# Patient Record
Sex: MALE | Race: WHITE | NOT HISPANIC OR LATINO | Employment: OTHER | URBAN - METROPOLITAN AREA
[De-identification: names, ages, dates, MRNs, and addresses within clinical notes are randomized per-mention and may not be internally consistent; named-entity substitution may affect disease eponyms.]

---

## 2017-01-17 ENCOUNTER — APPOINTMENT (OUTPATIENT)
Dept: LAB | Age: 67
End: 2017-01-17
Payer: MEDICARE

## 2017-01-17 ENCOUNTER — TRANSCRIBE ORDERS (OUTPATIENT)
Dept: ADMINISTRATIVE | Age: 67
End: 2017-01-17

## 2017-01-17 DIAGNOSIS — I35.9 AORTIC VALVE DISORDERS: ICD-10-CM

## 2017-01-17 DIAGNOSIS — I35.9 AORTIC VALVE DISORDERS: Primary | ICD-10-CM

## 2017-01-17 LAB
INR PPP: 3.36 (ref 0.86–1.16)
PROTHROMBIN TIME: 33.3 SECONDS (ref 12–14.3)

## 2017-01-17 PROCEDURE — 36415 COLL VENOUS BLD VENIPUNCTURE: CPT

## 2017-01-17 PROCEDURE — 85610 PROTHROMBIN TIME: CPT

## 2017-02-07 ENCOUNTER — TRANSCRIBE ORDERS (OUTPATIENT)
Dept: ADMINISTRATIVE | Age: 67
End: 2017-02-07

## 2017-02-07 ENCOUNTER — APPOINTMENT (OUTPATIENT)
Dept: LAB | Age: 67
End: 2017-02-07
Payer: MEDICARE

## 2017-02-07 DIAGNOSIS — I35.9 AORTIC VALVE DISORDERS: Primary | ICD-10-CM

## 2017-02-07 DIAGNOSIS — I35.9 AORTIC VALVE DISORDERS: ICD-10-CM

## 2017-02-07 LAB
INR PPP: 3.04 (ref 0.86–1.16)
PROTHROMBIN TIME: 30.9 SECONDS (ref 12–14.3)

## 2017-02-07 PROCEDURE — 85610 PROTHROMBIN TIME: CPT

## 2017-02-07 PROCEDURE — 36415 COLL VENOUS BLD VENIPUNCTURE: CPT

## 2017-03-13 ENCOUNTER — TRANSCRIBE ORDERS (OUTPATIENT)
Dept: ADMINISTRATIVE | Age: 67
End: 2017-03-13

## 2017-03-13 ENCOUNTER — APPOINTMENT (OUTPATIENT)
Dept: LAB | Age: 67
End: 2017-03-13
Payer: MEDICARE

## 2017-03-13 DIAGNOSIS — I35.9 AORTIC VALVE DISORDERS: ICD-10-CM

## 2017-03-13 DIAGNOSIS — I35.9 AORTIC VALVE DISORDERS: Primary | ICD-10-CM

## 2017-03-13 LAB
INR PPP: 3.24 (ref 0.86–1.16)
PROTHROMBIN TIME: 32.4 SECONDS (ref 12–14.3)

## 2017-03-13 PROCEDURE — 85610 PROTHROMBIN TIME: CPT

## 2017-03-13 PROCEDURE — 36415 COLL VENOUS BLD VENIPUNCTURE: CPT

## 2017-04-03 ENCOUNTER — TRANSCRIBE ORDERS (OUTPATIENT)
Dept: ADMINISTRATIVE | Age: 67
End: 2017-04-03

## 2017-04-03 ENCOUNTER — APPOINTMENT (OUTPATIENT)
Dept: LAB | Age: 67
End: 2017-04-03
Payer: MEDICARE

## 2017-04-03 DIAGNOSIS — Z12.5 SPECIAL SCREENING FOR MALIGNANT NEOPLASM OF PROSTATE: ICD-10-CM

## 2017-04-03 DIAGNOSIS — I35.9 AORTIC VALVE DISORDERS: ICD-10-CM

## 2017-04-03 DIAGNOSIS — E78.5 OTHER AND UNSPECIFIED HYPERLIPIDEMIA: ICD-10-CM

## 2017-04-03 DIAGNOSIS — E13.9 DIABETES MELLITUS OF OTHER TYPE WITHOUT COMPLICATION: ICD-10-CM

## 2017-04-03 DIAGNOSIS — E13.9 DIABETES MELLITUS OF OTHER TYPE WITHOUT COMPLICATION: Primary | ICD-10-CM

## 2017-04-03 LAB
ALBUMIN SERPL BCP-MCNC: 3.7 G/DL (ref 3.5–5)
ALP SERPL-CCNC: 65 U/L (ref 46–116)
ALT SERPL W P-5'-P-CCNC: 65 U/L (ref 12–78)
ANION GAP SERPL CALCULATED.3IONS-SCNC: 8 MMOL/L (ref 4–13)
AST SERPL W P-5'-P-CCNC: 45 U/L (ref 5–45)
BILIRUB SERPL-MCNC: 0.88 MG/DL (ref 0.2–1)
BUN SERPL-MCNC: 10 MG/DL (ref 5–25)
CALCIUM SERPL-MCNC: 8.6 MG/DL (ref 8.3–10.1)
CHLORIDE SERPL-SCNC: 101 MMOL/L (ref 100–108)
CHOLEST SERPL-MCNC: 133 MG/DL (ref 50–200)
CO2 SERPL-SCNC: 25 MMOL/L (ref 21–32)
CREAT SERPL-MCNC: 0.96 MG/DL (ref 0.6–1.3)
EST. AVERAGE GLUCOSE BLD GHB EST-MCNC: 183 MG/DL
GFR SERPL CREATININE-BSD FRML MDRD: >60 ML/MIN/1.73SQ M
GLUCOSE P FAST SERPL-MCNC: 173 MG/DL (ref 65–99)
HBA1C MFR BLD: 8 % (ref 4.2–6.3)
HDLC SERPL-MCNC: 27 MG/DL (ref 40–60)
INR PPP: 4.01 (ref 0.86–1.16)
LDLC SERPL CALC-MCNC: 48 MG/DL (ref 0–100)
POTASSIUM SERPL-SCNC: 4.2 MMOL/L (ref 3.5–5.3)
PROT SERPL-MCNC: 7.3 G/DL (ref 6.4–8.2)
PROTHROMBIN TIME: 38.1 SECONDS (ref 12–14.3)
PSA SERPL-MCNC: 1 NG/ML (ref 0–4)
SODIUM SERPL-SCNC: 134 MMOL/L (ref 136–145)
TRIGL SERPL-MCNC: 291 MG/DL

## 2017-04-03 PROCEDURE — 36415 COLL VENOUS BLD VENIPUNCTURE: CPT

## 2017-04-03 PROCEDURE — 80053 COMPREHEN METABOLIC PANEL: CPT

## 2017-04-03 PROCEDURE — 83036 HEMOGLOBIN GLYCOSYLATED A1C: CPT

## 2017-04-03 PROCEDURE — G0103 PSA SCREENING: HCPCS

## 2017-04-03 PROCEDURE — 80061 LIPID PANEL: CPT

## 2017-04-03 PROCEDURE — 85610 PROTHROMBIN TIME: CPT

## 2017-04-13 ENCOUNTER — TRANSCRIBE ORDERS (OUTPATIENT)
Dept: ADMINISTRATIVE | Facility: HOSPITAL | Age: 67
End: 2017-04-13

## 2017-04-13 DIAGNOSIS — D49.0 PAROTID TUMOR: Primary | ICD-10-CM

## 2017-04-20 ENCOUNTER — HOSPITAL ENCOUNTER (OUTPATIENT)
Dept: RADIOLOGY | Age: 67
Discharge: HOME | End: 2017-04-20
Payer: MEDICARE

## 2017-04-20 DIAGNOSIS — D49.0 PAROTID TUMOR: ICD-10-CM

## 2017-04-20 PROCEDURE — 70491 CT SOFT TISSUE NECK W/DYE: CPT

## 2017-04-20 RX ADMIN — IOHEXOL 85 ML: 350 INJECTION, SOLUTION INTRAVENOUS at 08:36

## 2017-05-10 ENCOUNTER — TRANSCRIBE ORDERS (OUTPATIENT)
Dept: ADMINISTRATIVE | Age: 67
End: 2017-05-10

## 2017-05-10 ENCOUNTER — APPOINTMENT (OUTPATIENT)
Dept: LAB | Age: 67
End: 2017-05-10
Payer: MEDICARE

## 2017-05-10 DIAGNOSIS — I35.9 AORTIC VALVE DISORDERS: ICD-10-CM

## 2017-05-10 DIAGNOSIS — I35.9 AORTIC VALVE DISORDERS: Primary | ICD-10-CM

## 2017-05-10 LAB
INR PPP: 3.09 (ref 0.86–1.16)
PROTHROMBIN TIME: 32.3 SECONDS (ref 12.1–14.4)

## 2017-05-10 PROCEDURE — 85610 PROTHROMBIN TIME: CPT

## 2017-05-10 PROCEDURE — 36415 COLL VENOUS BLD VENIPUNCTURE: CPT

## 2017-06-05 ENCOUNTER — TRANSCRIBE ORDERS (OUTPATIENT)
Dept: ADMINISTRATIVE | Age: 67
End: 2017-06-05

## 2017-06-05 ENCOUNTER — APPOINTMENT (OUTPATIENT)
Dept: LAB | Age: 67
End: 2017-06-05
Payer: MEDICARE

## 2017-06-05 DIAGNOSIS — I35.9 AORTIC VALVE DISORDERS: ICD-10-CM

## 2017-06-05 DIAGNOSIS — I35.9 AORTIC VALVE DISORDERS: Primary | ICD-10-CM

## 2017-06-05 LAB
INR PPP: 3.17 (ref 0.86–1.16)
PROTHROMBIN TIME: 33 SECONDS (ref 12.1–14.4)

## 2017-06-05 PROCEDURE — 36415 COLL VENOUS BLD VENIPUNCTURE: CPT

## 2017-06-05 PROCEDURE — 85610 PROTHROMBIN TIME: CPT

## 2017-07-06 ENCOUNTER — TRANSCRIBE ORDERS (OUTPATIENT)
Dept: ADMINISTRATIVE | Age: 67
End: 2017-07-06

## 2017-07-06 ENCOUNTER — APPOINTMENT (OUTPATIENT)
Dept: LAB | Age: 67
End: 2017-07-06
Payer: MEDICARE

## 2017-07-06 DIAGNOSIS — I35.9 AORTIC VALVE DISORDERS: Primary | ICD-10-CM

## 2017-07-06 DIAGNOSIS — I35.9 AORTIC VALVE DISORDERS: ICD-10-CM

## 2017-07-06 LAB
INR PPP: 2.81 (ref 0.86–1.16)
PROTHROMBIN TIME: 30 SECONDS (ref 12.1–14.4)

## 2017-07-06 PROCEDURE — 85610 PROTHROMBIN TIME: CPT

## 2017-07-06 PROCEDURE — 36415 COLL VENOUS BLD VENIPUNCTURE: CPT

## 2017-07-31 ENCOUNTER — TRANSCRIBE ORDERS (OUTPATIENT)
Dept: ADMINISTRATIVE | Age: 67
End: 2017-07-31

## 2017-07-31 ENCOUNTER — APPOINTMENT (OUTPATIENT)
Dept: LAB | Age: 67
End: 2017-07-31
Payer: MEDICARE

## 2017-07-31 DIAGNOSIS — E13.9 OTHER SPECIFIED DIABETES MELLITUS: ICD-10-CM

## 2017-07-31 DIAGNOSIS — R53.83 FATIGUE, UNSPECIFIED TYPE: ICD-10-CM

## 2017-07-31 DIAGNOSIS — R53.83 FATIGUE, UNSPECIFIED TYPE: Primary | ICD-10-CM

## 2017-07-31 DIAGNOSIS — E78.3 HYPERCHYLOMICRONEMIA: ICD-10-CM

## 2017-07-31 LAB
ALBUMIN SERPL BCP-MCNC: 3.9 G/DL (ref 3.5–5)
ALP SERPL-CCNC: 57 U/L (ref 46–116)
ALT SERPL W P-5'-P-CCNC: 44 U/L (ref 12–78)
ANION GAP SERPL CALCULATED.3IONS-SCNC: 8 MMOL/L (ref 4–13)
AST SERPL W P-5'-P-CCNC: 34 U/L (ref 5–45)
BASOPHILS # BLD AUTO: 0.08 THOUSANDS/ΜL (ref 0–0.1)
BASOPHILS NFR BLD AUTO: 1 % (ref 0–1)
BILIRUB SERPL-MCNC: 0.76 MG/DL (ref 0.2–1)
BUN SERPL-MCNC: 11 MG/DL (ref 5–25)
CALCIUM SERPL-MCNC: 9 MG/DL (ref 8.3–10.1)
CHLORIDE SERPL-SCNC: 102 MMOL/L (ref 100–108)
CHOLEST SERPL-MCNC: 149 MG/DL (ref 50–200)
CO2 SERPL-SCNC: 26 MMOL/L (ref 21–32)
CREAT SERPL-MCNC: 1.01 MG/DL (ref 0.6–1.3)
EOSINOPHIL # BLD AUTO: 0.42 THOUSAND/ΜL (ref 0–0.61)
EOSINOPHIL NFR BLD AUTO: 5 % (ref 0–6)
ERYTHROCYTE [DISTWIDTH] IN BLOOD BY AUTOMATED COUNT: 14.7 % (ref 11.6–15.1)
EST. AVERAGE GLUCOSE BLD GHB EST-MCNC: 169 MG/DL
GFR SERPL CREATININE-BSD FRML MDRD: 77 ML/MIN/1.73SQ M
GLUCOSE P FAST SERPL-MCNC: 142 MG/DL (ref 65–99)
HBA1C MFR BLD: 7.5 % (ref 4.2–6.3)
HCT VFR BLD AUTO: 46.5 % (ref 36.5–49.3)
HDLC SERPL-MCNC: 31 MG/DL (ref 40–60)
HGB BLD-MCNC: 16 G/DL (ref 12–17)
INR PPP: 3.32 (ref 0.86–1.16)
LDLC SERPL CALC-MCNC: 55 MG/DL (ref 0–100)
LYMPHOCYTES # BLD AUTO: 2 THOUSANDS/ΜL (ref 0.6–4.47)
LYMPHOCYTES NFR BLD AUTO: 25 % (ref 14–44)
MCH RBC QN AUTO: 32.1 PG (ref 26.8–34.3)
MCHC RBC AUTO-ENTMCNC: 34.4 G/DL (ref 31.4–37.4)
MCV RBC AUTO: 93 FL (ref 82–98)
MONOCYTES # BLD AUTO: 1.16 THOUSAND/ΜL (ref 0.17–1.22)
MONOCYTES NFR BLD AUTO: 14 % (ref 4–12)
NEUTROPHILS # BLD AUTO: 4.45 THOUSANDS/ΜL (ref 1.85–7.62)
NEUTS SEG NFR BLD AUTO: 55 % (ref 43–75)
NRBC BLD AUTO-RTO: 0 /100 WBCS
PLATELET # BLD AUTO: 307 THOUSANDS/UL (ref 149–390)
PMV BLD AUTO: 10.6 FL (ref 8.9–12.7)
POTASSIUM SERPL-SCNC: 4.2 MMOL/L (ref 3.5–5.3)
PROT SERPL-MCNC: 7.6 G/DL (ref 6.4–8.2)
PROTHROMBIN TIME: 34.2 SECONDS (ref 12.1–14.4)
RBC # BLD AUTO: 4.98 MILLION/UL (ref 3.88–5.62)
SODIUM SERPL-SCNC: 136 MMOL/L (ref 136–145)
T4 SERPL-MCNC: 8.3 UG/DL (ref 4.7–13.3)
TRIGL SERPL-MCNC: 313 MG/DL
TSH SERPL DL<=0.05 MIU/L-ACNC: 2 UIU/ML (ref 0.36–3.74)
WBC # BLD AUTO: 8.14 THOUSAND/UL (ref 4.31–10.16)

## 2017-07-31 PROCEDURE — 83036 HEMOGLOBIN GLYCOSYLATED A1C: CPT

## 2017-07-31 PROCEDURE — 80061 LIPID PANEL: CPT

## 2017-07-31 PROCEDURE — 84436 ASSAY OF TOTAL THYROXINE: CPT

## 2017-07-31 PROCEDURE — 80053 COMPREHEN METABOLIC PANEL: CPT

## 2017-07-31 PROCEDURE — 84443 ASSAY THYROID STIM HORMONE: CPT

## 2017-07-31 PROCEDURE — 85610 PROTHROMBIN TIME: CPT

## 2017-07-31 PROCEDURE — 36415 COLL VENOUS BLD VENIPUNCTURE: CPT

## 2017-07-31 PROCEDURE — 85025 COMPLETE CBC W/AUTO DIFF WBC: CPT

## 2017-08-29 ENCOUNTER — APPOINTMENT (OUTPATIENT)
Dept: LAB | Age: 67
End: 2017-08-29
Payer: MEDICARE

## 2017-08-29 ENCOUNTER — TRANSCRIBE ORDERS (OUTPATIENT)
Dept: ADMINISTRATIVE | Age: 67
End: 2017-08-29

## 2017-08-29 DIAGNOSIS — O22.30 DEEP PHLEBOTHROMBOSIS, ANTEPARTUM, WITH DELIVERY (HCC): Primary | ICD-10-CM

## 2017-08-29 DIAGNOSIS — I82.409 DEEP PHLEBOTHROMBOSIS, ANTEPARTUM, WITH DELIVERY (HCC): Primary | ICD-10-CM

## 2017-08-29 DIAGNOSIS — O22.30 DEEP PHLEBOTHROMBOSIS, ANTEPARTUM, WITH DELIVERY (HCC): ICD-10-CM

## 2017-08-29 DIAGNOSIS — I82.409 DEEP PHLEBOTHROMBOSIS, ANTEPARTUM, WITH DELIVERY (HCC): ICD-10-CM

## 2017-08-29 LAB
INR PPP: 2.91 (ref 0.86–1.16)
PROTHROMBIN TIME: 30.8 SECONDS (ref 12.1–14.4)

## 2017-08-29 PROCEDURE — 85610 PROTHROMBIN TIME: CPT

## 2017-08-29 PROCEDURE — 36415 COLL VENOUS BLD VENIPUNCTURE: CPT

## 2017-09-25 ENCOUNTER — APPOINTMENT (OUTPATIENT)
Dept: LAB | Age: 67
End: 2017-09-25
Payer: MEDICARE

## 2017-09-25 ENCOUNTER — TRANSCRIBE ORDERS (OUTPATIENT)
Dept: ADMINISTRATIVE | Age: 67
End: 2017-09-25

## 2017-09-25 DIAGNOSIS — I48.91 ATRIAL FIBRILLATION, UNSPECIFIED TYPE (HCC): ICD-10-CM

## 2017-09-25 DIAGNOSIS — I48.91 ATRIAL FIBRILLATION, UNSPECIFIED TYPE (HCC): Primary | ICD-10-CM

## 2017-09-25 LAB
INR PPP: 3.18 (ref 0.86–1.16)
PROTHROMBIN TIME: 33.1 SECONDS (ref 12.1–14.4)

## 2017-09-25 PROCEDURE — 36415 COLL VENOUS BLD VENIPUNCTURE: CPT

## 2017-09-25 PROCEDURE — 85610 PROTHROMBIN TIME: CPT

## 2017-10-10 RX ORDER — FOLIC ACID/MULTIVIT,IRON,MINER .4-18-35
1 TABLET,CHEWABLE ORAL DAILY
COMMUNITY
End: 2019-09-18

## 2017-10-10 RX ORDER — TRIAMTERENE AND HYDROCHLOROTHIAZIDE 37.5; 25 MG/1; MG/1
1 CAPSULE ORAL EVERY MORNING
COMMUNITY
End: 2018-12-03 | Stop reason: SDUPTHER

## 2017-10-10 RX ORDER — WARFARIN SODIUM 2 MG/1
TABLET ORAL
Status: ON HOLD | COMMUNITY
End: 2017-10-18

## 2017-10-10 RX ORDER — OMEPRAZOLE 20 MG/1
20 CAPSULE, DELAYED RELEASE ORAL DAILY
COMMUNITY
End: 2019-01-15 | Stop reason: SDUPTHER

## 2017-10-10 RX ORDER — ASPIRIN 81 MG/1
81 TABLET ORAL DAILY
COMMUNITY

## 2017-10-10 RX ORDER — LISINOPRIL 20 MG/1
20 TABLET ORAL DAILY
COMMUNITY
End: 2019-03-08 | Stop reason: SDUPTHER

## 2017-10-10 RX ORDER — FENOFIBRATE 134 MG/1
134 CAPSULE ORAL
COMMUNITY
End: 2019-05-28 | Stop reason: SDUPTHER

## 2017-10-10 RX ORDER — SIMVASTATIN 40 MG
40 TABLET ORAL
COMMUNITY
End: 2019-04-04 | Stop reason: SDUPTHER

## 2017-10-10 NOTE — PRE-PROCEDURE INSTRUCTIONS
Pre-Surgery Instructions:   Medication Instructions    aspirin (ECOTRIN LOW STRENGTH) 81 mg EC tablet Instructed patient per Anesthesia Guidelines   fenofibrate micronized (LOFIBRA) 134 MG capsule Instructed patient per Anesthesia Guidelines   lisinopril (ZESTRIL) 20 mg tablet Instructed patient per Anesthesia Guidelines   metFORMIN (GLUCOPHAGE) 1000 MG tablet Patient was instructed to contact Physician for medication instruction   multivitamin-iron-minerals-folic acid (CENTRUM) chewable tablet Instructed patient per Anesthesia Guidelines   omeprazole (PriLOSEC) 20 mg delayed release capsule Instructed patient per Anesthesia Guidelines   simvastatin (ZOCOR) 40 mg tablet Instructed patient per Anesthesia Guidelines   triamterene-hydrochlorothiazide (DYAZIDE) 37 5-25 mg per capsule Instructed patient per Anesthesia Guidelines   warfarin (COUMADIN) 2 mg tablet Patient was instructed by Physician and understands      Bathing instructions given

## 2017-10-11 ENCOUNTER — TELEPHONE (OUTPATIENT)
Dept: PREADMISSION TESTING | Facility: HOSPITAL | Age: 67
End: 2017-10-11

## 2017-10-12 ENCOUNTER — TRANSCRIBE ORDERS (OUTPATIENT)
Dept: RADIOLOGY | Facility: CLINIC | Age: 67
End: 2017-10-12

## 2017-10-12 ENCOUNTER — APPOINTMENT (OUTPATIENT)
Dept: PREADMISSION TESTING | Facility: HOSPITAL | Age: 67
End: 2017-10-12
Payer: MEDICARE

## 2017-10-12 ENCOUNTER — APPOINTMENT (OUTPATIENT)
Dept: LAB | Facility: CLINIC | Age: 67
End: 2017-10-12
Payer: MEDICARE

## 2017-10-12 VITALS
HEART RATE: 86 BPM | WEIGHT: 210 LBS | DIASTOLIC BLOOD PRESSURE: 72 MMHG | TEMPERATURE: 98.2 F | HEIGHT: 71 IN | SYSTOLIC BLOOD PRESSURE: 129 MMHG | BODY MASS INDEX: 29.4 KG/M2

## 2017-10-12 DIAGNOSIS — D49.0 NEOPLASM OF PAROTID GLAND: ICD-10-CM

## 2017-10-12 DIAGNOSIS — D49.0 NEOPLASM OF PAROTID GLAND: Primary | ICD-10-CM

## 2017-10-12 LAB
ANION GAP SERPL CALCULATED.3IONS-SCNC: 9 MMOL/L (ref 4–13)
BASOPHILS # BLD AUTO: 0.06 THOUSANDS/ΜL (ref 0–0.1)
BASOPHILS NFR BLD AUTO: 1 % (ref 0–1)
BUN SERPL-MCNC: 17 MG/DL (ref 5–25)
CALCIUM SERPL-MCNC: 9.6 MG/DL (ref 8.3–10.1)
CHLORIDE SERPL-SCNC: 101 MMOL/L (ref 100–108)
CO2 SERPL-SCNC: 24 MMOL/L (ref 21–32)
CREAT SERPL-MCNC: 0.98 MG/DL (ref 0.6–1.3)
EOSINOPHIL # BLD AUTO: 0.28 THOUSAND/ΜL (ref 0–0.61)
EOSINOPHIL NFR BLD AUTO: 3 % (ref 0–6)
ERYTHROCYTE [DISTWIDTH] IN BLOOD BY AUTOMATED COUNT: 14.3 % (ref 11.6–15.1)
GFR SERPL CREATININE-BSD FRML MDRD: 80 ML/MIN/1.73SQ M
GLUCOSE SERPL-MCNC: 128 MG/DL (ref 65–140)
HCT VFR BLD AUTO: 46.9 % (ref 36.5–49.3)
HGB BLD-MCNC: 15.5 G/DL (ref 12–17)
LYMPHOCYTES # BLD AUTO: 2.22 THOUSANDS/ΜL (ref 0.6–4.47)
LYMPHOCYTES NFR BLD AUTO: 25 % (ref 14–44)
MCH RBC QN AUTO: 30.7 PG (ref 26.8–34.3)
MCHC RBC AUTO-ENTMCNC: 33 G/DL (ref 31.4–37.4)
MCV RBC AUTO: 93 FL (ref 82–98)
MONOCYTES # BLD AUTO: 1.03 THOUSAND/ΜL (ref 0.17–1.22)
MONOCYTES NFR BLD AUTO: 12 % (ref 4–12)
NEUTROPHILS # BLD AUTO: 5.26 THOUSANDS/ΜL (ref 1.85–7.62)
NEUTS SEG NFR BLD AUTO: 59 % (ref 43–75)
PLATELET # BLD AUTO: 314 THOUSANDS/UL (ref 149–390)
PMV BLD AUTO: 9.7 FL (ref 8.9–12.7)
POTASSIUM SERPL-SCNC: 4.1 MMOL/L (ref 3.5–5.3)
RBC # BLD AUTO: 5.05 MILLION/UL (ref 3.88–5.62)
SODIUM SERPL-SCNC: 134 MMOL/L (ref 136–145)
WBC # BLD AUTO: 8.85 THOUSAND/UL (ref 4.31–10.16)

## 2017-10-12 PROCEDURE — 80048 BASIC METABOLIC PNL TOTAL CA: CPT

## 2017-10-12 PROCEDURE — 85025 COMPLETE CBC W/AUTO DIFF WBC: CPT

## 2017-10-12 PROCEDURE — 36415 COLL VENOUS BLD VENIPUNCTURE: CPT

## 2017-10-12 NOTE — H&P
Pre-Adm History & Physical    Crystal Montero    78 y o   male  [id number]  DOS: 10-18-17  Provider: W Romayne Katherne Bade, PA-C  Surgeon:  Analilia Garner  Date: October 12, 2017    Assessment:  Right parotid mass  Diagnosis: Right parotid neoplasm    Plan:  Crystal Montero is scheduled for Right parotidectomy on 10/18/17 with [facility]  Testing from today's PAT visit will be reviewed prior to Anesthesia review DOS  CC: parotid mass    HPI  79 yo male noticed parotid mass on right side in April and had CT scan  Pt followed up with his family doctor several months later and was told results of report  Pt went to see Dr Analilia Garner who recommended surgery  Pt previously had left parotid removed several years ago    Historical Information   Past Medical History:   Diagnosis Date    Diabetes mellitus ([address] )     type 2    Diabetic neuropathic arthropathy (HCC)     causes weakness in LE and uses a cane as assist to walk    GERD (gastroesophageal reflux disease)     History of DVT (deep vein thrombosis) 2015    left LE    Hyperlipidemia     Spinal stenosis      Past Surgical History:   Procedure Laterality Date    COLONOSCOPY      PAROTIDECTOMY Left     ROTATOR CUFF REPAIR Bilateral     TONSILLECTOMY       Social History   History   Alcohol Use    Yes     Comment: socially     History   Drug Use No     History   Smoking Status    Current Every Day Smoker    Types: Pipe   Smokeless Tobacco    Never Used     Comment: occ cigarette     Family History   Problem Relation Age of Onset    Lupus Mother     Cirrhosis Father         Meds/Allergies   No Known Allergies    Current Outpatient Prescriptions:     aspirin (ECOTRIN LOW STRENGTH) 81 mg EC tablet, Take 81 mg by mouth daily, Disp: , Rfl:     cyclobenzaprine (FLEXERIL) 10 mg tablet, Take 1 tablet by mouth 3 (three) times a day as needed for muscle spasms, Disp: 21 tablet, Rfl: 0    fenofibrate micronized (LOFIBRA) 134 MG capsule, Take 134 mg by mouth every morning before breakfast, Disp: , Rfl:     HYDROcodone-acetaminophen (NORCO) 5-325 mg per tablet, Take 1 tablet by mouth every 6 (six) hours as needed for pain Max Daily Amount: 4 tablets, Disp: 20 tablet, Rfl: 0    lisinopril (ZESTRIL) 20 mg tablet, Take 20 mg by mouth daily, Disp: , Rfl:     metFORMIN (GLUCOPHAGE) 1000 MG tablet, Take 1,000 mg by mouth 2 (two) times a day with meals, Disp: , Rfl:     multivitamin-iron-minerals-folic acid (CENTRUM) chewable tablet, Chew 1 tablet daily, Disp: , Rfl:     omeprazole (PriLOSEC) 20 mg delayed release capsule, Take 20 mg by mouth daily, Disp: , Rfl:     simvastatin (ZOCOR) 40 mg tablet, Take 40 mg by mouth daily at bedtime, Disp: , Rfl:     triamterene-hydrochlorothiazide (DYAZIDE) 37 5-25 mg per capsule, Take 1 capsule by mouth every morning, Disp: , Rfl:     warfarin (COUMADIN) 2 mg tablet, Take by mouth daily T-Th-S-S takes 2 5mg    M-W-F takes 5 0 mg , Disp: , Rfl:     Review of Systems  Pt complains of back pain, bilat feet numbness, right foot weakness  Vitals:    10/12/17 1212   BP: 129/72   Pulse: 86   Temp: 98 2 °F (36 8 °C)     Physical Exam  GEN: NAD, A+OX3   HEENT: Normocephalic, atraumatic, PER EOMI, sclera anicteric, TM grey and intact B/L, Nares Patent, Septum midline, oropharynx normal   NECK: Supple with FROM, trachea midline, No adenopathy  Right parotid mass palpated  , Thyroid normal to palpation, No bruits  CARDIAC: regular rate & rhythm, S1 & S2 normal   No heaves, thrills, gallops or murmurs  LUNGS: Clear to auscultation, No Wheeze, Rales, or Rhonchi  ABDOMEN: flat, BS X 4, soft, Non-tender to palpation, No masses, no organomegaly, No R/R/G  EXTREMITIES: No evidence of cyanosis, clubbing or edema   Pulses +2 B/L LE, decreased sensation bilat lower extremities  NEURO: CN II-XII intact grossly, No sensory or motor deficits    Lab Results: pending    Lab Results   Component Value Date    GLUCOSE 153 (H) 11/15/2016    CALCIUM 9 0 07/31/2017     07/31/2017    K 4 2 07/31/2017    CO2 26 07/31/2017     07/31/2017    BUN 11 07/31/2017    CREATININE 1 01 07/31/2017     Lab Results   Component Value Date    WBC 8 14 07/31/2017    HGB 16 0 07/31/2017    HCT 46 5 07/31/2017    MCV 93 07/31/2017     07/31/2017     Lab Results   Component Value Date    ALT 44 07/31/2017    AST 34 07/31/2017    ALKPHOS 57 07/31/2017    BILITOT 0 76 07/31/2017

## 2017-10-17 ENCOUNTER — ANESTHESIA EVENT (OUTPATIENT)
Dept: PERIOP | Facility: HOSPITAL | Age: 67
End: 2017-10-17
Payer: MEDICARE

## 2017-10-18 ENCOUNTER — ANESTHESIA (OUTPATIENT)
Dept: PERIOP | Facility: HOSPITAL | Age: 67
End: 2017-10-18
Payer: MEDICARE

## 2017-10-18 ENCOUNTER — HOSPITAL ENCOUNTER (OUTPATIENT)
Facility: HOSPITAL | Age: 67
Setting detail: OUTPATIENT SURGERY
Discharge: HOME | End: 2017-10-18
Attending: SPECIALIST | Admitting: SPECIALIST
Payer: MEDICARE

## 2017-10-18 VITALS
DIASTOLIC BLOOD PRESSURE: 75 MMHG | OXYGEN SATURATION: 93 % | TEMPERATURE: 98.5 F | BODY MASS INDEX: 29.4 KG/M2 | RESPIRATION RATE: 16 BRPM | WEIGHT: 210 LBS | HEIGHT: 71 IN | SYSTOLIC BLOOD PRESSURE: 157 MMHG | HEART RATE: 86 BPM

## 2017-10-18 DIAGNOSIS — D49.0 PAROTID NEOPLASM: ICD-10-CM

## 2017-10-18 PROBLEM — D37.030 NEOPLASM OF UNCERTAIN BEHAVIOR OF PAROTID GLAND: Status: ACTIVE | Noted: 2017-10-18

## 2017-10-18 PROCEDURE — 88307 TISSUE EXAM BY PATHOLOGIST: CPT | Performed by: SPECIALIST

## 2017-10-18 RX ORDER — ONDANSETRON 2 MG/ML
INJECTION INTRAMUSCULAR; INTRAVENOUS AS NEEDED
Status: DISCONTINUED | OUTPATIENT
Start: 2017-10-18 | End: 2017-10-18 | Stop reason: SURG

## 2017-10-18 RX ORDER — LIDOCAINE HYDROCHLORIDE AND EPINEPHRINE 10; 10 MG/ML; UG/ML
INJECTION, SOLUTION INFILTRATION; PERINEURAL AS NEEDED
Status: DISCONTINUED | OUTPATIENT
Start: 2017-10-18 | End: 2017-10-18 | Stop reason: HOSPADM

## 2017-10-18 RX ORDER — FENTANYL CITRATE 50 UG/ML
INJECTION, SOLUTION INTRAMUSCULAR; INTRAVENOUS AS NEEDED
Status: DISCONTINUED | OUTPATIENT
Start: 2017-10-18 | End: 2017-10-18 | Stop reason: SURG

## 2017-10-18 RX ORDER — PROPOFOL 10 MG/ML
INJECTION, EMULSION INTRAVENOUS AS NEEDED
Status: DISCONTINUED | OUTPATIENT
Start: 2017-10-18 | End: 2017-10-18 | Stop reason: SURG

## 2017-10-18 RX ORDER — ACETAMINOPHEN 325 MG/1
TABLET ORAL
Qty: 30 TABLET | Refills: 0
Start: 2017-10-18

## 2017-10-18 RX ORDER — ACETAMINOPHEN 325 MG/1
650 TABLET ORAL EVERY 6 HOURS PRN
Status: DISCONTINUED | OUTPATIENT
Start: 2017-10-18 | End: 2017-10-18 | Stop reason: HOSPADM

## 2017-10-18 RX ORDER — FENTANYL CITRATE/PF 50 MCG/ML
25 SYRINGE (ML) INJECTION
Status: COMPLETED | OUTPATIENT
Start: 2017-10-18 | End: 2017-10-18

## 2017-10-18 RX ORDER — WARFARIN SODIUM 2.5 MG/1
2.5 TABLET ORAL
Start: 2017-10-20

## 2017-10-18 RX ORDER — LIDOCAINE HYDROCHLORIDE 10 MG/ML
INJECTION, SOLUTION INFILTRATION; PERINEURAL AS NEEDED
Status: DISCONTINUED | OUTPATIENT
Start: 2017-10-18 | End: 2017-10-18 | Stop reason: SURG

## 2017-10-18 RX ORDER — SODIUM CHLORIDE, SODIUM LACTATE, POTASSIUM CHLORIDE, CALCIUM CHLORIDE 600; 310; 30; 20 MG/100ML; MG/100ML; MG/100ML; MG/100ML
INJECTION, SOLUTION INTRAVENOUS CONTINUOUS PRN
Status: DISCONTINUED | OUTPATIENT
Start: 2017-10-18 | End: 2017-10-18 | Stop reason: SURG

## 2017-10-18 RX ORDER — HYDROCODONE BITARTRATE AND ACETAMINOPHEN 5; 325 MG/1; MG/1
2 TABLET ORAL EVERY 6 HOURS PRN
Qty: 20 TABLET | Refills: 0 | Status: SHIPPED | OUTPATIENT
Start: 2017-10-18 | End: 2017-10-28

## 2017-10-18 RX ORDER — METOCLOPRAMIDE HYDROCHLORIDE 5 MG/ML
10 INJECTION INTRAMUSCULAR; INTRAVENOUS ONCE AS NEEDED
Status: DISCONTINUED | OUTPATIENT
Start: 2017-10-18 | End: 2017-10-18 | Stop reason: HOSPADM

## 2017-10-18 RX ORDER — SODIUM CHLORIDE, SODIUM LACTATE, POTASSIUM CHLORIDE, CALCIUM CHLORIDE 600; 310; 30; 20 MG/100ML; MG/100ML; MG/100ML; MG/100ML
75 INJECTION, SOLUTION INTRAVENOUS CONTINUOUS
Status: DISCONTINUED | OUTPATIENT
Start: 2017-10-18 | End: 2017-10-18 | Stop reason: HOSPADM

## 2017-10-18 RX ORDER — ROCURONIUM BROMIDE 10 MG/ML
INJECTION, SOLUTION INTRAVENOUS AS NEEDED
Status: DISCONTINUED | OUTPATIENT
Start: 2017-10-18 | End: 2017-10-18 | Stop reason: SURG

## 2017-10-18 RX ORDER — MORPHINE SULFATE 10 MG/ML
4 INJECTION, SOLUTION INTRAMUSCULAR; INTRAVENOUS EVERY 4 HOURS PRN
Status: DISCONTINUED | OUTPATIENT
Start: 2017-10-18 | End: 2017-10-18 | Stop reason: HOSPADM

## 2017-10-18 RX ORDER — SUCCINYLCHOLINE/SOD CL,ISO/PF 100 MG/5ML
SYRINGE (ML) INTRAVENOUS AS NEEDED
Status: DISCONTINUED | OUTPATIENT
Start: 2017-10-18 | End: 2017-10-18 | Stop reason: SURG

## 2017-10-18 RX ORDER — MIDAZOLAM HYDROCHLORIDE 1 MG/ML
INJECTION INTRAMUSCULAR; INTRAVENOUS AS NEEDED
Status: DISCONTINUED | OUTPATIENT
Start: 2017-10-18 | End: 2017-10-18 | Stop reason: SURG

## 2017-10-18 RX ORDER — ONDANSETRON 2 MG/ML
4 INJECTION INTRAMUSCULAR; INTRAVENOUS ONCE AS NEEDED
Status: DISCONTINUED | OUTPATIENT
Start: 2017-10-18 | End: 2017-10-18 | Stop reason: HOSPADM

## 2017-10-18 RX ORDER — HYDROCODONE BITARTRATE AND ACETAMINOPHEN 5; 325 MG/1; MG/1
2 TABLET ORAL EVERY 6 HOURS PRN
Status: DISCONTINUED | OUTPATIENT
Start: 2017-10-18 | End: 2017-10-18 | Stop reason: HOSPADM

## 2017-10-18 RX ADMIN — DEXAMETHASONE SODIUM PHOSPHATE 10 MG: 10 INJECTION INTRAMUSCULAR; INTRAVENOUS at 10:12

## 2017-10-18 RX ADMIN — FENTANYL CITRATE 100 MCG: 50 INJECTION INTRAMUSCULAR; INTRAVENOUS at 10:01

## 2017-10-18 RX ADMIN — FENTANYL CITRATE 25 MCG: 50 INJECTION INTRAMUSCULAR; INTRAVENOUS at 13:15

## 2017-10-18 RX ADMIN — PROPOFOL 200 MG: 10 INJECTION, EMULSION INTRAVENOUS at 10:01

## 2017-10-18 RX ADMIN — ONDANSETRON 4 MG: 2 INJECTION INTRAMUSCULAR; INTRAVENOUS at 12:09

## 2017-10-18 RX ADMIN — FENTANYL CITRATE 25 MCG: 50 INJECTION INTRAMUSCULAR; INTRAVENOUS at 13:20

## 2017-10-18 RX ADMIN — FENTANYL CITRATE 25 MCG: 50 INJECTION INTRAMUSCULAR; INTRAVENOUS at 13:05

## 2017-10-18 RX ADMIN — LIDOCAINE HYDROCHLORIDE 50 MG: 10 INJECTION, SOLUTION INFILTRATION; PERINEURAL at 10:01

## 2017-10-18 RX ADMIN — REMIFENTANIL HYDROCHLORIDE 0.2 MCG/KG/MIN: 1 INJECTION, POWDER, LYOPHILIZED, FOR SOLUTION INTRAVENOUS at 10:20

## 2017-10-18 RX ADMIN — Medication 100 MG: at 10:01

## 2017-10-18 RX ADMIN — MIDAZOLAM HYDROCHLORIDE 2 MG: 1 INJECTION, SOLUTION INTRAMUSCULAR; INTRAVENOUS at 09:52

## 2017-10-18 RX ADMIN — HYDROCODONE BITARTRATE AND ACETAMINOPHEN 2 TABLET: 5; 325 TABLET ORAL at 14:00

## 2017-10-18 RX ADMIN — FENTANYL CITRATE 25 MCG: 50 INJECTION INTRAMUSCULAR; INTRAVENOUS at 13:10

## 2017-10-18 RX ADMIN — CEFAZOLIN SODIUM 2000 MG: 2 SOLUTION INTRAVENOUS at 10:09

## 2017-10-18 RX ADMIN — SODIUM CHLORIDE, SODIUM LACTATE, POTASSIUM CHLORIDE, AND CALCIUM CHLORIDE: .6; .31; .03; .02 INJECTION, SOLUTION INTRAVENOUS at 09:52

## 2017-10-18 RX ADMIN — ROCURONIUM BROMIDE 5 MG: 10 INJECTION, SOLUTION INTRAVENOUS at 10:01

## 2017-10-18 NOTE — OP NOTE
OPERATIVE REPORT  PATIENT NAME: Crystal Montero    :  1950  MRN: [id number]  Pt Location: [facility] OR ROOM 03    SURGERY DATE: 10/18/2017    Surgeon(s) and Role:     * Jesse Suárez MD - Primary     * Taylro Gonzales MD - Assisting    Preop Diagnosis:  Parotid neoplasm [D49 0], right    Post-Op Diagnosis Codes:     * Parotid neoplasm [D49 0], right    Procedure(s):  SUPERFICIAL PAROTIDECTOMY WITH NERVE DISSECTION AND PRESERVATION (Right)  EXCISION PAROTID TUMOR, DEEP LOBE  RIGHT FACIAL NERVE MONITORING 2 HOURS  Specimen(s):  ID Type Source Tests Collected by Time Destination   1 : Superficial Lobe Right Parotid gland Tissue Parotid gland TISSUE Leslie  Jesse Suárez MD 10/18/2017 1157    2 : Additional deep lobe tissue right Tissue Parotid gland TISSUE EXAM Jesse Suárez MD 10/18/2017 1201        Estimated Blood Loss:   Minimal    Drains:  7 mm LEYDI drain    Anesthesia Type:   General    Operative Indications:  78year old with history of Warthin's tumor, left parotid gland, previously resected, now with progression of multiple masses within the right parotid gland  As such, removal via superficial parotidectomy with nerve dissection and preservation is indicated  Operative Findings:  Multiple masses in inferior aspect of parotid gland, including a separate mass in deep lobe, just deep to buccal branches of facial nerve, removed  Facial nerve identified, dissected free, preserved  Complications:   None    Procedure and Technique:  El Ch was positively identified and transferred onto the operating table in the supine position  Appropriate monitoring devices were put in place, anesthesia was induced and the patient was intubated without difficulty  The operating room table was turned 90 degrees, and the patients right neck was examined  Palpation confirmed the presence of multiple masses in the inferior aspect of the parotid gland   An appropriate site for skin incision was demarcated anterior to the right ear, curving beneath and behind the lobule of the ear, down into the neck in curvilinear fashion  Before proceeding further, the timeout process was completed  Local anesthesia in the form of 1% lidocaine with 1/100,000 epinephrine was then injected to the marked site  Electrodes for facial nerve monitoring were put in place and monitoring was noted to be functioning appropriately  The patients neck was then prepped and draped in the usual sterile fashion  Skin incision was then made at the marked site using a 15 blade, which was used to continue dissection through subcutaneous tissues  A superficial skin flap was elevated using Bovie cautery, and this flap was held in a retracted position using 2-0 silk stitches  Dissection then continued down to the sternocleidomastoid muscle posteriorly using Bovie cautery  Superiorly dissection continued anterior to auricular cartilage down to the depth of the tympanomastoid suture line using Bovie cautery  The tail of the parotid gland was grasped using Allis forceps, and dissection continued by elevating the gland off the sternocleidomastoid muscle using mosquito forceps and bipolar cautery  Dissection continued down to the digastric muscle, and using the depth of the muscle as a landmark, dissection continued through parotid tissue using mosquito forceps and bipolar cautery, and the main trunk of the facial nerve was identified  Dissection continued distally along the main trunk and branches of the facial nerve using mosquito forceps and bipolar cautery  Dissection continued through parotid tissue anteriorly using mosquito forceps and bipolar cautery, and the superficial lobe of the parotid gland, with multiple masses  was removed, and sent to pathology for permanent section evaluation  The surgical site was irrigated with saline, which was suctioned free, and hemomstasis was accomplished using bipolar cautery      An additional tumor was evident deep to the buccal branch of the facial nerve  The buccal branch was carefully elevated off the mass using mosquito forceps and tenotomy scissors  Dissection was carried out around the mass using mosquito forceps and bipolar cautery, and this additional tumor was sent to pathology for permanent section evaluation  The surgical site was again irrigated with saline, which was suctioned free, and hemomstasis was accomplished using bipolar cautery  A 7  Tico Rink drain was placed through a separate stab incision  The surgical site was closed by re-approximating deeper tissue using 3-0 Vicryl and 4-0 Monocryl stitches  Skin itself was closed using a 4-0 Nylon stitch in a running fashion followed by Bacitracin ointment  Drapes were removed, and anesthesia was reversed  The patient was awakened, extubated and taken to the recovery room in stable condition  All counts were correct at the end of the case, and no complications were encountered       I was present for the entire procedure    Patient Disposition:  PACU  and extubated and stable    SIGNATURE: Guillermina Dee MD  DATE: October 18, 2017  TIME: 12:35 PM

## 2017-10-18 NOTE — DISCHARGE INSTRUCTIONS
Parotidectomy   WHAT YOU NEED TO KNOW:   A parotidectomy is surgery to remove part or all of your parotid gland  Your parotid glands are found in your cheeks, over your jaw, and in front of your ears  They release saliva into your mouth through the parotid duct  Saliva helps break down food and protect your teeth  DISCHARGE INSTRUCTIONS:   Medicines:   · Prescription pain medicine  may be given  Ask your healthcare provider how to take this medicine safely  · Take your medicine as directed  Contact your healthcare provider if you think your medicine is not helping or if you have side effects  Tell him if you are allergic to any medicine  Keep a list of the medicines, vitamins, and herbs you take  Include the amounts, and when and why you take them  Bring the list or the pill bottles to follow-up visits  Carry your medicine list with you in case of an emergency  Follow up with your healthcare provider or surgeon as directed: You may need to return to have your stitches removed or imaging tests done  Write down your questions so you remember to ask them during your visits  Wound care:  Care for your wound as directed  You may need to carefully wash the wound with soap and water  Dry the area and put on new, clean bandages as directed  Change your bandages when they get wet or dirty  Eat a variety of healthy foods:  Healthy foods include fruits, vegetables, whole-grain breads, low-fat dairy products, beans, lean meats, and fish  Ask if you need to be on a special diet  Drink liquids as directed:  Ask your healthcare provider how much liquid to drink each day and which liquids are best for you  This will help flush your mouth and keep your saliva flowing  Contact your healthcare provider or surgeon if:   · You have a fever  · Your wound is red, swollen, or draining pus  · Your pain does not get better, even after you take medicine  · Your face becomes red and sweaty when you eat      · You have new trouble chewing  · Your mouth and eyes are very dry  · You feel a new lump in your face or neck  · You have questions or concerns about your condition or care  Seek care immediately or call 911 if:   · Blood soaks through your bandage  · You have trouble swallowing  · You have sudden numbness in your face  · You have severe pain  · You cannot move part of your face  © 2017 [employer] Information is for End User's use only and may not be sold, redistributed or otherwise used for commercial purposes  All illustrations and images included in CareNotes® are the copyrighted property of [employer]  or [employer]  The above information is an  only  It is not intended as medical advice for individual conditions or treatments  Talk to your doctor, nurse or pharmacist before following any medical regimen to see if it is safe and effective for you  Salbador-Crain Drain Care   WHAT YOU NEED TO KNOW:   A Salbador-Crain (LEYDI) drain is used to remove fluids that build up in an area of your body after surgery  The LEYDI drain is a bulb shaped device connected to a tube  One end of the tube is placed inside you during surgery  The other end comes out through a small cut in your skin  The bulb is connected to this end  You may have a stitch to hold the tube in place  DISCHARGE INSTRUCTIONS:   Return to the emergency department if:   · Your LEYDI drain breaks or comes out  · You have cloudy yellow or brown drainage from your LEYDI drain site, or the drainage smells bad  Contact your healthcare provider if:   · You drain less than 30 milliliters (2 tablespoons) in 24 hours  This may mean your drain can be removed  · You suddenly stop draining fluid or think your LEYDI drain is blocked  · You have a fever higher than 101 5°F (38 6°C)  · You have increased pain, redness, or swelling around the drain site       · You have questions about your LEYDI drain care   How a Salbador-Crain drain works: The LEYDI drain removes fluids by creating suction in the tube  The bulb is squeezed flat and connected to the tube that sticks out of your body  The bulb expands as it fills with fluid  How to change the bandage around your Salbador-Crain drain:  If you have a bandage, change it once a day  You may need to change your bandage more than once a day if it gets completely wet  · Wash your hands with soap and water  · Loosen the tape and gently remove the old bandage  Throw the old bandage into a plastic trash bag  · Use soap and water or saline solution to clean your LEYDI drain site  Dip a cotton swab or gauze pad in the solution and gently clean your skin  · Pat the area dry  · Place a new bandage on your LEYDI drain site and secure it to your skin with surgical tape  · Wash your hands  How to empty the Salbador-Crain drain:  Empty the bulb when it is half full or every 8 to 12 hours  · Wash your hands with soap and water  · Remove the plug from the bulb  · Pour the fluid into a measuring cup  · Clean the plug with an alcohol swab or a cotton ball dipped in rubbing alcohol  · Squeeze the bulb flat and put the plug back in  The bulb should stay flat until it starts to fill with fluid again  · Measure the amount of fluid you pour out  Write down how much fluid you empty from the LEYDI drain and the date and time you collected it  · Flush the fluid down the toilet  Wash your hands  Clear clogged tubing:  Use the following steps to clear your Salbador-Crain tubing:  · Hold the tubing between your thumb and first finger at the place closest to your skin  This hand will prevent the tube from being pulled out of your skin  · Use your other thumb and first finger to slide the clog down the tubing toward the bulb  You may have to repeat the sliding until the tubing is unclogged    Salbador-Crain drain removal:  The amount of fluid that you drain will decrease as your wound heals  The LEYDI drain usually is removed when less than 30 milliliters (2 tablespoons) is collected in 24 hours  Ask your healthcare provider when and how your LEYDI drain will be removed  Follow up with your healthcare provider as directed:  Write down your questions so you remember to ask them during your visits  © 2017 [address] [employer] Information is for End User's use only and may not be sold, redistributed or otherwise used for commercial purposes  All illustrations and images included in CareNotes® are the copyrighted property of [employer]  or [employer]  The above information is an  only  It is not intended as medical advice for individual conditions or treatments  Talk to your doctor, nurse or pharmacist before following any medical regimen to see if it is safe and effective for you  Call Dr Jimmie Vela with any questions or concerns: office ; mobile  (urgent issues)

## 2017-10-18 NOTE — ANESTHESIA POSTPROCEDURE EVALUATION
Post-Op Assessment Note      CV Status:  Stable    Mental Status:  Alert and awake    Hydration Status:  Euvolemic    PONV Controlled:  Controlled    Airway Patency:  Patent    Post Op Vitals Reviewed: Yes          Staff: CRNA           BP   176/80   Temp  97 4   Pulse  91   Resp   18   SpO2   98 on 3lnc

## 2017-10-18 NOTE — ANESTHESIA PREPROCEDURE EVALUATION
Review of Systems/Medical History  Patient summary reviewed  Chart reviewed      Cardiovascular  EKG reviewed, Exercise tolerance: good,     Pulmonary  Smoker cigarette smoker more than 10 packs per year , ,        GI/Hepatic            Endo/Other  Diabetes well controlled type 2 Oral agent,      GYN       Hematology  Negative hematology ROS      Musculoskeletal  Negative musculoskeletal ROS        Neurology  Negative neurology ROS      Psychology   Negative psychology ROS            Physical Exam    Airway    Mallampati score: II  TM Distance: >3 FB  Neck ROM: full     Dental   Comment: No loose,     Cardiovascular  Rhythm: regular, Rate: normal,     Pulmonary  Breath sounds clear to auscultation,     Other Findings        Anesthesia Plan  ASA Score- 2       Anesthesia Type- general with ASA Monitors  Additional Monitors:   Airway Plan: ETT  Induction- intravenous  Informed Consent-   I personally reviewed this patient with the CRNA  Discussed and agreed on the Anesthesia Plan with the CRNA  Lawanda Solo

## 2017-10-23 ENCOUNTER — APPOINTMENT (OUTPATIENT)
Dept: LAB | Age: 67
End: 2017-10-23
Payer: MEDICARE

## 2017-10-23 ENCOUNTER — TRANSCRIBE ORDERS (OUTPATIENT)
Dept: ADMINISTRATIVE | Age: 67
End: 2017-10-23

## 2017-10-23 DIAGNOSIS — O22.30 DEEP PHLEBOTHROMBOSIS, ANTEPARTUM, WITH DELIVERY (HCC): Primary | ICD-10-CM

## 2017-10-23 DIAGNOSIS — I82.409 DEEP PHLEBOTHROMBOSIS, ANTEPARTUM, WITH DELIVERY (HCC): Primary | ICD-10-CM

## 2017-10-23 DIAGNOSIS — O22.30 DEEP PHLEBOTHROMBOSIS, ANTEPARTUM, WITH DELIVERY (HCC): ICD-10-CM

## 2017-10-23 DIAGNOSIS — I82.409 DEEP PHLEBOTHROMBOSIS, ANTEPARTUM, WITH DELIVERY (HCC): ICD-10-CM

## 2017-10-23 LAB
INR PPP: 1.46 (ref 0.86–1.16)
PROTHROMBIN TIME: 17.8 SECONDS (ref 12.1–14.4)

## 2017-10-23 PROCEDURE — 85610 PROTHROMBIN TIME: CPT

## 2017-10-23 PROCEDURE — 36415 COLL VENOUS BLD VENIPUNCTURE: CPT

## 2017-11-06 ENCOUNTER — APPOINTMENT (OUTPATIENT)
Dept: LAB | Age: 67
End: 2017-11-06
Payer: MEDICARE

## 2017-11-06 ENCOUNTER — TRANSCRIBE ORDERS (OUTPATIENT)
Dept: ADMINISTRATIVE | Age: 67
End: 2017-11-06

## 2017-11-06 DIAGNOSIS — I82.409 DEEP PHLEBOTHROMBOSIS, ANTEPARTUM, WITH DELIVERY (HCC): ICD-10-CM

## 2017-11-06 DIAGNOSIS — I82.409 DEEP PHLEBOTHROMBOSIS, ANTEPARTUM, WITH DELIVERY (HCC): Primary | ICD-10-CM

## 2017-11-06 DIAGNOSIS — O22.30 DEEP PHLEBOTHROMBOSIS, ANTEPARTUM, WITH DELIVERY (HCC): ICD-10-CM

## 2017-11-06 DIAGNOSIS — O22.30 DEEP PHLEBOTHROMBOSIS, ANTEPARTUM, WITH DELIVERY (HCC): Primary | ICD-10-CM

## 2017-11-06 LAB
INR PPP: 2.81 (ref 0.86–1.16)
PROTHROMBIN TIME: 30 SECONDS (ref 12.1–14.4)

## 2017-11-06 PROCEDURE — 36415 COLL VENOUS BLD VENIPUNCTURE: CPT

## 2017-11-06 PROCEDURE — 85610 PROTHROMBIN TIME: CPT

## 2017-12-04 ENCOUNTER — APPOINTMENT (OUTPATIENT)
Dept: LAB | Age: 67
End: 2017-12-04
Payer: MEDICARE

## 2017-12-04 ENCOUNTER — TRANSCRIBE ORDERS (OUTPATIENT)
Dept: ADMINISTRATIVE | Age: 67
End: 2017-12-04

## 2017-12-04 DIAGNOSIS — I82.4Y9 DEEP VEIN THROMBOSIS (DVT) OF PROXIMAL LOWER EXTREMITY, UNSPECIFIED CHRONICITY, UNSPECIFIED LATERALITY (HCC): ICD-10-CM

## 2017-12-04 DIAGNOSIS — I82.4Y9 DEEP VEIN THROMBOSIS (DVT) OF PROXIMAL LOWER EXTREMITY, UNSPECIFIED CHRONICITY, UNSPECIFIED LATERALITY (HCC): Primary | ICD-10-CM

## 2017-12-04 DIAGNOSIS — E13.40 OTHER SPECIFIED DIABETES MELLITUS WITH DIABETIC NEUROPATHY, UNSPECIFIED LONG TERM INSULIN USE STATUS: ICD-10-CM

## 2017-12-04 DIAGNOSIS — E13.40 OTHER SPECIFIED DIABETES MELLITUS WITH DIABETIC NEUROPATHY, UNSPECIFIED LONG TERM INSULIN USE STATUS: Primary | ICD-10-CM

## 2017-12-04 LAB
ALBUMIN SERPL BCP-MCNC: 4 G/DL (ref 3.5–5)
ALP SERPL-CCNC: 56 U/L (ref 46–116)
ALT SERPL W P-5'-P-CCNC: 40 U/L (ref 12–78)
ANION GAP SERPL CALCULATED.3IONS-SCNC: 8 MMOL/L (ref 4–13)
AST SERPL W P-5'-P-CCNC: 26 U/L (ref 5–45)
BILIRUB SERPL-MCNC: 0.72 MG/DL (ref 0.2–1)
BUN SERPL-MCNC: 12 MG/DL (ref 5–25)
CALCIUM SERPL-MCNC: 8.9 MG/DL (ref 8.3–10.1)
CHLORIDE SERPL-SCNC: 102 MMOL/L (ref 100–108)
CO2 SERPL-SCNC: 24 MMOL/L (ref 21–32)
CREAT SERPL-MCNC: 1.01 MG/DL (ref 0.6–1.3)
CREAT UR-MCNC: 36.4 MG/DL
EST. AVERAGE GLUCOSE BLD GHB EST-MCNC: 166 MG/DL
GFR SERPL CREATININE-BSD FRML MDRD: 77 ML/MIN/1.73SQ M
GLUCOSE P FAST SERPL-MCNC: 152 MG/DL (ref 65–99)
HBA1C MFR BLD: 7.4 % (ref 4.2–6.3)
INR PPP: 3.84 (ref 0.86–1.16)
MICROALBUMIN UR-MCNC: 9.8 MG/L (ref 0–20)
MICROALBUMIN/CREAT 24H UR: 27 MG/G CREATININE (ref 0–30)
POTASSIUM SERPL-SCNC: 4.1 MMOL/L (ref 3.5–5.3)
PROT SERPL-MCNC: 7.4 G/DL (ref 6.4–8.2)
PROTHROMBIN TIME: 38.4 SECONDS (ref 12.1–14.4)
SODIUM SERPL-SCNC: 134 MMOL/L (ref 136–145)

## 2017-12-04 PROCEDURE — 80053 COMPREHEN METABOLIC PANEL: CPT

## 2017-12-04 PROCEDURE — 82570 ASSAY OF URINE CREATININE: CPT

## 2017-12-04 PROCEDURE — 36415 COLL VENOUS BLD VENIPUNCTURE: CPT

## 2017-12-04 PROCEDURE — 83036 HEMOGLOBIN GLYCOSYLATED A1C: CPT

## 2017-12-04 PROCEDURE — 85610 PROTHROMBIN TIME: CPT

## 2017-12-04 PROCEDURE — 82043 UR ALBUMIN QUANTITATIVE: CPT

## 2018-01-02 ENCOUNTER — APPOINTMENT (OUTPATIENT)
Dept: LAB | Age: 68
End: 2018-01-02
Payer: MEDICARE

## 2018-01-02 ENCOUNTER — TRANSCRIBE ORDERS (OUTPATIENT)
Dept: ADMINISTRATIVE | Age: 68
End: 2018-01-02

## 2018-01-02 DIAGNOSIS — I48.91 ATRIAL FIBRILLATION, UNSPECIFIED TYPE (HCC): Primary | ICD-10-CM

## 2018-01-02 DIAGNOSIS — I48.91 ATRIAL FIBRILLATION, UNSPECIFIED TYPE (HCC): ICD-10-CM

## 2018-01-02 LAB
INR PPP: 3.15 (ref 0.86–1.16)
PROTHROMBIN TIME: 32.8 SECONDS (ref 12.1–14.4)

## 2018-01-02 PROCEDURE — 85610 PROTHROMBIN TIME: CPT

## 2018-01-02 PROCEDURE — 36415 COLL VENOUS BLD VENIPUNCTURE: CPT

## 2018-01-29 ENCOUNTER — APPOINTMENT (OUTPATIENT)
Dept: LAB | Age: 68
End: 2018-01-29
Payer: MEDICARE

## 2018-01-29 DIAGNOSIS — I48.91 ATRIAL FIBRILLATION, UNSPECIFIED TYPE (HCC): ICD-10-CM

## 2018-01-29 LAB
INR PPP: 2.88 (ref 0.86–1.16)
PROTHROMBIN TIME: 30.6 SECONDS (ref 12.1–14.4)

## 2018-01-29 PROCEDURE — 36415 COLL VENOUS BLD VENIPUNCTURE: CPT

## 2018-01-29 PROCEDURE — 85610 PROTHROMBIN TIME: CPT

## 2018-02-27 ENCOUNTER — TRANSCRIBE ORDERS (OUTPATIENT)
Dept: ADMINISTRATIVE | Age: 68
End: 2018-02-27

## 2018-02-27 ENCOUNTER — APPOINTMENT (OUTPATIENT)
Dept: LAB | Age: 68
End: 2018-02-27
Payer: MEDICARE

## 2018-02-27 DIAGNOSIS — O22.30 DVT (DEEP VEIN THROMBOSIS) IN PREGNANCY: ICD-10-CM

## 2018-02-27 DIAGNOSIS — I82.90 DEEP VEIN THROMBOSIS (DVT) OF NON-EXTREMITY VEIN, UNSPECIFIED CHRONICITY: ICD-10-CM

## 2018-02-27 DIAGNOSIS — I82.90 DEEP VEIN THROMBOSIS (DVT) OF NON-EXTREMITY VEIN, UNSPECIFIED CHRONICITY: Primary | ICD-10-CM

## 2018-02-27 LAB
INR PPP: 2.84 (ref 0.86–1.16)
PROTHROMBIN TIME: 30.2 SECONDS (ref 12.1–14.4)

## 2018-02-27 PROCEDURE — 85610 PROTHROMBIN TIME: CPT

## 2018-02-27 PROCEDURE — 36415 COLL VENOUS BLD VENIPUNCTURE: CPT

## 2018-03-26 ENCOUNTER — APPOINTMENT (OUTPATIENT)
Dept: LAB | Age: 68
End: 2018-03-26
Payer: MEDICARE

## 2018-03-26 ENCOUNTER — TRANSCRIBE ORDERS (OUTPATIENT)
Dept: ADMINISTRATIVE | Age: 68
End: 2018-03-26

## 2018-03-26 DIAGNOSIS — E13.8 DIABETES MELLITUS OF OTHER TYPE WITH COMPLICATION, UNSPECIFIED LONG TERM INSULIN USE STATUS: Primary | ICD-10-CM

## 2018-03-26 DIAGNOSIS — E13.8 DIABETES MELLITUS OF OTHER TYPE WITH COMPLICATION, UNSPECIFIED LONG TERM INSULIN USE STATUS: ICD-10-CM

## 2018-03-26 DIAGNOSIS — O22.30 DVT (DEEP VEIN THROMBOSIS) IN PREGNANCY: ICD-10-CM

## 2018-03-26 DIAGNOSIS — I82.90 DEEP VEIN THROMBOSIS (DVT) OF NON-EXTREMITY VEIN, UNSPECIFIED CHRONICITY: ICD-10-CM

## 2018-03-26 LAB
ALBUMIN SERPL BCP-MCNC: 3.9 G/DL (ref 3.5–5)
ALP SERPL-CCNC: 69 U/L (ref 46–116)
ALT SERPL W P-5'-P-CCNC: 51 U/L (ref 12–78)
ANION GAP SERPL CALCULATED.3IONS-SCNC: 4 MMOL/L (ref 4–13)
AST SERPL W P-5'-P-CCNC: 33 U/L (ref 5–45)
BILIRUB SERPL-MCNC: 0.52 MG/DL (ref 0.2–1)
BUN SERPL-MCNC: 11 MG/DL (ref 5–25)
CALCIUM SERPL-MCNC: 8.8 MG/DL (ref 8.3–10.1)
CHLORIDE SERPL-SCNC: 104 MMOL/L (ref 100–108)
CO2 SERPL-SCNC: 25 MMOL/L (ref 21–32)
CREAT SERPL-MCNC: 1.07 MG/DL (ref 0.6–1.3)
EST. AVERAGE GLUCOSE BLD GHB EST-MCNC: 183 MG/DL
GFR SERPL CREATININE-BSD FRML MDRD: 72 ML/MIN/1.73SQ M
GLUCOSE P FAST SERPL-MCNC: 177 MG/DL (ref 65–99)
HBA1C MFR BLD: 8 % (ref 4.2–6.3)
INR PPP: 3.63 (ref 0.86–1.16)
POTASSIUM SERPL-SCNC: 4.4 MMOL/L (ref 3.5–5.3)
PROT SERPL-MCNC: 7.7 G/DL (ref 6.4–8.2)
PROTHROMBIN TIME: 36.7 SECONDS (ref 12.1–14.4)
SODIUM SERPL-SCNC: 133 MMOL/L (ref 136–145)

## 2018-03-26 PROCEDURE — 80053 COMPREHEN METABOLIC PANEL: CPT

## 2018-03-26 PROCEDURE — 85610 PROTHROMBIN TIME: CPT

## 2018-03-26 PROCEDURE — 36415 COLL VENOUS BLD VENIPUNCTURE: CPT

## 2018-03-26 PROCEDURE — 83036 HEMOGLOBIN GLYCOSYLATED A1C: CPT

## 2018-04-20 ENCOUNTER — APPOINTMENT (OUTPATIENT)
Dept: LAB | Age: 68
End: 2018-04-20
Payer: MEDICARE

## 2018-04-20 ENCOUNTER — TRANSCRIBE ORDERS (OUTPATIENT)
Dept: ADMINISTRATIVE | Age: 68
End: 2018-04-20

## 2018-04-20 DIAGNOSIS — I82.4Y9 DEEP VEIN THROMBOSIS (DVT) OF PROXIMAL LOWER EXTREMITY, UNSPECIFIED CHRONICITY, UNSPECIFIED LATERALITY (HCC): ICD-10-CM

## 2018-04-20 DIAGNOSIS — I82.4Y9 DEEP VEIN THROMBOSIS (DVT) OF PROXIMAL LOWER EXTREMITY, UNSPECIFIED CHRONICITY, UNSPECIFIED LATERALITY (HCC): Primary | ICD-10-CM

## 2018-04-20 LAB
INR PPP: 2.93 (ref 0.86–1.16)
PROTHROMBIN TIME: 31 SECONDS (ref 12.1–14.4)

## 2018-04-20 PROCEDURE — 36415 COLL VENOUS BLD VENIPUNCTURE: CPT

## 2018-04-20 PROCEDURE — 85610 PROTHROMBIN TIME: CPT

## 2018-05-07 LAB
LEFT EYE DIABETIC RETINOPATHY: NORMAL
RIGHT EYE DIABETIC RETINOPATHY: NORMAL

## 2018-05-18 ENCOUNTER — APPOINTMENT (OUTPATIENT)
Dept: LAB | Age: 68
End: 2018-05-18
Payer: MEDICARE

## 2018-05-18 DIAGNOSIS — I82.4Y9 DEEP VEIN THROMBOSIS (DVT) OF PROXIMAL LOWER EXTREMITY, UNSPECIFIED CHRONICITY, UNSPECIFIED LATERALITY (HCC): ICD-10-CM

## 2018-05-18 LAB
INR PPP: 2.26 (ref 0.86–1.17)
PROTHROMBIN TIME: 25 SECONDS (ref 11.8–14.2)

## 2018-05-18 PROCEDURE — 36415 COLL VENOUS BLD VENIPUNCTURE: CPT

## 2018-05-18 PROCEDURE — 85610 PROTHROMBIN TIME: CPT

## 2018-06-18 ENCOUNTER — APPOINTMENT (OUTPATIENT)
Dept: LAB | Age: 68
End: 2018-06-18
Payer: MEDICARE

## 2018-06-18 ENCOUNTER — TRANSCRIBE ORDERS (OUTPATIENT)
Dept: ADMINISTRATIVE | Age: 68
End: 2018-06-18

## 2018-06-18 DIAGNOSIS — I82.90 DEEP VEIN THROMBOSIS (DVT) OF NON-EXTREMITY VEIN, UNSPECIFIED CHRONICITY: Primary | ICD-10-CM

## 2018-06-18 DIAGNOSIS — I82.90 DEEP VEIN THROMBOSIS (DVT) OF NON-EXTREMITY VEIN, UNSPECIFIED CHRONICITY: ICD-10-CM

## 2018-06-18 LAB
INR PPP: 3.25 (ref 0.86–1.17)
PROTHROMBIN TIME: 33.2 SECONDS (ref 11.8–14.2)

## 2018-06-18 PROCEDURE — 36415 COLL VENOUS BLD VENIPUNCTURE: CPT

## 2018-06-18 PROCEDURE — 85610 PROTHROMBIN TIME: CPT

## 2018-07-16 ENCOUNTER — APPOINTMENT (OUTPATIENT)
Dept: LAB | Age: 68
End: 2018-07-16
Payer: MEDICARE

## 2018-07-16 DIAGNOSIS — I82.90 DEEP VEIN THROMBOSIS (DVT) OF NON-EXTREMITY VEIN, UNSPECIFIED CHRONICITY: ICD-10-CM

## 2018-07-16 LAB
INR PPP: 3.05 (ref 0.86–1.17)
PROTHROMBIN TIME: 31.6 SECONDS (ref 11.8–14.2)

## 2018-07-16 PROCEDURE — 36415 COLL VENOUS BLD VENIPUNCTURE: CPT

## 2018-07-16 PROCEDURE — 85610 PROTHROMBIN TIME: CPT

## 2018-07-30 ENCOUNTER — TRANSCRIBE ORDERS (OUTPATIENT)
Dept: ADMINISTRATIVE | Age: 68
End: 2018-07-30

## 2018-07-30 ENCOUNTER — APPOINTMENT (OUTPATIENT)
Dept: LAB | Age: 68
End: 2018-07-30
Payer: MEDICARE

## 2018-07-30 DIAGNOSIS — O22.30 DVT (DEEP VEIN THROMBOSIS) IN PREGNANCY: Primary | ICD-10-CM

## 2018-07-30 DIAGNOSIS — E08.00 DIABETES MELLITUS DUE TO UNDERLYING CONDITION WITH HYPEROSMOLARITY WITHOUT COMA, WITHOUT LONG-TERM CURRENT USE OF INSULIN (HCC): ICD-10-CM

## 2018-07-30 DIAGNOSIS — I35.9 AORTIC VALVE DISEASE: ICD-10-CM

## 2018-07-30 DIAGNOSIS — E78.2 MIXED HYPERLIPIDEMIA: ICD-10-CM

## 2018-07-30 LAB
ALBUMIN SERPL BCP-MCNC: 3.8 G/DL (ref 3.5–5)
ALP SERPL-CCNC: 59 U/L (ref 46–116)
ALT SERPL W P-5'-P-CCNC: 45 U/L (ref 12–78)
ANION GAP SERPL CALCULATED.3IONS-SCNC: 9 MMOL/L (ref 4–13)
AST SERPL W P-5'-P-CCNC: 33 U/L (ref 5–45)
BASOPHILS # BLD AUTO: 0.07 THOUSANDS/ΜL (ref 0–0.1)
BASOPHILS NFR BLD AUTO: 1 % (ref 0–1)
BILIRUB SERPL-MCNC: 0.61 MG/DL (ref 0.2–1)
BUN SERPL-MCNC: 12 MG/DL (ref 5–25)
CALCIUM SERPL-MCNC: 9.1 MG/DL (ref 8.3–10.1)
CHLORIDE SERPL-SCNC: 101 MMOL/L (ref 100–108)
CHOLEST SERPL-MCNC: 146 MG/DL (ref 50–200)
CO2 SERPL-SCNC: 24 MMOL/L (ref 21–32)
CREAT SERPL-MCNC: 1.06 MG/DL (ref 0.6–1.3)
EOSINOPHIL # BLD AUTO: 0.4 THOUSAND/ΜL (ref 0–0.61)
EOSINOPHIL NFR BLD AUTO: 6 % (ref 0–6)
ERYTHROCYTE [DISTWIDTH] IN BLOOD BY AUTOMATED COUNT: 13.7 % (ref 11.6–15.1)
EST. AVERAGE GLUCOSE BLD GHB EST-MCNC: 157 MG/DL
GFR SERPL CREATININE-BSD FRML MDRD: 72 ML/MIN/1.73SQ M
GLUCOSE P FAST SERPL-MCNC: 146 MG/DL (ref 65–99)
HBA1C MFR BLD: 7.1 % (ref 4.2–6.3)
HCT VFR BLD AUTO: 45.4 % (ref 36.5–49.3)
HDLC SERPL-MCNC: 25 MG/DL (ref 40–60)
HGB BLD-MCNC: 15.1 G/DL (ref 12–17)
IMM GRANULOCYTES # BLD AUTO: 0.03 THOUSAND/UL (ref 0–0.2)
IMM GRANULOCYTES NFR BLD AUTO: 0 % (ref 0–2)
LDLC SERPL CALC-MCNC: 49 MG/DL (ref 0–100)
LYMPHOCYTES # BLD AUTO: 1.75 THOUSANDS/ΜL (ref 0.6–4.47)
LYMPHOCYTES NFR BLD AUTO: 24 % (ref 14–44)
MCH RBC QN AUTO: 31.8 PG (ref 26.8–34.3)
MCHC RBC AUTO-ENTMCNC: 33.3 G/DL (ref 31.4–37.4)
MCV RBC AUTO: 96 FL (ref 82–98)
MONOCYTES # BLD AUTO: 1.14 THOUSAND/ΜL (ref 0.17–1.22)
MONOCYTES NFR BLD AUTO: 16 % (ref 4–12)
NEUTROPHILS # BLD AUTO: 3.9 THOUSANDS/ΜL (ref 1.85–7.62)
NEUTS SEG NFR BLD AUTO: 53 % (ref 43–75)
NONHDLC SERPL-MCNC: 121 MG/DL
NRBC BLD AUTO-RTO: 0 /100 WBCS
PLATELET # BLD AUTO: 306 THOUSANDS/UL (ref 149–390)
PMV BLD AUTO: 10 FL (ref 8.9–12.7)
POTASSIUM SERPL-SCNC: 4.4 MMOL/L (ref 3.5–5.3)
PROT SERPL-MCNC: 7.2 G/DL (ref 6.4–8.2)
RBC # BLD AUTO: 4.75 MILLION/UL (ref 3.88–5.62)
SODIUM SERPL-SCNC: 134 MMOL/L (ref 136–145)
T4 SERPL-MCNC: 9.2 UG/DL (ref 4.7–13.3)
TRIGL SERPL-MCNC: 360 MG/DL
TSH SERPL DL<=0.05 MIU/L-ACNC: 1.92 UIU/ML (ref 0.36–3.74)
WBC # BLD AUTO: 7.29 THOUSAND/UL (ref 4.31–10.16)

## 2018-07-30 PROCEDURE — 83036 HEMOGLOBIN GLYCOSYLATED A1C: CPT

## 2018-07-30 PROCEDURE — 36415 COLL VENOUS BLD VENIPUNCTURE: CPT

## 2018-07-30 PROCEDURE — 80053 COMPREHEN METABOLIC PANEL: CPT

## 2018-07-30 PROCEDURE — 84436 ASSAY OF TOTAL THYROXINE: CPT

## 2018-07-30 PROCEDURE — 84443 ASSAY THYROID STIM HORMONE: CPT

## 2018-07-30 PROCEDURE — 85025 COMPLETE CBC W/AUTO DIFF WBC: CPT

## 2018-07-30 PROCEDURE — 80061 LIPID PANEL: CPT

## 2018-08-14 ENCOUNTER — APPOINTMENT (OUTPATIENT)
Dept: LAB | Age: 68
End: 2018-08-14
Payer: MEDICARE

## 2018-08-14 DIAGNOSIS — O22.30 DVT (DEEP VEIN THROMBOSIS) IN PREGNANCY: ICD-10-CM

## 2018-08-14 PROCEDURE — 85610 PROTHROMBIN TIME: CPT

## 2018-09-11 ENCOUNTER — TRANSCRIBE ORDERS (OUTPATIENT)
Dept: ADMINISTRATIVE | Age: 68
End: 2018-09-11

## 2018-09-11 ENCOUNTER — APPOINTMENT (OUTPATIENT)
Dept: LAB | Age: 68
End: 2018-09-11
Payer: MEDICARE

## 2018-09-11 DIAGNOSIS — I35.9 AORTIC VALVE DISEASE: ICD-10-CM

## 2018-09-11 DIAGNOSIS — I35.9 AORTIC VALVE DISEASE: Primary | ICD-10-CM

## 2018-09-11 LAB
INR PPP: 2.75 (ref 0.86–1.17)
PROTHROMBIN TIME: 29.1 SECONDS (ref 11.8–14.2)

## 2018-09-11 PROCEDURE — 36415 COLL VENOUS BLD VENIPUNCTURE: CPT

## 2018-09-11 PROCEDURE — 85610 PROTHROMBIN TIME: CPT

## 2018-10-09 ENCOUNTER — APPOINTMENT (OUTPATIENT)
Dept: LAB | Age: 68
End: 2018-10-09
Payer: MEDICARE

## 2018-10-09 DIAGNOSIS — I35.9 AORTIC VALVE DISEASE: ICD-10-CM

## 2018-10-09 LAB
INR PPP: 3.27 (ref 0.86–1.17)
INR PPP: 3.27 (ref 0.86–1.17)
PROTHROMBIN TIME: 33.3 SECONDS (ref 11.8–14.2)

## 2018-10-09 PROCEDURE — 36415 COLL VENOUS BLD VENIPUNCTURE: CPT

## 2018-10-09 PROCEDURE — 85610 PROTHROMBIN TIME: CPT

## 2018-11-05 ENCOUNTER — LAB (OUTPATIENT)
Dept: LAB | Age: 68
End: 2018-11-05
Payer: MEDICARE

## 2018-11-05 ENCOUNTER — TELEPHONE (OUTPATIENT)
Dept: NON INVASIVE DIAGNOSTICS | Facility: HOSPITAL | Age: 68
End: 2018-11-05

## 2018-11-05 ENCOUNTER — TRANSCRIBE ORDERS (OUTPATIENT)
Dept: ADMINISTRATIVE | Age: 68
End: 2018-11-05

## 2018-11-05 DIAGNOSIS — E13.8 DIABETES MELLITUS OF OTHER TYPE WITH COMPLICATION, UNSPECIFIED WHETHER LONG TERM INSULIN USE: Primary | ICD-10-CM

## 2018-11-05 DIAGNOSIS — I82.551 CHRONIC THROMBOEMBOLISM OF PERONEAL VEIN, RIGHT (HCC): Primary | ICD-10-CM

## 2018-11-05 DIAGNOSIS — E13.8 DIABETES MELLITUS OF OTHER TYPE WITH COMPLICATION, UNSPECIFIED WHETHER LONG TERM INSULIN USE: ICD-10-CM

## 2018-11-05 DIAGNOSIS — I82.551 CHRONIC THROMBOEMBOLISM OF PERONEAL VEIN, RIGHT (HCC): ICD-10-CM

## 2018-11-05 LAB
ALBUMIN SERPL BCP-MCNC: 3.9 G/DL (ref 3.5–5)
ALP SERPL-CCNC: 74 U/L (ref 46–116)
ALT SERPL W P-5'-P-CCNC: 49 U/L (ref 12–78)
ANION GAP SERPL CALCULATED.3IONS-SCNC: 7 MMOL/L (ref 4–13)
AST SERPL W P-5'-P-CCNC: 38 U/L (ref 5–45)
BILIRUB SERPL-MCNC: 0.74 MG/DL (ref 0.2–1)
BUN SERPL-MCNC: 17 MG/DL (ref 5–25)
CALCIUM SERPL-MCNC: 9.1 MG/DL (ref 8.3–10.1)
CHLORIDE SERPL-SCNC: 100 MMOL/L (ref 100–108)
CO2 SERPL-SCNC: 24 MMOL/L (ref 21–32)
CREAT SERPL-MCNC: 1 MG/DL (ref 0.6–1.3)
CREAT UR-MCNC: 54.7 MG/DL
EST. AVERAGE GLUCOSE BLD GHB EST-MCNC: 180 MG/DL
GFR SERPL CREATININE-BSD FRML MDRD: 78 ML/MIN/1.73SQ M
GLUCOSE P FAST SERPL-MCNC: 150 MG/DL (ref 65–99)
HBA1C MFR BLD: 7.9 % (ref 4.2–6.3)
INR PPP: 2.95 (ref 0.86–1.17)
INR PPP: 2.95 (ref 0.86–1.17)
MICROALBUMIN UR-MCNC: 11.6 MG/L (ref 0–20)
MICROALBUMIN/CREAT 24H UR: 21 MG/G CREATININE (ref 0–30)
POTASSIUM SERPL-SCNC: 4.1 MMOL/L (ref 3.5–5.3)
PROT SERPL-MCNC: 7.9 G/DL (ref 6.4–8.2)
PROTHROMBIN TIME: 30.8 SECONDS (ref 11.8–14.2)
SODIUM SERPL-SCNC: 131 MMOL/L (ref 136–145)

## 2018-11-05 PROCEDURE — 80053 COMPREHEN METABOLIC PANEL: CPT

## 2018-11-05 PROCEDURE — 36415 COLL VENOUS BLD VENIPUNCTURE: CPT

## 2018-11-05 PROCEDURE — 82570 ASSAY OF URINE CREATININE: CPT

## 2018-11-05 PROCEDURE — 82043 UR ALBUMIN QUANTITATIVE: CPT

## 2018-11-05 PROCEDURE — 85610 PROTHROMBIN TIME: CPT

## 2018-11-05 PROCEDURE — 83036 HEMOGLOBIN GLYCOSYLATED A1C: CPT

## 2018-11-06 ENCOUNTER — ANTICOAG VISIT (OUTPATIENT)
Dept: CARDIOLOGY CLINIC | Facility: CLINIC | Age: 68
End: 2018-11-06

## 2018-11-06 ENCOUNTER — TELEPHONE (OUTPATIENT)
Dept: CARDIOLOGY CLINIC | Facility: CLINIC | Age: 68
End: 2018-11-06

## 2018-11-06 DIAGNOSIS — I82.409 DEEP VEIN THROMBOSIS (DVT) OF LOWER EXTREMITY, UNSPECIFIED CHRONICITY, UNSPECIFIED LATERALITY, UNSPECIFIED VEIN (HCC): Primary | ICD-10-CM

## 2018-11-06 NOTE — PROGRESS NOTES
11/6/18, tc to pt, had been followed by dr Templeton Flank office  Dx dvt  Per patient , his current dose is 5 mg m/w/f, 2 5 mg others  Will continue current dose, inr due 3 weeks  Will mail new pt/inr script to pt

## 2018-11-21 ENCOUNTER — OFFICE VISIT (OUTPATIENT)
Dept: CARDIOLOGY CLINIC | Facility: CLINIC | Age: 68
End: 2018-11-21
Payer: MEDICARE

## 2018-11-21 VITALS — BODY MASS INDEX: 29.68 KG/M2 | HEIGHT: 71 IN | WEIGHT: 212 LBS

## 2018-11-21 DIAGNOSIS — I10 ESSENTIAL HYPERTENSION: Primary | ICD-10-CM

## 2018-11-21 DIAGNOSIS — E78.2 MIXED HYPERLIPIDEMIA: ICD-10-CM

## 2018-11-21 PROCEDURE — 99213 OFFICE O/P EST LOW 20 MIN: CPT | Performed by: INTERNAL MEDICINE

## 2018-11-21 RX ORDER — WARFARIN SODIUM 5 MG/1
TABLET ORAL
Refills: 3 | COMMUNITY
Start: 2018-10-31 | End: 2019-11-02 | Stop reason: SDUPTHER

## 2018-11-21 RX ORDER — ACETAMINOPHEN AND CODEINE PHOSPHATE 300; 30 MG/1; MG/1
TABLET ORAL
Refills: 1 | COMMUNITY
Start: 2018-10-22 | End: 2018-11-21 | Stop reason: SDUPTHER

## 2018-11-21 RX ORDER — INFLUENZA A VIRUSA/MICHIGAN/45/2015 X-275 (H1N1) ANTIGEN (FORMALDEHYDE INACTIVATED), INFLUENZA A VIRUS A/HONG KONG/4801/2014 X-263B (H3N2) ANTIGEN (FORMALDEHYDE INACTIVATED), AND INFLUENZA B VIRUS B/BRISBANE/60/2008 ANTIGEN (FORMALDEHYDE INACTIVATED) 60; 60; 60 UG/.5ML; UG/.5ML; UG/.5ML
INJECTION, SUSPENSION INTRAMUSCULAR
Refills: 0 | COMMUNITY
Start: 2018-09-17 | End: 2019-01-15

## 2018-11-21 NOTE — PATIENT INSTRUCTIONS
Follow-up BMP regarding mild hyponatremia  Otherwise the patient will continue on the same regimen of medications

## 2018-11-21 NOTE — PROGRESS NOTES
Assessment/Plan:    No problem-specific Assessment & Plan notes found for this encounter  Diagnoses and all orders for this visit:    Essential hypertension    Mixed hyperlipidemia    Other orders  -     Discontinue: acetaminophen-codeine (TYLENOL #3) 300-30 mg per tablet; TAKE 1 TABLET EVERY 4 TO 6 HOURS AS NEEDED PAIN  -     PROAIR  (90 Base) MCG/ACT inhaler; Inhale 2 puffs every 4 (four) hours as needed  -     FLUZONE HIGH-DOSE 0 5 ML RADHA; TO BE ADMINISTERED BY PHARMACIST FOR IMMUNIZATION  -     warfarin (COUMADIN) 5 mg tablet; TAKE 1 TABLET EVERY DAY OR AS DIRECTED BY MD  -     Zoster Vac Recomb Adjuvanted (SHINGRIX) 50 MCG/0 5ML SUSR;           Subjective:  Feels well  Patient ID: Leila Maldonado is a 80 y o  male  Patient presented to this office for follow-up regarding longstanding history of hypertension and dyslipidemia  He is also on Coumadin for deep vein thrombosis  He experiences some leg edema but denies any symptoms of chest pain palpitation or excessive shortness of breath  The following portions of the patient's history were reviewed and updated as appropriate: allergies, current medications, past family history, past medical history, past social history, past surgical history and problem list     Review of Systems   Respiratory: Negative for apnea, cough, chest tightness, shortness of breath and wheezing  Cardiovascular: Positive for leg swelling  Negative for chest pain (mild) and palpitations  Gastrointestinal: Negative for abdominal pain  Neurological: Negative for dizziness  Objective:  Stable  Ht 5' 11" (1 803 m)   Wt 96 2 kg (212 lb)   BMI 29 57 kg/m²          Physical Exam   Constitutional: He is oriented to person, place, and time  He appears well-developed and well-nourished  No distress  HENT:   Head: Normocephalic  Eyes: Pupils are equal, round, and reactive to light  Neck: Normal range of motion  No JVD present   No thyromegaly present  Cardiovascular: Normal rate, regular rhythm, S1 normal and S2 normal   Exam reveals no gallop and no friction rub  No murmur heard  Pulmonary/Chest: Effort normal and breath sounds normal  No respiratory distress  He has no wheezes  He has no rales  He exhibits no tenderness  Abdominal: Soft  Musculoskeletal: Normal range of motion  He exhibits edema  He exhibits no tenderness (mild) or deformity  Neurological: He is alert and oriented to person, place, and time  Skin: Skin is warm and dry  He is not diaphoretic  Psychiatric: He has a normal mood and affect  Vitals reviewed

## 2018-11-21 NOTE — LETTER
November 21, 2018     Fran Smith DO  [address]    Patient: Barber Gates   YOB: 1950   Date of Visit: 11/21/2018       Dear Dr Jamia Barbosa: Thank you for referring Letty Escudero to me for evaluation  Below are my notes for this consultation  If you have questions, please do not hesitate to call me  I look forward to following your patient along with you  Sincerely,        Andreina Torres MD        CC: No Recipients  Andreina Torres MD  11/21/2018 10:19 AM  Sign at close encounter  Assessment/Plan:    No problem-specific Assessment & Plan notes found for this encounter  Diagnoses and all orders for this visit:    Essential hypertension    Mixed hyperlipidemia    Other orders  -     Discontinue: acetaminophen-codeine (TYLENOL #3) 300-30 mg per tablet; TAKE 1 TABLET EVERY 4 TO 6 HOURS AS NEEDED PAIN  -     PROAIR  (90 Base) MCG/ACT inhaler; Inhale 2 puffs every 4 (four) hours as needed  -     FLUZONE HIGH-DOSE 0 5 ML RADHA; TO BE ADMINISTERED BY PHARMACIST FOR IMMUNIZATION  -     warfarin (COUMADIN) 5 mg tablet; TAKE 1 TABLET EVERY DAY OR AS DIRECTED BY MD  -     Zoster Vac Recomb Adjuvanted (SHINGRIX) 50 MCG/0 5ML SUSR;           Subjective:  Feels well  Patient ID: Barber Gates is a 80 y o  male  Patient presented to this office for follow-up regarding longstanding history of hypertension and dyslipidemia  He is also on Coumadin for deep vein thrombosis  He experiences some leg edema but denies any symptoms of chest pain palpitation or excessive shortness of breath  The following portions of the patient's history were reviewed and updated as appropriate: allergies, current medications, past family history, past medical history, past social history, past surgical history and problem list     Review of Systems   Respiratory: Negative for apnea, cough, chest tightness, shortness of breath and wheezing  Cardiovascular: Positive for leg swelling  Negative for chest pain (mild) and palpitations  Gastrointestinal: Negative for abdominal pain  Neurological: Negative for dizziness  Objective:  Stable  Ht 5' 11" (1 803 m)   Wt 96 2 kg (212 lb)   BMI 29 57 kg/m²           Physical Exam   Constitutional: He is oriented to person, place, and time  He appears well-developed and well-nourished  No distress  HENT:   Head: Normocephalic  Eyes: Pupils are equal, round, and reactive to light  Neck: Normal range of motion  No JVD present  No thyromegaly present  Cardiovascular: Normal rate, regular rhythm, S1 normal and S2 normal   Exam reveals no gallop and no friction rub  No murmur heard  Pulmonary/Chest: Effort normal and breath sounds normal  No respiratory distress  He has no wheezes  He has no rales  He exhibits no tenderness  Abdominal: Soft  Musculoskeletal: Normal range of motion  He exhibits edema  He exhibits no tenderness (mild) or deformity  Neurological: He is alert and oriented to person, place, and time  Skin: Skin is warm and dry  He is not diaphoretic  Psychiatric: He has a normal mood and affect  Vitals reviewed

## 2018-11-27 ENCOUNTER — ANTICOAG VISIT (OUTPATIENT)
Dept: CARDIOLOGY CLINIC | Facility: CLINIC | Age: 68
End: 2018-11-27

## 2018-11-27 ENCOUNTER — APPOINTMENT (OUTPATIENT)
Dept: LAB | Age: 68
End: 2018-11-27
Payer: MEDICARE

## 2018-11-27 DIAGNOSIS — I82.551 CHRONIC THROMBOEMBOLISM OF PERONEAL VEIN, RIGHT (HCC): ICD-10-CM

## 2018-11-27 LAB
INR PPP: 3.89 (ref 0.86–1.17)
INR PPP: 3.89 (ref 0.86–1.17)
PROTHROMBIN TIME: 38.1 SECONDS (ref 11.8–14.2)

## 2018-11-27 PROCEDURE — 36415 COLL VENOUS BLD VENIPUNCTURE: CPT

## 2018-11-27 PROCEDURE — 85610 PROTHROMBIN TIME: CPT

## 2018-11-27 NOTE — PROGRESS NOTES
Patient instructed to hold coumadin for two days  Will hold tomorrow and stay on same regimen 5 mg MWF 2 5 others  One month redraw  Patient states he had a lot of spinich forThanksgiving

## 2018-12-03 DIAGNOSIS — I10 ESSENTIAL HYPERTENSION: Primary | ICD-10-CM

## 2018-12-03 RX ORDER — TRIAMTERENE AND HYDROCHLOROTHIAZIDE 37.5; 25 MG/1; MG/1
CAPSULE ORAL
Qty: 90 CAPSULE | Refills: 3 | Status: SHIPPED | OUTPATIENT
Start: 2018-12-03 | End: 2019-12-01 | Stop reason: SDUPTHER

## 2018-12-20 ENCOUNTER — TRANSCRIBE ORDERS (OUTPATIENT)
Dept: ADMINISTRATIVE | Age: 68
End: 2018-12-20

## 2018-12-20 ENCOUNTER — ANTICOAG VISIT (OUTPATIENT)
Dept: CARDIOLOGY CLINIC | Facility: CLINIC | Age: 68
End: 2018-12-20

## 2018-12-20 ENCOUNTER — APPOINTMENT (OUTPATIENT)
Dept: LAB | Age: 68
End: 2018-12-20
Payer: MEDICARE

## 2018-12-20 DIAGNOSIS — I82.551 CHRONIC THROMBOEMBOLISM OF PERONEAL VEIN, RIGHT (HCC): Primary | ICD-10-CM

## 2018-12-20 DIAGNOSIS — I82.551 CHRONIC THROMBOEMBOLISM OF PERONEAL VEIN, RIGHT (HCC): ICD-10-CM

## 2018-12-20 LAB
INR PPP: 3.09 (ref 0.86–1.17)
PROTHROMBIN TIME: 31.9 SECONDS (ref 11.8–14.2)

## 2018-12-20 PROCEDURE — 36415 COLL VENOUS BLD VENIPUNCTURE: CPT

## 2018-12-20 PROCEDURE — 85610 PROTHROMBIN TIME: CPT

## 2018-12-20 NOTE — PROGRESS NOTES
12/20/18, tc to pt, continue current dose, inr due 4 weeks  Pt aware to call office if any changes in health, medication or bleeding   jeri

## 2019-01-14 RX ORDER — ACETAMINOPHEN AND CODEINE PHOSPHATE 300; 30 MG/1; MG/1
1 TABLET ORAL EVERY 4 HOURS PRN
Refills: 1 | COMMUNITY
Start: 2018-12-06 | End: 2019-01-15

## 2019-01-14 RX ORDER — FLUTICASONE PROPIONATE 50 MCG
SPRAY, SUSPENSION (ML) NASAL
Refills: 1 | COMMUNITY
Start: 2018-12-10 | End: 2019-10-29

## 2019-01-15 ENCOUNTER — OFFICE VISIT (OUTPATIENT)
Dept: FAMILY MEDICINE CLINIC | Facility: CLINIC | Age: 69
End: 2019-01-15
Payer: MEDICARE

## 2019-01-15 VITALS
BODY MASS INDEX: 30.32 KG/M2 | WEIGHT: 216.6 LBS | TEMPERATURE: 97.3 F | DIASTOLIC BLOOD PRESSURE: 78 MMHG | HEART RATE: 88 BPM | RESPIRATION RATE: 16 BRPM | HEIGHT: 71 IN | SYSTOLIC BLOOD PRESSURE: 130 MMHG

## 2019-01-15 DIAGNOSIS — K21.9 GASTROESOPHAGEAL REFLUX DISEASE WITHOUT ESOPHAGITIS: ICD-10-CM

## 2019-01-15 DIAGNOSIS — E11.42 TYPE 2 DIABETES MELLITUS WITH DIABETIC POLYNEUROPATHY, WITHOUT LONG-TERM CURRENT USE OF INSULIN (HCC): ICD-10-CM

## 2019-01-15 DIAGNOSIS — E78.2 MIXED HYPERLIPIDEMIA: ICD-10-CM

## 2019-01-15 DIAGNOSIS — Z12.11 SCREENING FOR COLON CANCER: ICD-10-CM

## 2019-01-15 DIAGNOSIS — Z76.89 ENCOUNTER TO ESTABLISH CARE: Primary | ICD-10-CM

## 2019-01-15 DIAGNOSIS — I10 ESSENTIAL HYPERTENSION: ICD-10-CM

## 2019-01-15 DIAGNOSIS — Z11.59 ENCOUNTER FOR HEPATITIS C SCREENING TEST FOR LOW RISK PATIENT: ICD-10-CM

## 2019-01-15 DIAGNOSIS — Z86.718 HISTORY OF DVT (DEEP VEIN THROMBOSIS): ICD-10-CM

## 2019-01-15 DIAGNOSIS — Z00.00 HEALTHCARE MAINTENANCE: ICD-10-CM

## 2019-01-15 DIAGNOSIS — Z12.5 PROSTATE CANCER SCREENING: ICD-10-CM

## 2019-01-15 DIAGNOSIS — Z79.01 ANTICOAGULATED ON COUMADIN: ICD-10-CM

## 2019-01-15 PROCEDURE — 99204 OFFICE O/P NEW MOD 45 MIN: CPT | Performed by: NURSE PRACTITIONER

## 2019-01-15 RX ORDER — OMEPRAZOLE 20 MG/1
20 CAPSULE, DELAYED RELEASE ORAL DAILY
Qty: 90 CAPSULE | Refills: 1 | Status: SHIPPED | OUTPATIENT
Start: 2019-01-15 | End: 2019-11-02 | Stop reason: SDUPTHER

## 2019-01-15 NOTE — PROGRESS NOTES
FAMILY PRACTICE OFFICE VISIT       NAME: Elsy Argueta  AGE: 80 y o  SEX: male       : 1950        MRN: [id number]    DATE: 1/15/2019    Assessment and Plan     Problem List Items Addressed This Visit     Essential hypertension    Relevant Orders    Comprehensive metabolic panel    Hemoglobin A1C    Lipid panel    TSH, 3rd generation    Vitamin B12    Vitamin D 25 hydroxy    Mixed hyperlipidemia    Type 2 diabetes mellitus with diabetic polyneuropathy, without long-term current use of insulin (HCC)    Relevant Medications    metFORMIN (GLUCOPHAGE) 1000 MG tablet    Other Relevant Orders    Comprehensive metabolic panel    Hemoglobin A1C    Lipid panel    TSH, 3rd generation    Vitamin B12    Vitamin D 25 hydroxy    GERD (gastroesophageal reflux disease)    Relevant Medications    MAGNESIUM OXIDE PO    omeprazole (PriLOSEC) 20 mg delayed release capsule    History of DVT (deep vein thrombosis)    Anticoagulated on Coumadin      Other Visit Diagnoses     Encounter to establish care    -  Primary    Healthcare maintenance        Prostate cancer screening        Relevant Orders    PSA, Total Screen    Screening for colon cancer        Relevant Orders    Ambulatory referral to Gastroenterology    Encounter for hepatitis C screening test for low risk patient        Relevant Orders    Hepatitis C antibody          1  Encounter to establish care  This 36-year-old male presents today to establish care  Her PCP was Dr Yinka Caba, was recently retired  Previous records have been reviewed  2  Type 2 diabetes mellitus with diabetic polyneuropathy, without long-term current use of insulin (HCC)  Last A1c 7 9 present on 2018  Admits he has not been watching his diet very closely  He is currently on metformin 1000 mg twice daily  Will repeat A1c in March and he will follow up in March  Strongly encouraged to eat a low-fat, low-cholesterol, low-carbohydrate diet and try to get regular exercise    Eye exam is up-to-date as of May 2018 with Dr Maninder Meyer  Foot exam is up-to-date as of yesterday, with Dr Che Mg at [facility] [address]  Comprehensive metabolic panel    Hemoglobin A1C    Lipid panel    TSH, 3rd generation    Vitamin B12    Vitamin D 25 hydroxy    metFORMIN (GLUCOPHAGE) 1000 MG tablet   3  Essential hypertension  Blood pressure is stable on current medication lisinopril 20 mg daily and Dyazide 37 5-25 mg every other day  Dyazide has recently been reduced from once daily to every other day due to hyponatremia with last sodium 131  Hypertension is managed by Cardiology, Dr Christelle Mancini  He will be is blood work in March and follow up in office in March  Comprehensive metabolic panel    Hemoglobin A1C    Lipid panel    TSH, 3rd generation    Vitamin B12    Vitamin D 25 hydroxy   4  Mixed hyperlipidemia  Currently taking simvastatin 40 mg daily and fenofibrate 134 mg daily  Last lipid panel checked in July 2018, with cholesterol 146, triglycerides 360, HDL 25, LDL 49  Hyperlipidemia is managed by Dr Christelle Mancini  Will repeat lipid panel in March  5  Gastroesophageal reflux disease without esophagitis  Stable on Prilosec 20 mg daily  omeprazole (PriLOSEC) 20 mg delayed release capsule   6  History of DVT (deep vein thrombosis)  History of DVT approximately 3 years ago, is maintained on Coumadin, which is managed by Dr Christelle Mancini  7  Anticoagulated on Coumadin     8  Healthcare maintenance  Last colonoscopy March 2016, will be due for repeat this March  Patient is aware  Name and number for gastroenterology provided today  Last PSA completed April 2017, will repeat  He is agreeable for one time hepatitis C screening  Patient believes he had pneumonia vaccines at [facility]  Office has contacted [facility], patient has had influenza vaccines there, but has never had pneumonia vaccines  Will offer at next visit  He is a pipe smoker, will offer AAA screen at next visit     9  Prostate cancer screening  PSA, Total Screen 10  Screening for colon cancer  Ambulatory referral to Gastroenterology   11  Encounter for hepatitis C screening test for low risk patient  Hepatitis C antibody     He will complete blood work in follow-up in March  Chief Complaint     Chief Complaint   Patient presents with    Establish Care       History of Present Illness     Trevor Gauthier is a 72-year-old male presenting today to establish care  His prior PCP was Dr Jersey Osborne, who is recently retired  He has type 2 diabetes, and is currently taking metformin 1000 mg twice daily  He does have annual eye exams, last eye exam was in May with Dr Tuan Packer  He follows with Podiatry and just had exam yesterday with Dr Shrestha Man  History DVT on coumadin for approximately 3 years  This is managed by Dr Tommie Garrett  He also follows Dr Tommie Garrett for hyperlipidemia and lower extremity edema  Reports he recently changed Dyazide to every other day due to hyponatremia  Last colonoscopy completed 03/11/2016, By Dr Lynsey Layne, repeat due in 3 years  Review of Systems   Review of Systems   Constitutional: Negative  HENT: Negative  Eyes: Negative  Respiratory: Negative  Cardiovascular: Positive for leg swelling  Gastrointestinal: Negative  Endocrine: Negative  Genitourinary: Negative  Musculoskeletal: Negative  Skin: Negative  Allergic/Immunologic: Negative  Neurological: Negative  Hematological: Negative  Psychiatric/Behavioral: Negative          Active Problem List     Patient Active Problem List   Diagnosis    Essential hypertension    Mixed hyperlipidemia    Type 2 diabetes mellitus with diabetic polyneuropathy, without long-term current use of insulin (HCC)    Fatty liver    GERD (gastroesophageal reflux disease)    History of DVT (deep vein thrombosis)    Anticoagulated on Coumadin       Past Medical History:  Past Medical History:   Diagnosis Date    Diabetes mellitus ([facility] [address] )     type 2    Diabetic neuropathic arthropathy (HCC)     causes weakness in LE and uses a cane as assist to walk    GERD (gastroesophageal reflux disease)     History of DVT (deep vein thrombosis) 2015    left LE    Hyperlipidemia     Parotid tumor     Spinal stenosis        Past Surgical History:  Past Surgical History:   Procedure Laterality Date    COLONOSCOPY  03/11/2016    completed by Dr Bryce Haddad, repeat in 3 years    PAROTIDECTOMY Left     IA EXC PAROTD,LAT LOBE,DISSECT 5TH NERV Right 10/18/2017    Procedure: SUPERFICIAL PAROTIDECTOMY [address];  Surgeon: Geovanni Underwood MD;  Location: [facility];  Service: ENT    ROTATOR CUFF REPAIR Bilateral     TONSILLECTOMY      TONSILLECTOMY         Family History:  Family History   Problem Relation Age of Onset    Lupus Mother     Rheum arthritis Mother     Cirrhosis Father     Alcohol abuse Father     Substance Abuse Brother        Social History:  Social History     Social History    Marital status: /Civil Union     Spouse name: N/A    Number of children: N/A    Years of education: N/A     Occupational History    Not on file  Social History Main Topics    Smoking status: Current Every Day Smoker     Types: Pipe    Smokeless tobacco: Never Used    Alcohol use Yes      Comment: socially    Drug use: No    Sexual activity: Not on file     Other Topics Concern    Not on file     Social History Narrative                   I have reviewed the patient's medical history in detail; there are no changes to the history as noted in the electronic medical record      Objective     Vitals:    01/15/19 0931   BP: 130/78   BP Location: Left arm   Patient Position: Sitting   Cuff Size: Large   Pulse: 88   Resp: 16   Temp: (!) 97 3 °F (36 3 °C)   TempSrc: Tympanic   Weight: 98 2 kg (216 lb 9 6 oz)   Height: 5' 11" (1 803 m)     Wt Readings from Last 3 Encounters:   01/15/19 98 2 kg (216 lb 9 6 oz)   11/21/18 96 2 kg (212 lb)   10/18/17 95 3 kg (210 lb)     Body mass index is 30 21 kg/m²  PHQ-9 Depression Screening    PHQ-9:    Frequency of the following problems over the past two weeks:       Little interest or pleasure in doing things:  0 - not at all  Feeling down, depressed, or hopeless:  0 - not at all  PHQ-2 Score:  0       Physical Exam   Constitutional: He is oriented to person, place, and time  He appears well-developed and well-nourished  No distress  HENT:   Head: Normocephalic and atraumatic  Right Ear: Tympanic membrane and ear canal normal    Left Ear: Tympanic membrane and ear canal normal    Nose: Nose normal    Mouth/Throat: Oropharynx is clear and moist    Eyes: Pupils are equal, round, and reactive to light  Conjunctivae and EOM are normal    Neck: Normal range of motion  Neck supple  No thyromegaly present  Negative for bruit bilaterally  Cardiovascular: Normal rate and regular rhythm  No murmur heard  Pulmonary/Chest: Effort normal and breath sounds normal    Abdominal: Soft  Bowel sounds are normal    Musculoskeletal: Normal range of motion  Lymphadenopathy:     He has no cervical adenopathy  Neurological: He is alert and oriented to person, place, and time  Skin: No rash noted  Psychiatric: He has a normal mood and affect  Nursing note and vitals reviewed  ALLERGIES:  No Known Allergies    Current Medications     Current Outpatient Prescriptions   Medication Sig Dispense Refill    acetaminophen (TYLENOL) 325 mg tablet 2, by mouth, every 6 hours as needed for mild to moderate pain   30 tablet 0    aspirin (ECOTRIN LOW STRENGTH) 81 mg EC tablet Take 81 mg by mouth daily      fenofibrate micronized (LOFIBRA) 134 MG capsule Take 134 mg by mouth every morning before breakfast      fluticasone (FLONASE) 50 mcg/act nasal spray INSTILL 2 SPRAYS IN NOSTRILS ONCE DAILY  1    lisinopril (ZESTRIL) 20 mg tablet Take 20 mg by mouth daily      MAGNESIUM OXIDE PO Take 400 mg by mouth daily      metFORMIN (GLUCOPHAGE) 1000 MG tablet Take 1 tablet (1,000 mg total) by mouth 2 (two) times a day with meals 180 tablet 1    multivitamin-iron-minerals-folic acid (CENTRUM) chewable tablet Chew 1 tablet daily      omeprazole (PriLOSEC) 20 mg delayed release capsule Take 1 capsule (20 mg total) by mouth daily 90 capsule 1    PROAIR  (90 Base) MCG/ACT inhaler Inhale 2 puffs every 4 (four) hours as needed  3    Propylhexedrine (BENZEDREX NA) 2 sprays into each nostril as needed      simvastatin (ZOCOR) 40 mg tablet Take 40 mg by mouth daily at bedtime      triamterene-hydrochlorothiazide (DYAZIDE) 37 5-25 mg per capsule 1 CAPSULE(S) BY MOUTH DAILY 90 capsule 3    warfarin (COUMADIN) 2 5 mg tablet Take 1 tablet by mouth daily T-Th-S-S takes 2 5mg     M-W-F takes 5 0 mg      warfarin (COUMADIN) 5 mg tablet TAKE 1 TABLET EVERY DAY OR AS DIRECTED BY MD  3     No current facility-administered medications for this visit            Health Maintenance     Health Maintenance   Topic Date Due    Hepatitis C Screening  1950   [employer] Annual Wellness Visit (AWV)  1950    CRC Screening: Colonoscopy  1950    Diabetic Foot Exam  05/30/1961    DM Eye Exam  05/30/1961    DTaP,Tdap,and Td Vaccines (1 - Tdap) 05/30/1972    Pneumococcal PPSV23/PCV13 65+ Years / Low and Medium Risk (1 of 2 - PCV13) 05/30/2016    HEMOGLOBIN A1C  05/05/2019    URINE MICROALBUMIN  11/05/2019    Fall Risk  01/15/2020    Depression Screening PHQ  01/15/2020    INFLUENZA VACCINE  Completed     Immunization History   Administered Date(s) Administered    Influenza 09/13/2018, 09/17/2018       DAYAMI Alvarenga

## 2019-01-18 PROBLEM — D37.030 NEOPLASM OF UNCERTAIN BEHAVIOR OF PAROTID GLAND: Status: RESOLVED | Noted: 2017-10-18 | Resolved: 2019-01-18

## 2019-01-18 PROBLEM — K21.9 GERD (GASTROESOPHAGEAL REFLUX DISEASE): Status: ACTIVE | Noted: 2019-01-18

## 2019-01-18 PROBLEM — K76.0 FATTY LIVER: Status: ACTIVE | Noted: 2019-01-18

## 2019-01-18 PROBLEM — Z79.01 ANTICOAGULATED ON COUMADIN: Status: ACTIVE | Noted: 2019-01-18

## 2019-01-18 PROBLEM — E11.42 TYPE 2 DIABETES MELLITUS WITH DIABETIC POLYNEUROPATHY, WITHOUT LONG-TERM CURRENT USE OF INSULIN (HCC): Status: ACTIVE | Noted: 2019-01-18

## 2019-01-18 PROBLEM — Z86.718 HISTORY OF DVT (DEEP VEIN THROMBOSIS): Status: ACTIVE | Noted: 2019-01-18

## 2019-01-24 ENCOUNTER — TRANSCRIBE ORDERS (OUTPATIENT)
Dept: ADMINISTRATIVE | Age: 69
End: 2019-01-24

## 2019-01-24 ENCOUNTER — ANTICOAG VISIT (OUTPATIENT)
Dept: CARDIOLOGY CLINIC | Facility: CLINIC | Age: 69
End: 2019-01-24

## 2019-01-24 ENCOUNTER — APPOINTMENT (OUTPATIENT)
Dept: LAB | Age: 69
End: 2019-01-24
Payer: MEDICARE

## 2019-01-24 DIAGNOSIS — I82.551 CHRONIC THROMBOEMBOLISM OF PERONEAL VEIN, RIGHT (HCC): Primary | ICD-10-CM

## 2019-01-24 DIAGNOSIS — I82.551 CHRONIC THROMBOEMBOLISM OF PERONEAL VEIN, RIGHT (HCC): ICD-10-CM

## 2019-01-24 NOTE — PROGRESS NOTES
1/24/19, tc to pt, continue current dose, inr due 4 weeks  Pt to call office if any changes in health, medication or bleeding   jeri

## 2019-02-18 ENCOUNTER — APPOINTMENT (OUTPATIENT)
Dept: LAB | Age: 69
End: 2019-02-18
Payer: MEDICARE

## 2019-02-18 ENCOUNTER — ANTICOAG VISIT (OUTPATIENT)
Dept: CARDIOLOGY CLINIC | Facility: CLINIC | Age: 69
End: 2019-02-18

## 2019-02-18 DIAGNOSIS — I82.409 DEEP VEIN THROMBOSIS (DVT) OF LOWER EXTREMITY, UNSPECIFIED CHRONICITY, UNSPECIFIED LATERALITY, UNSPECIFIED VEIN (HCC): ICD-10-CM

## 2019-02-18 LAB
INR PPP: 2.29 (ref 0.86–1.17)
PROTHROMBIN TIME: 25.3 SECONDS (ref 11.8–14.2)

## 2019-02-18 PROCEDURE — 85610 PROTHROMBIN TIME: CPT

## 2019-02-18 PROCEDURE — 36415 COLL VENOUS BLD VENIPUNCTURE: CPT

## 2019-02-18 NOTE — PROGRESS NOTES
2/18/19, tc to pt, continue current dose, inr due 4 weeks  Pt reports he will have bottom right molar tooth extracted tomorrow  Oral surgeon doing procedure with warfarin on board  Reminded pt to call if any problems or bleeding   jeri

## 2019-03-05 ENCOUNTER — LAB (OUTPATIENT)
Dept: LAB | Age: 69
End: 2019-03-05
Payer: MEDICARE

## 2019-03-05 ENCOUNTER — TRANSCRIBE ORDERS (OUTPATIENT)
Dept: ADMINISTRATIVE | Age: 69
End: 2019-03-05

## 2019-03-05 ENCOUNTER — TELEPHONE (OUTPATIENT)
Dept: FAMILY MEDICINE CLINIC | Facility: CLINIC | Age: 69
End: 2019-03-05

## 2019-03-05 DIAGNOSIS — E11.42 TYPE 2 DIABETES MELLITUS WITH DIABETIC POLYNEUROPATHY, WITHOUT LONG-TERM CURRENT USE OF INSULIN (HCC): ICD-10-CM

## 2019-03-05 DIAGNOSIS — Z12.5 PROSTATE CANCER SCREENING: ICD-10-CM

## 2019-03-05 DIAGNOSIS — I10 ESSENTIAL HYPERTENSION: ICD-10-CM

## 2019-03-05 DIAGNOSIS — Z11.59 ENCOUNTER FOR HEPATITIS C SCREENING TEST FOR LOW RISK PATIENT: ICD-10-CM

## 2019-03-05 LAB
25(OH)D3 SERPL-MCNC: 18.5 NG/ML (ref 30–100)
ALBUMIN SERPL BCP-MCNC: 4 G/DL (ref 3.5–5)
ALP SERPL-CCNC: 87 U/L (ref 46–116)
ALT SERPL W P-5'-P-CCNC: 54 U/L (ref 12–78)
ANION GAP SERPL CALCULATED.3IONS-SCNC: 4 MMOL/L (ref 4–13)
AST SERPL W P-5'-P-CCNC: 35 U/L (ref 5–45)
BILIRUB SERPL-MCNC: 0.67 MG/DL (ref 0.2–1)
BUN SERPL-MCNC: 11 MG/DL (ref 5–25)
CALCIUM SERPL-MCNC: 8.8 MG/DL (ref 8.3–10.1)
CHLORIDE SERPL-SCNC: 103 MMOL/L (ref 100–108)
CHOLEST SERPL-MCNC: 161 MG/DL (ref 50–200)
CO2 SERPL-SCNC: 26 MMOL/L (ref 21–32)
CREAT SERPL-MCNC: 0.96 MG/DL (ref 0.6–1.3)
EST. AVERAGE GLUCOSE BLD GHB EST-MCNC: 183 MG/DL
GFR SERPL CREATININE-BSD FRML MDRD: 81 ML/MIN/1.73SQ M
GLUCOSE P FAST SERPL-MCNC: 145 MG/DL (ref 65–99)
HBA1C MFR BLD: 8 % (ref 4.2–6.3)
HCV AB SER QL: NORMAL
HDLC SERPL-MCNC: 33 MG/DL (ref 40–60)
LDLC SERPL CALC-MCNC: 54 MG/DL (ref 0–100)
NONHDLC SERPL-MCNC: 128 MG/DL
POTASSIUM SERPL-SCNC: 4.2 MMOL/L (ref 3.5–5.3)
PROT SERPL-MCNC: 7.8 G/DL (ref 6.4–8.2)
PSA SERPL-MCNC: 1.4 NG/ML (ref 0–4)
SODIUM SERPL-SCNC: 133 MMOL/L (ref 136–145)
TRIGL SERPL-MCNC: 369 MG/DL
TSH SERPL DL<=0.05 MIU/L-ACNC: 1.55 UIU/ML (ref 0.36–3.74)
VIT B12 SERPL-MCNC: 384 PG/ML (ref 100–900)

## 2019-03-05 PROCEDURE — 80053 COMPREHEN METABOLIC PANEL: CPT

## 2019-03-05 PROCEDURE — 80061 LIPID PANEL: CPT

## 2019-03-05 PROCEDURE — 36415 COLL VENOUS BLD VENIPUNCTURE: CPT

## 2019-03-05 PROCEDURE — G0103 PSA SCREENING: HCPCS

## 2019-03-05 PROCEDURE — 86803 HEPATITIS C AB TEST: CPT

## 2019-03-05 PROCEDURE — 84443 ASSAY THYROID STIM HORMONE: CPT

## 2019-03-05 PROCEDURE — 83036 HEMOGLOBIN GLYCOSYLATED A1C: CPT

## 2019-03-05 PROCEDURE — 82607 VITAMIN B-12: CPT

## 2019-03-05 PROCEDURE — 82306 VITAMIN D 25 HYDROXY: CPT

## 2019-03-05 NOTE — TELEPHONE ENCOUNTER
----- Message from [id number] sent at 3/5/2019  4:42 PM EST -----  Please let patient know his blood work is overall stable  We will discuss in detail at his next office visit on 3/19

## 2019-03-05 NOTE — RESULT ENCOUNTER NOTE
Please let patient know his blood work is overall stable  We will discuss in detail at his next office visit on 3/19

## 2019-03-08 DIAGNOSIS — I10 ESSENTIAL HYPERTENSION: Primary | ICD-10-CM

## 2019-03-08 RX ORDER — LISINOPRIL 20 MG/1
TABLET ORAL
Qty: 90 TABLET | Refills: 4 | Status: SHIPPED | OUTPATIENT
Start: 2019-03-08 | End: 2020-05-04 | Stop reason: SDUPTHER

## 2019-03-13 PROBLEM — E11.40 TYPE 2 DIABETES MELLITUS WITH DIABETIC NEUROPATHY (HCC): Status: ACTIVE | Noted: 2019-03-13

## 2019-03-18 ENCOUNTER — TRANSCRIBE ORDERS (OUTPATIENT)
Dept: ADMINISTRATIVE | Age: 69
End: 2019-03-18

## 2019-03-18 ENCOUNTER — ANTICOAG VISIT (OUTPATIENT)
Dept: CARDIOLOGY CLINIC | Facility: CLINIC | Age: 69
End: 2019-03-18

## 2019-03-18 ENCOUNTER — APPOINTMENT (OUTPATIENT)
Dept: LAB | Age: 69
End: 2019-03-18
Payer: MEDICARE

## 2019-03-18 DIAGNOSIS — I82.409 DEEP VEIN THROMBOSIS (DVT) OF LOWER EXTREMITY, UNSPECIFIED CHRONICITY, UNSPECIFIED LATERALITY, UNSPECIFIED VEIN (HCC): ICD-10-CM

## 2019-03-18 LAB
INR PPP: 2.56 (ref 0.86–1.17)
PROTHROMBIN TIME: 27.6 SECONDS (ref 11.8–14.2)

## 2019-03-18 PROCEDURE — 85610 PROTHROMBIN TIME: CPT

## 2019-03-18 PROCEDURE — 36415 COLL VENOUS BLD VENIPUNCTURE: CPT

## 2019-03-18 NOTE — PROGRESS NOTES
3/18/19, tc to pt, continue current dose, inr due 1 month  Reminded to call if any changes in health, medication, diet or bleeding   jeri

## 2019-03-19 ENCOUNTER — OFFICE VISIT (OUTPATIENT)
Dept: FAMILY MEDICINE CLINIC | Facility: CLINIC | Age: 69
End: 2019-03-19
Payer: MEDICARE

## 2019-03-19 VITALS
SYSTOLIC BLOOD PRESSURE: 112 MMHG | DIASTOLIC BLOOD PRESSURE: 72 MMHG | TEMPERATURE: 98.6 F | RESPIRATION RATE: 16 BRPM | HEART RATE: 84 BPM | HEIGHT: 71 IN | BODY MASS INDEX: 29.96 KG/M2 | WEIGHT: 214 LBS

## 2019-03-19 DIAGNOSIS — E55.9 VITAMIN D DEFICIENCY: ICD-10-CM

## 2019-03-19 DIAGNOSIS — Z72.0 TOBACCO ABUSE: ICD-10-CM

## 2019-03-19 DIAGNOSIS — E78.2 MIXED HYPERLIPIDEMIA: ICD-10-CM

## 2019-03-19 DIAGNOSIS — E11.42 TYPE 2 DIABETES MELLITUS WITH DIABETIC POLYNEUROPATHY, WITHOUT LONG-TERM CURRENT USE OF INSULIN (HCC): Primary | ICD-10-CM

## 2019-03-19 DIAGNOSIS — Z00.00 MEDICARE ANNUAL WELLNESS VISIT, INITIAL: ICD-10-CM

## 2019-03-19 DIAGNOSIS — E53.8 VITAMIN B12 DEFICIENCY: ICD-10-CM

## 2019-03-19 DIAGNOSIS — Z23 ENCOUNTER FOR IMMUNIZATION: ICD-10-CM

## 2019-03-19 DIAGNOSIS — Z13.6 SCREENING FOR AAA (ABDOMINAL AORTIC ANEURYSM): ICD-10-CM

## 2019-03-19 DIAGNOSIS — R25.2 MUSCLE CRAMPS: ICD-10-CM

## 2019-03-19 DIAGNOSIS — L98.9 SKIN LESION: ICD-10-CM

## 2019-03-19 PROCEDURE — 90746 HEPB VACCINE 3 DOSE ADULT IM: CPT

## 2019-03-19 PROCEDURE — 99214 OFFICE O/P EST MOD 30 MIN: CPT | Performed by: NURSE PRACTITIONER

## 2019-03-19 PROCEDURE — G0010 ADMIN HEPATITIS B VACCINE: HCPCS

## 2019-03-19 PROCEDURE — G0438 PPPS, INITIAL VISIT: HCPCS | Performed by: NURSE PRACTITIONER

## 2019-03-19 PROCEDURE — 90670 PCV13 VACCINE IM: CPT

## 2019-03-19 PROCEDURE — G0009 ADMIN PNEUMOCOCCAL VACCINE: HCPCS

## 2019-03-19 NOTE — PROGRESS NOTES
Assessment and Plan:    Problem List Items Addressed This Visit        Endocrine    Type 2 diabetes mellitus with diabetic polyneuropathy, without long-term current use of insulin (HCC)    Relevant Medications    sitaGLIPtin (JANUVIA) 100 mg tablet    Other Relevant Orders    Hemoglobin A1C    CBC    Comprehensive metabolic panel      Other Visit Diagnoses     Encounter for immunization    -  Primary    Relevant Orders    HEPATITIS B VACCINE ADULT IM (Completed)    PNEUMOCOCCAL CONJUGATE VACCINE 13-VALENT GREATER THAN 6 MONTHS (Completed)    Tobacco abuse        Relevant Orders    US abdominal aorta screening aaa    Screening for AAA (abdominal aortic aneurysm)        Relevant Orders    US abdominal aorta screening aaa    Skin lesion        Relevant Orders    Ambulatory referral to Dermatology        Health Maintenance Due   Topic Date Due    Medicare Annual Wellness Visit (AWV)  1950    BMI: Followup Plan  05/30/1969    CRC Screening: Colonoscopy  03/11/2019         HPI:  Shalom Montoya is a 80 y o  male here for his Initial Wellness Visit      Patient Active Problem List   Diagnosis    Essential hypertension    Mixed hyperlipidemia    Type 2 diabetes mellitus with diabetic polyneuropathy, without long-term current use of insulin (HCC)    Fatty liver    GERD (gastroesophageal reflux disease)    History of DVT (deep vein thrombosis)    Anticoagulated on Coumadin    Type 2 diabetes mellitus with diabetic neuropathy (HCC)     Past Medical History:   Diagnosis Date    Diabetes mellitus ([address] )     type 2    Diabetic neuropathic arthropathy (HCC)     causes weakness in LE and uses a cane as assist to walk    GERD (gastroesophageal reflux disease)     History of DVT (deep vein thrombosis) 2015    left LE    Hyperlipidemia     Parotid tumor     Spinal stenosis      Past Surgical History:   Procedure Laterality Date    COLONOSCOPY  03/11/2016    completed by Dr Cadence Smith, repeat in 3 years    PAROTIDECTOMY Left     ID EXC PAROTD,LAT LOBE,DISSECT 5TH NERV Right 10/18/2017    Procedure: SUPERFICIAL PAROTIDECTOMY WITH NERVE DISSECTION AND PRESERVATION;  Surgeon: Kelly Baca MD;  [facility];  Service: ENT    ROTATOR CUFF REPAIR Bilateral     TONSILLECTOMY      TONSILLECTOMY       Family History   Problem Relation Age of Onset    Lupus Mother     Rheum arthritis Mother     Cirrhosis Father     Alcohol abuse Father     Substance Abuse Brother      Social History     Tobacco Use   Smoking Status Current Every Day Smoker    Types: Pipe   Smokeless Tobacco Never Used     Social History     Substance and Sexual Activity   Alcohol Use Yes    Comment: socially      Social History     Substance and Sexual Activity   Drug Use No       Current Outpatient Medications   Medication Sig Dispense Refill    acetaminophen (TYLENOL) 325 mg tablet 2, by mouth, every 6 hours as needed for mild to moderate pain   30 tablet 0    aspirin (ECOTRIN LOW STRENGTH) 81 mg EC tablet Take 81 mg by mouth daily      fenofibrate micronized (LOFIBRA) 134 MG capsule Take 134 mg by mouth every morning before breakfast      lisinopril (ZESTRIL) 20 mg tablet TAKE 1 TABLET(S) BY MOUTH DAILY 90 tablet 4    MAGNESIUM OXIDE PO Take 400 mg by mouth daily      metFORMIN (GLUCOPHAGE) 1000 MG tablet Take 1 tablet (1,000 mg total) by mouth 2 (two) times a day with meals 180 tablet 1    multivitamin-iron-minerals-folic acid (CENTRUM) chewable tablet Chew 1 tablet daily      omeprazole (PriLOSEC) 20 mg delayed release capsule Take 1 capsule (20 mg total) by mouth daily 90 capsule 1    PROAIR  (90 Base) MCG/ACT inhaler Inhale 2 puffs every 4 (four) hours as needed  3    Propylhexedrine (BENZEDREX NA) 2 sprays into each nostril as needed      simvastatin (ZOCOR) 40 mg tablet Take 40 mg by mouth daily at bedtime      triamterene-hydrochlorothiazide (DYAZIDE) 37 5-25 mg per capsule 1 CAPSULE(S) BY MOUTH DAILY (Patient taking differently: 1 CAPSULE(S) BY MOUTH EVERY OTHER DAY) 90 capsule 3    warfarin (COUMADIN) 2 5 mg tablet Take 1 tablet by mouth daily T-Th-S-S takes 2 5mg     M-W-F takes 5 0 mg      warfarin (COUMADIN) 5 mg tablet TAKE 1 TABLET EVERY DAY OR AS DIRECTED BY MD  3    fluticasone (FLONASE) 50 mcg/act nasal spray INSTILL 2 SPRAYS IN NOSTRILS ONCE DAILY  1    sitaGLIPtin (JANUVIA) 100 mg tablet Take 1 tablet (100 mg total) by mouth daily 30 tablet 3     No current facility-administered medications for this visit  No Known Allergies  Immunization History   Administered Date(s) Administered    Hep B, adult 03/19/2019    INFLUENZA 09/13/2018, 09/17/2018    Pneumococcal Conjugate 13-Valent 03/19/2019    Zoster Vaccine Recombinant 07/30/2018, 09/29/2018       Patient Care Team:  Vibha Nazario as PCP - General (Family Medicine)    Medicare Screening Tests and Risk Assessments:  Cheng Edward is here for his Initial Wellness visit  Health Risk Assessment:  Patient rates overall health as good  Patient feels that their physical health rating is Slightly worse  Eyesight was rated as Same  Hearing was rated as Same  Patient feels that their emotional and mental health rating is Same  Pain experienced by patient in the last 7 days has been Some  Patient's pain rating has been 6/10  Emotional/Mental Health:  Patient has been feeling nervous/anxious  PHQ-9 Depression Screening:    Frequency of the following problems over the past two weeks:      1  Little interest or pleasure in doing things: 0 - not at all      2  Feeling down, depressed, or hopeless: 0 - not at all  PHQ-2 Score: 0          Broken Bones/Falls: Fall Risk Assessment:    In the past year, patient has experienced: History of falling in past year     Number of falls: 1  Patient feels unsteady standing  Patient is taking medication that can cause feelings of lightheadedness or tiredness  Patient often has no need to rush to the toilet  Chronic conditions that may contribute to falls diabetes and neuropathy  Bladder/Bowel:  Patient has not leaked urine accidently in the last six months  Patient reports no loss of bowel control  Immunizations:  Patient has had a flu vaccination within the last year  Patient has received a pneumonia shot  Patient has not received a shingles shot  Patient has not received tetanus/diphtheria shot  Home Safety:  Patient has trouble with stairs inside or outside of their home  Patient currently reports that there are no safety hazards present in home, working smoke alarms, working carbon monoxide detectors  Preventative Screenings:   prostate cancer screen performed, 3/5/2019  colon cancer screen completed, 3/11/2016  cholesterol screen completed, 3/5/2019  glaucoma eye exam completed, (Additional Comments: Pt reports eye exam 5/2018)    Nutrition:  Current diet: Diabetic and Limited junk food with servings of the following:    Medications:  Patient is currently taking over-the-counter supplements  List of OTC medications includes: see updated med list  Patient is able to manage medications  Lifestyle Choices:  Patient reports current tobacco use  Patient reports alcohol use  Alcohol use per week: 1  Patient drives a vehicle  Patient wears seat belt  Current level of exercise of physical activity described by patient as: Moderate  Activities of Daily Living:  Can get out of bed by his or her self, able to dress self, able to make own meals, able to do own shopping, able to bathe self, can do own laundry/housekeeping, can manage own money, pay bills and track expenses    Previous Hospitalizations:  No hospitalization or ED visit in past 12 months        Advanced Directives:  Patient has decided on a power of   Patient has spoken to designated power of   Patient has completed advanced directive          Preventative Screening/Counseling:      Cardiovascular: General: Screening Current      Counseling: Tobacco Cessation          Diabetes:      General: Screening Current      Comments: Diagnosed diabetic, A1C current  Colorectal Cancer:      General: Risks and Benefits Discussed      Comments: Due for repeat colonoscopy now, appointment scheduled with gastroenterology on 4/17  Prostate Cancer:      General: Screening Current          Osteoporosis:      General: Screening Not Indicated          AAA:  Male patient with age over [de-identified] years  Patient has history of tobacco use  General: Risks and Benefits Discussed      Counseling: tobacco cessation counseling given      Study: Screening US          Glaucoma:      General: Screening Current          HIV:      General: Screening Not Indicated          Hepatitis C:      General: Screening Current        Advanced Directives:   Patient has living will for healthcare, patient has an advanced directive  Immunizations:      Pneumococcal: Risks & Benefits Discussed      Hepatitis B (Medium to high risk patients): Risks & Benefits Discussed and Start Hep B Series Today      Other Preventative Counseling (Non-Medicare):   Increase physical activity and Nutrition Counseling      Referrals:  Referral(s) to: Dermatology

## 2019-03-19 NOTE — PROGRESS NOTES
FAMILY PRACTICE OFFICE VISIT       NAME: Citlali Gold  AGE: 80 y o  SEX: male       : 1950        MRN: [id number]    DATE: 3/19/2019    Assessment and Plan     Problem List Items Addressed This Visit        Endocrine    Type 2 diabetes mellitus with diabetic polyneuropathy, without long-term current use of insulin ([facility] [address] ) - Primary    Relevant Medications    sitaGLIPtin (JANUVIA) 100 mg tablet    Other Relevant Orders    Hemoglobin A1C    CBC    Comprehensive metabolic panel       Other    Mixed hyperlipidemia      Other Visit Diagnoses     Skin lesion        Relevant Orders    Ambulatory referral to Dermatology    Muscle cramps        Vitamin B12 deficiency        Vitamin D deficiency        Medicare annual wellness visit, initial        Tobacco abuse        Relevant Orders    US abdominal aorta screening aaa    Screening for AAA (abdominal aortic aneurysm)        Relevant Orders    US abdominal aorta screening aaa    Encounter for immunization        Relevant Orders    HEPATITIS B VACCINE ADULT IM (Completed)    PNEUMOCOCCAL CONJUGATE VACCINE 13-VALENT GREATER THAN 6 MONTHS (Completed)          1  Type 2 diabetes mellitus with diabetic polyneuropathy, without long-term current use of insulin ([facility])  A1c 8 0%  Patient has noticed fasting sugars running 170-200's  Will continue metformin 1000 mg twice daily and will add Januvia 100 mg daily  Reviewed side effect profile of medication with patient  He will continue to check fasting sugars  Will repeat A1c and follow up in 3 months  He will continue to watch his diet  Eye exam up to date May 2018  Foot exam up to date, by Dr Lashonda Mueller  sitaGLIPtin (JANUVIA) 100 mg tablet    Hemoglobin A1C    CBC    Comprehensive metabolic panel   2  Mixed hyperlipidemia  LDL at goal 54, triglycerides elevated at 367  Will continue fenofibrate 134 mg daily, and Simvastatin 40 mg daily  3  Skin lesion  Skin lesion on left scalp, recommend evaluation by dermatology   Name and number provided  Ambulatory referral to Dermatology   4  Muscle cramps  Will continue magnesium and will add vitamin B12, he will let me know if this not effective  5  Vitamin B12 deficiency Vitamin B12 level at low end of normal, likely from long term use of metformin  Recommend taking OTC vitamin B12 500 mcg daily  6  Vitamin D deficiency Vitamin d deficiency  Recommend taking vitamin D 2000 units daily  7  Medicare annual wellness visit, initial  Annual wellness visit, accompanied by his wife today  8  Tobacco abuse  US abdominal aorta screening aaa   9  Screening for AAA (abdominal aortic aneurysm)   abdominal aorta screening aaa   10  Encounter for immunization  HEPATITIS B VACCINE ADULT IM    PNEUMOCOCCAL CONJUGATE VACCINE 13-VALENT GREATER THAN 6 MONTHS           Chief Complaint     Chief Complaint   Patient presents with    Medicare Wellness Visit     initial    Follow-up    Spasms     of the ankles x's 2+ mths       History of Present Illness     Amira Flowers is a 80year old male presenting today for follow up and review of recent blood work  He has smoked a pipe for many years has recently changed to smoking an e-cigarettes, without nicotine  Son was diagnosed with kidney cancer, which prompted him to work on quitting  He is concerned about his blood sugars  His fasting blood sugars have been running 170-200's  He cut down on snacking, but sugars have not improved  He has a skin lesion on the left side of his head that he would like to have checked  He has been having muscle cramps in his bilateral ankles for the past 2 months  He takes magnesium at bedtime, which helps with leg cramps at night, but not ankle cramping  Review of Systems   Review of Systems   Constitutional: Negative  HENT: Negative  Respiratory: Negative  Cardiovascular: Negative  Gastrointestinal: Negative  Genitourinary: Negative      Musculoskeletal:        Ankle cramping   Skin: Negative  Neurological: Negative  Psychiatric/Behavioral: Negative          Active Problem List     Patient Active Problem List   Diagnosis    Essential hypertension    Mixed hyperlipidemia    Type 2 diabetes mellitus with diabetic polyneuropathy, without long-term current use of insulin (HCC)    Fatty liver    GERD (gastroesophageal reflux disease)    History of DVT (deep vein thrombosis)    Anticoagulated on Coumadin    Type 2 diabetes mellitus with diabetic neuropathy ([facility] [address] [address] )    Atherosclerosis of native arteries of the extremities with ulceration ([facility] )    Atrial fibrillation ([facility] )       Past Medical History:  Past Medical History:   Diagnosis Date    Diabetes mellitus ([facility] [facility] )     type 2    Diabetic neuropathic arthropathy (HCC)     causes weakness in LE and uses a cane as assist to walk    GERD (gastroesophageal reflux disease)     History of DVT (deep vein thrombosis) 2015    left LE    Hyperlipidemia     Parotid tumor     Spinal stenosis        Past Surgical History:  Past Surgical History:   Procedure Laterality Date    COLONOSCOPY  03/11/2016    completed by Dr Marli Gatica, repeat in 3 years    PAROTIDECTOMY Left     NV EXC PAROTD,LAT LOBE,DISSECT 5TH NERV Right 10/18/2017    Procedure: SUPERFICIAL PAROTIDECTOMY WITH NERVE DISSECTION AND PRESERVATION;  Surgeon: Alice Moseley MD;  [facility];  Service: ENT    ROTATOR CUFF REPAIR Bilateral     TONSILLECTOMY      TONSILLECTOMY         Family History:  Family History   Problem Relation Age of Onset    Lupus Mother     Rheum arthritis Mother     Cirrhosis Father     Alcohol abuse Father     Substance Abuse Brother        Social History:  Social History     Socioeconomic History    Marital status: /Civil Union     Spouse name: Not on file    Number of children: Not on file    Years of education: Not on file    Highest education level: Not on file   Occupational History    Not on file   Social Needs    Financial resource strain: Not on file    Food insecurity:     Worry: Not on file     Inability: Not on file    Transportation needs:     Medical: Not on file     Non-medical: Not on file   Tobacco Use    Smoking status: Current Every Day Smoker     Types: Pipe    Smokeless tobacco: Never Used   Substance and Sexual Activity    Alcohol use: Yes     Comment: socially    Drug use: No    Sexual activity: Not on file   Lifestyle    Physical activity:     Days per week: Not on file     Minutes per session: Not on file    Stress: Not on file   Relationships    Social connections:     Talks on phone: Not on file     Gets together: Not on file     Attends Confucianist service: Not on file     Active member of club or organization: Not on file     Attends meetings of clubs or organizations: Not on file     Relationship status: Not on file    Intimate partner violence:     Fear of current or ex partner: Not on file     Emotionally abused: Not on file     Physically abused: Not on file     Forced sexual activity: Not on file   Other Topics Concern    Not on file   Social History Narrative               I have reviewed the patient's medical history in detail; there are no changes to the history as noted in the electronic medical record  Objective     Vitals:    03/19/19 1021 03/19/19 1115   BP: 144/68 112/72   Pulse: 84    Resp: 16    Temp: 98 6 °F (37 °C)    TempSrc: Tympanic    Weight: 97 1 kg (214 lb)    Height: 5' 11" (1 803 m)      Wt Readings from Last 3 Encounters:   03/19/19 97 1 kg (214 lb)   01/15/19 98 2 kg (216 lb 9 6 oz)   11/21/18 96 2 kg (212 lb)     Body mass index is 29 85 kg/m²  PHQ-9 Depression Screening    PHQ-9:    Frequency of the following problems over the past two weeks:            Physical Exam   Constitutional: He is oriented to person, place, and time  He appears well-developed and well-nourished  No distress  HENT:   Head: Normocephalic and atraumatic     Right Ear: Tympanic membrane and ear canal normal    Left Ear: Tympanic membrane and ear canal normal    Nose: Nose normal    Mouth/Throat: Oropharynx is clear and moist    Eyes: Pupils are equal, round, and reactive to light  Conjunctivae and EOM are normal    Neck: Normal range of motion  Neck supple  Cardiovascular: Normal rate and regular rhythm  No murmur heard  Pulmonary/Chest: Effort normal and breath sounds normal    Abdominal: Soft  Bowel sounds are normal    Musculoskeletal: Normal range of motion  Lymphadenopathy:     He has no cervical adenopathy  Neurological: He is alert and oriented to person, place, and time  Skin: No rash noted  Left scalp with dark hyperpigmented lesion, irregular borders, 1 5 cm round  Slightly elevated  Psychiatric: He has a normal mood and affect  Nursing note and vitals reviewed  ALLERGIES:  No Known Allergies    Current Medications     Current Outpatient Medications   Medication Sig Dispense Refill    acetaminophen (TYLENOL) 325 mg tablet 2, by mouth, every 6 hours as needed for mild to moderate pain   30 tablet 0    aspirin (ECOTRIN LOW STRENGTH) 81 mg EC tablet Take 81 mg by mouth daily      fenofibrate micronized (LOFIBRA) 134 MG capsule Take 134 mg by mouth every morning before breakfast      lisinopril (ZESTRIL) 20 mg tablet TAKE 1 TABLET(S) BY MOUTH DAILY 90 tablet 4    MAGNESIUM OXIDE PO Take 400 mg by mouth daily      metFORMIN (GLUCOPHAGE) 1000 MG tablet Take 1 tablet (1,000 mg total) by mouth 2 (two) times a day with meals 180 tablet 1    multivitamin-iron-minerals-folic acid (CENTRUM) chewable tablet Chew 1 tablet daily      omeprazole (PriLOSEC) 20 mg delayed release capsule Take 1 capsule (20 mg total) by mouth daily 90 capsule 1    PROAIR  (90 Base) MCG/ACT inhaler Inhale 2 puffs every 4 (four) hours as needed  3    Propylhexedrine (BENZEDREX NA) 2 sprays into each nostril as needed      simvastatin (ZOCOR) 40 mg tablet Take 40 mg by mouth daily at bedtime      triamterene-hydrochlorothiazide (DYAZIDE) 37 5-25 mg per capsule 1 CAPSULE(S) BY MOUTH DAILY (Patient taking differently: 1 CAPSULE(S) BY MOUTH EVERY OTHER DAY) 90 capsule 3    warfarin (COUMADIN) 2 5 mg tablet Take 1 tablet by mouth daily T-Th-S-S takes 2 5mg     M-W-F takes 5 0 mg      warfarin (COUMADIN) 5 mg tablet TAKE 1 TABLET EVERY DAY OR AS DIRECTED BY MD  3    fluticasone (FLONASE) 50 mcg/act nasal spray INSTILL 2 SPRAYS IN NOSTRILS ONCE DAILY  1    sitaGLIPtin (JANUVIA) 100 mg tablet Take 1 tablet (100 mg total) by mouth daily 30 tablet 3     No current facility-administered medications for this visit            Health Maintenance     Health Maintenance   Topic Date Due    Medicare Annual Wellness Visit (AWV)  1950    BMI: Followup Plan  05/30/1969    CRC Screening: Colonoscopy  03/11/2019    DTaP,Tdap,and Td Vaccines (1 - Tdap) 03/19/2020 (Originally 5/30/1972)    HEPATITIS B VACCINES (2 of 3 - Risk 3-dose series) 04/16/2019    HEMOGLOBIN A1C  09/05/2019    Diabetic Foot Exam  10/29/2019    URINE MICROALBUMIN  11/05/2019    Fall Risk  03/19/2020    Depression Screening PHQ  03/19/2020    BMI: Adult  03/19/2020    Pneumococcal PPSV23/PCV13 65+ Years / Low and Medium Risk (2 of 2 - PPSV23) 03/19/2020    DM Eye Exam  05/07/2020    Hepatitis C Screening  Completed    INFLUENZA VACCINE  Completed     Immunization History   Administered Date(s) Administered    Hep B, adult 03/19/2019    INFLUENZA 09/13/2018, 09/17/2018    Pneumococcal Conjugate 13-Valent 03/19/2019    Zoster Vaccine Recombinant 07/30/2018, 09/29/2018       DAYAMI Payne

## 2019-03-20 ENCOUNTER — TELEPHONE (OUTPATIENT)
Dept: FAMILY MEDICINE CLINIC | Facility: CLINIC | Age: 69
End: 2019-03-20

## 2019-03-20 NOTE — TELEPHONE ENCOUNTER
Patient states the dermatologist that Hedy referred him to is too far out for him  Wants to know if she can do a referral for a dermatologist locally, closer to [place]  Please call to advise

## 2019-03-21 PROBLEM — I48.91 ATRIAL FIBRILLATION (HCC): Status: ACTIVE | Noted: 2019-03-21

## 2019-03-21 PROBLEM — I70.25 ATHEROSCLEROSIS OF NATIVE ARTERIES OF THE EXTREMITIES WITH ULCERATION (HCC): Status: ACTIVE | Noted: 2019-03-21

## 2019-03-22 ENCOUNTER — HOSPITAL ENCOUNTER (OUTPATIENT)
Dept: RADIOLOGY | Age: 69
Discharge: HOME | End: 2019-03-22
Payer: MEDICARE

## 2019-03-22 DIAGNOSIS — Z13.6 SCREENING FOR AAA (ABDOMINAL AORTIC ANEURYSM): ICD-10-CM

## 2019-03-22 DIAGNOSIS — Z72.0 TOBACCO ABUSE: ICD-10-CM

## 2019-03-22 PROCEDURE — 76706 US ABDL AORTA SCREEN AAA: CPT

## 2019-03-24 DIAGNOSIS — I71.4 ABDOMINAL AORTIC ANEURYSM (AAA) WITHOUT RUPTURE (HCC): Primary | ICD-10-CM

## 2019-03-25 ENCOUNTER — TELEPHONE (OUTPATIENT)
Dept: FAMILY MEDICINE CLINIC | Facility: CLINIC | Age: 69
End: 2019-03-25

## 2019-03-25 NOTE — RESULT ENCOUNTER NOTE
Please let patient know his US of the abdomen screening for an aneurysm, shows a borderline dilation of the abdominal aorta, the largest artery in the abdomen  This may be a developing aneurysm and we need to watch this  We will repeat this US in 1 year

## 2019-03-25 NOTE — TELEPHONE ENCOUNTER
----- Message from [id number] [facility] sent at 3/24/2019  9:13 PM EDT -----  Please let patient know his US of the abdomen screening for an aneurysm, shows a borderline dilation of the abdominal aorta, the largest artery in the abdomen  This may be a developing aneurysm and we need to watch this  We will repeat this US in 1 year

## 2019-04-04 DIAGNOSIS — E78.2 MIXED HYPERLIPIDEMIA: Primary | ICD-10-CM

## 2019-04-05 RX ORDER — SIMVASTATIN 40 MG
TABLET ORAL
Qty: 90 TABLET | Refills: 4 | Status: SHIPPED | OUTPATIENT
Start: 2019-04-05 | End: 2020-05-04 | Stop reason: SDUPTHER

## 2019-04-15 ENCOUNTER — ANTICOAG VISIT (OUTPATIENT)
Dept: CARDIOLOGY CLINIC | Facility: CLINIC | Age: 69
End: 2019-04-15

## 2019-04-15 ENCOUNTER — APPOINTMENT (OUTPATIENT)
Dept: LAB | Age: 69
End: 2019-04-15
Payer: MEDICARE

## 2019-04-15 DIAGNOSIS — I82.409 DEEP VEIN THROMBOSIS (DVT) OF LOWER EXTREMITY, UNSPECIFIED CHRONICITY, UNSPECIFIED LATERALITY, UNSPECIFIED VEIN (HCC): ICD-10-CM

## 2019-04-15 LAB
INR PPP: 2.55 (ref 0.86–1.17)
PROTHROMBIN TIME: 27.5 SECONDS (ref 11.8–14.2)

## 2019-04-15 PROCEDURE — 85610 PROTHROMBIN TIME: CPT

## 2019-04-15 PROCEDURE — 36415 COLL VENOUS BLD VENIPUNCTURE: CPT

## 2019-04-17 ENCOUNTER — OFFICE VISIT (OUTPATIENT)
Dept: GASTROENTEROLOGY | Facility: AMBULARY SURGERY CENTER | Age: 69
End: 2019-04-17
Payer: MEDICARE

## 2019-04-17 VITALS
BODY MASS INDEX: 29.26 KG/M2 | RESPIRATION RATE: 18 BRPM | SYSTOLIC BLOOD PRESSURE: 124 MMHG | HEART RATE: 79 BPM | WEIGHT: 209 LBS | DIASTOLIC BLOOD PRESSURE: 80 MMHG | TEMPERATURE: 97.9 F | HEIGHT: 71 IN

## 2019-04-17 DIAGNOSIS — K21.9 GASTROESOPHAGEAL REFLUX DISEASE WITHOUT ESOPHAGITIS: ICD-10-CM

## 2019-04-17 DIAGNOSIS — Z79.01 ANTICOAGULANT LONG-TERM USE: ICD-10-CM

## 2019-04-17 DIAGNOSIS — Z86.010 HISTORY OF COLON POLYPS: Primary | ICD-10-CM

## 2019-04-17 DIAGNOSIS — I71.4 ABDOMINAL AORTIC ANEURYSM (AAA) WITHOUT RUPTURE (HCC): ICD-10-CM

## 2019-04-17 PROBLEM — Z86.0100 HISTORY OF COLON POLYPS: Status: ACTIVE | Noted: 2019-04-17

## 2019-04-17 PROBLEM — Z86.0100 HX OF COLONIC POLYPS: Status: ACTIVE | Noted: 2019-04-17

## 2019-04-17 PROCEDURE — 99204 OFFICE O/P NEW MOD 45 MIN: CPT | Performed by: INTERNAL MEDICINE

## 2019-04-17 RX ORDER — FAMOTIDINE 20 MG/1
20 TABLET, FILM COATED ORAL 2 TIMES DAILY
Qty: 60 TABLET | Refills: 11 | Status: SHIPPED | OUTPATIENT
Start: 2019-04-17 | End: 2019-05-16 | Stop reason: ALTCHOICE

## 2019-04-18 ENCOUNTER — TELEPHONE (OUTPATIENT)
Dept: GASTROENTEROLOGY | Facility: AMBULARY SURGERY CENTER | Age: 69
End: 2019-04-18

## 2019-04-23 ENCOUNTER — OFFICE VISIT (OUTPATIENT)
Dept: FAMILY MEDICINE CLINIC | Facility: CLINIC | Age: 69
End: 2019-04-23
Payer: MEDICARE

## 2019-04-23 ENCOUNTER — TELEPHONE (OUTPATIENT)
Dept: FAMILY MEDICINE CLINIC | Facility: CLINIC | Age: 69
End: 2019-04-23

## 2019-04-23 VITALS
HEART RATE: 84 BPM | TEMPERATURE: 97.5 F | RESPIRATION RATE: 20 BRPM | SYSTOLIC BLOOD PRESSURE: 128 MMHG | BODY MASS INDEX: 29.46 KG/M2 | WEIGHT: 210.4 LBS | HEIGHT: 71 IN | DIASTOLIC BLOOD PRESSURE: 70 MMHG

## 2019-04-23 DIAGNOSIS — L98.9 SKIN LESION: Primary | ICD-10-CM

## 2019-04-23 DIAGNOSIS — Z23 ENCOUNTER FOR IMMUNIZATION: ICD-10-CM

## 2019-04-23 DIAGNOSIS — M25.532 LEFT WRIST PAIN: Primary | ICD-10-CM

## 2019-04-23 PROCEDURE — 90746 HEPB VACCINE 3 DOSE ADULT IM: CPT

## 2019-04-23 PROCEDURE — G0010 ADMIN HEPATITIS B VACCINE: HCPCS

## 2019-04-23 PROCEDURE — 99213 OFFICE O/P EST LOW 20 MIN: CPT | Performed by: FAMILY MEDICINE

## 2019-05-13 ENCOUNTER — ANTICOAG VISIT (OUTPATIENT)
Dept: CARDIOLOGY CLINIC | Facility: CLINIC | Age: 69
End: 2019-05-13

## 2019-05-13 ENCOUNTER — TELEPHONE (OUTPATIENT)
Dept: CARDIOLOGY CLINIC | Facility: CLINIC | Age: 69
End: 2019-05-13

## 2019-05-13 ENCOUNTER — APPOINTMENT (OUTPATIENT)
Dept: LAB | Age: 69
End: 2019-05-13
Payer: MEDICARE

## 2019-05-13 ENCOUNTER — TRANSCRIBE ORDERS (OUTPATIENT)
Dept: ADMINISTRATIVE | Age: 69
End: 2019-05-13

## 2019-05-13 DIAGNOSIS — I82.409 DEEP VEIN THROMBOSIS (DVT) OF LOWER EXTREMITY, UNSPECIFIED CHRONICITY, UNSPECIFIED LATERALITY, UNSPECIFIED VEIN (HCC): ICD-10-CM

## 2019-05-13 LAB
INR PPP: 1.55 (ref 0.86–1.17)
PROTHROMBIN TIME: 18.7 SECONDS (ref 11.8–14.2)

## 2019-05-13 PROCEDURE — 36415 COLL VENOUS BLD VENIPUNCTURE: CPT

## 2019-05-13 PROCEDURE — 85610 PROTHROMBIN TIME: CPT

## 2019-05-14 LAB
LEFT EYE DIABETIC RETINOPATHY: NORMAL
RIGHT EYE DIABETIC RETINOPATHY: NORMAL

## 2019-05-15 ENCOUNTER — ANESTHESIA EVENT (OUTPATIENT)
Dept: GASTROENTEROLOGY | Facility: HOSPITAL | Age: 69
End: 2019-05-15

## 2019-05-16 ENCOUNTER — HOSPITAL ENCOUNTER (OUTPATIENT)
Dept: GASTROENTEROLOGY | Facility: HOSPITAL | Age: 69
Setting detail: OUTPATIENT SURGERY
Discharge: HOME | End: 2019-05-16
Attending: INTERNAL MEDICINE | Admitting: INTERNAL MEDICINE
Payer: MEDICARE

## 2019-05-16 ENCOUNTER — ANESTHESIA (OUTPATIENT)
Dept: GASTROENTEROLOGY | Facility: HOSPITAL | Age: 69
End: 2019-05-16

## 2019-05-16 ENCOUNTER — TELEPHONE (OUTPATIENT)
Dept: GASTROENTEROLOGY | Facility: CLINIC | Age: 69
End: 2019-05-16

## 2019-05-16 VITALS
BODY MASS INDEX: 28 KG/M2 | DIASTOLIC BLOOD PRESSURE: 79 MMHG | HEART RATE: 72 BPM | SYSTOLIC BLOOD PRESSURE: 129 MMHG | WEIGHT: 200 LBS | RESPIRATION RATE: 18 BRPM | HEIGHT: 71 IN | TEMPERATURE: 98.4 F | OXYGEN SATURATION: 98 %

## 2019-05-16 DIAGNOSIS — Z86.010 HISTORY OF COLONIC POLYPS: ICD-10-CM

## 2019-05-16 DIAGNOSIS — Z79.01 LONG TERM CURRENT USE OF ANTICOAGULANT: ICD-10-CM

## 2019-05-16 DIAGNOSIS — K21.9 GASTRO-ESOPHAGEAL REFLUX DISEASE WITHOUT ESOPHAGITIS: ICD-10-CM

## 2019-05-16 LAB — GLUCOSE SERPL-MCNC: 115 MG/DL (ref 65–140)

## 2019-05-16 PROCEDURE — 45380 COLONOSCOPY AND BIOPSY: CPT | Performed by: INTERNAL MEDICINE

## 2019-05-16 PROCEDURE — 43235 EGD DIAGNOSTIC BRUSH WASH: CPT | Performed by: INTERNAL MEDICINE

## 2019-05-16 PROCEDURE — 88305 TISSUE EXAM BY PATHOLOGIST: CPT | Performed by: PATHOLOGY

## 2019-05-16 PROCEDURE — 82948 REAGENT STRIP/BLOOD GLUCOSE: CPT

## 2019-05-16 PROCEDURE — 45385 COLONOSCOPY W/LESION REMOVAL: CPT | Performed by: INTERNAL MEDICINE

## 2019-05-16 RX ORDER — LIDOCAINE HYDROCHLORIDE 10 MG/ML
INJECTION, SOLUTION INFILTRATION; PERINEURAL AS NEEDED
Status: DISCONTINUED | OUTPATIENT
Start: 2019-05-16 | End: 2019-05-16 | Stop reason: SURG

## 2019-05-16 RX ORDER — SODIUM CHLORIDE, SODIUM LACTATE, POTASSIUM CHLORIDE, CALCIUM CHLORIDE 600; 310; 30; 20 MG/100ML; MG/100ML; MG/100ML; MG/100ML
INJECTION, SOLUTION INTRAVENOUS CONTINUOUS PRN
Status: DISCONTINUED | OUTPATIENT
Start: 2019-05-16 | End: 2019-05-16 | Stop reason: SURG

## 2019-05-16 RX ORDER — SODIUM CHLORIDE 9 MG/ML
125 INJECTION, SOLUTION INTRAVENOUS CONTINUOUS
Status: DISCONTINUED | OUTPATIENT
Start: 2019-05-16 | End: 2019-05-20 | Stop reason: HOSPADM

## 2019-05-16 RX ORDER — PROPOFOL 10 MG/ML
INJECTION, EMULSION INTRAVENOUS AS NEEDED
Status: DISCONTINUED | OUTPATIENT
Start: 2019-05-16 | End: 2019-05-16 | Stop reason: SURG

## 2019-05-16 RX ADMIN — PROPOFOL 100 MG: 10 INJECTION, EMULSION INTRAVENOUS at 08:07

## 2019-05-16 RX ADMIN — SODIUM CHLORIDE, SODIUM LACTATE, POTASSIUM CHLORIDE, AND CALCIUM CHLORIDE: .6; .31; .03; .02 INJECTION, SOLUTION INTRAVENOUS at 07:50

## 2019-05-16 RX ADMIN — PROPOFOL 50 MG: 10 INJECTION, EMULSION INTRAVENOUS at 08:09

## 2019-05-16 RX ADMIN — PROPOFOL 50 MG: 10 INJECTION, EMULSION INTRAVENOUS at 08:22

## 2019-05-16 RX ADMIN — LIDOCAINE HYDROCHLORIDE ANHYDROUS 90 MG: 10 INJECTION, SOLUTION INFILTRATION at 08:07

## 2019-05-16 RX ADMIN — PROPOFOL 50 MG: 10 INJECTION, EMULSION INTRAVENOUS at 08:28

## 2019-05-16 RX ADMIN — PROPOFOL 50 MG: 10 INJECTION, EMULSION INTRAVENOUS at 08:13

## 2019-05-16 RX ADMIN — PROPOFOL 50 MG: 10 INJECTION, EMULSION INTRAVENOUS at 08:34

## 2019-05-16 RX ADMIN — PROPOFOL 50 MG: 10 INJECTION, EMULSION INTRAVENOUS at 08:18

## 2019-05-17 ENCOUNTER — HOSPITAL ENCOUNTER (INPATIENT)
Facility: HOSPITAL | Age: 69
LOS: 1 days | Discharge: HOME | DRG: 661 | End: 2019-05-18
Attending: EMERGENCY MEDICINE | Admitting: HOSPITALIST
Payer: MEDICARE

## 2019-05-17 ENCOUNTER — ANESTHESIA EVENT (OUTPATIENT)
Dept: PERIOP | Facility: HOSPITAL | Age: 69
DRG: 661 | End: 2019-05-17
Payer: MEDICARE

## 2019-05-17 ENCOUNTER — APPOINTMENT (EMERGENCY)
Dept: CT IMAGING | Facility: HOSPITAL | Age: 69
DRG: 661 | End: 2019-05-17
Payer: MEDICARE

## 2019-05-17 ENCOUNTER — APPOINTMENT (OUTPATIENT)
Dept: ULTRASOUND IMAGING | Facility: HOSPITAL | Age: 69
DRG: 661 | End: 2019-05-17
Payer: MEDICARE

## 2019-05-17 ENCOUNTER — ANESTHESIA (OUTPATIENT)
Dept: PERIOP | Facility: HOSPITAL | Age: 69
DRG: 661 | End: 2019-05-17
Payer: MEDICARE

## 2019-05-17 ENCOUNTER — APPOINTMENT (EMERGENCY)
Dept: RADIOLOGY | Facility: HOSPITAL | Age: 69
DRG: 661 | End: 2019-05-17
Payer: MEDICARE

## 2019-05-17 ENCOUNTER — APPOINTMENT (OUTPATIENT)
Dept: RADIOLOGY | Facility: HOSPITAL | Age: 69
DRG: 661 | End: 2019-05-17
Payer: MEDICARE

## 2019-05-17 DIAGNOSIS — N13.2 URETERAL STONE WITH HYDRONEPHROSIS: Primary | ICD-10-CM

## 2019-05-17 DIAGNOSIS — N23 RENAL COLIC ON RIGHT SIDE: ICD-10-CM

## 2019-05-17 DIAGNOSIS — K80.20 GALLSTONE: ICD-10-CM

## 2019-05-17 DIAGNOSIS — E86.0 DEHYDRATION: ICD-10-CM

## 2019-05-17 PROBLEM — D72.829 LEUKOCYTOSIS: Status: ACTIVE | Noted: 2019-05-17

## 2019-05-17 LAB
ABO GROUP BLD: NORMAL
ALBUMIN SERPL BCP-MCNC: 3.7 G/DL (ref 3.5–5)
ALP SERPL-CCNC: 65 U/L (ref 46–116)
ALT SERPL W P-5'-P-CCNC: 45 U/L (ref 12–78)
ANION GAP SERPL CALCULATED.3IONS-SCNC: 11 MMOL/L (ref 4–13)
APTT PPP: 34 SECONDS (ref 26–38)
AST SERPL W P-5'-P-CCNC: 25 U/L (ref 5–45)
ATRIAL RATE: 61 BPM
BACTERIA UR QL AUTO: ABNORMAL /HPF
BASOPHILS # BLD AUTO: 0.04 THOUSANDS/ΜL (ref 0–0.1)
BASOPHILS NFR BLD AUTO: 0 % (ref 0–1)
BILIRUB SERPL-MCNC: 0.8 MG/DL (ref 0.2–1)
BILIRUB UR QL STRIP: ABNORMAL
BLD GP AB SCN SERPL QL: NEGATIVE
BUN SERPL-MCNC: 15 MG/DL (ref 5–25)
CALCIUM SERPL-MCNC: 9.3 MG/DL (ref 8.3–10.1)
CHLORIDE SERPL-SCNC: 100 MMOL/L (ref 100–108)
CK SERPL-CCNC: 84 U/L (ref 39–308)
CLARITY UR: CLEAR
CO2 SERPL-SCNC: 24 MMOL/L (ref 21–32)
COLOR UR: YELLOW
CREAT SERPL-MCNC: 1.27 MG/DL (ref 0.6–1.3)
EOSINOPHIL # BLD AUTO: 0.01 THOUSAND/ΜL (ref 0–0.61)
EOSINOPHIL NFR BLD AUTO: 0 % (ref 0–6)
ERYTHROCYTE [DISTWIDTH] IN BLOOD BY AUTOMATED COUNT: 13.6 % (ref 11.6–15.1)
GFR SERPL CREATININE-BSD FRML MDRD: 58 ML/MIN/1.73SQ M
GLUCOSE SERPL-MCNC: 127 MG/DL (ref 65–140)
GLUCOSE SERPL-MCNC: 159 MG/DL (ref 65–140)
GLUCOSE SERPL-MCNC: 163 MG/DL (ref 65–140)
GLUCOSE SERPL-MCNC: 174 MG/DL (ref 65–140)
GLUCOSE UR STRIP-MCNC: NEGATIVE MG/DL
HCT VFR BLD AUTO: 43.2 % (ref 36.5–49.3)
HGB BLD-MCNC: 14.8 G/DL (ref 12–17)
HGB UR QL STRIP.AUTO: ABNORMAL
HYALINE CASTS #/AREA URNS LPF: ABNORMAL /LPF
IMM GRANULOCYTES # BLD AUTO: 0.18 THOUSAND/UL (ref 0–0.2)
IMM GRANULOCYTES NFR BLD AUTO: 1 % (ref 0–2)
INR PPP: 1.11 (ref 0.86–1.17)
KETONES UR STRIP-MCNC: ABNORMAL MG/DL
LACTATE SERPL-SCNC: 1.8 MMOL/L (ref 0.5–2)
LEUKOCYTE ESTERASE UR QL STRIP: NEGATIVE
LIPASE SERPL-CCNC: 199 U/L (ref 73–393)
LYMPHOCYTES # BLD AUTO: 0.8 THOUSANDS/ΜL (ref 0.6–4.47)
LYMPHOCYTES NFR BLD AUTO: 3 % (ref 14–44)
MCH RBC QN AUTO: 31.6 PG (ref 26.8–34.3)
MCHC RBC AUTO-ENTMCNC: 34.3 G/DL (ref 31.4–37.4)
MCV RBC AUTO: 92 FL (ref 82–98)
MONOCYTES # BLD AUTO: 3.35 THOUSAND/ΜL (ref 0.17–1.22)
MONOCYTES NFR BLD AUTO: 14 % (ref 4–12)
MUCOUS THREADS UR QL AUTO: ABNORMAL
NEUTROPHILS # BLD AUTO: 19.66 THOUSANDS/ΜL (ref 1.85–7.62)
NEUTS SEG NFR BLD AUTO: 82 % (ref 43–75)
NITRITE UR QL STRIP: NEGATIVE
NON-SQ EPI CELLS URNS QL MICRO: ABNORMAL /HPF
NRBC BLD AUTO-RTO: 0 /100 WBCS
P AXIS: 50 DEGREES
PH UR STRIP.AUTO: 5.5 [PH] (ref 4.5–8)
PLATELET # BLD AUTO: 295 THOUSANDS/UL (ref 149–390)
PMV BLD AUTO: 9.2 FL (ref 8.9–12.7)
POTASSIUM SERPL-SCNC: 3.9 MMOL/L (ref 3.5–5.3)
PR INTERVAL: 184 MS
PROT SERPL-MCNC: 7.3 G/DL (ref 6.4–8.2)
PROT UR STRIP-MCNC: ABNORMAL MG/DL
PROTHROMBIN TIME: 14 SECONDS (ref 11.8–14.2)
QRS AXIS: 67 DEGREES
QRSD INTERVAL: 82 MS
QT INTERVAL: 400 MS
QTC INTERVAL: 402 MS
RBC # BLD AUTO: 4.68 MILLION/UL (ref 3.88–5.62)
RBC #/AREA URNS AUTO: ABNORMAL /HPF
RH BLD: POSITIVE
SODIUM SERPL-SCNC: 135 MMOL/L (ref 136–145)
SP GR UR STRIP.AUTO: 1.01 (ref 1–1.03)
SPECIMEN EXPIRATION DATE: NORMAL
T WAVE AXIS: 52 DEGREES
TROPONIN I SERPL-MCNC: 0.03 NG/ML
UROBILINOGEN UR QL STRIP.AUTO: 0.2 E.U./DL
VENTRICULAR RATE: 61 BPM
WBC # BLD AUTO: 24.04 THOUSAND/UL (ref 4.31–10.16)
WBC #/AREA URNS AUTO: ABNORMAL /HPF

## 2019-05-17 PROCEDURE — 52356 CYSTO/URETERO W/LITHOTRIPSY: CPT | Performed by: UROLOGY

## 2019-05-17 PROCEDURE — 86850 RBC ANTIBODY SCREEN: CPT | Performed by: EMERGENCY MEDICINE

## 2019-05-17 PROCEDURE — 71045 X-RAY EXAM CHEST 1 VIEW: CPT

## 2019-05-17 PROCEDURE — 85610 PROTHROMBIN TIME: CPT | Performed by: EMERGENCY MEDICINE

## 2019-05-17 PROCEDURE — 85025 COMPLETE CBC W/AUTO DIFF WBC: CPT | Performed by: EMERGENCY MEDICINE

## 2019-05-17 PROCEDURE — BT1D1ZZ FLUOROSCOPY OF RIGHT KIDNEY, URETER AND BLADDER USING LOW OSMOLAR CONTRAST: ICD-10-PCS | Performed by: UROLOGY

## 2019-05-17 PROCEDURE — 99220 PR INITIAL OBSERVATION CARE/DAY 70 MINUTES: CPT | Performed by: INTERNAL MEDICINE

## 2019-05-17 PROCEDURE — 36415 COLL VENOUS BLD VENIPUNCTURE: CPT | Performed by: EMERGENCY MEDICINE

## 2019-05-17 PROCEDURE — 88300 SURGICAL PATH GROSS: CPT | Performed by: PATHOLOGY

## 2019-05-17 PROCEDURE — 83605 ASSAY OF LACTIC ACID: CPT | Performed by: EMERGENCY MEDICINE

## 2019-05-17 PROCEDURE — 83690 ASSAY OF LIPASE: CPT | Performed by: EMERGENCY MEDICINE

## 2019-05-17 PROCEDURE — 99285 EMERGENCY DEPT VISIT HI MDM: CPT

## 2019-05-17 PROCEDURE — 84484 ASSAY OF TROPONIN QUANT: CPT | Performed by: EMERGENCY MEDICINE

## 2019-05-17 PROCEDURE — 0TC68ZZ EXTIRPATION OF MATTER FROM RIGHT URETER, VIA NATURAL OR ARTIFICIAL OPENING ENDOSCOPIC: ICD-10-PCS | Performed by: UROLOGY

## 2019-05-17 PROCEDURE — C2617 STENT, NON-COR, TEM W/O DEL: HCPCS | Performed by: UROLOGY

## 2019-05-17 PROCEDURE — C1769 GUIDE WIRE: HCPCS | Performed by: UROLOGY

## 2019-05-17 PROCEDURE — 93005 ELECTROCARDIOGRAM TRACING: CPT

## 2019-05-17 PROCEDURE — 74420 UROGRAPHY RTRGR +-KUB: CPT

## 2019-05-17 PROCEDURE — 82550 ASSAY OF CK (CPK): CPT | Performed by: EMERGENCY MEDICINE

## 2019-05-17 PROCEDURE — 96375 TX/PRO/DX INJ NEW DRUG ADDON: CPT

## 2019-05-17 PROCEDURE — 76705 ECHO EXAM OF ABDOMEN: CPT

## 2019-05-17 PROCEDURE — 86901 BLOOD TYPING SEROLOGIC RH(D): CPT | Performed by: EMERGENCY MEDICINE

## 2019-05-17 PROCEDURE — 96361 HYDRATE IV INFUSION ADD-ON: CPT

## 2019-05-17 PROCEDURE — 80053 COMPREHEN METABOLIC PANEL: CPT | Performed by: EMERGENCY MEDICINE

## 2019-05-17 PROCEDURE — 87040 BLOOD CULTURE FOR BACTERIA: CPT | Performed by: EMERGENCY MEDICINE

## 2019-05-17 PROCEDURE — 82948 REAGENT STRIP/BLOOD GLUCOSE: CPT

## 2019-05-17 PROCEDURE — 81001 URINALYSIS AUTO W/SCOPE: CPT

## 2019-05-17 PROCEDURE — 99284 EMERGENCY DEPT VISIT MOD MDM: CPT | Performed by: EMERGENCY MEDICINE

## 2019-05-17 PROCEDURE — 85730 THROMBOPLASTIN TIME PARTIAL: CPT | Performed by: EMERGENCY MEDICINE

## 2019-05-17 PROCEDURE — 0T768DZ DILATION OF RIGHT URETER WITH INTRALUMINAL DEVICE, VIA NATURAL OR ARTIFICIAL OPENING ENDOSCOPIC: ICD-10-PCS | Performed by: UROLOGY

## 2019-05-17 PROCEDURE — 96376 TX/PRO/DX INJ SAME DRUG ADON: CPT

## 2019-05-17 PROCEDURE — 93010 ELECTROCARDIOGRAM REPORT: CPT | Performed by: INTERNAL MEDICINE

## 2019-05-17 PROCEDURE — 86900 BLOOD TYPING SEROLOGIC ABO: CPT | Performed by: EMERGENCY MEDICINE

## 2019-05-17 PROCEDURE — 74177 CT ABD & PELVIS W/CONTRAST: CPT

## 2019-05-17 PROCEDURE — 99215 OFFICE O/P EST HI 40 MIN: CPT | Performed by: UROLOGY

## 2019-05-17 PROCEDURE — 82360 CALCULUS ASSAY QUANT: CPT | Performed by: UROLOGY

## 2019-05-17 PROCEDURE — 96374 THER/PROPH/DIAG INJ IV PUSH: CPT

## 2019-05-17 PROCEDURE — 71275 CT ANGIOGRAPHY CHEST: CPT

## 2019-05-17 DEVICE — STENT URETERAL 6 FR 26CM INLAY OPTIMA: Type: IMPLANTABLE DEVICE | Site: URINARY BLADDER | Status: FUNCTIONAL

## 2019-05-17 RX ORDER — SODIUM CHLORIDE 9 MG/ML
125 INJECTION, SOLUTION INTRAVENOUS CONTINUOUS
Status: DISCONTINUED | OUTPATIENT
Start: 2019-05-17 | End: 2019-05-17

## 2019-05-17 RX ORDER — HYDROMORPHONE HCL/PF 1 MG/ML
0.5 SYRINGE (ML) INJECTION
Status: DISCONTINUED | OUTPATIENT
Start: 2019-05-17 | End: 2019-05-17 | Stop reason: HOSPADM

## 2019-05-17 RX ORDER — SODIUM CHLORIDE 9 MG/ML
INJECTION, SOLUTION INTRAVENOUS CONTINUOUS PRN
Status: DISCONTINUED | OUTPATIENT
Start: 2019-05-17 | End: 2019-05-17 | Stop reason: SURG

## 2019-05-17 RX ORDER — FENTANYL CITRATE/PF 50 MCG/ML
50 SYRINGE (ML) INJECTION
Status: DISCONTINUED | OUTPATIENT
Start: 2019-05-17 | End: 2019-05-17 | Stop reason: HOSPADM

## 2019-05-17 RX ORDER — OXYCODONE HYDROCHLORIDE 5 MG/1
5 TABLET ORAL EVERY 6 HOURS PRN
Status: DISCONTINUED | OUTPATIENT
Start: 2019-05-17 | End: 2019-05-18 | Stop reason: HOSPADM

## 2019-05-17 RX ORDER — ACETAMINOPHEN 325 MG/1
650 TABLET ORAL EVERY 6 HOURS PRN
Status: DISCONTINUED | OUTPATIENT
Start: 2019-05-17 | End: 2019-05-18 | Stop reason: HOSPADM

## 2019-05-17 RX ORDER — HYDROMORPHONE HCL/PF 1 MG/ML
0.2 SYRINGE (ML) INJECTION ONCE
Status: COMPLETED | OUTPATIENT
Start: 2019-05-17 | End: 2019-05-17

## 2019-05-17 RX ORDER — PANTOPRAZOLE SODIUM 40 MG/1
40 TABLET, DELAYED RELEASE ORAL
Status: DISCONTINUED | OUTPATIENT
Start: 2019-05-17 | End: 2019-05-18 | Stop reason: HOSPADM

## 2019-05-17 RX ORDER — HYDROMORPHONE HCL/PF 1 MG/ML
0.5 SYRINGE (ML) INJECTION EVERY 4 HOURS PRN
Status: DISCONTINUED | OUTPATIENT
Start: 2019-05-17 | End: 2019-05-18 | Stop reason: HOSPADM

## 2019-05-17 RX ORDER — MAGNESIUM HYDROXIDE 1200 MG/15ML
LIQUID ORAL AS NEEDED
Status: DISCONTINUED | OUTPATIENT
Start: 2019-05-17 | End: 2019-05-17 | Stop reason: HOSPADM

## 2019-05-17 RX ORDER — HYDROMORPHONE HCL/PF 1 MG/ML
1 SYRINGE (ML) INJECTION EVERY 4 HOURS PRN
Status: DISCONTINUED | OUTPATIENT
Start: 2019-05-17 | End: 2019-05-18 | Stop reason: HOSPADM

## 2019-05-17 RX ORDER — WARFARIN SODIUM 5 MG/1
5 TABLET ORAL
Status: DISCONTINUED | OUTPATIENT
Start: 2019-05-20 | End: 2019-05-17

## 2019-05-17 RX ORDER — HYDROMORPHONE HCL/PF 1 MG/ML
0.2 SYRINGE (ML) INJECTION
Status: DISCONTINUED | OUTPATIENT
Start: 2019-05-17 | End: 2019-05-17 | Stop reason: HOSPADM

## 2019-05-17 RX ORDER — ONDANSETRON 2 MG/ML
INJECTION INTRAMUSCULAR; INTRAVENOUS AS NEEDED
Status: DISCONTINUED | OUTPATIENT
Start: 2019-05-17 | End: 2019-05-17 | Stop reason: SURG

## 2019-05-17 RX ORDER — DIPHENHYDRAMINE HYDROCHLORIDE 50 MG/ML
12.5 INJECTION INTRAMUSCULAR; INTRAVENOUS ONCE AS NEEDED
Status: DISCONTINUED | OUTPATIENT
Start: 2019-05-17 | End: 2019-05-17 | Stop reason: HOSPADM

## 2019-05-17 RX ORDER — WARFARIN SODIUM 5 MG/1
5 TABLET ORAL
Status: DISCONTINUED | OUTPATIENT
Start: 2019-05-17 | End: 2019-05-18 | Stop reason: HOSPADM

## 2019-05-17 RX ORDER — SODIUM CHLORIDE, SODIUM LACTATE, POTASSIUM CHLORIDE, CALCIUM CHLORIDE 600; 310; 30; 20 MG/100ML; MG/100ML; MG/100ML; MG/100ML
75 INJECTION, SOLUTION INTRAVENOUS CONTINUOUS
Status: DISCONTINUED | OUTPATIENT
Start: 2019-05-17 | End: 2019-05-18 | Stop reason: HOSPADM

## 2019-05-17 RX ORDER — FENOFIBRATE 145 MG/1
145 TABLET, COATED ORAL DAILY
Status: DISCONTINUED | OUTPATIENT
Start: 2019-05-17 | End: 2019-05-18 | Stop reason: HOSPADM

## 2019-05-17 RX ORDER — CEFAZOLIN SODIUM 2 G/50ML
2000 SOLUTION INTRAVENOUS EVERY 8 HOURS
Status: COMPLETED | OUTPATIENT
Start: 2019-05-17 | End: 2019-05-18

## 2019-05-17 RX ORDER — ASPIRIN 81 MG/1
81 TABLET ORAL DAILY
Status: DISCONTINUED | OUTPATIENT
Start: 2019-05-17 | End: 2019-05-18 | Stop reason: HOSPADM

## 2019-05-17 RX ORDER — LIDOCAINE HYDROCHLORIDE 20 MG/ML
INJECTION, SOLUTION INFILTRATION; PERINEURAL AS NEEDED
Status: DISCONTINUED | OUTPATIENT
Start: 2019-05-17 | End: 2019-05-17 | Stop reason: SURG

## 2019-05-17 RX ORDER — HYDROMORPHONE HCL/PF 1 MG/ML
0.5 SYRINGE (ML) INJECTION ONCE
Status: COMPLETED | OUTPATIENT
Start: 2019-05-17 | End: 2019-05-17

## 2019-05-17 RX ORDER — TAMSULOSIN HYDROCHLORIDE 0.4 MG/1
0.4 CAPSULE ORAL
Status: DISCONTINUED | OUTPATIENT
Start: 2019-05-17 | End: 2019-05-18 | Stop reason: HOSPADM

## 2019-05-17 RX ORDER — METOCLOPRAMIDE HYDROCHLORIDE 5 MG/ML
10 INJECTION INTRAMUSCULAR; INTRAVENOUS ONCE AS NEEDED
Status: DISCONTINUED | OUTPATIENT
Start: 2019-05-17 | End: 2019-05-17 | Stop reason: HOSPADM

## 2019-05-17 RX ORDER — WARFARIN SODIUM 2.5 MG/1
2.5 TABLET ORAL
Status: DISCONTINUED | OUTPATIENT
Start: 2019-05-17 | End: 2019-05-17

## 2019-05-17 RX ORDER — ONDANSETRON 2 MG/ML
4 INJECTION INTRAMUSCULAR; INTRAVENOUS ONCE
Status: COMPLETED | OUTPATIENT
Start: 2019-05-17 | End: 2019-05-17

## 2019-05-17 RX ORDER — PRAVASTATIN SODIUM 40 MG
40 TABLET ORAL
Status: DISCONTINUED | OUTPATIENT
Start: 2019-05-17 | End: 2019-05-18 | Stop reason: HOSPADM

## 2019-05-17 RX ORDER — FLUTICASONE PROPIONATE 50 MCG
2 SPRAY, SUSPENSION (ML) NASAL DAILY
Status: DISCONTINUED | OUTPATIENT
Start: 2019-05-17 | End: 2019-05-18 | Stop reason: HOSPADM

## 2019-05-17 RX ORDER — ONDANSETRON 2 MG/ML
4 INJECTION INTRAMUSCULAR; INTRAVENOUS ONCE AS NEEDED
Status: DISCONTINUED | OUTPATIENT
Start: 2019-05-17 | End: 2019-05-17 | Stop reason: HOSPADM

## 2019-05-17 RX ORDER — PROPOFOL 10 MG/ML
INJECTION, EMULSION INTRAVENOUS AS NEEDED
Status: DISCONTINUED | OUTPATIENT
Start: 2019-05-17 | End: 2019-05-17 | Stop reason: SURG

## 2019-05-17 RX ORDER — ALBUTEROL SULFATE 90 UG/1
2 AEROSOL, METERED RESPIRATORY (INHALATION) EVERY 4 HOURS PRN
Status: DISCONTINUED | OUTPATIENT
Start: 2019-05-17 | End: 2019-05-18 | Stop reason: HOSPADM

## 2019-05-17 RX ORDER — DEXAMETHASONE SODIUM PHOSPHATE 10 MG/ML
INJECTION, SOLUTION INTRAMUSCULAR; INTRAVENOUS AS NEEDED
Status: DISCONTINUED | OUTPATIENT
Start: 2019-05-17 | End: 2019-05-17 | Stop reason: SURG

## 2019-05-17 RX ORDER — WARFARIN SODIUM 2.5 MG/1
2.5 TABLET ORAL
Status: DISCONTINUED | OUTPATIENT
Start: 2019-05-18 | End: 2019-05-18 | Stop reason: HOSPADM

## 2019-05-17 RX ORDER — ONDANSETRON 2 MG/ML
4 INJECTION INTRAMUSCULAR; INTRAVENOUS EVERY 6 HOURS PRN
Status: DISCONTINUED | OUTPATIENT
Start: 2019-05-17 | End: 2019-05-18 | Stop reason: HOSPADM

## 2019-05-17 RX ADMIN — SODIUM CHLORIDE, SODIUM LACTATE, POTASSIUM CHLORIDE, AND CALCIUM CHLORIDE 75 ML/HR: .6; .31; .03; .02 INJECTION, SOLUTION INTRAVENOUS at 19:51

## 2019-05-17 RX ADMIN — HYDROMORPHONE HYDROCHLORIDE 0.5 MG: 1 INJECTION, SOLUTION INTRAMUSCULAR; INTRAVENOUS; SUBCUTANEOUS at 09:54

## 2019-05-17 RX ADMIN — HYDROMORPHONE HYDROCHLORIDE 0.5 MG: 1 INJECTION, SOLUTION INTRAMUSCULAR; INTRAVENOUS; SUBCUTANEOUS at 14:28

## 2019-05-17 RX ADMIN — ASPIRIN 81 MG: 81 TABLET, COATED ORAL at 09:53

## 2019-05-17 RX ADMIN — MAGNESIUM OXIDE TAB 400 MG (241.3 MG ELEMENTAL MG) 400 MG: 400 (241.3 MG) TAB at 09:53

## 2019-05-17 RX ADMIN — HYDROMORPHONE HYDROCHLORIDE 0.5 MG: 1 INJECTION, SOLUTION INTRAMUSCULAR; INTRAVENOUS; SUBCUTANEOUS at 14:56

## 2019-05-17 RX ADMIN — ONDANSETRON 4 MG: 2 INJECTION INTRAMUSCULAR; INTRAVENOUS at 06:12

## 2019-05-17 RX ADMIN — PANTOPRAZOLE SODIUM 40 MG: 40 TABLET, DELAYED RELEASE ORAL at 11:12

## 2019-05-17 RX ADMIN — LIDOCAINE HYDROCHLORIDE 5 ML: 20 INJECTION, SOLUTION INFILTRATION; PERINEURAL at 18:20

## 2019-05-17 RX ADMIN — WARFARIN SODIUM 5 MG: 5 TABLET ORAL at 20:24

## 2019-05-17 RX ADMIN — SODIUM CHLORIDE 500 ML: 0.9 INJECTION, SOLUTION INTRAVENOUS at 06:10

## 2019-05-17 RX ADMIN — TAMSULOSIN HYDROCHLORIDE 0.4 MG: 0.4 CAPSULE ORAL at 20:25

## 2019-05-17 RX ADMIN — HYDROMORPHONE HYDROCHLORIDE 0.2 MG: 1 INJECTION, SOLUTION INTRAMUSCULAR; INTRAVENOUS; SUBCUTANEOUS at 06:17

## 2019-05-17 RX ADMIN — ONDANSETRON 4 MG: 2 INJECTION INTRAMUSCULAR; INTRAVENOUS at 18:20

## 2019-05-17 RX ADMIN — FENOFIBRATE 145 MG: 145 TABLET, COATED ORAL at 09:53

## 2019-05-17 RX ADMIN — CEFAZOLIN SODIUM 2000 MG: 2 SOLUTION INTRAVENOUS at 18:27

## 2019-05-17 RX ADMIN — SODIUM CHLORIDE: 0.9 INJECTION, SOLUTION INTRAVENOUS at 18:17

## 2019-05-17 RX ADMIN — PROPOFOL 150 MG: 10 INJECTION, EMULSION INTRAVENOUS at 18:20

## 2019-05-17 RX ADMIN — IOHEXOL 4 ML: 240 INJECTION, SOLUTION INTRATHECAL; INTRAVASCULAR; INTRAVENOUS; ORAL at 19:11

## 2019-05-17 RX ADMIN — CEFAZOLIN SODIUM 2000 MG: 2 SOLUTION INTRAVENOUS at 11:11

## 2019-05-17 RX ADMIN — HYDROMORPHONE HYDROCHLORIDE 0.2 MG: 1 INJECTION, SOLUTION INTRAMUSCULAR; INTRAVENOUS; SUBCUTANEOUS at 07:22

## 2019-05-17 RX ADMIN — IOHEXOL 100 ML: 350 INJECTION, SOLUTION INTRAVENOUS at 07:01

## 2019-05-17 RX ADMIN — SODIUM CHLORIDE 125 ML/HR: 0.9 INJECTION, SOLUTION INTRAVENOUS at 07:16

## 2019-05-17 RX ADMIN — DEXAMETHASONE SODIUM PHOSPHATE 4 MG: 10 INJECTION, SOLUTION INTRAMUSCULAR; INTRAVENOUS at 18:20

## 2019-05-18 VITALS
HEART RATE: 74 BPM | HEIGHT: 71 IN | WEIGHT: 203.26 LBS | RESPIRATION RATE: 18 BRPM | DIASTOLIC BLOOD PRESSURE: 57 MMHG | SYSTOLIC BLOOD PRESSURE: 109 MMHG | BODY MASS INDEX: 28.46 KG/M2 | TEMPERATURE: 99.6 F | OXYGEN SATURATION: 95 %

## 2019-05-18 LAB
ANION GAP SERPL CALCULATED.3IONS-SCNC: 10 MMOL/L (ref 4–13)
BASOPHILS # BLD AUTO: 0.05 THOUSANDS/ΜL (ref 0–0.1)
BASOPHILS NFR BLD AUTO: 0 % (ref 0–1)
BUN SERPL-MCNC: 21 MG/DL (ref 5–25)
CALCIUM SERPL-MCNC: 8.7 MG/DL (ref 8.3–10.1)
CHLORIDE SERPL-SCNC: 100 MMOL/L (ref 100–108)
CO2 SERPL-SCNC: 22 MMOL/L (ref 21–32)
CREAT SERPL-MCNC: 1.46 MG/DL (ref 0.6–1.3)
EOSINOPHIL # BLD AUTO: 0 THOUSAND/ΜL (ref 0–0.61)
EOSINOPHIL NFR BLD AUTO: 0 % (ref 0–6)
ERYTHROCYTE [DISTWIDTH] IN BLOOD BY AUTOMATED COUNT: 14 % (ref 11.6–15.1)
GFR SERPL CREATININE-BSD FRML MDRD: 49 ML/MIN/1.73SQ M
GLUCOSE P FAST SERPL-MCNC: 146 MG/DL (ref 65–99)
GLUCOSE SERPL-MCNC: 141 MG/DL (ref 65–140)
GLUCOSE SERPL-MCNC: 146 MG/DL (ref 65–140)
GLUCOSE SERPL-MCNC: 154 MG/DL (ref 65–140)
GLUCOSE SERPL-MCNC: 155 MG/DL (ref 65–140)
HCT VFR BLD AUTO: 36.8 % (ref 36.5–49.3)
HGB BLD-MCNC: 12.2 G/DL (ref 12–17)
IMM GRANULOCYTES # BLD AUTO: 0.28 THOUSAND/UL (ref 0–0.2)
IMM GRANULOCYTES NFR BLD AUTO: 1 % (ref 0–2)
INR PPP: 1.34 (ref 0.86–1.17)
LYMPHOCYTES # BLD AUTO: 1.21 THOUSANDS/ΜL (ref 0.6–4.47)
LYMPHOCYTES NFR BLD AUTO: 5 % (ref 14–44)
MCH RBC QN AUTO: 31 PG (ref 26.8–34.3)
MCHC RBC AUTO-ENTMCNC: 33.2 G/DL (ref 31.4–37.4)
MCV RBC AUTO: 93 FL (ref 82–98)
MONOCYTES # BLD AUTO: 3.62 THOUSAND/ΜL (ref 0.17–1.22)
MONOCYTES NFR BLD AUTO: 15 % (ref 4–12)
NEUTROPHILS # BLD AUTO: 19.27 THOUSANDS/ΜL (ref 1.85–7.62)
NEUTS SEG NFR BLD AUTO: 79 % (ref 43–75)
NRBC BLD AUTO-RTO: 0 /100 WBCS
PLATELET # BLD AUTO: 248 THOUSANDS/UL (ref 149–390)
PMV BLD AUTO: 9.7 FL (ref 8.9–12.7)
POTASSIUM SERPL-SCNC: 4.1 MMOL/L (ref 3.5–5.3)
PROTHROMBIN TIME: 16.2 SECONDS (ref 11.8–14.2)
RBC # BLD AUTO: 3.94 MILLION/UL (ref 3.88–5.62)
SODIUM SERPL-SCNC: 132 MMOL/L (ref 136–145)
WBC # BLD AUTO: 24.43 THOUSAND/UL (ref 4.31–10.16)

## 2019-05-18 PROCEDURE — 82948 REAGENT STRIP/BLOOD GLUCOSE: CPT

## 2019-05-18 PROCEDURE — 85025 COMPLETE CBC W/AUTO DIFF WBC: CPT | Performed by: UROLOGY

## 2019-05-18 PROCEDURE — 85610 PROTHROMBIN TIME: CPT | Performed by: UROLOGY

## 2019-05-18 PROCEDURE — 99239 HOSP IP/OBS DSCHRG MGMT >30: CPT | Performed by: HOSPITALIST

## 2019-05-18 PROCEDURE — 80048 BASIC METABOLIC PNL TOTAL CA: CPT | Performed by: UROLOGY

## 2019-05-18 PROCEDURE — 99232 SBSQ HOSP IP/OBS MODERATE 35: CPT | Performed by: UROLOGY

## 2019-05-18 RX ORDER — TAMSULOSIN HYDROCHLORIDE 0.4 MG/1
0.4 CAPSULE ORAL
Qty: 30 CAPSULE | Refills: 1 | Status: SHIPPED | OUTPATIENT
Start: 2019-05-19 | End: 2019-07-30 | Stop reason: SDUPTHER

## 2019-05-18 RX ORDER — HYDRALAZINE HYDROCHLORIDE 20 MG/ML
10 INJECTION INTRAMUSCULAR; INTRAVENOUS EVERY 6 HOURS PRN
Status: DISCONTINUED | OUTPATIENT
Start: 2019-05-18 | End: 2019-05-18 | Stop reason: HOSPADM

## 2019-05-18 RX ORDER — CEFDINIR 300 MG/1
300 CAPSULE ORAL EVERY 12 HOURS SCHEDULED
Qty: 10 CAPSULE | Refills: 0 | Status: SHIPPED | OUTPATIENT
Start: 2019-05-18 | End: 2019-05-23

## 2019-05-18 RX ADMIN — ASPIRIN 81 MG: 81 TABLET, COATED ORAL at 10:07

## 2019-05-18 RX ADMIN — PANTOPRAZOLE SODIUM 40 MG: 40 TABLET, DELAYED RELEASE ORAL at 05:39

## 2019-05-18 RX ADMIN — CEFAZOLIN SODIUM 2000 MG: 2 SOLUTION INTRAVENOUS at 02:17

## 2019-05-18 RX ADMIN — TAMSULOSIN HYDROCHLORIDE 0.4 MG: 0.4 CAPSULE ORAL at 16:18

## 2019-05-18 RX ADMIN — FLUTICASONE PROPIONATE 2 SPRAY: 50 SPRAY, METERED NASAL at 10:11

## 2019-05-18 RX ADMIN — WARFARIN SODIUM 2.5 MG: 2.5 TABLET ORAL at 17:11

## 2019-05-18 RX ADMIN — MAGNESIUM OXIDE TAB 400 MG (241.3 MG ELEMENTAL MG) 400 MG: 400 (241.3 MG) TAB at 10:07

## 2019-05-18 RX ADMIN — CEFAZOLIN SODIUM 2000 MG: 2 SOLUTION INTRAVENOUS at 10:08

## 2019-05-18 RX ADMIN — PRAVASTATIN SODIUM 40 MG: 40 TABLET ORAL at 16:18

## 2019-05-18 RX ADMIN — FENOFIBRATE 145 MG: 145 TABLET, COATED ORAL at 10:07

## 2019-05-18 RX ADMIN — INSULIN LISPRO 1 UNITS: 100 INJECTION, SOLUTION INTRAVENOUS; SUBCUTANEOUS at 11:30

## 2019-05-20 ENCOUNTER — TRANSITIONAL CARE MANAGEMENT (OUTPATIENT)
Dept: FAMILY MEDICINE CLINIC | Facility: CLINIC | Age: 69
End: 2019-05-20

## 2019-05-20 ENCOUNTER — TELEPHONE (OUTPATIENT)
Dept: UROLOGY | Facility: AMBULATORY SURGERY CENTER | Age: 69
End: 2019-05-20

## 2019-05-20 DIAGNOSIS — N20.0 NEPHROLITHIASIS: Primary | ICD-10-CM

## 2019-05-21 ENCOUNTER — TELEPHONE (OUTPATIENT)
Dept: UROLOGY | Facility: HOSPITAL | Age: 69
End: 2019-05-21

## 2019-05-22 ENCOUNTER — TELEPHONE (OUTPATIENT)
Dept: FAMILY MEDICINE CLINIC | Facility: CLINIC | Age: 69
End: 2019-05-22

## 2019-05-22 LAB — BACTERIA BLD CULT: NORMAL

## 2019-05-23 ENCOUNTER — LAB (OUTPATIENT)
Dept: LAB | Age: 69
End: 2019-05-23
Payer: MEDICARE

## 2019-05-23 ENCOUNTER — TELEPHONE (OUTPATIENT)
Dept: FAMILY MEDICINE CLINIC | Facility: CLINIC | Age: 69
End: 2019-05-23

## 2019-05-23 ENCOUNTER — OFFICE VISIT (OUTPATIENT)
Dept: FAMILY MEDICINE CLINIC | Facility: CLINIC | Age: 69
End: 2019-05-23
Payer: MEDICARE

## 2019-05-23 VITALS
TEMPERATURE: 98.6 F | WEIGHT: 202.2 LBS | OXYGEN SATURATION: 97 % | DIASTOLIC BLOOD PRESSURE: 64 MMHG | HEART RATE: 81 BPM | BODY MASS INDEX: 28.31 KG/M2 | HEIGHT: 71 IN | SYSTOLIC BLOOD PRESSURE: 118 MMHG | RESPIRATION RATE: 20 BRPM

## 2019-05-23 DIAGNOSIS — E78.2 MIXED HYPERLIPIDEMIA: ICD-10-CM

## 2019-05-23 DIAGNOSIS — R78.81 POSITIVE BLOOD CULTURE: ICD-10-CM

## 2019-05-23 DIAGNOSIS — E11.42 TYPE 2 DIABETES MELLITUS WITH DIABETIC POLYNEUROPATHY, WITHOUT LONG-TERM CURRENT USE OF INSULIN (HCC): ICD-10-CM

## 2019-05-23 DIAGNOSIS — IMO0001 TRANSITION OF CARE PERFORMED WITH SHARING OF CLINICAL SUMMARY: Primary | ICD-10-CM

## 2019-05-23 DIAGNOSIS — N13.2 URETERAL STONE WITH HYDRONEPHROSIS: ICD-10-CM

## 2019-05-23 DIAGNOSIS — I10 ESSENTIAL HYPERTENSION: ICD-10-CM

## 2019-05-23 DIAGNOSIS — I82.409 DEEP VEIN THROMBOSIS (DVT) OF LOWER EXTREMITY, UNSPECIFIED CHRONICITY, UNSPECIFIED LATERALITY, UNSPECIFIED VEIN (HCC): ICD-10-CM

## 2019-05-23 DIAGNOSIS — D72.829 LEUKOCYTOSIS, UNSPECIFIED TYPE: ICD-10-CM

## 2019-05-23 DIAGNOSIS — Z79.01 ANTICOAGULANT LONG-TERM USE: ICD-10-CM

## 2019-05-23 LAB
BASOPHILS # BLD AUTO: 0.09 THOUSANDS/ΜL (ref 0–0.1)
BASOPHILS NFR BLD AUTO: 1 % (ref 0–1)
CA PHOS MFR STONE: 10 %
COLOR STONE: NORMAL
COM MFR STONE: 90 %
COMMENT-STONE3: NORMAL
COMPOSITION: NORMAL
EOSINOPHIL # BLD AUTO: 0.25 THOUSAND/ΜL (ref 0–0.61)
EOSINOPHIL NFR BLD AUTO: 2 % (ref 0–6)
ERYTHROCYTE [DISTWIDTH] IN BLOOD BY AUTOMATED COUNT: 14 % (ref 11.6–15.1)
HCT VFR BLD AUTO: 40.8 % (ref 36.5–49.3)
HGB BLD-MCNC: 13.4 G/DL (ref 12–17)
IMM GRANULOCYTES # BLD AUTO: 0.15 THOUSAND/UL (ref 0–0.2)
IMM GRANULOCYTES NFR BLD AUTO: 1 % (ref 0–2)
INR PPP: 2.3 (ref 0.86–1.17)
LABORATORY COMMENT REPORT: NORMAL
LYMPHOCYTES # BLD AUTO: 1.61 THOUSANDS/ΜL (ref 0.6–4.47)
LYMPHOCYTES NFR BLD AUTO: 15 % (ref 14–44)
MCH RBC QN AUTO: 31.2 PG (ref 26.8–34.3)
MCHC RBC AUTO-ENTMCNC: 32.8 G/DL (ref 31.4–37.4)
MCV RBC AUTO: 95 FL (ref 82–98)
MONOCYTES # BLD AUTO: 1 THOUSAND/ΜL (ref 0.17–1.22)
MONOCYTES NFR BLD AUTO: 9 % (ref 4–12)
NEUTROPHILS # BLD AUTO: 7.8 THOUSANDS/ΜL (ref 1.85–7.62)
NEUTS SEG NFR BLD AUTO: 72 % (ref 43–75)
NIDUS STONE QL: NORMAL
NRBC BLD AUTO-RTO: 0 /100 WBCS
PHOTO: NORMAL
PLATELET # BLD AUTO: 387 THOUSANDS/UL (ref 149–390)
PMV BLD AUTO: 10 FL (ref 8.9–12.7)
PROTHROMBIN TIME: 25.4 SECONDS (ref 11.8–14.2)
RBC # BLD AUTO: 4.3 MILLION/UL (ref 3.88–5.62)
SIZE STONE: NORMAL MM
STONE ANALYSIS-IMP: NORMAL
WBC # BLD AUTO: 10.9 THOUSAND/UL (ref 4.31–10.16)
WT STONE: 41.2 MG

## 2019-05-23 PROCEDURE — 85025 COMPLETE CBC W/AUTO DIFF WBC: CPT

## 2019-05-23 PROCEDURE — 36415 COLL VENOUS BLD VENIPUNCTURE: CPT

## 2019-05-23 PROCEDURE — 87040 BLOOD CULTURE FOR BACTERIA: CPT

## 2019-05-23 PROCEDURE — 99495 TRANSJ CARE MGMT MOD F2F 14D: CPT | Performed by: NURSE PRACTITIONER

## 2019-05-23 PROCEDURE — 85610 PROTHROMBIN TIME: CPT

## 2019-05-24 ENCOUNTER — TELEPHONE (OUTPATIENT)
Dept: GASTROENTEROLOGY | Facility: CLINIC | Age: 69
End: 2019-05-24

## 2019-05-24 ENCOUNTER — ANTICOAG VISIT (OUTPATIENT)
Dept: CARDIOLOGY CLINIC | Facility: CLINIC | Age: 69
End: 2019-05-24

## 2019-05-28 ENCOUNTER — OFFICE VISIT (OUTPATIENT)
Dept: CARDIOLOGY CLINIC | Facility: CLINIC | Age: 69
End: 2019-05-28
Payer: MEDICARE

## 2019-05-28 VITALS
RESPIRATION RATE: 18 BRPM | HEART RATE: 70 BPM | BODY MASS INDEX: 27.89 KG/M2 | DIASTOLIC BLOOD PRESSURE: 80 MMHG | WEIGHT: 199.2 LBS | SYSTOLIC BLOOD PRESSURE: 130 MMHG | HEIGHT: 71 IN

## 2019-05-28 DIAGNOSIS — I10 ESSENTIAL HYPERTENSION: Primary | ICD-10-CM

## 2019-05-28 DIAGNOSIS — I48.0 PAROXYSMAL ATRIAL FIBRILLATION (HCC): ICD-10-CM

## 2019-05-28 DIAGNOSIS — E78.2 MIXED HYPERLIPIDEMIA: ICD-10-CM

## 2019-05-28 LAB
BACTERIA BLD CULT: ABNORMAL
BACTERIA BLD CULT: NORMAL
BACTERIA BLD CULT: NORMAL
GRAM STN SPEC: ABNORMAL

## 2019-05-28 PROCEDURE — 99213 OFFICE O/P EST LOW 20 MIN: CPT | Performed by: INTERNAL MEDICINE

## 2019-05-28 RX ORDER — FENOFIBRATE 134 MG/1
134 CAPSULE ORAL
Qty: 90 CAPSULE | Refills: 3 | Status: SHIPPED | OUTPATIENT
Start: 2019-05-28 | End: 2020-05-04 | Stop reason: SDUPTHER

## 2019-06-04 ENCOUNTER — HOSPITAL ENCOUNTER (OUTPATIENT)
Dept: CT IMAGING | Facility: HOSPITAL | Age: 69
Discharge: HOME | End: 2019-06-04
Payer: MEDICARE

## 2019-06-04 DIAGNOSIS — N20.0 NEPHROLITHIASIS: ICD-10-CM

## 2019-06-04 PROCEDURE — 74176 CT ABD & PELVIS W/O CONTRAST: CPT

## 2019-06-06 ENCOUNTER — PROCEDURE VISIT (OUTPATIENT)
Dept: UROLOGY | Facility: CLINIC | Age: 69
End: 2019-06-06
Payer: MEDICARE

## 2019-06-06 VITALS — BODY MASS INDEX: 27.86 KG/M2 | WEIGHT: 199 LBS | HEIGHT: 71 IN

## 2019-06-06 DIAGNOSIS — N20.0 KIDNEY STONES: Primary | ICD-10-CM

## 2019-06-06 LAB
SL AMB  POCT GLUCOSE, UA: NORMAL
SL AMB LEUKOCYTE ESTERASE,UA: NORMAL
SL AMB POCT BILIRUBIN,UA: NORMAL
SL AMB POCT BLOOD,UA: NORMAL
SL AMB POCT CLARITY,UA: CLEAR
SL AMB POCT COLOR,UA: YELLOW
SL AMB POCT KETONES,UA: NORMAL
SL AMB POCT NITRITE,UA: NORMAL
SL AMB POCT PH,UA: 8
SL AMB POCT SPECIFIC GRAVITY,UA: 1.01
SL AMB POCT URINE PROTEIN: NORMAL
SL AMB POCT UROBILINOGEN: NORMAL

## 2019-06-06 PROCEDURE — 52310 CYSTOSCOPY AND TREATMENT: CPT | Performed by: PHYSICIAN ASSISTANT

## 2019-06-06 PROCEDURE — 81002 URINALYSIS NONAUTO W/O SCOPE: CPT | Performed by: PHYSICIAN ASSISTANT

## 2019-06-08 DIAGNOSIS — E11.42 TYPE 2 DIABETES MELLITUS WITH DIABETIC POLYNEUROPATHY, WITHOUT LONG-TERM CURRENT USE OF INSULIN (HCC): ICD-10-CM

## 2019-06-10 ENCOUNTER — TRANSCRIBE ORDERS (OUTPATIENT)
Dept: ADMINISTRATIVE | Age: 69
End: 2019-06-10

## 2019-06-10 ENCOUNTER — LAB (OUTPATIENT)
Dept: LAB | Age: 69
End: 2019-06-10
Payer: MEDICARE

## 2019-06-10 DIAGNOSIS — E13.42 DIABETIC POLYNEUROPATHY ASSOCIATED WITH OTHER SPECIFIED DIABETES MELLITUS (HCC): ICD-10-CM

## 2019-06-10 DIAGNOSIS — E78.2 MIXED HYPERLIPIDEMIA: ICD-10-CM

## 2019-06-10 DIAGNOSIS — E13.42 DIABETIC POLYNEUROPATHY ASSOCIATED WITH OTHER SPECIFIED DIABETES MELLITUS (HCC): Primary | ICD-10-CM

## 2019-06-10 LAB
ALBUMIN SERPL BCP-MCNC: 3.7 G/DL (ref 3.5–5)
ALP SERPL-CCNC: 64 U/L (ref 46–116)
ALT SERPL W P-5'-P-CCNC: 29 U/L (ref 12–78)
ANION GAP SERPL CALCULATED.3IONS-SCNC: 4 MMOL/L (ref 4–13)
AST SERPL W P-5'-P-CCNC: 23 U/L (ref 5–45)
BASOPHILS # BLD AUTO: 0.1 THOUSANDS/ΜL (ref 0–0.1)
BASOPHILS NFR BLD AUTO: 1 % (ref 0–1)
BILIRUB SERPL-MCNC: 0.46 MG/DL (ref 0.2–1)
BUN SERPL-MCNC: 13 MG/DL (ref 5–25)
CALCIUM SERPL-MCNC: 9.1 MG/DL (ref 8.3–10.1)
CHLORIDE SERPL-SCNC: 105 MMOL/L (ref 100–108)
CHOLEST SERPL-MCNC: 130 MG/DL (ref 50–200)
CO2 SERPL-SCNC: 25 MMOL/L (ref 21–32)
CREAT SERPL-MCNC: 1.07 MG/DL (ref 0.6–1.3)
EOSINOPHIL # BLD AUTO: 0.36 THOUSAND/ΜL (ref 0–0.61)
EOSINOPHIL NFR BLD AUTO: 5 % (ref 0–6)
ERYTHROCYTE [DISTWIDTH] IN BLOOD BY AUTOMATED COUNT: 13.6 % (ref 11.6–15.1)
EST. AVERAGE GLUCOSE BLD GHB EST-MCNC: 137 MG/DL
GFR SERPL CREATININE-BSD FRML MDRD: 71 ML/MIN/1.73SQ M
GLUCOSE P FAST SERPL-MCNC: 105 MG/DL (ref 65–99)
HBA1C MFR BLD: 6.4 % (ref 4.2–6.3)
HCT VFR BLD AUTO: 44.2 % (ref 36.5–49.3)
HDLC SERPL-MCNC: 32 MG/DL (ref 40–60)
HGB BLD-MCNC: 14.2 G/DL (ref 12–17)
IMM GRANULOCYTES # BLD AUTO: 0.04 THOUSAND/UL (ref 0–0.2)
IMM GRANULOCYTES NFR BLD AUTO: 1 % (ref 0–2)
LDLC SERPL CALC-MCNC: 53 MG/DL (ref 0–100)
LYMPHOCYTES # BLD AUTO: 1.37 THOUSANDS/ΜL (ref 0.6–4.47)
LYMPHOCYTES NFR BLD AUTO: 17 % (ref 14–44)
MCH RBC QN AUTO: 30.4 PG (ref 26.8–34.3)
MCHC RBC AUTO-ENTMCNC: 32.1 G/DL (ref 31.4–37.4)
MCV RBC AUTO: 95 FL (ref 82–98)
MONOCYTES # BLD AUTO: 0.97 THOUSAND/ΜL (ref 0.17–1.22)
MONOCYTES NFR BLD AUTO: 12 % (ref 4–12)
NEUTROPHILS # BLD AUTO: 5.21 THOUSANDS/ΜL (ref 1.85–7.62)
NEUTS SEG NFR BLD AUTO: 64 % (ref 43–75)
NONHDLC SERPL-MCNC: 98 MG/DL
NRBC BLD AUTO-RTO: 0 /100 WBCS
PLATELET # BLD AUTO: 337 THOUSANDS/UL (ref 149–390)
PMV BLD AUTO: 9.9 FL (ref 8.9–12.7)
POTASSIUM SERPL-SCNC: 4.4 MMOL/L (ref 3.5–5.3)
PROT SERPL-MCNC: 7.6 G/DL (ref 6.4–8.2)
RBC # BLD AUTO: 4.67 MILLION/UL (ref 3.88–5.62)
SODIUM SERPL-SCNC: 134 MMOL/L (ref 136–145)
TRIGL SERPL-MCNC: 225 MG/DL
WBC # BLD AUTO: 8.05 THOUSAND/UL (ref 4.31–10.16)

## 2019-06-10 PROCEDURE — 85025 COMPLETE CBC W/AUTO DIFF WBC: CPT

## 2019-06-10 PROCEDURE — 83036 HEMOGLOBIN GLYCOSYLATED A1C: CPT

## 2019-06-10 PROCEDURE — 36415 COLL VENOUS BLD VENIPUNCTURE: CPT

## 2019-06-10 PROCEDURE — 80061 LIPID PANEL: CPT

## 2019-06-10 PROCEDURE — 80053 COMPREHEN METABOLIC PANEL: CPT

## 2019-06-10 RX ORDER — SITAGLIPTIN 100 MG/1
TABLET, FILM COATED ORAL
Qty: 30 TABLET | Refills: 5 | Status: SHIPPED | OUTPATIENT
Start: 2019-06-10 | End: 2019-09-19 | Stop reason: SDUPTHER

## 2019-06-17 DIAGNOSIS — E11.42 TYPE 2 DIABETES MELLITUS WITH DIABETIC POLYNEUROPATHY, WITHOUT LONG-TERM CURRENT USE OF INSULIN (HCC): Primary | ICD-10-CM

## 2019-06-17 DIAGNOSIS — I48.0 PAROXYSMAL ATRIAL FIBRILLATION (HCC): ICD-10-CM

## 2019-06-24 ENCOUNTER — APPOINTMENT (OUTPATIENT)
Dept: RADIOLOGY | Age: 69
End: 2019-06-24
Payer: MEDICARE

## 2019-06-24 ENCOUNTER — APPOINTMENT (OUTPATIENT)
Dept: LAB | Age: 69
End: 2019-06-24
Payer: MEDICARE

## 2019-06-24 ENCOUNTER — ANTICOAG VISIT (OUTPATIENT)
Dept: CARDIOLOGY CLINIC | Facility: CLINIC | Age: 69
End: 2019-06-24

## 2019-06-24 ENCOUNTER — TRANSCRIBE ORDERS (OUTPATIENT)
Dept: ADMINISTRATIVE | Age: 69
End: 2019-06-24

## 2019-06-24 DIAGNOSIS — O22.30 DEEP PHLEBOTHROMBOSIS, ANTEPARTUM, WITH DELIVERY (HCC): Primary | ICD-10-CM

## 2019-06-24 DIAGNOSIS — I82.409 DEEP PHLEBOTHROMBOSIS, ANTEPARTUM, WITH DELIVERY (HCC): Primary | ICD-10-CM

## 2019-06-24 DIAGNOSIS — I82.409 DEEP PHLEBOTHROMBOSIS, ANTEPARTUM, WITH DELIVERY (HCC): ICD-10-CM

## 2019-06-24 DIAGNOSIS — N20.0 KIDNEY STONES: ICD-10-CM

## 2019-06-24 DIAGNOSIS — I82.409 DEEP VEIN THROMBOSIS (DVT) OF LOWER EXTREMITY, UNSPECIFIED CHRONICITY, UNSPECIFIED LATERALITY, UNSPECIFIED VEIN (HCC): ICD-10-CM

## 2019-06-24 DIAGNOSIS — O22.30 DEEP PHLEBOTHROMBOSIS, ANTEPARTUM, WITH DELIVERY (HCC): ICD-10-CM

## 2019-06-24 LAB
INR PPP: 2.66 (ref 0.84–1.19)
PROTHROMBIN TIME: 27.8 SECONDS (ref 11.6–14.5)

## 2019-06-24 PROCEDURE — 85610 PROTHROMBIN TIME: CPT

## 2019-06-24 PROCEDURE — 74018 RADEX ABDOMEN 1 VIEW: CPT

## 2019-06-24 PROCEDURE — 36415 COLL VENOUS BLD VENIPUNCTURE: CPT

## 2019-06-27 ENCOUNTER — OFFICE VISIT (OUTPATIENT)
Dept: FAMILY MEDICINE CLINIC | Facility: CLINIC | Age: 69
End: 2019-06-27
Payer: MEDICARE

## 2019-06-27 VITALS
TEMPERATURE: 98.2 F | OXYGEN SATURATION: 94 % | RESPIRATION RATE: 16 BRPM | BODY MASS INDEX: 27.91 KG/M2 | HEIGHT: 71 IN | HEART RATE: 81 BPM | DIASTOLIC BLOOD PRESSURE: 84 MMHG | WEIGHT: 199.38 LBS | SYSTOLIC BLOOD PRESSURE: 124 MMHG

## 2019-06-27 DIAGNOSIS — E78.2 MIXED HYPERLIPIDEMIA: ICD-10-CM

## 2019-06-27 DIAGNOSIS — I10 ESSENTIAL HYPERTENSION: ICD-10-CM

## 2019-06-27 DIAGNOSIS — E11.42 TYPE 2 DIABETES MELLITUS WITH DIABETIC POLYNEUROPATHY, WITHOUT LONG-TERM CURRENT USE OF INSULIN (HCC): Primary | ICD-10-CM

## 2019-06-27 PROCEDURE — 99214 OFFICE O/P EST MOD 30 MIN: CPT | Performed by: NURSE PRACTITIONER

## 2019-07-08 ENCOUNTER — APPOINTMENT (OUTPATIENT)
Dept: RADIOLOGY | Facility: AMBULARY SURGERY CENTER | Age: 69
End: 2019-07-08
Attending: SURGERY
Payer: MEDICARE

## 2019-07-08 ENCOUNTER — OFFICE VISIT (OUTPATIENT)
Dept: OBGYN CLINIC | Facility: CLINIC | Age: 69
End: 2019-07-08
Payer: MEDICARE

## 2019-07-08 VITALS
HEIGHT: 71 IN | BODY MASS INDEX: 27.58 KG/M2 | DIASTOLIC BLOOD PRESSURE: 72 MMHG | HEART RATE: 80 BPM | SYSTOLIC BLOOD PRESSURE: 143 MMHG | WEIGHT: 197 LBS

## 2019-07-08 DIAGNOSIS — M25.532 PAIN IN LEFT WRIST: ICD-10-CM

## 2019-07-08 DIAGNOSIS — M75.21 BICEPS TENDONITIS ON RIGHT: Primary | ICD-10-CM

## 2019-07-08 DIAGNOSIS — M25.532 LEFT WRIST PAIN: ICD-10-CM

## 2019-07-08 DIAGNOSIS — M67.432 GANGLION CYST OF DORSUM OF LEFT WRIST: ICD-10-CM

## 2019-07-08 PROCEDURE — 99204 OFFICE O/P NEW MOD 45 MIN: CPT | Performed by: SURGERY

## 2019-07-08 PROCEDURE — 73110 X-RAY EXAM OF WRIST: CPT

## 2019-07-08 NOTE — PROGRESS NOTES
Araseli OSORIO  Attending, Orthopaedic Surgery  Hand, Wrist, and Elbow Surgery  [facility]      ORTHOPAEDIC HAND, WRIST, AND ELBOW OFFICE  VISIT       ASSESSMENT/PLAN:    Ganglion to dorsum of left wrist  Right distal biceps tendinitis    Discussion was had with the patient today in regard to his conditions  In regard to the ganglion cyst, we discussed if this remains asymptomatic there is no need to intervene surgically  At times he says they are aspirated in the office however there is a high recurrence rate  If the patient begins to experience pain in the cyst we may discuss surgical intervention  In regards to his right distal biceps tendinitis and like him to see an occupational therapist or physical therapist for strengthening and stretching exercises  Patient should avoid lifting heavy objects as an heavy eccentric load can potentially cause rupture of biceps tendon  Patient may follow up in 6 weeks time for re-evaluation  The patient verbalized understanding of exam findings and treatment plan  We engaged in the shared decision-making process and treatment options were discussed at length with the patient  Surgical and conservative management discussed today along with risks and benefits  Diagnoses and all orders for this visit:    Biceps tendonitis on right  -     Ambulatory referral to PT/OT hand therapy; Future    Ganglion cyst of dorsum of left wrist    Pain in left wrist  -     XR wrist 3+ vw left; Future    Left wrist pain  -     Ambulatory referral to Orthopedic Surgery        Follow Up:  Return in about 6 weeks (around 8/19/2019)  To Do Next Visit:  Re-evaluation of current issue      General Discussions:  Ganglion Cysts: The anatomy and physiology of the ganglion was discussed with the patient today in the office  Fluid leaking out of the joint surface typically creates a small sac, which can enlarge and cause pain or limitation of motion    Treatment options include observation, aspiration, or surgical incision were discussed with the patient today  Observation typically lead to resolution and approximately 10% of patients, aspiration results in resolution of approximately 50% of patients, and surgical excision leads to resolution in approximately 97% of patients  After discussion with the patient today, the patient voiced understanding of all treatment options  Operative Discussions:  Ganglion Cyst Excision: The anatomy and physiology of the ganglion was discussed with the patient today in the office  Fluid leaking out of the joint surface typically creates a small sac, which can enlarge and cause pain or limitation of motion  Treatment options include observation, aspiration, or surgical incision were discussed with the patient today  Observation typically lead to resolution and approximately 10% of patients, aspiration least resolution approximately 50% of patients, and surgical excision lead to resolution in approximately 97% of patients  After discussion with the patient today, the patient voiced understanding of all treatment options  The patient has elected excision of the ganglion  The risks and benefits of the procedure were explained to the patient, which include, but are not limited to: Bleeding, infection, recurrence, pain, scar, damage to tendons, damage to nerves, and damage to blood vessels, failure to give desired results and complications related to anesthesia  These risks, along with alternative conservative treatment options, and postoperative protocols were voiced back and understood by the patient  All questions were answered to the patient's satisfaction  The patient agrees to comply with a standard postoperative protocol, and is willing to proceed  Education was provided via written and auditory forms  There were no barriers to learning   Written handouts regarding wound care, incision and scar care, and general preoperative information was provided to the patient  Prior to surgery, the patient may be requested to stop all anti-inflammatory medications  Prophylactic aspirin, Plavix, and Coumadin may be allowed to be continued  Medications including vitamin E , ginkgo, and fish oil are requested to be stopped approximately one week prior to surgery  Hypertensive medications and beta blockers, if taken, should be continued  ____________________________________________________________________________________________________________________________________________      CHIEF COMPLAINT:  Chief Complaint   Patient presents with    Left Wrist - Pain       SUBJECTIVE:  Gwendolyn Okeefe is a 77y o  year old RHD male who presents in regard to right elbow pain and a mass to his left wrist  Patient reports that 3 weeks ago he was swinging a 2 lb mallet for several hours when he noticed an increase in pain to his antecubital space  He reports since that time he has been trying to take it easy with lifting, and using his arm for heavy labor  He states the pain has slightly reduced since initial onset, but he still has soreness to the distal bicep  He has been taking tylenol for this pain which does provide him with limited relief  Patient also reports he has noticed a mass lost left wrist for several months  The mass left to its in size  I today he reports the mass is smaller than he noticed few weeks ago  He is not currently experiencing pain at the mass  He has had no previous treatment regard to the mass  He denies any numbness and tingling  He denies any injury        Pain/symptom timing:  Worse during the day when active  Pain/symptom context:  Worse with activites and work  Pain/symptom modifying factors:  Rest makes better, activities make worse  Pain/symptom associated signs/symptoms: none    Prior treatment   · NSAIDsNo   · Injections No   · Bracing/Orthotics No    Physical Therapy No     I have personally reviewed all the relevant PMH, PSH, SH, FH, Medications and allergies      PAST MEDICAL HISTORY:  Past Medical History:   Diagnosis Date    Claudication, intermittent ([facility] [address] )     Colon polyp     6 polyps 3 years ago    Diabetes mellitus ([facility] [address] )     type 2    Diabetic neuropathic arthropathy (HCC)     causes weakness in LE and uses a cane as assist to walk    Diabetic neuropathy (HCC)     GERD (gastroesophageal reflux disease)     History of DVT (deep vein thrombosis) 2015    left LE    Hyperlipidemia     Hypertension     Parotid tumor     Spinal stenosis        PAST SURGICAL HISTORY:  Past Surgical History:   Procedure Laterality Date    COLONOSCOPY  05/16/2019    completed by Dr Camilo Jane, Repeat 3 years    [place] RETROGRADE PYELOGRAM  5/17/2019    PAROTIDECTOMY Left     DC CYSTO/URETERO W/LITHOTRIPSY &INDWELL STENT INSRT Right 5/17/2019    Procedure: CYSTOSCOPY URETEROSCOPY WITH LITHOTRIPSY HOLMIUM LASER, RETROGRADE PYELOGRAM AND INSERTION STENT URETERAL--possible stent only;  Surgeon: Mary Garnica MD;  [facility];  Service: Urology    DC EXC PAROTD,LAT LOBE,DISSECT 5TH NERV Right 10/18/2017    Procedure: SUPERFICIAL PAROTIDECTOMY WITH NERVE DISSECTION AND PRESERVATION;  Surgeon: Milton Yu MD;  [facility];  Service: ENT    ROTATOR CUFF REPAIR Bilateral     TONSILLECTOMY         FAMILY HISTORY:  Family History   Problem Relation Age of Onset    Lupus Mother     Rheum arthritis Mother     Cirrhosis Father     Alcohol abuse Father     Substance Abuse Brother        SOCIAL HISTORY:  Social History     Tobacco Use    Smoking status: Current Every Day Smoker     Types: Pipe    Smokeless tobacco: Never Used   Substance Use Topics    Alcohol use: Yes     Comment: socially    Drug use: No       MEDICATIONS:    Current Outpatient Medications:     acetaminophen (TYLENOL) 325 mg tablet, 2, by mouth, every 6 hours as needed for mild to moderate pain , Disp: 30 tablet, Rfl: 0    aspirin (ECOTRIN LOW STRENGTH) 81 mg EC tablet, Take 81 mg by mouth daily, Disp: , Rfl:     fenofibrate micronized (LOFIBRA) 134 MG capsule, Take 1 capsule (134 mg total) by mouth daily with breakfast, Disp: 90 capsule, Rfl: 3    fluticasone (FLONASE) 50 mcg/act nasal spray, INSTILL 2 SPRAYS IN NOSTRILS ONCE PRN, Disp: , Rfl: 1    JANUVIA 100 MG tablet, TAKE 1 TABLET BY MOUTH EVERY DAY, Disp: 30 tablet, Rfl: 5    lisinopril (ZESTRIL) 20 mg tablet, TAKE 1 TABLET(S) BY MOUTH DAILY, Disp: 90 tablet, Rfl: 4    MAGNESIUM OXIDE PO, Take 400 mg by mouth daily, Disp: , Rfl:     metFORMIN (GLUCOPHAGE) 1000 MG tablet, Take 1 tablet (1,000 mg total) by mouth 2 (two) times a day with meals, Disp: 180 tablet, Rfl: 1    multivitamin-iron-minerals-folic acid (CENTRUM) chewable tablet, Chew 1 tablet daily, Disp: , Rfl:     omeprazole (PriLOSEC) 20 mg delayed release capsule, Take 1 capsule (20 mg total) by mouth daily, Disp: 90 capsule, Rfl: 1    PROAIR  (90 Base) MCG/ACT inhaler, Inhale 2 puffs every 4 (four) hours as needed, Disp: , Rfl: 3    Propylhexedrine (BENZEDREX NA), 2 sprays into each nostril as needed, Disp: , Rfl:     simvastatin (ZOCOR) 40 mg tablet, TAKE 1 TABLET EVERY DAY, Disp: 90 tablet, Rfl: 4    tamsulosin (FLOMAX) 0 4 mg, Take 1 capsule (0 4 mg total) by mouth daily with dinner, Disp: 30 capsule, Rfl: 1    triamterene-hydrochlorothiazide (DYAZIDE) 37 5-25 mg per capsule, 1 CAPSULE(S) BY MOUTH DAILY (Patient taking differently: 1 CAPSULE(S) BY MOUTH EVERY OTHER DAY), Disp: 90 capsule, Rfl: 3    warfarin (COUMADIN) 2 5 mg tablet, Take 1 tablet by mouth daily T-Th-S-S takes 2 5mg    M-W-F takes 5 0 mg, Disp: , Rfl:     warfarin (COUMADIN) 5 mg tablet, TAKE 1 TABLET EVERY DAY OR AS DIRECTED BY MD, Disp: , Rfl: 3    ALLERGIES:  No Known Allergies        REVIEW OF SYSTEMS:  Review of Systems   Constitutional: Negative for chills, fever and unexpected weight change     HENT: Negative for hearing loss, nosebleeds and sore throat  Eyes: Negative for pain, redness and visual disturbance  Respiratory: Negative for cough, shortness of breath and wheezing  Cardiovascular: Negative for chest pain, palpitations and leg swelling  Gastrointestinal: Negative for abdominal pain, nausea and vomiting  Endocrine: Negative for polydipsia and polyuria  Genitourinary: Negative for dysuria and hematuria  Musculoskeletal: Positive for myalgias  Negative for arthralgias and joint swelling  Skin: Negative for rash and wound  Neurological: Negative for dizziness, numbness and headaches  Psychiatric/Behavioral: Negative for agitation, decreased concentration and suicidal ideas  VITALS:  Vitals:    07/08/19 0903   BP: 143/72   Pulse: 80       LABS:  HgA1c:   Lab Results   Component Value Date    HGBA1C 6 4 (H) 06/10/2019     BMP:   Lab Results   Component Value Date    GLUCOSE 159 (H) 12/14/2015    CALCIUM 9 1 06/10/2019     (L) 12/14/2015    K 4 4 06/10/2019    CO2 25 06/10/2019     06/10/2019    BUN 13 06/10/2019    CREATININE 1 07 06/10/2019       _____________________________________________________  PHYSICAL EXAMINATION:  General: well developed and well nourished, alert, oriented times 3 and appears comfortable  Psychiatric: Normal  HEENT: Normocephalic, Atraumatic Trachea Midline, No torticollis  Pulmonary: No audible wheezing or respiratory distress   Cardiovascular: No pitting edema, 2+ radial pulse   Skin: Ganglion noted at left dorsum of radial wrist   Neurovascular: Sensation Intact to the Median, Ulnar, Radial Nerve, Motor Intact to the Median, Ulnar, Radial Nerve and Pulses Intact  Musculoskeletal: Normal, except as noted in detailed exam and in HPI        MUSCULOSKELETAL EXAMINATION:    Right elbow:  Negative pain with resisted supination  Positive pain with resisted flexion  Tender to palpate distal biceps tendon  No gap in AC space on palpation   Distal biceps palpated at elbow flexion crease with negative hook sign    Left wrist:  Small mass noted at the radial aspect of the dorsum on the wrist   Non tender to palpate mass   Sensation intact   Radial pulse 2 +    ___________________________________________________  STUDIES REVIEWED:  I have personally reviewed AP lateral and oblique radiographs of left wrist which demonstrate minimal arthritic changes, no acute fractures           PROCEDURES PERFORMED:  Procedures    no procedures performed    _____________________________________________________      Scribe Attestation    I,:   Kenneth Zamora MA am acting as a scribe while in the presence of the attending physician :        I,:   Fern Johnson MD personally performed the services described in this documentation    as scribed in my presence :

## 2019-07-12 ENCOUNTER — OFFICE VISIT (OUTPATIENT)
Dept: UROLOGY | Facility: CLINIC | Age: 69
End: 2019-07-12
Payer: MEDICARE

## 2019-07-12 VITALS
DIASTOLIC BLOOD PRESSURE: 64 MMHG | HEART RATE: 87 BPM | BODY MASS INDEX: 27.48 KG/M2 | SYSTOLIC BLOOD PRESSURE: 134 MMHG | HEIGHT: 71 IN

## 2019-07-12 DIAGNOSIS — N20.0 CALCULUS OF KIDNEY: Primary | ICD-10-CM

## 2019-07-12 PROCEDURE — 99213 OFFICE O/P EST LOW 20 MIN: CPT | Performed by: PHYSICIAN ASSISTANT

## 2019-07-12 NOTE — PROGRESS NOTES
UROLOGY PROGRESS NOTE   Patient Identifiers: Mikaela Mcclain (MRN [id number])  Date of Service: 7/12/2019    Subjective:     70-year-old man history kidney stones  He had a ureteroscopy May 15th with stent insertion  I took his stent out on June 6th he returns for follow-up  He is comfortable with no complaints  No burning or hematuria  No flank pain post stent removal     Patient has  no complaints  Objective:     VITALS:    Vitals:    07/12/19 0934   BP: 134/64   Pulse: 87     AUA SYMPTOM SCORE      Most Recent Value   AUA SYMPTOM SCORE   How often have you had a sensation of not emptying your bladder completely after you finished urinating? 0   How often have you had to urinate again less than two hours after you finished urinating? 0   How often have you found you stopped and started again several times when you urinate?  0   How often have you found it difficult to postpone urination? 0   How often have you had a weak urinary stream?  0   How often have you had to push or strain to begin urination? 0   How many times did you most typically get up to urinate from the time you went to bed at night until the time you got up in the morning?   2   Quality of Life: If you were to spend the rest of your life with your urinary condition just the way it is now, how would you feel about that?  1   AUA SYMPTOM SCORE  2            LABS:  Lab Results   Component Value Date    HGB 14 2 06/10/2019    HCT 44 2 06/10/2019    WBC 8 05 06/10/2019     06/10/2019   ]    Lab Results   Component Value Date     (L) 12/14/2015    K 4 4 06/10/2019     06/10/2019    CO2 25 06/10/2019    BUN 13 06/10/2019    CREATININE 1 07 06/10/2019    CALCIUM 9 1 06/10/2019    GLUCOSE 159 (H) 12/14/2015   ]        INPATIENT MEDS:    Current Outpatient Medications:     acetaminophen (TYLENOL) 325 mg tablet, 2, by mouth, every 6 hours as needed for mild to moderate pain , Disp: 30 tablet, Rfl: 0    aspirin (ECOTRIN LOW STRENGTH) 81 mg EC tablet, Take 81 mg by mouth daily, Disp: , Rfl:     fenofibrate micronized (LOFIBRA) 134 MG capsule, Take 1 capsule (134 mg total) by mouth daily with breakfast, Disp: 90 capsule, Rfl: 3    fluticasone (FLONASE) 50 mcg/act nasal spray, INSTILL 2 SPRAYS IN NOSTRILS ONCE PRN, Disp: , Rfl: 1    JANUVIA 100 MG tablet, TAKE 1 TABLET BY MOUTH EVERY DAY, Disp: 30 tablet, Rfl: 5    lisinopril (ZESTRIL) 20 mg tablet, TAKE 1 TABLET(S) BY MOUTH DAILY, Disp: 90 tablet, Rfl: 4    MAGNESIUM OXIDE PO, Take 400 mg by mouth daily, Disp: , Rfl:     metFORMIN (GLUCOPHAGE) 1000 MG tablet, Take 1 tablet (1,000 mg total) by mouth 2 (two) times a day with meals, Disp: 180 tablet, Rfl: 1    multivitamin-iron-minerals-folic acid (CENTRUM) chewable tablet, Chew 1 tablet daily, Disp: , Rfl:     omeprazole (PriLOSEC) 20 mg delayed release capsule, Take 1 capsule (20 mg total) by mouth daily, Disp: 90 capsule, Rfl: 1    PROAIR  (90 Base) MCG/ACT inhaler, Inhale 2 puffs every 4 (four) hours as needed, Disp: , Rfl: 3    Propylhexedrine (BENZEDREX NA), 2 sprays into each nostril as needed, Disp: , Rfl:     simvastatin (ZOCOR) 40 mg tablet, TAKE 1 TABLET EVERY DAY, Disp: 90 tablet, Rfl: 4    tamsulosin (FLOMAX) 0 4 mg, Take 1 capsule (0 4 mg total) by mouth daily with dinner, Disp: 30 capsule, Rfl: 1    triamterene-hydrochlorothiazide (DYAZIDE) 37 5-25 mg per capsule, 1 CAPSULE(S) BY MOUTH DAILY (Patient taking differently: 1 CAPSULE(S) BY MOUTH EVERY OTHER DAY), Disp: 90 capsule, Rfl: 3    warfarin (COUMADIN) 2 5 mg tablet, Take 1 tablet by mouth daily T-Th-S-S takes 2 5mg    M-W-F takes 5 0 mg, Disp: , Rfl:     warfarin (COUMADIN) 5 mg tablet, TAKE 1 TABLET EVERY DAY OR AS DIRECTED BY MD, Disp: , Rfl: 3      Physical Exam:   /64 (BP Location: Left arm, Patient Position: Sitting, Cuff Size: Adult)   Pulse 87   Ht 5' 11" (1 803 m)   BMI 27 48 kg/m²   GEN: no acute distress    RESP: breathing comfortably with no accessory muscle use    ABD: soft, non-tender, non-distended   INCISION:    EXT: no significant peripheral edema       RADIOLOGY:   ABDOMEN   IMPRESSION:     Possible distal right ureteral calculi      Assessment:     1   History kidney stones     Plan:   - I do not believe he has ureteral calculi based on clinical findings  - he may follow up on an as-needed basis  - we discussed water consumption and stone prevention  -

## 2019-07-19 ENCOUNTER — EVALUATION (OUTPATIENT)
Dept: OCCUPATIONAL THERAPY | Facility: CLINIC | Age: 69
End: 2019-07-19
Payer: MEDICARE

## 2019-07-19 DIAGNOSIS — M75.21 BICEPS TENDONITIS ON RIGHT: ICD-10-CM

## 2019-07-19 PROCEDURE — 97140 MANUAL THERAPY 1/> REGIONS: CPT | Performed by: OCCUPATIONAL THERAPIST

## 2019-07-19 PROCEDURE — 97165 OT EVAL LOW COMPLEX 30 MIN: CPT | Performed by: OCCUPATIONAL THERAPIST

## 2019-07-19 NOTE — PROGRESS NOTES
OT Evaluation     Today's date: 2019  Patient name: Blanka Aguilar  : 1950  MRN: [id number]  Referring provider: Cory Phillips MD  Dx:   Encounter Diagnosis     ICD-10-CM    1  Biceps tendonitis on right M75 21 Ambulatory referral to PT/OT hand therapy                  Assessment  Assessment details: Teresa Copeland is a R hand dominant male referred with distal bicep tendonitis of the R arm  His presentation is consistent with the referred diagnosis  See below for a detailed assessment  Impairments: activity intolerance, impaired physical strength and pain with function    Symptom irritability: lowUnderstanding of Dx/Px/POC: excellent  Goals  STG: Patient will be compliant with home exercise program in 1 week  STG: Pain will be reduced to a 1/10 during resistive motion of the bicep in 3 weeks  STG: Strength will be improved to 90 pounds  strength in 4 weeks  LTG: Performance in ADLs and IADLS will be improved to prior level of function with the affected extremity within 6 weeks  LTG: FOTO score increase by 10 points within 6 weeks  Plan  Plan details: Treatment to include modalities, manual therapy, PRE's, HEP, and orthotics as appropriate  Patient would benefit from: skilled OT and OT eval  Planned modality interventions: thermotherapy: hydrocollator packs  Planned therapy interventions: home exercise program, joint mobilization, manual therapy, therapeutic exercise, patient education and therapeutic activities  Frequency: 2x week  Duration in visits: [address] beginning date: 2019  Plan of Care expiration date: 2019  Treatment plan discussed with: patient        Subjective Evaluation    History of Present Illness  Date of onset: 2019  Mechanism of injury: Teresa Copeland was using a 2# rubber mallet repetitively to drive stakes into the ground when he felt pain in his R bicep   He reports the same diagnosis 15 years ago in the same arm that improved with conservative treatment  Pain  At best pain ratin  At worst pain ratin  Quality: discomfort and tight    Social Support    Employment status: not working  Hand dominance: right    Treatments  Previous treatment: chiropractic  Patient Goals  Patient goals for therapy: return to sport/leisure activities, decreased pain and independence with ADLs/IADLs          Objective     Palpation     Right   Tenderness of the biceps  Trigger point to biceps  Tenderness     Right Elbow   Tenderness in the distal biceps tendon  No tenderness in the lateral epicondyle  Right Wrist/Hand   Tenderness in the distal biceps tendon  No tenderness in the lateral epicondyle  Neurological Testing     Additional Neurological Details  No numbness or tingling reported       Active Range of Motion     Right Elbow   Flexion: WFL  Extension: WFL  Forearm supination: WFL  Forearm pronation: [facility]    Additional Active Range of Motion Details  +RROM elbow flexion in supination and pronation   +RROM supination     Strength/Myotome Testing     Right Elbow   Flexion: 5  Forearm supination: 5    Left Wrist/Hand      (2nd hand position)     Trial 1: 93 4    Right Wrist/Hand      (2nd hand position)     Trial 1: 82 8      Flowsheet Rows      Most Recent Value   PT/OT G-Codes   Current Score  50   Projected Score  66             Precautions: Universal      Manual              IASTM Bicep 10            IASTM cupping 5'                                                       Exercise Diary              HEP: Bicep stretch, isometrics Issued            HEP: eccentrics TBD                                                                                                                                                                                                                                                          Modalities              MHP 10

## 2019-07-22 ENCOUNTER — ANTICOAG VISIT (OUTPATIENT)
Dept: CARDIOLOGY CLINIC | Facility: CLINIC | Age: 69
End: 2019-07-22

## 2019-07-22 ENCOUNTER — APPOINTMENT (OUTPATIENT)
Dept: LAB | Facility: AMBULARY SURGERY CENTER | Age: 69
End: 2019-07-22
Payer: MEDICARE

## 2019-07-22 ENCOUNTER — OFFICE VISIT (OUTPATIENT)
Dept: OCCUPATIONAL THERAPY | Facility: CLINIC | Age: 69
End: 2019-07-22
Payer: MEDICARE

## 2019-07-22 DIAGNOSIS — M75.21 BICEPS TENDONITIS ON RIGHT: Primary | ICD-10-CM

## 2019-07-22 DIAGNOSIS — I82.409 DEEP VEIN THROMBOSIS (DVT) OF LOWER EXTREMITY, UNSPECIFIED CHRONICITY, UNSPECIFIED LATERALITY, UNSPECIFIED VEIN (HCC): ICD-10-CM

## 2019-07-22 LAB
INR PPP: 1.94 (ref 0.84–1.19)
PROTHROMBIN TIME: 21.7 SECONDS (ref 11.6–14.5)

## 2019-07-22 PROCEDURE — 36415 COLL VENOUS BLD VENIPUNCTURE: CPT

## 2019-07-22 PROCEDURE — 97140 MANUAL THERAPY 1/> REGIONS: CPT | Performed by: OCCUPATIONAL THERAPIST

## 2019-07-22 PROCEDURE — 97110 THERAPEUTIC EXERCISES: CPT | Performed by: OCCUPATIONAL THERAPIST

## 2019-07-22 PROCEDURE — 85610 PROTHROMBIN TIME: CPT

## 2019-07-22 NOTE — PROGRESS NOTES
Daily Note     Today's date: 2019  Patient name: Va Coe  : 1950  MRN: [id number]  Referring provider: Lin Thompson MD  Dx:   Encounter Diagnosis     ICD-10-CM    1  Biceps tendonitis on right M75 21                   Subjective: Reports discomfort in bicep with isometric HEP  Objective: See treatment diary below  Assessment: Tolerated treatment well  Patient would benefit from continued OT  Plan: Progress treatment as tolerated         Precautions: Universal      Manual             IASTM Bicep 10 10'           IASTM cupping 5' 5'                                                      Exercise Diary             HEP: Bicep stretch, isometrics Issued            HEP: eccentrics TBD            EF eccentric  3# 2x10           Supination/pronation  With hammer 20#           Flex bar   Pro/suo bending 20x Green                                                                                                                                                                                                                   Modalities             MHP 10 5'

## 2019-07-22 NOTE — PROGRESS NOTES
7/22/19, tc to pt, report he may have missed s 5 mg dose on fri, also has been eating cherries  Will continue current dose, inr due 4 weeks  Pt will call if any concerns   jeri

## 2019-07-25 ENCOUNTER — OFFICE VISIT (OUTPATIENT)
Dept: OCCUPATIONAL THERAPY | Facility: CLINIC | Age: 69
End: 2019-07-25
Payer: MEDICARE

## 2019-07-25 DIAGNOSIS — M75.21 BICEPS TENDONITIS ON RIGHT: Primary | ICD-10-CM

## 2019-07-25 PROCEDURE — 97140 MANUAL THERAPY 1/> REGIONS: CPT | Performed by: OCCUPATIONAL THERAPIST

## 2019-07-25 PROCEDURE — 97110 THERAPEUTIC EXERCISES: CPT | Performed by: OCCUPATIONAL THERAPIST

## 2019-07-25 NOTE — PROGRESS NOTES
Daily Note     Today's date: 2019  Patient name: Jacqui Sewell  : 1950  MRN: [id number]  Referring provider: Abel Rose MD  Dx:   Encounter Diagnosis     ICD-10-CM    1  Biceps tendonitis on right M75 21                   Subjective: "It feels pretty good"      Objective: See treatment diary below  Assessment: Tolerated treatment well  Patient would benefit from continued OT  Reports improving pain in distal bicep  Plan: Progress treatment as tolerated         Precautions: Universal      Manual            IASTM Bicep 10 10' 10          IASTM cupping 5' 5' 5                                                     Exercise Diary            HEP: Bicep stretch, isometrics Issued            HEP: eccentrics TBD            EF eccentric  3# 2x10 3# 3x 10          Supination/pronation  With hammer 20# Eccentric sup 3x 10          Flex bar   Pro/suo bending 20x Green  Sup/pro 3x 10 Green                                                                                                                                                                                                                 Modalities            MHP 10 5' 10

## 2019-07-29 ENCOUNTER — OFFICE VISIT (OUTPATIENT)
Dept: OCCUPATIONAL THERAPY | Facility: CLINIC | Age: 69
End: 2019-07-29
Payer: MEDICARE

## 2019-07-29 DIAGNOSIS — M75.21 BICEPS TENDONITIS ON RIGHT: Primary | ICD-10-CM

## 2019-07-29 DIAGNOSIS — N23 RENAL COLIC ON RIGHT SIDE: ICD-10-CM

## 2019-07-29 PROCEDURE — 97140 MANUAL THERAPY 1/> REGIONS: CPT | Performed by: OCCUPATIONAL THERAPIST

## 2019-07-29 PROCEDURE — 97110 THERAPEUTIC EXERCISES: CPT | Performed by: OCCUPATIONAL THERAPIST

## 2019-07-29 PROCEDURE — 97150 GROUP THERAPEUTIC PROCEDURES: CPT | Performed by: OCCUPATIONAL THERAPIST

## 2019-07-29 NOTE — TELEPHONE ENCOUNTER
Patient of [employer] seen in [facility], managed by Dr Hernan Huber  Patient calling to request a refill of Tamsulosin      He can be reached at [phone]

## 2019-07-29 NOTE — PROGRESS NOTES
Daily Note     Today's date: 2019  Patient name: Blanka Aguilar  : 1950  MRN: [id number]  Referring provider: Cory Phillips MD  Dx:   Encounter Diagnosis     ICD-10-CM    1  Biceps tendonitis on right M75 21                   Subjective: "It feels pretty good"      Objective: See treatment diary below  Assessment: Tolerated treatment well  Patient would benefit from continued OT  Wants to do HEP only at this time, marked improvement in pain  Plan: Progress treatment as tolerated         Precautions: Universal      Manual           IASTM Bicep 10 10' 10 10         IASTM cupping 5' 5' 5 5                                                    Exercise Diary           HEP: Bicep stretch, isometrics Issued            HEP: eccentrics TBD            EF eccentric  3# 2x10 3# 3x 10 4# 3x 10         Supination/pronation  With hammer 20# Eccentric sup 3x 10 eccentric 2#  3x10 w/ hammer         Flex bar   Pro/suo bending 20x Green  Sup/pro 3x 10 Green 3x 10 G         Doorway bicep stretch    2x 30 sec                                                                                                                                                                                                   Modalities           MHP 10 5' 10 5

## 2019-07-30 ENCOUNTER — APPOINTMENT (OUTPATIENT)
Dept: OCCUPATIONAL THERAPY | Facility: CLINIC | Age: 69
End: 2019-07-30
Payer: MEDICARE

## 2019-07-30 NOTE — TELEPHONE ENCOUNTER
The patient was last seen on 7/12/19 by Santosh Marshall PA-C in the [place] location  Patient has been treated for an acute kidney stone since May, 2019  Patient presented as a new patient at that time  All records reviewed and his file does not reflect an actual order for this medication    Request for same, 90 day supply with 3 refills was queued and forwarded to Santosh Marshall PA-C for approval

## 2019-07-31 RX ORDER — TAMSULOSIN HYDROCHLORIDE 0.4 MG/1
0.4 CAPSULE ORAL
Qty: 90 CAPSULE | Refills: 3 | Status: SHIPPED | OUTPATIENT
Start: 2019-07-31 | End: 2020-07-20

## 2019-08-19 ENCOUNTER — ANTICOAG VISIT (OUTPATIENT)
Dept: CARDIOLOGY CLINIC | Facility: CLINIC | Age: 69
End: 2019-08-19

## 2019-08-19 ENCOUNTER — APPOINTMENT (OUTPATIENT)
Dept: LAB | Age: 69
End: 2019-08-19
Payer: MEDICARE

## 2019-08-19 ENCOUNTER — TRANSCRIBE ORDERS (OUTPATIENT)
Dept: ADMINISTRATIVE | Age: 69
End: 2019-08-19

## 2019-08-19 DIAGNOSIS — I82.409 DEEP VEIN THROMBOSIS (DVT) OF LOWER EXTREMITY, UNSPECIFIED CHRONICITY, UNSPECIFIED LATERALITY, UNSPECIFIED VEIN (HCC): ICD-10-CM

## 2019-08-19 LAB
INR PPP: 2.2 (ref 0.84–1.19)
PROTHROMBIN TIME: 23.9 SECONDS (ref 11.6–14.5)

## 2019-08-19 PROCEDURE — 85610 PROTHROMBIN TIME: CPT

## 2019-08-19 PROCEDURE — 36415 COLL VENOUS BLD VENIPUNCTURE: CPT

## 2019-08-26 ENCOUNTER — OFFICE VISIT (OUTPATIENT)
Dept: OBGYN CLINIC | Facility: CLINIC | Age: 69
End: 2019-08-26
Payer: MEDICARE

## 2019-08-26 VITALS
HEIGHT: 71 IN | SYSTOLIC BLOOD PRESSURE: 130 MMHG | HEART RATE: 80 BPM | DIASTOLIC BLOOD PRESSURE: 69 MMHG | BODY MASS INDEX: 28.06 KG/M2 | WEIGHT: 200.4 LBS

## 2019-08-26 DIAGNOSIS — M67.432 GANGLION CYST OF DORSUM OF LEFT WRIST: ICD-10-CM

## 2019-08-26 DIAGNOSIS — M75.21 BICEPS TENDONITIS ON RIGHT: Primary | ICD-10-CM

## 2019-08-26 PROCEDURE — 1124F ACP DISCUSS-NO DSCNMKR DOCD: CPT | Performed by: SURGERY

## 2019-08-26 PROCEDURE — 1123F ACP DISCUSS/DSCN MKR DOCD: CPT | Performed by: SURGERY

## 2019-08-26 PROCEDURE — 99213 OFFICE O/P EST LOW 20 MIN: CPT | Performed by: SURGERY

## 2019-08-26 NOTE — PROGRESS NOTES
ASSESSMENT/PLAN:      77 y o  male with a left dorsal ganglion cyst that is asymptomatic and resolved right distal biceps tendinitis  With regards to Peter's biceps tendinis, if he starts to feel his symptoms return he should continue to perform his HEP  With regards to the ganglion cyst and the small mass located to the dorsal aspect of lis left forearm, we will continue to monitor  If the masses increase in size and become painful I would like to see him back in the office  The ganglion cyst can potentially be aspirated at that time  Follow up in a few months to check on the masses  The patient verbalized understanding of exam findings and treatment plan  We engaged in the shared decision-making process and treatment options were discussed at length with the patient  Surgical and conservative management discussed today along with risks and benefits  Diagnoses and all orders for this visit:    Biceps tendonitis on right    Ganglion cyst of dorsum of left wrist      Follow Up:  Return if symptoms worsen or fail to improve  To Do Next Visit:  Re-evaluation of current issue    ____________________________________________________________________________________________________________________________________________      CHIEF COMPLAINT:  Chief Complaint   Patient presents with    Right Arm - Follow-up       SUBJECTIVE:  Catalina Soto is a 77y o  year old RHD male who presents to the office today for a follow up regarding right biceps tendinitis  Lizzeth Jose has been attending physical therapy  He notes that his biceps tendonitis has resolved aprox  99 percent with PT  He states that he has been performing a HEP with symptoms relief  He notes his left dorsal ganglion cyst comes and goes and is not too bothersome at this time  He is not taking anything for pain control  I have personally reviewed all the relevant PMH, PSH, SH, FH, Medications and allergies       PAST MEDICAL HISTORY:  Past Medical History: Diagnosis Date    Claudication, intermittent (HCC)     Colon polyp     6 polyps 3 years ago    Diabetes mellitus ([address] )     type 2    Diabetic neuropathic arthropathy (HCC)     causes weakness in LE and uses a cane as assist to walk    Diabetic neuropathy (HCC)     GERD (gastroesophageal reflux disease)     History of DVT (deep vein thrombosis) 2015    left LE    Hyperlipidemia     Hypertension     Parotid tumor     Spinal stenosis        PAST SURGICAL HISTORY:  Past Surgical History:   Procedure Laterality Date    COLONOSCOPY  05/16/2019    completed by Dr Emilie Pride, Repeat 3 years    [place] RETROGRADE PYELOGRAM  5/17/2019    PAROTIDECTOMY Left     [place] CYSTO/URETERO W/LITHOTRIPSY &INDWELL STENT INSRT Right 5/17/2019    Procedure: CYSTOSCOPY URETEROSCOPY WITH LITHOTRIPSY HOLMIUM LASER, RETROGRADE PYELOGRAM AND INSERTION STENT URETERAL--possible stent only;  Surgeon: Fadia Dawn MD;  [facility];  Service: Urology    [place] EXC PAROTD,LAT LOBE,DISSECT 5TH NERV Right 10/18/2017    Procedure: SUPERFICIAL PAROTIDECTOMY WITH NERVE DISSECTION AND PRESERVATION;  Surgeon: Quinn Vernon MD;  [facility];  Service: ENT    ROTATOR CUFF REPAIR Bilateral     TONSILLECTOMY         FAMILY HISTORY:  Family History   Problem Relation Age of Onset    Lupus Mother     Rheum arthritis Mother     Cirrhosis Father     Alcohol abuse Father     Substance Abuse Brother        SOCIAL HISTORY:  Social History     Tobacco Use    Smoking status: Current Every Day Smoker     Types: Pipe    Smokeless tobacco: Never Used   Substance Use Topics    Alcohol use: Yes     Comment: socially    Drug use: No       MEDICATIONS:    Current Outpatient Medications:     acetaminophen (TYLENOL) 325 mg tablet, 2, by mouth, every 6 hours as needed for mild to moderate pain , Disp: 30 tablet, Rfl: 0    aspirin (ECOTRIN LOW STRENGTH) 81 mg EC tablet, Take 81 mg by mouth daily, Disp: , Rfl:     fenofibrate micronized (LOFIBRA) 134 MG capsule, Take 1 capsule (134 mg total) by mouth daily with breakfast, Disp: 90 capsule, Rfl: 3    fluticasone (FLONASE) 50 mcg/act nasal spray, INSTILL 2 SPRAYS IN NOSTRILS ONCE PRN, Disp: , Rfl: 1    JANUVIA 100 MG tablet, TAKE 1 TABLET BY MOUTH EVERY DAY, Disp: 30 tablet, Rfl: 5    lisinopril (ZESTRIL) 20 mg tablet, TAKE 1 TABLET(S) BY MOUTH DAILY, Disp: 90 tablet, Rfl: 4    MAGNESIUM OXIDE PO, Take 400 mg by mouth daily, Disp: , Rfl:     metFORMIN (GLUCOPHAGE) 1000 MG tablet, Take 1 tablet (1,000 mg total) by mouth 2 (two) times a day with meals, Disp: 180 tablet, Rfl: 1    multivitamin-iron-minerals-folic acid (CENTRUM) chewable tablet, Chew 1 tablet daily, Disp: , Rfl:     omeprazole (PriLOSEC) 20 mg delayed release capsule, Take 1 capsule (20 mg total) by mouth daily, Disp: 90 capsule, Rfl: 1    PROAIR  (90 Base) MCG/ACT inhaler, Inhale 2 puffs every 4 (four) hours as needed, Disp: , Rfl: 3    Propylhexedrine (BENZEDREX NA), 2 sprays into each nostril as needed, Disp: , Rfl:     simvastatin (ZOCOR) 40 mg tablet, TAKE 1 TABLET EVERY DAY, Disp: 90 tablet, Rfl: 4    tamsulosin (FLOMAX) 0 4 mg, Take 1 capsule (0 4 mg total) by mouth daily with dinner, Disp: 90 capsule, Rfl: 3    triamterene-hydrochlorothiazide (DYAZIDE) 37 5-25 mg per capsule, 1 CAPSULE(S) BY MOUTH DAILY (Patient taking differently: 1 CAPSULE(S) BY MOUTH EVERY OTHER DAY), Disp: 90 capsule, Rfl: 3    warfarin (COUMADIN) 2 5 mg tablet, Take 1 tablet by mouth daily T-Th-S-S takes 2 5mg    M-W-F takes 5 0 mg, Disp: , Rfl:     warfarin (COUMADIN) 5 mg tablet, TAKE 1 TABLET EVERY DAY OR AS DIRECTED BY MD, Disp: , Rfl: 3    ALLERGIES:  No Known Allergies    REVIEW OF SYSTEMS:  Review of Systems   Constitutional: Negative for chills, fever and unexpected weight change  HENT: Negative for hearing loss, nosebleeds and sore throat  Eyes: Negative for pain, redness and visual disturbance     Respiratory: Negative for cough, shortness of breath and wheezing  Cardiovascular: Negative for chest pain, palpitations and leg swelling  Gastrointestinal: Negative for abdominal pain, nausea and vomiting  Endocrine: Negative for polydipsia and polyuria  Genitourinary: Negative for difficulty urinating and hematuria  Musculoskeletal: Negative for arthralgias, joint swelling and myalgias  Skin: Negative for rash and wound  Neurological: Negative for dizziness, numbness and headaches  Psychiatric/Behavioral: Negative for decreased concentration, dysphoric mood and suicidal ideas  The patient is not nervous/anxious  VITALS:  Vitals:    08/26/19 0810   BP: 130/69   Pulse: 80       LABS:  HgA1c:   Lab Results   Component Value Date    HGBA1C 6 4 (H) 06/10/2019     BMP:   Lab Results   Component Value Date    GLUCOSE 159 (H) 12/14/2015    CALCIUM 9 1 06/10/2019     (L) 12/14/2015    K 4 4 06/10/2019    CO2 25 06/10/2019     06/10/2019    BUN 13 06/10/2019    CREATININE 1 07 06/10/2019       _____________________________________________________  PHYSICAL EXAMINATION:  General: well developed and well nourished, alert, oriented times 3 and appears comfortable  Psychiatric: Normal  HEENT: Normocephalic, Atraumatic Trachea Midline, No torticollis  Pulmonary: No audible wheezing or respiratory distress   Cardiovascular: No pitting edema, 2+ radial pulse   Skin: No erythema, lacerations, fluctation, ulcerations  Neurovascular: Sensation Intact to the Median, Ulnar, Radial Nerve, Motor Intact to the Median, Ulnar, Radial Nerve and Pulses Intact  Musculoskeletal: Normal, except as noted in detailed exam and in HPI        MUSCULOSKELETAL EXAMINATION:    Right elbow:  No erythema  No ecchymosis   Full elbow ROM  No pain with resisted flexion   No tenderness   Small mass notes, dorsal lateral aspect     Left wrist:  No erythema  No ecchymosis   Dorsal mass noted   Mass non tender to palpation  Sensation intact ___________________________________________________  STUDIES REVIEWED:  No new imaging to review           PROCEDURES PERFORMED:  Procedures  No Procedures performed today    _____________________________________________________      [facility]    I,:   Black Haley am acting as a scribe while in the presence of the attending physician :        I,:   Teresa Kumar MD personally performed the services described in this documentation    as scribed in my presence :

## 2019-09-05 ENCOUNTER — LAB (OUTPATIENT)
Dept: LAB | Age: 69
End: 2019-09-05
Payer: MEDICARE

## 2019-09-05 DIAGNOSIS — E78.2 MIXED HYPERLIPIDEMIA: ICD-10-CM

## 2019-09-05 DIAGNOSIS — I10 ESSENTIAL HYPERTENSION: ICD-10-CM

## 2019-09-05 DIAGNOSIS — I48.0 PAROXYSMAL ATRIAL FIBRILLATION (HCC): ICD-10-CM

## 2019-09-05 DIAGNOSIS — D72.829 LEUKOCYTOSIS, UNSPECIFIED TYPE: ICD-10-CM

## 2019-09-05 DIAGNOSIS — E11.42 TYPE 2 DIABETES MELLITUS WITH DIABETIC POLYNEUROPATHY, WITHOUT LONG-TERM CURRENT USE OF INSULIN (HCC): ICD-10-CM

## 2019-09-05 LAB
ALBUMIN SERPL BCP-MCNC: 3.8 G/DL (ref 3.5–5)
ALP SERPL-CCNC: 56 U/L (ref 46–116)
ALT SERPL W P-5'-P-CCNC: 28 U/L (ref 12–78)
ANION GAP SERPL CALCULATED.3IONS-SCNC: 6 MMOL/L (ref 4–13)
AST SERPL W P-5'-P-CCNC: 17 U/L (ref 5–45)
BASOPHILS # BLD AUTO: 0.1 THOUSANDS/ΜL (ref 0–0.1)
BASOPHILS NFR BLD AUTO: 1 % (ref 0–1)
BILIRUB SERPL-MCNC: 0.39 MG/DL (ref 0.2–1)
BUN SERPL-MCNC: 15 MG/DL (ref 5–25)
CALCIUM SERPL-MCNC: 9.3 MG/DL (ref 8.3–10.1)
CHLORIDE SERPL-SCNC: 104 MMOL/L (ref 100–108)
CHOLEST SERPL-MCNC: 158 MG/DL (ref 50–200)
CO2 SERPL-SCNC: 23 MMOL/L (ref 21–32)
CREAT SERPL-MCNC: 0.99 MG/DL (ref 0.6–1.3)
EOSINOPHIL # BLD AUTO: 0.34 THOUSAND/ΜL (ref 0–0.61)
EOSINOPHIL NFR BLD AUTO: 4 % (ref 0–6)
ERYTHROCYTE [DISTWIDTH] IN BLOOD BY AUTOMATED COUNT: 14.7 % (ref 11.6–15.1)
EST. AVERAGE GLUCOSE BLD GHB EST-MCNC: 123 MG/DL
GFR SERPL CREATININE-BSD FRML MDRD: 78 ML/MIN/1.73SQ M
GLUCOSE P FAST SERPL-MCNC: 117 MG/DL (ref 65–99)
HBA1C MFR BLD: 5.9 % (ref 4.2–6.3)
HCT VFR BLD AUTO: 47.8 % (ref 36.5–49.3)
HDLC SERPL-MCNC: 35 MG/DL (ref 40–60)
HGB BLD-MCNC: 15 G/DL (ref 12–17)
IMM GRANULOCYTES # BLD AUTO: 0.04 THOUSAND/UL (ref 0–0.2)
IMM GRANULOCYTES NFR BLD AUTO: 1 % (ref 0–2)
LDLC SERPL CALC-MCNC: 70 MG/DL (ref 0–100)
LYMPHOCYTES # BLD AUTO: 1.37 THOUSANDS/ΜL (ref 0.6–4.47)
LYMPHOCYTES NFR BLD AUTO: 16 % (ref 14–44)
MCH RBC QN AUTO: 31 PG (ref 26.8–34.3)
MCHC RBC AUTO-ENTMCNC: 31.4 G/DL (ref 31.4–37.4)
MCV RBC AUTO: 99 FL (ref 82–98)
MONOCYTES # BLD AUTO: 1.22 THOUSAND/ΜL (ref 0.17–1.22)
MONOCYTES NFR BLD AUTO: 15 % (ref 4–12)
NEUTROPHILS # BLD AUTO: 5.31 THOUSANDS/ΜL (ref 1.85–7.62)
NEUTS SEG NFR BLD AUTO: 63 % (ref 43–75)
NONHDLC SERPL-MCNC: 123 MG/DL
NRBC BLD AUTO-RTO: 0 /100 WBCS
PLATELET # BLD AUTO: 281 THOUSANDS/UL (ref 149–390)
PMV BLD AUTO: 10.1 FL (ref 8.9–12.7)
POTASSIUM SERPL-SCNC: 4.5 MMOL/L (ref 3.5–5.3)
PROT SERPL-MCNC: 7.6 G/DL (ref 6.4–8.2)
RBC # BLD AUTO: 4.84 MILLION/UL (ref 3.88–5.62)
SODIUM SERPL-SCNC: 133 MMOL/L (ref 136–145)
TRIGL SERPL-MCNC: 265 MG/DL
TSH SERPL DL<=0.05 MIU/L-ACNC: 1.9 UIU/ML (ref 0.36–3.74)
WBC # BLD AUTO: 8.38 THOUSAND/UL (ref 4.31–10.16)

## 2019-09-05 PROCEDURE — 80053 COMPREHEN METABOLIC PANEL: CPT

## 2019-09-05 PROCEDURE — 83036 HEMOGLOBIN GLYCOSYLATED A1C: CPT

## 2019-09-05 PROCEDURE — 36415 COLL VENOUS BLD VENIPUNCTURE: CPT

## 2019-09-05 PROCEDURE — 80061 LIPID PANEL: CPT

## 2019-09-05 PROCEDURE — 84443 ASSAY THYROID STIM HORMONE: CPT

## 2019-09-05 PROCEDURE — 85025 COMPLETE CBC W/AUTO DIFF WBC: CPT

## 2019-09-06 NOTE — RESULT ENCOUNTER NOTE
Please let patient know that blood work is stable  Hemoglobin A1c is good at 5 9%  Will review in further detail at office visit later this month

## 2019-09-16 ENCOUNTER — ANTICOAG VISIT (OUTPATIENT)
Dept: CARDIOLOGY CLINIC | Facility: CLINIC | Age: 69
End: 2019-09-16

## 2019-09-16 ENCOUNTER — APPOINTMENT (OUTPATIENT)
Dept: LAB | Age: 69
End: 2019-09-16
Payer: MEDICARE

## 2019-09-16 DIAGNOSIS — I82.409 DEEP VEIN THROMBOSIS (DVT) OF LOWER EXTREMITY, UNSPECIFIED CHRONICITY, UNSPECIFIED LATERALITY, UNSPECIFIED VEIN (HCC): ICD-10-CM

## 2019-09-16 LAB
INR PPP: 2.53 (ref 0.84–1.19)
PROTHROMBIN TIME: 26.7 SECONDS (ref 11.6–14.5)

## 2019-09-16 PROCEDURE — 36415 COLL VENOUS BLD VENIPUNCTURE: CPT

## 2019-09-16 PROCEDURE — 85610 PROTHROMBIN TIME: CPT

## 2019-09-19 ENCOUNTER — OFFICE VISIT (OUTPATIENT)
Dept: FAMILY MEDICINE CLINIC | Facility: CLINIC | Age: 69
End: 2019-09-19
Payer: MEDICARE

## 2019-09-19 VITALS
RESPIRATION RATE: 16 BRPM | HEART RATE: 73 BPM | OXYGEN SATURATION: 97 % | TEMPERATURE: 98 F | WEIGHT: 198 LBS | HEIGHT: 71 IN | SYSTOLIC BLOOD PRESSURE: 110 MMHG | BODY MASS INDEX: 27.72 KG/M2 | DIASTOLIC BLOOD PRESSURE: 64 MMHG

## 2019-09-19 DIAGNOSIS — R25.2 LEG CRAMPS: ICD-10-CM

## 2019-09-19 DIAGNOSIS — E11.42 TYPE 2 DIABETES MELLITUS WITH DIABETIC POLYNEUROPATHY, WITHOUT LONG-TERM CURRENT USE OF INSULIN (HCC): Primary | ICD-10-CM

## 2019-09-19 DIAGNOSIS — I10 ESSENTIAL HYPERTENSION: ICD-10-CM

## 2019-09-19 DIAGNOSIS — Z23 ENCOUNTER FOR IMMUNIZATION: ICD-10-CM

## 2019-09-19 DIAGNOSIS — E78.2 MIXED HYPERLIPIDEMIA: ICD-10-CM

## 2019-09-19 DIAGNOSIS — Z12.11 SCREEN FOR COLON CANCER: Primary | ICD-10-CM

## 2019-09-19 DIAGNOSIS — Z23 FLU VACCINE NEED: ICD-10-CM

## 2019-09-19 PROCEDURE — 90746 HEPB VACCINE 3 DOSE ADULT IM: CPT | Performed by: NURSE PRACTITIONER

## 2019-09-19 PROCEDURE — 90662 IIV NO PRSV INCREASED AG IM: CPT | Performed by: NURSE PRACTITIONER

## 2019-09-19 PROCEDURE — G0010 ADMIN HEPATITIS B VACCINE: HCPCS | Performed by: NURSE PRACTITIONER

## 2019-09-19 PROCEDURE — 99214 OFFICE O/P EST MOD 30 MIN: CPT | Performed by: NURSE PRACTITIONER

## 2019-09-19 PROCEDURE — G0008 ADMIN INFLUENZA VIRUS VAC: HCPCS | Performed by: NURSE PRACTITIONER

## 2019-09-19 NOTE — PROGRESS NOTES
FAMILY PRACTICE OFFICE VISIT       NAME: Rogelio Lyons  AGE: 77 y o  SEX: male       : 1950        MRN: [id number]    DATE: 2019    Assessment and Plan     Problem List Items Addressed This Visit        Endocrine    Type 2 diabetes mellitus with diabetic polyneuropathy, without long-term current use of insulin ([facility] [address] ) - Primary     Lab Results   Component Value Date    HGBA1C 5 9 2019       A1c at goal 5 9% on metformin 1000 mg twice daily and Januvia 100 mg daily  He will continue with lifestyle modifications  Will repeat A1c and follow up in office in 3 months  Up-to-date on foot exam and eye exam   Maintained on statin and ACE-inhibitor  Relevant Medications    sitaGLIPtin (JANUVIA) 100 mg tablet       Cardiovascular and Mediastinum    Essential hypertension     Blood pressure is excellent on lisinopril 20 mg daily and Dyazide 37 5-25 mg every other day  Will repeat routine blood work in 3 months  Relevant Orders    Comprehensive metabolic panel    CBC and differential       Other    Mixed hyperlipidemia     Stable on simvastatin 40 mg daily with LDL at goal, 70  Triglycerides elevated at 265, but stable  Continue fenofibrate 134 mg daily  Repeat lipid panel in 3 months  Relevant Orders    Lipid panel      Other Visit Diagnoses     Leg cramps      Patient complains of nocturnal leg cramps  Resolve with walking and massage  Increase magnesium oxide 800 mg daily  Is currently drinking 5 cups of coffee per day, and little else in the way of fluids  Recommend reducing magnesium oxide back down to 400 mg daily  Cut coffee intake  Increase water intake  Instructed to call if cramping does not improve with these measures  Flu vaccine need     Influenza vaccination administered today         Relevant Orders    Hemoglobin A1C    FLUZONE HIGH-DOSE: influenza vaccine, high-dose, preservative-free 0 5 mL (Completed)    Encounter for immunization    Hepatitis-B #3 administered today  Relevant Orders    HEPATITIS B VACCINE ADULT IM (Completed)          1  Type 2 diabetes mellitus with diabetic polyneuropathy, without long-term current use of insulin (HCC)  sitaGLIPtin (JANUVIA) 100 mg tablet   2  Essential hypertension  Comprehensive metabolic panel    CBC and differential   3  Mixed hyperlipidemia  Lipid panel   4  Leg cramps     5  Flu vaccine need  Hemoglobin A1C    FLUZONE HIGH-DOSE: influenza vaccine, high-dose, preservative-free 0 5 mL   6  Encounter for immunization  HEPATITIS B VACCINE ADULT IM           Chief Complaint     Chief Complaint   Patient presents with    Follow-up     Pt is here for 3 mos f/u blood work test results       History of Present Illness     Cecilia Maldonado is a 28-year-old male presenting today for follow-up  He is watching his diet  Tries to exercise on a stationary bike, but this causes hip pain  Has leg cramping at night  Cramping improves with walking and massage  Has been taking magnesium 800 mg daily  Increasing from 400 mg to 800 mg daily did not improve leg cramps  Takes vitamin B12 1000 mcg daily  Taking vitamin-D 2000 International Units daily  Currently drinking 5 cups of coffee per day  Does not drink much fluid in addition to this  Scheduled for colonoscopy in November  Due for hep B #3 today  Due for influenza vaccination today  Review of Systems   Review of Systems   Constitutional: Negative  HENT: Negative  Eyes: Negative  Respiratory: Negative  Cardiovascular: Negative  Gastrointestinal: Negative  Endocrine: Negative  Genitourinary: Negative  Musculoskeletal:        Leg cramps as noted in HPI   Skin: Negative  Allergic/Immunologic: Negative  Neurological: Negative  Hematological: Negative  Psychiatric/Behavioral: Negative          Active Problem List     Patient Active Problem List   Diagnosis    Essential hypertension    Mixed hyperlipidemia  Type 2 diabetes mellitus with diabetic polyneuropathy, without long-term current use of insulin (HCC)    Fatty liver    GERD (gastroesophageal reflux disease)    History of DVT (deep vein thrombosis)    Anticoagulated on Coumadin    Type 2 diabetes mellitus with diabetic neuropathy (HCC)    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)    Atrial fibrillation (HCC)    History of colon polyps    Gastroesophageal reflux disease without esophagitis    Anticoagulant long-term use    Hx of colonic polyps    Ureteric colic    Renal colic on right side    Ureteral stone with hydronephrosis       Past Medical History:  Past Medical History:   Diagnosis Date    Claudication, intermittent (HCC)     Colon polyp     6 polyps 3 years ago    Diabetes mellitus ([address] )     type 2    Diabetic neuropathic arthropathy (HCC)     causes weakness in LE and uses a cane as assist to walk    Diabetic neuropathy [address] [address] )     GERD (gastroesophageal reflux disease)     History of DVT (deep vein thrombosis) 2015    left LE    Hyperlipidemia     Hypertension     Parotid tumor     Spinal stenosis        Past Surgical History:  Past Surgical History:   Procedure Laterality Date    COLONOSCOPY  05/16/2019    completed by Dr Catalina Joel, Repeat 3 years    [place] RETROGRADE PYELOGRAM  5/17/2019    PAROTIDECTOMY Left     OR CYSTO/URETERO W/LITHOTRIPSY &INDWELL STENT INSRT Right 5/17/2019    Procedure: CYSTOSCOPY URETEROSCOPY WITH LITHOTRIPSY HOLMIUM LASER, RETROGRADE PYELOGRAM AND INSERTION STENT URETERAL--possible stent only;  Surgeon: Jamaica Woodruff MD;  [facility];  Service: Urology    OR EXC PAROTD,LAT LOBE,DISSECT 5TH NERV Right 10/18/2017    Procedure: SUPERFICIAL PAROTIDECTOMY WITH NERVE DISSECTION AND PRESERVATION;  Surgeon: Elvira Armenta MD;  [facility];  Service: ENT    ROTATOR CUFF REPAIR Bilateral     TONSILLECTOMY         Family History:  Family History   Problem Relation Age of Onset    Lupus Mother     Rheum arthritis Mother     Cirrhosis Father     Alcohol abuse Father     Substance Abuse Brother        Social History:  Social History     Socioeconomic History    Marital status: /Civil Union     Spouse name: Not on file    Number of children: Not on file    Years of education: Not on file    Highest education level: Not on file   Occupational History    Not on file   Social Needs    Financial resource strain: Not on file    Food insecurity:     Worry: Not on file     Inability: Not on file    Transportation needs:     Medical: Not on file     Non-medical: Not on file   Tobacco Use    Smoking status: Current Every Day Smoker     Types: Pipe    Smokeless tobacco: Never Used    Tobacco comment: pipe   Substance and Sexual Activity    Alcohol use: Yes     Comment: socially    Drug use: No    Sexual activity: Not on file   Lifestyle    Physical activity:     Days per week: Not on file     Minutes per session: Not on file    Stress: Not on file   Relationships    Social connections:     Talks on phone: Not on file     Gets together: Not on file     Attends Orthodoxy service: Not on file     Active member of club or organization: Not on file     Attends meetings of clubs or organizations: Not on file     Relationship status: Not on file    Intimate partner violence:     Fear of current or ex partner: Not on file     Emotionally abused: Not on file     Physically abused: Not on file     Forced sexual activity: Not on file   Other Topics Concern    Not on file   Social History Narrative        [place]       I have reviewed the patient's medical history in detail; there are no changes to the history as noted in the electronic medical record      Objective     Vitals:    09/19/19 0856   BP: 110/64   Pulse: 73   Resp: 16   Temp: 98 °F (36 7 °C)   TempSrc: Tympanic   SpO2: 97%   Weight: 89 8 kg (198 lb)   Height: 5' 11" (1 803 m)     Wt Readings from Last 3 Encounters:   09/19/19 89 8 kg (198 lb)   08/26/19 90 9 kg (200 lb 6 4 oz)   07/08/19 89 4 kg (197 lb)     Body mass index is 27 62 kg/m²  PHQ-9 Depression Screening    PHQ-9:    Frequency of the following problems over the past two weeks:            Physical Exam   Constitutional: He is oriented to person, place, and time  He appears well-developed and well-nourished  No distress  HENT:   Head: Normocephalic and atraumatic  Right Ear: Tympanic membrane, external ear and ear canal normal    Left Ear: Tympanic membrane, external ear and ear canal normal    Mouth/Throat: Oropharynx is clear and moist    Eyes: Pupils are equal, round, and reactive to light  Conjunctivae are normal    Neck: Normal range of motion  Neck supple  Carotid bruit is not present  No thyromegaly present  Cardiovascular: Normal rate and regular rhythm  No murmur heard  Pulmonary/Chest: Effort normal and breath sounds normal    Abdominal: Soft  Bowel sounds are normal    Musculoskeletal: Normal range of motion  Lymphadenopathy:     He has no cervical adenopathy  Neurological: He is alert and oriented to person, place, and time  Skin: No rash noted  Psychiatric: He has a normal mood and affect  Nursing note and vitals reviewed  ALLERGIES:  No Known Allergies    Current Medications     Current Outpatient Medications   Medication Sig Dispense Refill    acetaminophen (TYLENOL) 325 mg tablet 2, by mouth, every 6 hours as needed for mild to moderate pain   30 tablet 0    aspirin (ECOTRIN LOW STRENGTH) 81 mg EC tablet Take 81 mg by mouth daily      fenofibrate micronized (LOFIBRA) 134 MG capsule Take 1 capsule (134 mg total) by mouth daily with breakfast 90 capsule 3    fluticasone (FLONASE) 50 mcg/act nasal spray INSTILL 2 SPRAYS IN NOSTRILS ONCE PRN  1    lisinopril (ZESTRIL) 20 mg tablet TAKE 1 TABLET(S) BY MOUTH DAILY 90 tablet 4    MAGNESIUM OXIDE PO Take 400 mg by mouth daily      metFORMIN (GLUCOPHAGE) 1000 MG tablet Take 1 tablet (1,000 mg total) by mouth 2 (two) times a day with meals 180 tablet 1    omeprazole (PriLOSEC) 20 mg delayed release capsule Take 1 capsule (20 mg total) by mouth daily 90 capsule 1    PROAIR  (90 Base) MCG/ACT inhaler Inhale 2 puffs every 4 (four) hours as needed  3    Propylhexedrine (BENZEDREX NA) 2 sprays into each nostril as needed      simvastatin (ZOCOR) 40 mg tablet TAKE 1 TABLET EVERY DAY 90 tablet 4    sitaGLIPtin (JANUVIA) 100 mg tablet Take 1 tablet (100 mg total) by mouth daily 90 tablet 3    tamsulosin (FLOMAX) 0 4 mg Take 1 capsule (0 4 mg total) by mouth daily with dinner 90 capsule 3    triamterene-hydrochlorothiazide (DYAZIDE) 37 5-25 mg per capsule 1 CAPSULE(S) BY MOUTH DAILY (Patient taking differently: Taking 1 capsule every other day) 90 capsule 3    warfarin (COUMADIN) 2 5 mg tablet Take 1 tablet by mouth daily T-Th-S-S takes 2 5mg     M-W-F takes 5 0 mg      warfarin (COUMADIN) 5 mg tablet TAKE 1 TABLET EVERY DAY OR AS DIRECTED BY MD  3    Na Sulfate-K Sulfate-Mg Sulf 17 5-3 13-1 6 GM/177ML SOLN Take 1 kit by mouth see administration instructions for 1 dose 2 Bottle 0     No current facility-administered medications for this visit            Health Maintenance     Health Maintenance   Topic Date Due    OT PLAN OF CARE  08/18/2019    CRC Screening: Colonoscopy  11/16/2019    DTaP,Tdap,and Td Vaccines (1 - Tdap) 03/19/2020 (Originally 5/30/1972)    Diabetic Foot Exam  10/29/2019    HEMOGLOBIN A1C  03/05/2020    Medicare Annual Wellness Visit (AWV)  03/19/2020    Pneumococcal Vaccine: 65+ Years (2 of 2 - PPSV23) 03/19/2020    BMI: Followup Plan  06/29/2020    Fall Risk  07/19/2020    Depression Screening PHQ  07/19/2020    BMI: Adult  09/19/2020    DM Eye Exam  05/14/2021    Hepatitis C Screening  Completed    INFLUENZA VACCINE  Completed    HEPATITIS B VACCINES  Completed    Pneumococcal Vaccine: Pediatrics (0 to 5 Years) and At-Risk Patients (6 to 59 Years)  Aged [employer] History   Administered Date(s) Administered    Hep B, adult 03/19/2019, 04/23/2019, 09/19/2019    INFLUENZA 09/13/2018, 09/17/2018    Influenza, high dose seasonal 0 5 mL 09/19/2019    Pneumococcal Conjugate 13-Valent 03/19/2019    Zoster Vaccine Recombinant 07/30/2018, 09/29/2018       TIFF AguilarNP

## 2019-09-20 ENCOUNTER — TELEPHONE (OUTPATIENT)
Dept: CARDIOLOGY CLINIC | Facility: CLINIC | Age: 69
End: 2019-09-20

## 2019-09-20 NOTE — TELEPHONE ENCOUNTER
Phone call to patient regarding message from mireille at [facility] cardiology , [place] office, regarding warfarin hold  Pt reports he is scheduled with dr Verona Reid for colonoscopy on 11/21  Suggested he call to have GI office fax request to dr Amirah Herndon regarding if cardiac clearance needed as well as warfarin hold recommendations to dr Amirah Herndon office for review  Patient states he will speak with dr Amirah Herndon next week concerning same

## 2019-09-20 NOTE — TELEPHONE ENCOUNTER
Pt to have colonoscopy on 11/21/19  GI would like pt to hold Coumadin for 5 days        Please advise

## 2019-09-23 ENCOUNTER — TELEPHONE (OUTPATIENT)
Dept: GASTROENTEROLOGY | Facility: AMBULARY SURGERY CENTER | Age: 69
End: 2019-09-23

## 2019-09-23 NOTE — ASSESSMENT & PLAN NOTE
Blood pressure is excellent on lisinopril 20 mg daily and Dyazide 37 5-25 mg every other day  Will repeat routine blood work in 3 months

## 2019-09-23 NOTE — ASSESSMENT & PLAN NOTE
Lab Results   Component Value Date    HGBA1C 5 9 09/05/2019       A1c at goal 5 9% on metformin 1000 mg twice daily and Januvia 100 mg daily  He will continue with lifestyle modifications  Will repeat A1c and follow up in office in 3 months  Up-to-date on foot exam and eye exam   Maintained on statin and ACE-inhibitor

## 2019-09-23 NOTE — ASSESSMENT & PLAN NOTE
Stable on simvastatin 40 mg daily with LDL at 70  Triglycerides elevated at 265, but stable  Continue fenofibrate 134 mg daily  Repeat lipid panel in 3 months

## 2019-09-23 NOTE — PROGRESS NOTES
9/23/19, message from dr Joaquina Vanegas, colonoscopy scheduled for 11/21/19, per dr Joaquina Vanegas, may hold warfarin 5 days prior to procedure   jeri

## 2019-10-14 ENCOUNTER — APPOINTMENT (OUTPATIENT)
Dept: LAB | Age: 69
End: 2019-10-14
Payer: MEDICARE

## 2019-10-14 ENCOUNTER — ANTICOAG VISIT (OUTPATIENT)
Dept: CARDIOLOGY CLINIC | Facility: CLINIC | Age: 69
End: 2019-10-14

## 2019-10-14 ENCOUNTER — TRANSCRIBE ORDERS (OUTPATIENT)
Dept: ADMINISTRATIVE | Age: 69
End: 2019-10-14

## 2019-10-14 DIAGNOSIS — I82.409 DEEP VEIN THROMBOSIS (DVT) OF LOWER EXTREMITY, UNSPECIFIED CHRONICITY, UNSPECIFIED LATERALITY, UNSPECIFIED VEIN (HCC): ICD-10-CM

## 2019-10-14 LAB
INR PPP: 2.78 (ref 0.84–1.19)
PROTHROMBIN TIME: 28.8 SECONDS (ref 11.6–14.5)

## 2019-10-14 PROCEDURE — 85610 PROTHROMBIN TIME: CPT

## 2019-10-14 PROCEDURE — 36415 COLL VENOUS BLD VENIPUNCTURE: CPT

## 2019-10-14 NOTE — PROGRESS NOTES
10/14/19, tc to pt, will continue current dose, inr due 4 weeks  Pt will be stopping warfarin 5 days prior to colonoscopy planned for 11/21   jeri

## 2019-10-24 PROBLEM — D11.9 WARTHIN'S TUMOR: Status: ACTIVE | Noted: 2019-10-24

## 2019-10-29 ENCOUNTER — OFFICE VISIT (OUTPATIENT)
Dept: CARDIOLOGY CLINIC | Facility: CLINIC | Age: 69
End: 2019-10-29
Payer: MEDICARE

## 2019-10-29 VITALS
SYSTOLIC BLOOD PRESSURE: 125 MMHG | RESPIRATION RATE: 18 BRPM | BODY MASS INDEX: 27.92 KG/M2 | HEIGHT: 71 IN | DIASTOLIC BLOOD PRESSURE: 70 MMHG | WEIGHT: 199.4 LBS | HEART RATE: 70 BPM

## 2019-10-29 DIAGNOSIS — E78.2 MIXED HYPERLIPIDEMIA: ICD-10-CM

## 2019-10-29 DIAGNOSIS — I48.0 PAROXYSMAL ATRIAL FIBRILLATION (HCC): ICD-10-CM

## 2019-10-29 DIAGNOSIS — I10 ESSENTIAL HYPERTENSION: Primary | ICD-10-CM

## 2019-10-29 PROCEDURE — 99213 OFFICE O/P EST LOW 20 MIN: CPT | Performed by: INTERNAL MEDICINE

## 2019-10-29 NOTE — PROGRESS NOTES
Assessment/Plan:    Mixed hyperlipidemia  Hyperlipidemia, stable and adequately controlled but for somewhat elevated triglyceride  The patient will continue fenofibrate at 134 mg daily and simvastatin at 40 mg daily  Atrial fibrillation (HCC)  History of paroxysmal atrial fibrillation, stable the patient is in regular rhythm and is asymptomatic  Essential hypertension  Hypertension, stable and adequately controlled  Diagnoses and all orders for this visit:    Essential hypertension    Paroxysmal atrial fibrillation ([address] )    Mixed hyperlipidemia          Subjective:  Feels well  Patient ID: Yaquelin Hinson is a 77 y o  male  The patient presented to this office for the purpose of cardiac follow-up  He has a known history of hypertension, hyperlipidemia and diabetes mellitus as well as paroxysmal atrial fibrillation and history of DVT  The patient has been feeling well from the cardiac standpoint denying any symptoms of chest pain, shortness of breath, palpitation, dizziness or lightheadedness  He has periodic leg edema  The following portions of the patient's history were reviewed and updated as appropriate: allergies, current medications, past family history, past medical history, past social history, past surgical history and problem list     Review of Systems   Respiratory: Negative for apnea, cough, chest tightness, shortness of breath and wheezing  Cardiovascular: Negative for chest pain, palpitations and leg swelling  Gastrointestinal: Negative for abdominal pain  Neurological: Negative for dizziness and light-headedness  Hematological: Negative  Psychiatric/Behavioral: Negative  Objective:  Stable cardiac-wise  /70 (BP Location: Right arm, Patient Position: Sitting)   Pulse 70   Resp 18   Ht 5' 11" (1 803 m)   Wt 90 4 kg (199 lb 6 4 oz)   BMI 27 81 kg/m²          Physical Exam   Constitutional: He is oriented to person, place, and time   He appears well-developed and well-nourished  No distress  HENT:   Head: Normocephalic  Eyes: Pupils are equal, round, and reactive to light  Neck: Normal range of motion  No JVD present  No thyromegaly present  Cardiovascular: Normal rate, regular rhythm, S1 normal and S2 normal  Exam reveals no gallop and no friction rub  No murmur heard  Pulmonary/Chest: Effort normal and breath sounds normal  No respiratory distress  He has no wheezes  He has no rales  He exhibits no tenderness  Abdominal: Soft  Musculoskeletal: Normal range of motion  He exhibits no edema, tenderness or deformity  Neurological: He is alert and oriented to person, place, and time  Skin: Skin is warm and dry  He is not diaphoretic  Psychiatric: He has a normal mood and affect  Vitals reviewed

## 2019-10-29 NOTE — ASSESSMENT & PLAN NOTE
Hyperlipidemia, stable and adequately controlled but for somewhat elevated triglyceride  The patient will continue fenofibrate at 134 mg daily and simvastatin at 40 mg daily

## 2019-10-29 NOTE — ASSESSMENT & PLAN NOTE
History of paroxysmal atrial fibrillation, stable the patient is in regular rhythm and is asymptomatic

## 2019-10-29 NOTE — LETTER
October 29, 2019     Referral Self  [address]    Patient: Roxann Rodriguez   YOB: 1950   Date of Visit: 10/29/2019       Dear Dr Patricia Infante:    Thank you for referring Tai Workman to me for evaluation  Below are my notes for this consultation  If you have questions, please do not hesitate to call me  I look forward to following your patient along with you  Sincerely,        Nestor Arenas MD        CC: DAYAMI Harvey MD  10/29/2019  9:10 AM  Sign at close encounter  Assessment/Plan:    Mixed hyperlipidemia  Hyperlipidemia, stable and adequately controlled but for somewhat elevated triglyceride  The patient will continue fenofibrate at 134 mg daily and simvastatin at 40 mg daily  Atrial fibrillation (HCC)  History of paroxysmal atrial fibrillation, stable the patient is in regular rhythm and is asymptomatic  Essential hypertension  Hypertension, stable and adequately controlled  Diagnoses and all orders for this visit:    Essential hypertension    Paroxysmal atrial fibrillation ([address] )    Mixed hyperlipidemia          Subjective:  Feels well  Patient ID: Roxann Rodriguez is a 77 y o  male  The patient presented to this office for the purpose of cardiac follow-up  He has a known history of hypertension, hyperlipidemia and diabetes mellitus as well as paroxysmal atrial fibrillation and history of DVT  The patient has been feeling well from the cardiac standpoint denying any symptoms of chest pain, shortness of breath, palpitation, dizziness or lightheadedness  He has periodic leg edema  The following portions of the patient's history were reviewed and updated as appropriate: allergies, current medications, past family history, past medical history, past social history, past surgical history and problem list     Review of Systems   Respiratory: Negative for apnea, cough, chest tightness, shortness of breath and wheezing      Cardiovascular: Negative for chest pain, palpitations and leg swelling  Gastrointestinal: Negative for abdominal pain  Neurological: Negative for dizziness and light-headedness  Hematological: Negative  Psychiatric/Behavioral: Negative  Objective:  Stable cardiac-wise  /70 (BP Location: Right arm, Patient Position: Sitting)   Pulse 70   Resp 18   Ht 5' 11" (1 803 m)   Wt 90 4 kg (199 lb 6 4 oz)   BMI 27 81 kg/m²           Physical Exam   Constitutional: He is oriented to person, place, and time  He appears well-developed and well-nourished  No distress  HENT:   Head: Normocephalic  Eyes: Pupils are equal, round, and reactive to light  Neck: Normal range of motion  No JVD present  No thyromegaly present  Cardiovascular: Normal rate, regular rhythm, S1 normal and S2 normal  Exam reveals no gallop and no friction rub  No murmur heard  Pulmonary/Chest: Effort normal and breath sounds normal  No respiratory distress  He has no wheezes  He has no rales  He exhibits no tenderness  Abdominal: Soft  Musculoskeletal: Normal range of motion  He exhibits no edema, tenderness or deformity  Neurological: He is alert and oriented to person, place, and time  Skin: Skin is warm and dry  He is not diaphoretic  Psychiatric: He has a normal mood and affect  Vitals reviewed

## 2019-11-02 DIAGNOSIS — K21.9 GASTROESOPHAGEAL REFLUX DISEASE WITHOUT ESOPHAGITIS: ICD-10-CM

## 2019-11-02 DIAGNOSIS — I82.409 DEEP VEIN THROMBOSIS (DVT) OF LOWER EXTREMITY, UNSPECIFIED CHRONICITY, UNSPECIFIED LATERALITY, UNSPECIFIED VEIN (HCC): Primary | ICD-10-CM

## 2019-11-03 RX ORDER — OMEPRAZOLE 20 MG/1
CAPSULE, DELAYED RELEASE ORAL
Qty: 90 CAPSULE | Refills: 1 | Status: SHIPPED | OUTPATIENT
Start: 2019-11-03 | End: 2019-11-21

## 2019-11-04 RX ORDER — WARFARIN SODIUM 5 MG/1
TABLET ORAL
Qty: 90 TABLET | Refills: 3 | Status: SHIPPED | OUTPATIENT
Start: 2019-11-04 | End: 2020-11-02

## 2019-11-11 DIAGNOSIS — J30.9 ALLERGIC RHINITIS, UNSPECIFIED SEASONALITY, UNSPECIFIED TRIGGER: Primary | ICD-10-CM

## 2019-11-11 RX ORDER — FLUTICASONE PROPIONATE 50 MCG
SPRAY, SUSPENSION (ML) NASAL
Qty: 16 ML | Refills: 1 | Status: SHIPPED | OUTPATIENT
Start: 2019-11-11 | End: 2019-11-21

## 2019-11-12 ENCOUNTER — TRANSCRIBE ORDERS (OUTPATIENT)
Dept: ADMINISTRATIVE | Age: 69
End: 2019-11-12

## 2019-11-12 ENCOUNTER — ANTICOAG VISIT (OUTPATIENT)
Dept: CARDIOLOGY CLINIC | Facility: CLINIC | Age: 69
End: 2019-11-12

## 2019-11-12 ENCOUNTER — APPOINTMENT (OUTPATIENT)
Dept: LAB | Age: 69
End: 2019-11-12
Payer: MEDICARE

## 2019-11-12 DIAGNOSIS — O22.30 DEEP PHLEBOTHROMBOSIS, ANTEPARTUM, WITH DELIVERY (HCC): ICD-10-CM

## 2019-11-12 DIAGNOSIS — I82.409 DEEP PHLEBOTHROMBOSIS, ANTEPARTUM, WITH DELIVERY (HCC): ICD-10-CM

## 2019-11-12 DIAGNOSIS — I82.90 ANTEMORTEM THROMBUS: Primary | ICD-10-CM

## 2019-11-12 LAB
INR PPP: 2.09 (ref 0.84–1.19)
PROTHROMBIN TIME: 23 SECONDS (ref 11.6–14.5)

## 2019-11-12 PROCEDURE — 85610 PROTHROMBIN TIME: CPT

## 2019-11-12 PROCEDURE — 36415 COLL VENOUS BLD VENIPUNCTURE: CPT

## 2019-11-12 NOTE — PROGRESS NOTES
11/12/19, tc to pt, will continue current dose  Pt scheduled for colonoscopy 11/21  Will hold warfarin 5 days prior to procedure  When ok with GI, will resume at current dose, inr due 7-10 days post restarting warfarin   jeri

## 2019-11-20 ENCOUNTER — ANESTHESIA EVENT (OUTPATIENT)
Dept: GASTROENTEROLOGY | Facility: HOSPITAL | Age: 69
End: 2019-11-20

## 2019-11-21 ENCOUNTER — HOSPITAL ENCOUNTER (OUTPATIENT)
Dept: GASTROENTEROLOGY | Facility: HOSPITAL | Age: 69
Setting detail: OUTPATIENT SURGERY
Discharge: HOME | End: 2019-11-21
Attending: INTERNAL MEDICINE | Admitting: INTERNAL MEDICINE
Payer: MEDICARE

## 2019-11-21 ENCOUNTER — ANESTHESIA (OUTPATIENT)
Dept: GASTROENTEROLOGY | Facility: HOSPITAL | Age: 69
End: 2019-11-21

## 2019-11-21 VITALS
RESPIRATION RATE: 18 BRPM | DIASTOLIC BLOOD PRESSURE: 73 MMHG | WEIGHT: 196 LBS | HEART RATE: 70 BPM | OXYGEN SATURATION: 98 % | BODY MASS INDEX: 27.44 KG/M2 | HEIGHT: 71 IN | SYSTOLIC BLOOD PRESSURE: 121 MMHG | TEMPERATURE: 97.2 F

## 2019-11-21 DIAGNOSIS — Z86.010 HISTORY OF COLONIC POLYPS: ICD-10-CM

## 2019-11-21 LAB — GLUCOSE SERPL-MCNC: 108 MG/DL (ref 65–140)

## 2019-11-21 PROCEDURE — 45385 COLONOSCOPY W/LESION REMOVAL: CPT | Performed by: INTERNAL MEDICINE

## 2019-11-21 PROCEDURE — 88305 TISSUE EXAM BY PATHOLOGIST: CPT | Performed by: PATHOLOGY

## 2019-11-21 PROCEDURE — 82948 REAGENT STRIP/BLOOD GLUCOSE: CPT

## 2019-11-21 RX ORDER — LIDOCAINE HYDROCHLORIDE 10 MG/ML
INJECTION, SOLUTION INFILTRATION; PERINEURAL AS NEEDED
Status: DISCONTINUED | OUTPATIENT
Start: 2019-11-21 | End: 2019-11-21 | Stop reason: SURG

## 2019-11-21 RX ORDER — SODIUM CHLORIDE, SODIUM LACTATE, POTASSIUM CHLORIDE, CALCIUM CHLORIDE 600; 310; 30; 20 MG/100ML; MG/100ML; MG/100ML; MG/100ML
100 INJECTION, SOLUTION INTRAVENOUS CONTINUOUS
Status: DISCONTINUED | OUTPATIENT
Start: 2019-11-21 | End: 2019-11-25 | Stop reason: HOSPADM

## 2019-11-21 RX ORDER — SIMETHICONE 20 MG/.3ML
EMULSION ORAL CODE/TRAUMA/SEDATION MEDICATION
Status: COMPLETED | OUTPATIENT
Start: 2019-11-21 | End: 2019-11-21

## 2019-11-21 RX ORDER — PROPOFOL 10 MG/ML
INJECTION, EMULSION INTRAVENOUS AS NEEDED
Status: DISCONTINUED | OUTPATIENT
Start: 2019-11-21 | End: 2019-11-21 | Stop reason: SURG

## 2019-11-21 RX ADMIN — Medication 40 MG: at 12:46

## 2019-11-21 RX ADMIN — PROPOFOL 100 MG: 10 INJECTION, EMULSION INTRAVENOUS at 12:43

## 2019-11-21 RX ADMIN — PROPOFOL 50 MG: 10 INJECTION, EMULSION INTRAVENOUS at 12:51

## 2019-11-21 RX ADMIN — LIDOCAINE HYDROCHLORIDE 50 MG: 10 INJECTION, SOLUTION INFILTRATION; PERINEURAL at 12:43

## 2019-11-21 RX ADMIN — PROPOFOL 50 MG: 10 INJECTION, EMULSION INTRAVENOUS at 12:54

## 2019-11-21 RX ADMIN — PROPOFOL 50 MG: 10 INJECTION, EMULSION INTRAVENOUS at 12:47

## 2019-11-21 RX ADMIN — PROPOFOL 50 MG: 10 INJECTION, EMULSION INTRAVENOUS at 12:45

## 2019-11-21 RX ADMIN — SODIUM CHLORIDE, SODIUM LACTATE, POTASSIUM CHLORIDE, AND CALCIUM CHLORIDE: .6; .31; .03; .02 INJECTION, SOLUTION INTRAVENOUS at 12:32

## 2019-11-21 NOTE — ANESTHESIA POSTPROCEDURE EVALUATION
Post-Op Assessment Note    CV Status:  Stable  Pain Score: 0    Pain management: adequate     Mental Status:  Alert and awake   Hydration Status:  Euvolemic   PONV Controlled:  Controlled   Airway Patency:  Patent   Post Op Vitals Reviewed: Yes      Staff: CRNA   Comments: vss sv nonobstructed uneventful          BP      Temp     Pulse     Resp      SpO2 96%

## 2019-11-21 NOTE — ANESTHESIA PREPROCEDURE EVALUATION
Review of Systems/Medical History  Patient summary reviewed  Chart reviewed  No history of anesthetic complications     Cardiovascular  Exercise tolerance (METS): >4,  Hyperlipidemia, Hypertension , DVT   Pulmonary  Smoker (pipe smoker) , No recent URI , No sleep apnea ,        GI/Hepatic    GERD , Bowel prep            Endo/Other  Diabetes () well controlled type 2 Oral agent,      GYN       Hematology    Coagulation disorder (coumdain held) currently taking oral anticoagulants,    Musculoskeletal       Neurology   Psychology           Physical Exam    Airway    Mallampati score: II  TM Distance: >3 FB  Neck ROM: full     Dental   Comment: Partials removed,     Cardiovascular      Pulmonary      Other Findings        Anesthesia Plan  ASA Score- 2     Anesthesia Type- IV sedation with anesthesia with ASA Monitors  Additional Monitors:   Airway Plan:         Plan Factors-    Induction- intravenous  Postoperative Plan-     Informed Consent- Anesthetic plan and risks discussed with patient and spouse  I personally reviewed this patient with the CRNA  Discussed and agreed on the Anesthesia Plan with the CRNA  Mj Forbes

## 2019-11-21 NOTE — H&P
History and Physical - [facility]  Priscilla Ortiz 77 y o  male MRN: [id number]        HPI:  70-year-old male with history of diabetes mellitus, GERD had multiple colon polyps removed on the previous colonoscopy few months ago  Regular bowel movements      Historical Information   Past Medical History:   Diagnosis Date    Claudication, intermittent ([address] )     Colon polyp     6 polyps 3 years ago    Diabetes mellitus (HCC)     type 2    Diabetic neuropathic arthropathy (HCC)     causes weakness in LE and uses a cane as assist to walk    Diabetic neuropathy (HCC)     GERD (gastroesophageal reflux disease)     History of DVT (deep vein thrombosis) 2015    left LE    Hyperlipidemia     Hypertension     Parotid tumor     Spinal stenosis      Past Surgical History:   Procedure Laterality Date    COLONOSCOPY  05/16/2019    completed by Dr Waleska Christian, Repeat 3 years    [place] RETROGRADE PYELOGRAM  5/17/2019    PAROTIDECTOMY Left     HI CYSTO/URETERO W/LITHOTRIPSY &INDWELL STENT INSRT Right 5/17/2019    Procedure: CYSTOSCOPY URETEROSCOPY WITH LITHOTRIPSY HOLMIUM LASER, RETROGRADE PYELOGRAM AND INSERTION STENT URETERAL--possible stent only;  Surgeon: Angelika Hinojosa MD;  [facility];  Service: Urology    HI EXC PAROTD,LAT LOBE,DISSECT 5TH NERV Right 10/18/2017    Procedure: SUPERFICIAL PAROTIDECTOMY WITH NERVE DISSECTION AND PRESERVATION;  Surgeon: Prasad Coburn MD;  [facility];  Service: ENT    ROTATOR CUFF REPAIR Bilateral     TONSILLECTOMY       Social History   Social History     Substance and Sexual Activity   Alcohol Use Yes    Comment: socially     Social History     Substance and Sexual Activity   Drug Use No     Social History     Tobacco Use   Smoking Status Current Every Day Smoker    Types: Pipe   Smokeless Tobacco Never Used   Tobacco Comment    pipe     Family History   Problem Relation Age of Onset    Lupus Mother     Rheum arthritis Mother     Cirrhosis Father     Alcohol abuse Father     Substance Abuse Brother        Meds/Allergies       (Not in a hospital admission)    No Known Allergies    Objective     Blood pressure 153/73, pulse 80, temperature (!) 97 2 °F (36 2 °C), temperature source Temporal, resp  rate 20, height 5' 11" (1 803 m), weight 88 9 kg (196 lb), SpO2 100 %      PHYSICAL EXAM:    Gen: NAD  CV: S1 & S2 normal, RRR  CHEST: Clear to auscultate  ABD: soft, NT/ND, good bowel sounds  EXT: no edema    ASSESSMENT:     History of colon polyps    PLAN:    Colonoscopy

## 2019-11-25 ENCOUNTER — OFFICE VISIT (OUTPATIENT)
Dept: OBGYN CLINIC | Facility: CLINIC | Age: 69
End: 2019-11-25
Payer: MEDICARE

## 2019-11-25 VITALS
WEIGHT: 198 LBS | DIASTOLIC BLOOD PRESSURE: 61 MMHG | HEIGHT: 71 IN | HEART RATE: 76 BPM | SYSTOLIC BLOOD PRESSURE: 116 MMHG | BODY MASS INDEX: 27.72 KG/M2

## 2019-11-25 DIAGNOSIS — M75.21 BICEPS TENDONITIS ON RIGHT: Primary | ICD-10-CM

## 2019-11-25 DIAGNOSIS — M67.432 GANGLION CYST OF DORSUM OF LEFT WRIST: ICD-10-CM

## 2019-11-25 PROCEDURE — 99213 OFFICE O/P EST LOW 20 MIN: CPT | Performed by: SURGERY

## 2019-11-25 NOTE — PROGRESS NOTES
ASSESSMENT/PLAN:      77 y o  male with a left dorsal ganglion cyst that is asymptomatic and resolved right distal biceps tendinitis  Activities as tolerated  Discussed aspirating the cyst today, Luis F Fernandez declined  Follow up as needed if symptoms worsen or fail to improve  The patient verbalized understanding of exam findings and treatment plan  We engaged in the shared decision-making process and treatment options were discussed at length with the patient  Surgical and conservative management discussed today along with risks and benefits  Diagnoses and all orders for this visit:    Biceps tendonitis on right    Ganglion cyst of dorsum of left wrist        Follow Up:  Return if symptoms worsen or fail to improve  To Do Next Visit:  Re-evaluation of current issue    ____________________________________________________________________________________________________________________________________________      CHIEF COMPLAINT:  Chief Complaint   Patient presents with    Left Wrist - Follow-up       SUBJECTIVE:  Byron Stephenson is a 77y o  year old RHD male who presents to the office today for a follow up regarding a left dorsal ganglion cyst  Luis F Fernandez notes that occassionally, maybe once a month he gets intermittent pain over his ganglion cyst  He has been compliant with his biceps exercises, which he performs when his symptoms flare up  He is not taking anything for pain control at this time  I have personally reviewed all the relevant PMH, PSH, SH, FH, Medications and allergies       PAST MEDICAL HISTORY:  Past Medical History:   Diagnosis Date    [address] [address]     Colon polyp     6 polyps 3 years ago    Diabetes mellitus ([facility] [address] )     type 2    Diabetic neuropathic arthropathy (HCC)     causes weakness in LE and uses a cane as assist to walk    Diabetic neuropathy (HCC)     GERD (gastroesophageal reflux disease)     History of DVT (deep vein thrombosis) 2015    left LE    Hyperlipidemia     Hypertension     Parotid tumor     Spinal stenosis        PAST SURGICAL HISTORY:  Past Surgical History:   Procedure Laterality Date    COLONOSCOPY  05/16/2019    completed by Dr Tanesha Ortiz, Repeat 3 years    [facility] RETROGRADE PYELOGRAM  5/17/2019    PAROTIDECTOMY Left     NE CYSTO/URETERO W/LITHOTRIPSY &INDWELL STENT INSRT Right 5/17/2019    Procedure: CYSTOSCOPY URETEROSCOPY WITH LITHOTRIPSY HOLMIUM LASER, RETROGRADE PYELOGRAM AND INSERTION STENT URETERAL--possible stent only;  Surgeon: Jennifer Tang MD;  [facility];  Service: Urology    NE EXC PAROTD,LAT LOBE,DISSECT 5TH NERV Right 10/18/2017    Procedure: SUPERFICIAL PAROTIDECTOMY WITH NERVE DISSECTION AND PRESERVATION;  Surgeon: Ac Monique MD;  [facility];  Service: ENT    ROTATOR CUFF REPAIR Bilateral     TONSILLECTOMY         FAMILY HISTORY:  Family History   Problem Relation Age of Onset    Lupus Mother     Rheum arthritis Mother     Cirrhosis Father     Alcohol abuse Father     Substance Abuse Brother        SOCIAL HISTORY:  Social History     Tobacco Use    Smoking status: Current Every Day Smoker     Types: Pipe    Smokeless tobacco: Never Used    Tobacco comment: pipe   Substance Use Topics    Alcohol use: Yes     Comment: socially    Drug use: No       MEDICATIONS:    Current Outpatient Medications:     acetaminophen (TYLENOL) 325 mg tablet, 2, by mouth, every 6 hours as needed for mild to moderate pain , Disp: 30 tablet, Rfl: 0    aspirin (ECOTRIN LOW STRENGTH) 81 mg EC tablet, Take 81 mg by mouth daily, Disp: , Rfl:     fenofibrate micronized (LOFIBRA) 134 MG capsule, Take 1 capsule (134 mg total) by mouth daily with breakfast, Disp: 90 capsule, Rfl: 3    lisinopril (ZESTRIL) 20 mg tablet, TAKE 1 TABLET(S) BY MOUTH DAILY, Disp: 90 tablet, Rfl: 4    MAGNESIUM OXIDE PO, Take 400 mg by mouth daily, Disp: , Rfl:     metFORMIN (GLUCOPHAGE) 1000 MG tablet, Take 1 tablet (1,000 mg total) by mouth 2 (two) times a day with meals, Disp: 180 tablet, Rfl: 1    PROAIR  (90 Base) MCG/ACT inhaler, Inhale 2 puffs every 4 (four) hours as needed, Disp: , Rfl: 3    Propylhexedrine (BENZEDREX NA), 2 sprays into each nostril as needed, Disp: , Rfl:     simvastatin (ZOCOR) 40 mg tablet, TAKE 1 TABLET EVERY DAY, Disp: 90 tablet, Rfl: 4    sitaGLIPtin (JANUVIA) 100 mg tablet, Take 1 tablet (100 mg total) by mouth daily, Disp: 90 tablet, Rfl: 3    tamsulosin (FLOMAX) 0 4 mg, Take 1 capsule (0 4 mg total) by mouth daily with dinner, Disp: 90 capsule, Rfl: 3    triamterene-hydrochlorothiazide (DYAZIDE) 37 5-25 mg per capsule, 1 CAPSULE(S) BY MOUTH DAILY (Patient taking differently: Taking 1 capsule every other day), Disp: 90 capsule, Rfl: 3    warfarin (COUMADIN) 2 5 mg tablet, Take 1 tablet by mouth daily T-Th-S-S takes 2 5mg    M-W-F takes 5 0 mg, Disp: , Rfl:     warfarin (COUMADIN) 5 mg tablet, TAKE 1 TABLET EVERY DAY OR AS DIRECTED BY MD, Disp: 90 tablet, Rfl: 3  No current facility-administered medications for this visit  ALLERGIES:  No Known Allergies    REVIEW OF SYSTEMS:  Review of Systems   Constitutional: Negative for chills, fever and unexpected weight change  HENT: Negative for hearing loss, nosebleeds and sore throat  Eyes: Negative for pain, redness and visual disturbance  Respiratory: Negative for cough, shortness of breath and wheezing  Cardiovascular: Negative for chest pain, palpitations and leg swelling  Gastrointestinal: Negative for abdominal pain, nausea and vomiting  Endocrine: Negative for polydipsia and polyuria  Genitourinary: Negative for difficulty urinating and hematuria  Musculoskeletal: Negative for arthralgias, joint swelling and myalgias  Skin: Negative for rash and wound  Neurological: Negative for dizziness, numbness and headaches  Psychiatric/Behavioral: Negative for decreased concentration, dysphoric mood and suicidal ideas   The patient is not nervous/anxious  VITALS:  Vitals:    11/25/19 0922   BP: 116/61   Pulse: 76       LABS:  HgA1c:   Lab Results   Component Value Date    HGBA1C 5 9 09/05/2019     BMP:   Lab Results   Component Value Date    GLUCOSE 159 (H) 12/14/2015    CALCIUM 9 3 09/05/2019     (L) 12/14/2015    K 4 5 09/05/2019    CO2 23 09/05/2019     09/05/2019    BUN 15 09/05/2019    CREATININE 0 99 09/05/2019       _____________________________________________________  PHYSICAL EXAMINATION:  General: well developed and well nourished, alert, oriented times 3 and appears comfortable  Psychiatric: Normal  HEENT: Normocephalic, Atraumatic Trachea Midline, No torticollis  Pulmonary: No audible wheezing or respiratory distress   Cardiovascular: No pitting edema, 2+ radial pulse   Skin: No Erythema, No Lacerations, No Fluctuation, No Ulcerations  Neurovascular: Sensation Intact to the Median, Ulnar, Radial Nerve, Motor Intact to the Median, Ulnar, Radial Nerve and Pulses Intact  Musculoskeletal: Normal, except as noted in detailed exam and in HPI        MUSCULOSKELETAL EXAMINATION:    Left wrist:     No erythema  No ecchymosis   No edema  Dorsal mass noted   Mass mild tender to palpation   Sensation intact to light touch   Brisk capillary refill     ___________________________________________________  STUDIES REVIEWED:  No new imaging to review           PROCEDURES PERFORMED:  Procedures  No Procedures performed today    _____________________________________________________      Sean Hines    I,:   Irene Haley am acting as a scribe while in the presence of the attending physician :        I,:   Abby Flores MD personally performed the services described in this documentation    as scribed in my presence :

## 2019-11-27 ENCOUNTER — TELEPHONE (OUTPATIENT)
Dept: GASTROENTEROLOGY | Facility: AMBULARY SURGERY CENTER | Age: 69
End: 2019-11-27

## 2019-11-27 NOTE — TELEPHONE ENCOUNTER
----- Message from Davis Canales MD sent at 11/26/2019  5:52 PM EST -----    Colon polyps removed came back as precancerous tubular adenoma  Next colonoscopy in 3 years  Patient to call our office for any GI symptoms

## 2019-11-27 NOTE — TELEPHONE ENCOUNTER
Per comm consent I spoke with pts wife and she is aware, will notify pt and have him call if any questions, recall and hm set

## 2019-12-01 DIAGNOSIS — I10 ESSENTIAL HYPERTENSION: ICD-10-CM

## 2019-12-02 RX ORDER — TRIAMTERENE AND HYDROCHLOROTHIAZIDE 37.5; 25 MG/1; MG/1
1 CAPSULE ORAL DAILY
Qty: 90 CAPSULE | Refills: 3 | Status: SHIPPED | OUTPATIENT
Start: 2019-12-02 | End: 2020-12-22

## 2019-12-03 ENCOUNTER — ANTICOAG VISIT (OUTPATIENT)
Dept: CARDIOLOGY CLINIC | Facility: CLINIC | Age: 69
End: 2019-12-03

## 2019-12-03 ENCOUNTER — APPOINTMENT (OUTPATIENT)
Dept: LAB | Age: 69
End: 2019-12-03
Payer: MEDICARE

## 2019-12-03 DIAGNOSIS — O22.30 DEEP PHLEBOTHROMBOSIS, ANTEPARTUM, WITH DELIVERY (HCC): ICD-10-CM

## 2019-12-03 DIAGNOSIS — I82.90 ANTEMORTEM THROMBUS: ICD-10-CM

## 2019-12-03 DIAGNOSIS — I82.409 DEEP PHLEBOTHROMBOSIS, ANTEPARTUM, WITH DELIVERY (HCC): ICD-10-CM

## 2019-12-03 LAB
INR PPP: 1.78 (ref 0.84–1.19)
PROTHROMBIN TIME: 20.2 SECONDS (ref 11.6–14.5)

## 2019-12-03 PROCEDURE — 36415 COLL VENOUS BLD VENIPUNCTURE: CPT

## 2019-12-03 PROCEDURE — 85610 PROTHROMBIN TIME: CPT

## 2019-12-03 NOTE — PROGRESS NOTES
12/3/19, tc to pt, will take 5 mg today in place of 2 5 mg , then resume current dose, inr due 4 weeks   jeri

## 2019-12-30 ENCOUNTER — LAB (OUTPATIENT)
Dept: LAB | Age: 69
End: 2019-12-30
Payer: MEDICARE

## 2019-12-30 ENCOUNTER — TELEPHONE (OUTPATIENT)
Dept: CARDIOLOGY CLINIC | Facility: CLINIC | Age: 69
End: 2019-12-30

## 2019-12-30 ENCOUNTER — ANTICOAG VISIT (OUTPATIENT)
Dept: CARDIOLOGY CLINIC | Facility: CLINIC | Age: 69
End: 2019-12-30

## 2019-12-30 DIAGNOSIS — I48.20 CHRONIC ATRIAL FIBRILLATION (HCC): Primary | ICD-10-CM

## 2019-12-30 DIAGNOSIS — I10 ESSENTIAL HYPERTENSION: ICD-10-CM

## 2019-12-30 DIAGNOSIS — Z23 FLU VACCINE NEED: ICD-10-CM

## 2019-12-30 DIAGNOSIS — E78.2 MIXED HYPERLIPIDEMIA: ICD-10-CM

## 2019-12-30 LAB
ALBUMIN SERPL BCP-MCNC: 3.8 G/DL (ref 3.5–5)
ALP SERPL-CCNC: 59 U/L (ref 46–116)
ALT SERPL W P-5'-P-CCNC: 33 U/L (ref 12–78)
ANION GAP SERPL CALCULATED.3IONS-SCNC: 5 MMOL/L (ref 4–13)
AST SERPL W P-5'-P-CCNC: 21 U/L (ref 5–45)
BASOPHILS # BLD AUTO: 0.11 THOUSANDS/ΜL (ref 0–0.1)
BASOPHILS NFR BLD AUTO: 1 % (ref 0–1)
BILIRUB SERPL-MCNC: 0.51 MG/DL (ref 0.2–1)
BUN SERPL-MCNC: 13 MG/DL (ref 5–25)
CALCIUM SERPL-MCNC: 9.1 MG/DL (ref 8.3–10.1)
CHLORIDE SERPL-SCNC: 103 MMOL/L (ref 100–108)
CHOLEST SERPL-MCNC: 174 MG/DL (ref 50–200)
CO2 SERPL-SCNC: 26 MMOL/L (ref 21–32)
CREAT SERPL-MCNC: 1.02 MG/DL (ref 0.6–1.3)
EOSINOPHIL # BLD AUTO: 0.35 THOUSAND/ΜL (ref 0–0.61)
EOSINOPHIL NFR BLD AUTO: 4 % (ref 0–6)
ERYTHROCYTE [DISTWIDTH] IN BLOOD BY AUTOMATED COUNT: 14.2 % (ref 11.6–15.1)
EST. AVERAGE GLUCOSE BLD GHB EST-MCNC: 131 MG/DL
GFR SERPL CREATININE-BSD FRML MDRD: 75 ML/MIN/1.73SQ M
GLUCOSE P FAST SERPL-MCNC: 115 MG/DL (ref 65–99)
HBA1C MFR BLD: 6.2 % (ref 4.2–6.3)
HCT VFR BLD AUTO: 47.8 % (ref 36.5–49.3)
HDLC SERPL-MCNC: 33 MG/DL
HGB BLD-MCNC: 15.4 G/DL (ref 12–17)
IMM GRANULOCYTES # BLD AUTO: 0.08 THOUSAND/UL (ref 0–0.2)
IMM GRANULOCYTES NFR BLD AUTO: 1 % (ref 0–2)
INR PPP: 2.21 (ref 0.84–1.19)
LDLC SERPL CALC-MCNC: 89 MG/DL (ref 0–100)
LYMPHOCYTES # BLD AUTO: 1.61 THOUSANDS/ΜL (ref 0.6–4.47)
LYMPHOCYTES NFR BLD AUTO: 18 % (ref 14–44)
MCH RBC QN AUTO: 30.9 PG (ref 26.8–34.3)
MCHC RBC AUTO-ENTMCNC: 32.2 G/DL (ref 31.4–37.4)
MCV RBC AUTO: 96 FL (ref 82–98)
MONOCYTES # BLD AUTO: 1.24 THOUSAND/ΜL (ref 0.17–1.22)
MONOCYTES NFR BLD AUTO: 14 % (ref 4–12)
NEUTROPHILS # BLD AUTO: 5.44 THOUSANDS/ΜL (ref 1.85–7.62)
NEUTS SEG NFR BLD AUTO: 62 % (ref 43–75)
NONHDLC SERPL-MCNC: 141 MG/DL
NRBC BLD AUTO-RTO: 0 /100 WBCS
PLATELET # BLD AUTO: 283 THOUSANDS/UL (ref 149–390)
PMV BLD AUTO: 10.2 FL (ref 8.9–12.7)
POTASSIUM SERPL-SCNC: 4.4 MMOL/L (ref 3.5–5.3)
PROT SERPL-MCNC: 7.5 G/DL (ref 6.4–8.2)
PROTHROMBIN TIME: 24 SECONDS (ref 11.6–14.5)
RBC # BLD AUTO: 4.98 MILLION/UL (ref 3.88–5.62)
SODIUM SERPL-SCNC: 134 MMOL/L (ref 136–145)
TRIGL SERPL-MCNC: 262 MG/DL
WBC # BLD AUTO: 8.83 THOUSAND/UL (ref 4.31–10.16)

## 2019-12-30 PROCEDURE — 83036 HEMOGLOBIN GLYCOSYLATED A1C: CPT

## 2019-12-30 PROCEDURE — 85610 PROTHROMBIN TIME: CPT

## 2019-12-30 PROCEDURE — 85025 COMPLETE CBC W/AUTO DIFF WBC: CPT

## 2019-12-30 PROCEDURE — 36415 COLL VENOUS BLD VENIPUNCTURE: CPT

## 2019-12-30 PROCEDURE — 80061 LIPID PANEL: CPT

## 2019-12-30 PROCEDURE — 80053 COMPREHEN METABOLIC PANEL: CPT

## 2019-12-31 ENCOUNTER — TELEPHONE (OUTPATIENT)
Dept: FAMILY MEDICINE CLINIC | Facility: CLINIC | Age: 69
End: 2019-12-31

## 2019-12-31 NOTE — TELEPHONE ENCOUNTER
----- Message from [id number] Srinivasan sent at 12/30/2019  8:55 PM EST -----  Please let patient know blood work is stable  Will review in detail at office visit on 01/13

## 2020-01-13 ENCOUNTER — OFFICE VISIT (OUTPATIENT)
Dept: FAMILY MEDICINE CLINIC | Facility: CLINIC | Age: 70
End: 2020-01-13
Payer: MEDICARE

## 2020-01-13 VITALS
WEIGHT: 204.8 LBS | HEIGHT: 61 IN | BODY MASS INDEX: 38.67 KG/M2 | TEMPERATURE: 97.1 F | HEART RATE: 82 BPM | DIASTOLIC BLOOD PRESSURE: 78 MMHG | SYSTOLIC BLOOD PRESSURE: 124 MMHG | RESPIRATION RATE: 16 BRPM | OXYGEN SATURATION: 98 %

## 2020-01-13 DIAGNOSIS — I10 ESSENTIAL HYPERTENSION: ICD-10-CM

## 2020-01-13 DIAGNOSIS — E11.40 TYPE 2 DIABETES MELLITUS WITH DIABETIC NEUROPATHY, WITHOUT LONG-TERM CURRENT USE OF INSULIN (HCC): Primary | ICD-10-CM

## 2020-01-13 DIAGNOSIS — E78.2 MIXED HYPERLIPIDEMIA: ICD-10-CM

## 2020-01-13 DIAGNOSIS — R26.89 BALANCE PROBLEMS: ICD-10-CM

## 2020-01-13 DIAGNOSIS — I48.0 PAROXYSMAL ATRIAL FIBRILLATION (HCC): ICD-10-CM

## 2020-01-13 PROCEDURE — 99214 OFFICE O/P EST MOD 30 MIN: CPT | Performed by: NURSE PRACTITIONER

## 2020-01-13 NOTE — PROGRESS NOTES
FAMILY PRACTICE OFFICE VISIT       NAME: Monica Garza  AGE: 77 y o  SEX: male       : 1950        MRN: [id number]    Assessment and Plan     Problem List Items Addressed This Visit        Endocrine    Type 2 diabetes mellitus with diabetic neuropathy ([facility] [address] ) - Primary       Lab Results   Component Value Date    HGBA1C 6 2 2019     A1c is stable at 6 2% on metformin  Foot exam completed today  Follows with podiatry, Dr Keisha Chavez  Name and number provided for podiatry in [address] as per patient request  Eye exam is up to date  Repeat routine blood work and follow up in office in 4 months  Relevant Orders    Ambulatory referral to Podiatry    Hemoglobin A1C    TSH, 3rd generation       Cardiovascular and Mediastinum    Essential hypertension     BP is stable on Dyazide and lisinopril  Relevant Orders    CBC and differential    Comprehensive metabolic panel    TSH, 3rd generation    Atrial fibrillation (HCC)     Paroxysmal a  Fib  Regular rate and rhythm today  Anticoagulated on coumadin for history of DVT  Follows with Dr Trang Duke, cardiology  Other    Mixed hyperlipidemia     LDL 89, triglycerides up at 262  Admits to a weakness of potato chips  He will work on reducing consumption of chips  He will resume exercise on stationary bike  Will continue simvastatin  Repeat lipid panel in 4 months  Relevant Orders    Lipid panel    TSH, 3rd generation      Other Visit Diagnoses     Balance problems      Diabetic peripheral neuropathy likely contributing  Recommend physical therapy to work on balance  Patient declines at this time  He will call if he changes his mind  Health maintenance:  Colonoscopy is up-to-date  Will be due for Pneumovax in March  He had Shingrix at [facility]  Complete blood work and follow up in 4 months       1  Type 2 diabetes mellitus with diabetic neuropathy, without long-term current use of insulin [facility])  Ambulatory referral to Podiatry    Hemoglobin A1C    TSH, 3rd generation   2  Essential hypertension  CBC and differential    Comprehensive metabolic panel    TSH, 3rd generation   3  Mixed hyperlipidemia  Lipid panel    TSH, 3rd generation   4  Paroxysmal atrial fibrillation (HCC)     5  Balance problems             Chief Complaint     Chief Complaint   Patient presents with    Follow-up     Diabetes       History of Present Illness     Vin Caal is a 56-year-old male presenting today for follow-up visit  Diabetes:  Has bilateral peripheral neuropathy in his feet  Currently going to Podiatry, Dr Talia Banks, for requests referral for a new podiatrist     Has chronic low back pain and hip pain that has been present for many years  No worsening of symptoms  Over the past month, has intermittent numbness in right foot  Usually occurs after is sitting in certain positions, resolves after he gets up and starts moving  Usually only lasts a few minutes  This does not occur every day  Notes he has very poor balance, ambulates with a cane  Health maintenance:  Colonoscopy is up-to-date  Will be due for Pneumovax in March  He had Shingrix at [facility] pharmacy  Review of Systems   Review of Systems   Constitutional: Negative  HENT: Negative  Eyes: Negative  Respiratory: Negative  Cardiovascular: Negative  Gastrointestinal: Negative  Endocrine: Negative  Genitourinary: Negative  Musculoskeletal: Negative  Skin: Negative  Neurological: Positive for numbness (as noted in HPI)  Psychiatric/Behavioral: Negative          Active Problem List     Patient Active Problem List   Diagnosis    Essential hypertension    Mixed hyperlipidemia    Type 2 diabetes mellitus with diabetic polyneuropathy, without long-term current use of insulin (HCC)    Fatty liver    GERD (gastroesophageal reflux disease)    History of DVT (deep vein thrombosis)    Anticoagulated on Coumadin    Type 2 diabetes mellitus with diabetic neuropathy (HCC)    Atherosclerosis of native arteries of the extremities with ulceration ([address] )    Atrial fibrillation ([address] )    History of colon polyps    Gastroesophageal reflux disease without esophagitis    Anticoagulant long-term use    Ureteric colic    Ureteral stone with hydronephrosis    Warthin's tumor       Past Medical History:  Past Medical History:   Diagnosis Date    Claudication, intermittent ([address] )     Colon polyp     6 polyps 3 years ago    Diabetes mellitus ([address] )     type 2    Diabetic neuropathic arthropathy (HCC)     causes weakness in LE and uses a cane as assist to walk    Diabetic neuropathy (HCC)     GERD (gastroesophageal reflux disease)     History of DVT (deep vein thrombosis) 2015    left LE    Hyperlipidemia     Hypertension     Parotid tumor     Spinal stenosis        Past Surgical History:  Past Surgical History:   Procedure Laterality Date    COLONOSCOPY  05/16/2019    completed by Dr Oscar Redmond, Repeat 3 years    [facility] RETROGRADE PYELOGRAM  5/17/2019    PAROTIDECTOMY Left     NE CYSTO/URETERO W/LITHOTRIPSY &INDWELL STENT INSRT Right 5/17/2019    Procedure: CYSTOSCOPY URETEROSCOPY WITH LITHOTRIPSY HOLMIUM LASER, RETROGRADE PYELOGRAM AND INSERTION STENT URETERAL--possible stent only;  Surgeon: Tasia Aranda MD;  [facility]: [facility];  Service: Urology    NE EXC PAROTD,LAT LOBE,DISSECT 5TH NERV Right 10/18/2017    Procedure: SUPERFICIAL PAROTIDECTOMY WITH NERVE DISSECTION AND PRESERVATION;  Surgeon: Manolo Mancini MD;  [facility];  Service: ENT    ROTATOR CUFF REPAIR Bilateral     TONSILLECTOMY         Family History:  Family History   Problem Relation Age of Onset    Lupus Mother     Rheum arthritis Mother     Cirrhosis Father     Alcohol abuse Father     Substance Abuse Brother        Social History:  Social History     Socioeconomic History    Marital status: /Civil Union     Spouse name: Not on file    Number of children: Not on file    Years of education: Not on file    Highest education level: Not on file   Occupational History    Not on file   Social Needs    Financial resource strain: Not on file    Food insecurity:     Worry: Not on file     Inability: Not on file    Transportation needs:     Medical: Not on file     Non-medical: Not on file   Tobacco Use    Smoking status: Current Every Day Smoker     Types: Pipe    Smokeless tobacco: Never Used    Tobacco comment: pipe   Substance and Sexual Activity    Alcohol use: Yes     Comment: socially    Drug use: No    Sexual activity: Not on file   Lifestyle    Physical activity:     Days per week: Not on file     Minutes per session: Not on file    Stress: Not on file   Relationships    Social connections:     Talks on phone: Not on file     Gets together: Not on file     Attends Yarsani service: Not on file     Active member of club or organization: Not on file     Attends meetings of clubs or organizations: Not on file     Relationship status: Not on file    Intimate partner violence:     Fear of current or ex partner: Not on file     Emotionally abused: Not on file     Physically abused: Not on file     Forced sexual activity: Not on file   Other Topics Concern    Not on file   Social History Narrative        [place]       I have reviewed the patient's medical history in detail; there are no changes to the history as noted in the electronic medical record      Objective     Vitals:    01/13/20 0929 01/13/20 1023   BP: 140/78 124/78   BP Location: Left arm    Patient Position: Sitting    Cuff Size: Standard    Pulse: 82    Resp: 16    Temp: (!) 97 1 °F (36 2 °C)    TempSrc: Tympanic    SpO2: 98%    Weight: 92 9 kg (204 lb 12 8 oz)    Height: 5' 1 32" (1 558 m)      Wt Readings from Last 3 Encounters:   01/13/20 92 9 kg (204 lb 12 8 oz)   11/25/19 89 8 kg (198 lb)   11/21/19 88 9 kg (196 lb)     Physical Exam   Constitutional: He is oriented to person, place, and time  He appears well-developed and well-nourished  HENT:   Head: Normocephalic and atraumatic  Right Ear: Tympanic membrane and ear canal normal    Left Ear: Tympanic membrane normal    Mouth/Throat: Oropharynx is clear and moist    Eyes: Pupils are equal, round, and reactive to light  Conjunctivae and EOM are normal    Neck: Normal range of motion  Neck supple  No thyromegaly present  Cardiovascular: Normal rate and regular rhythm  Pulses are weak pulses  No murmur heard  Pulses:       Dorsalis pedis pulses are 2+ on the right side, and 1+ on the left side  Posterior tibial pulses are 2+ on the right side, and 1+ on the left side  Pulmonary/Chest: Effort normal and breath sounds normal    Abdominal: Soft  Bowel sounds are normal    Musculoskeletal: He exhibits no edema  Feet:   Right Foot:   Skin Integrity: Positive for callus (right lateral first MTP joint callus, right lateral great toe callus)  Left Foot:   Skin Integrity: Positive for callus (Lateral right great toe callus)  Lymphadenopathy:     He has no cervical adenopathy  Neurological: He is alert and oriented to person, place, and time  Skin: No rash noted  Psychiatric: He has a normal mood and affect  Nursing note and vitals reviewed  Patient's shoes and socks removed  Right Foot/Ankle   Right Foot Inspection  Skin Exam: callus (right lateral first MTP joint callus, right lateral great toe callus) and callus (right lateral first MTP joint callus, right lateral great toe callus)                          Toe Exam: ROM and strength within normal limits  Sensory     Proprioception: diminished   Monofilament testing: diminished  Vascular  Capillary refills: < 3 seconds  The right DP pulse is 2+  The right PT pulse is 2+       Left Foot/Ankle  Left Foot Inspection  Skin Exam: callus (Lateral right great toe callus)                         Toe Exam: ROM and strength within normal limits                   Sensory Proprioception: diminished  Monofilament: diminished  Vascular  Capillary refills: < 3 seconds  The left DP pulse is 1+  The left PT pulse is 1+  Assign Risk Category:  No deformity present; Loss of protective sensation; Weak pulses       Risk: 2    ALLERGIES:  No Known Allergies    Current Medications     Current Outpatient Medications   Medication Sig Dispense Refill    acetaminophen (TYLENOL) 325 mg tablet 2, by mouth, every 6 hours as needed for mild to moderate pain  30 tablet 0    aspirin (ECOTRIN LOW STRENGTH) 81 mg EC tablet Take 81 mg by mouth daily      fenofibrate micronized (LOFIBRA) 134 MG capsule Take 1 capsule (134 mg total) by mouth daily with breakfast 90 capsule 3    lisinopril (ZESTRIL) 20 mg tablet TAKE 1 TABLET(S) BY MOUTH DAILY 90 tablet 4    MAGNESIUM OXIDE PO Take 400 mg by mouth daily      metFORMIN (GLUCOPHAGE) 1000 MG tablet Take 1 tablet (1,000 mg total) by mouth 2 (two) times a day with meals 180 tablet 1    PROAIR  (90 Base) MCG/ACT inhaler Inhale 2 puffs every 4 (four) hours as needed  3    Propylhexedrine (BENZEDREX NA) 2 sprays into each nostril as needed      simvastatin (ZOCOR) 40 mg tablet TAKE 1 TABLET EVERY DAY 90 tablet 4    sitaGLIPtin (JANUVIA) 100 mg tablet Take 1 tablet (100 mg total) by mouth daily 90 tablet 3    tamsulosin (FLOMAX) 0 4 mg Take 1 capsule (0 4 mg total) by mouth daily with dinner 90 capsule 3    triamterene-hydrochlorothiazide (DYAZIDE) 37 5-25 mg per capsule Take 1 capsule by mouth daily 90 capsule 3    warfarin (COUMADIN) 2 5 mg tablet Take 1 tablet by mouth daily T-Th-S-S takes 2 5mg     M-W-F takes 5 0 mg      warfarin (COUMADIN) 5 mg tablet TAKE 1 TABLET EVERY DAY OR AS DIRECTED BY MD 90 tablet 3     No current facility-administered medications for this visit            Health Maintenance     Health Maintenance   Topic Date Due    OT PLAN OF CARE  08/18/2019    Diabetic Foot Exam  10/29/2019    DTaP,Tdap,and Td Vaccines (1 - Tdap) 03/19/2020 (Originally 5/30/1961)    Medicare Annual Wellness Visit (AWV)  03/19/2020    Pneumococcal Vaccine: 65+ Years (2 of 2 - PPSV23) 03/19/2020    BMI: Followup Plan  06/29/2020    HEMOGLOBIN A1C  06/30/2020    Fall Risk  07/19/2020    Depression Screening PHQ  07/19/2020    BMI: Adult  01/13/2021    DM Eye Exam  05/14/2021    CRC Screening: Colonoscopy  11/21/2022    Hepatitis C Screening  Completed    Influenza Vaccine  Completed    Hepatitis B Vaccine  Completed    Pneumococcal Vaccine: Pediatrics (0 to 5 Years) and At-Risk Patients (6 to 59 Years)  Aged Out    HIB Vaccine  Aged Out    IPV Vaccine  Aged Out    Hepatitis A Vaccine  Aged Out    Meningococcal ACWY Vaccine  Aged Out    HPV Vaccine  Aged [employer] History   Administered Date(s) Administered    Hep B, adult 03/19/2019, 04/23/2019, 09/19/2019    INFLUENZA 09/13/2018, 09/17/2018    Influenza, high dose seasonal 0 5 mL 09/19/2019    Pneumococcal Conjugate 13-Valent 03/19/2019    Zoster Vaccine Recombinant 07/30/2018, 09/29/2018       DAYAMI Blanco

## 2020-01-19 NOTE — ASSESSMENT & PLAN NOTE
Lab Results   Component Value Date    HGBA1C 6 2 12/30/2019     A1c is stable at 6 2% on metformin  Foot exam completed today  Follows with podiatry, Dr Hamilton Brought  Name and number provided for podiatry in [address] as per patient request  Eye exam is up to date  Repeat routine blood work and follow up in office in 4 months

## 2020-01-19 NOTE — ASSESSMENT & PLAN NOTE
LDL 89, triglycerides up at 262  Admits to a weakness of potato chips  He will work on reducing consumption of chips  He will resume exercise on stationary bike  Will continue simvastatin an fenofibrate  Repeat lipid panel in 4 months

## 2020-01-19 NOTE — ASSESSMENT & PLAN NOTE
Paroxysmal a  Fib  Regular rate and rhythm today  Anticoagulated on coumadin for history of DVT  Follows with Dr Nader Escobedo, cardiology

## 2020-01-27 ENCOUNTER — APPOINTMENT (OUTPATIENT)
Dept: LAB | Age: 70
End: 2020-01-27
Payer: MEDICARE

## 2020-01-27 ENCOUNTER — ANTICOAG VISIT (OUTPATIENT)
Dept: CARDIOLOGY CLINIC | Facility: CLINIC | Age: 70
End: 2020-01-27

## 2020-01-27 ENCOUNTER — TRANSCRIBE ORDERS (OUTPATIENT)
Dept: ADMINISTRATIVE | Age: 70
End: 2020-01-27

## 2020-02-24 ENCOUNTER — APPOINTMENT (OUTPATIENT)
Dept: LAB | Age: 70
End: 2020-02-24
Payer: MEDICARE

## 2020-02-24 ENCOUNTER — ANTICOAG VISIT (OUTPATIENT)
Dept: CARDIOLOGY CLINIC | Facility: CLINIC | Age: 70
End: 2020-02-24

## 2020-02-27 DIAGNOSIS — E11.42 TYPE 2 DIABETES MELLITUS WITH DIABETIC POLYNEUROPATHY, WITHOUT LONG-TERM CURRENT USE OF INSULIN (HCC): ICD-10-CM

## 2020-03-16 ENCOUNTER — OFFICE VISIT (OUTPATIENT)
Dept: PODIATRY | Facility: CLINIC | Age: 70
End: 2020-03-16
Payer: MEDICARE

## 2020-03-16 VITALS
WEIGHT: 208 LBS | DIASTOLIC BLOOD PRESSURE: 77 MMHG | SYSTOLIC BLOOD PRESSURE: 146 MMHG | BODY MASS INDEX: 29.12 KG/M2 | HEIGHT: 71 IN | HEART RATE: 81 BPM

## 2020-03-16 DIAGNOSIS — B35.1 TINEA UNGUIUM: ICD-10-CM

## 2020-03-16 DIAGNOSIS — M20.41 HAMMER TOES OF BOTH FEET: ICD-10-CM

## 2020-03-16 DIAGNOSIS — M20.42 HAMMER TOES OF BOTH FEET: ICD-10-CM

## 2020-03-16 DIAGNOSIS — E11.40 TYPE 2 DIABETES MELLITUS WITH DIABETIC NEUROPATHY, WITHOUT LONG-TERM CURRENT USE OF INSULIN (HCC): Primary | ICD-10-CM

## 2020-03-16 PROCEDURE — 99203 OFFICE O/P NEW LOW 30 MIN: CPT | Performed by: PODIATRIST

## 2020-03-16 PROCEDURE — 3077F SYST BP >= 140 MM HG: CPT | Performed by: PODIATRIST

## 2020-03-16 PROCEDURE — 3078F DIAST BP <80 MM HG: CPT | Performed by: PODIATRIST

## 2020-03-16 PROCEDURE — 1160F RVW MEDS BY RX/DR IN RCRD: CPT | Performed by: PODIATRIST

## 2020-03-16 PROCEDURE — 4040F PNEUMOC VAC/ADMIN/RCVD: CPT | Performed by: PODIATRIST

## 2020-03-16 PROCEDURE — 2022F DILAT RTA XM EVC RTNOPTHY: CPT | Performed by: PODIATRIST

## 2020-03-16 PROCEDURE — 4004F PT TOBACCO SCREEN RCVD TLK: CPT | Performed by: PODIATRIST

## 2020-03-16 RX ORDER — OMEPRAZOLE 20 MG/1
20 CAPSULE, DELAYED RELEASE ORAL DAILY
COMMUNITY
Start: 2020-01-31 | End: 2020-05-05

## 2020-03-16 NOTE — PATIENT INSTRUCTIONS
Foot Care for People with Diabetes   WHAT YOU NEED TO KNOW:   · Foot care helps protect your feet and prevent foot ulcers or sores  Long-term high blood sugar levels can damage the blood vessels and nerves in your legs and feet  This damage makes it hard to feel pressure, pain, temperature, and touch  You may not be able to feel a cut or sore, or shoes that are too tight  Foot care is needed to prevent serious problems, such as an infection or amputation  · Diabetes may cause your toes to become crooked or curved under  These changes may affect the way you walk and can lead to increased pressure on your foot  The pressure can decrease blood flow to your feet  Lack of blood flow increases your risk for a foot ulcer  Do not ignore small problems, such as dry skin or small wounds  These can become life-threatening over time without proper care  DISCHARGE INSTRUCTIONS:   Contact your healthcare provider if:   · Your feet become numb, weak, or hard to move  · You have pus draining from a sore on your foot  · You have a wound on your foot that gets bigger, deeper, or does not heal      · You see blisters, cuts, scratches, calluses, or sores on your foot  · You have a fever, and your feet become red, warm, and swollen  · Your toenails become thick, curled, or yellow  · You find it hard to check your feet because your vision is poor  · You have questions or concerns about your condition or care  Foot care:   · Check your feet each day  Look at your whole foot, including the bottom, and between and under your toes  Check for wounds, corns, and calluses  Use a mirror to see the bottom of your feet  The skin on your feet may be shiny, tight, or darker than normal  Your feet may also be cold and pale  Feel your feet by running your hands along the tops, bottoms, sides, and between your toes  Redness, swelling, and warmth are signs of blood flow problems that can lead to a foot ulcer   Do not try to remove corns or calluses yourself  · Wash your feet each day with soap and warm water  Do not use hot water, because this can injure your foot  Dry your feet gently with a towel after you wash them  Dry between and under your toes  · Apply lotion or a moisturizer on your dry feet  Ask your healthcare provider what lotions are best to use  Do not put lotion or moisturizer between your toes  · Cut your toenails correctly  File or cut your toenails straight across  Use a soft brush to clean around your toenails  If your toenails are very thick, you may need to have a healthcare provider or specialist cut them  · Protect your feet  Do not walk barefoot or wear your shoes without socks  Check your shoes for rocks or other objects that can hurt your feet  Wear cotton socks to help keep your feet dry  Wear socks without toe seams, or wear them with the seams inside out  Change your socks each day  Do not wear socks that are dirty or damp  · Wear shoes that fit well  Wear shoes that do not rub against any area of your feet  Your shoes should be ½ to ¾ inch (1 to 2 centimeters) longer than your feet  Your shoes should also have extra space around the widest part of your feet  Walking or athletic shoes with laces or straps that adjust are best  Ask your healthcare provider for help to choose shoes that fit you best  Ask him if you need to wear an insert, orthotic, or bandage on your feet  Follow up with your healthcare provider or foot specialist as directed: You will need to have your feet checked at least once a year  You may need a foot exam more often if you have nerve damage, foot deformities, or ulcers  Write down your questions so you remember to ask them during your visits  © 2017 [address] Information is for End User's use only and may not be sold, redistributed or otherwise used for commercial purposes   All illustrations and images included in CareNotes® are the copyrighted property of [employer]  or Felix Moffett  The above information is an  only  It is not intended as medical advice for individual conditions or treatments  Talk to your doctor, nurse or pharmacist before following any medical regimen to see if it is safe and effective for you

## 2020-03-16 NOTE — PROGRESS NOTES
Assessment/Plan:     Diagnoses and all orders for this visit:    Type 2 diabetes mellitus with diabetic neuropathy, without long-term current use of insulin ([facility] [address] )  -     Ambulatory referral to Podiatry  -     Diabetic Shoe    Hammer toes of both feet  -     Diabetic Shoe    Tinea unguium    Other orders  -     omeprazole (PriLOSEC) 20 mg delayed release capsule; Take 20 mg by mouth daily      Diagnosis and options discussed  Patient has class findings of diminished pulses trophic changes to skin neuropathy and paresthesia in his feet   (Q 9)    -Educated on DM risk to lower extremities, proper shoe gear, and daily monitoring of feet    -Educated on A1C and the risks of poorly controlled Diabetes   -Discussed weight loss and suitable exercise regiment  Subjective:      Patient ID: Byron Stephenson is a 77 y o  male  Patient PMH signifcant for DM2, history of DVT on coumadin, HTN  HE has gotten blood clots in his legs multiple times and takes coumadin regularly  His PCP recommended he see a podiatrist to get his feet checked  His feet burn and tingle, his PCP said he is getting some neuropathy  The nails are growing thicker  His A1C was 6 1  The following portions of the patient's history were reviewed and updated as appropriate: allergies, current medications, past family history, past medical history, past social history, past surgical history and problem list     Review of Systems   Constitutional: Negative  HENT: Negative for sinus pressure and sinus pain  Respiratory: Negative for cough and shortness of breath  Cardiovascular: Negative for leg swelling  Gastrointestinal: Negative for diarrhea, nausea and vomiting  Musculoskeletal: Negative for arthralgias and gait problem  Skin: Positive for color change  Neurological: Positive for numbness  Negative for headaches           Objective:      /77   Pulse 81   Ht 5' 10 5" (1 791 m)   Wt 94 3 kg (208 lb)   BMI 29 42 kg/m² Physical Exam   Constitutional: He is oriented to person, place, and time  He appears well-developed and well-nourished  No distress  Cardiovascular: Intact distal pulses  Pulses are no weak pulses  Pulses:       Dorsalis pedis pulses are 1+ on the right side, and 1+ on the left side  Posterior tibial pulses are 1+ on the right side, and 0 on the left side  Pulmonary/Chest: Effort normal  No respiratory distress  Musculoskeletal: He exhibits no edema or tenderness  Right foot: There is normal range of motion  Left foot: There is normal range of motion  Feet:   Right Foot:   Protective Sensation: 10 sites tested  6 sites sensed  Skin Integrity: Positive for dry skin  Negative for ulcer, skin breakdown or erythema  Left Foot:   Protective Sensation: 10 sites tested  6 sites sensed  Skin Integrity: Positive for callus (left sub-1) and dry skin  Negative for ulcer, skin breakdown or erythema  Neurological: He is alert and oriented to person, place, and time  A sensory deficit is present  Coordination normal    Skin: Skin is warm and dry  Capillary refill takes less than 2 seconds  Vitals reviewed  Diabetic Foot Exam    Right Foot/Ankle   Right Foot Inspection  Skin Exam: skin intact, dry skin and abnormal color no erythema, no maceration and no ulcer                          Toe Exam: no swelling, no tenderness, erythema and  no right toe deformity  Sensory   Vibration: absent  Proprioception: diminished   Monofilament testing: diminished  Vascular  Capillary refills: elevated  The right DP pulse is 1+  The right PT pulse is 1+     Right Toe  - Comprehensive Exam  Hammertoes: fifth toe    Left Foot/Ankle  Left Foot Inspection  Skin Exam: skin intact, dry skin, abnormal color (nails thick and dystrophic x10) and callus (left sub-1)no erythema, no maceration and no ulcer                         Toe Exam: no swelling, no tenderness, no erythema and no left toe deformity Sensory   Vibration: absent  Proprioception: diminished  Monofilament: diminished  Vascular  Capillary refills: elevated  The left DP pulse is 1+  The left PT pulse is 0  Left Toe  - Comprehensive Exam  Hammertoes: fifth toe  Assign Risk Category:  No deformity present; Loss of protective sensation;  No weak pulses       Risk: 1

## 2020-03-24 ENCOUNTER — TRANSCRIBE ORDERS (OUTPATIENT)
Dept: ADMINISTRATIVE | Age: 70
End: 2020-03-24

## 2020-03-24 ENCOUNTER — ANTICOAG VISIT (OUTPATIENT)
Dept: CARDIOLOGY CLINIC | Facility: CLINIC | Age: 70
End: 2020-03-24

## 2020-03-24 ENCOUNTER — APPOINTMENT (OUTPATIENT)
Dept: LAB | Age: 70
End: 2020-03-24
Payer: MEDICARE

## 2020-04-16 ENCOUNTER — ANTICOAG VISIT (OUTPATIENT)
Dept: CARDIOLOGY CLINIC | Facility: CLINIC | Age: 70
End: 2020-04-16

## 2020-04-16 ENCOUNTER — LAB (OUTPATIENT)
Dept: LAB | Age: 70
End: 2020-04-16
Payer: MEDICARE

## 2020-04-16 DIAGNOSIS — E11.40 TYPE 2 DIABETES MELLITUS WITH DIABETIC NEUROPATHY, WITHOUT LONG-TERM CURRENT USE OF INSULIN (HCC): ICD-10-CM

## 2020-04-16 DIAGNOSIS — I10 ESSENTIAL HYPERTENSION: ICD-10-CM

## 2020-04-16 DIAGNOSIS — E78.2 MIXED HYPERLIPIDEMIA: ICD-10-CM

## 2020-04-16 LAB
ALBUMIN SERPL BCP-MCNC: 3.9 G/DL (ref 3.5–5)
ALP SERPL-CCNC: 56 U/L (ref 46–116)
ALT SERPL W P-5'-P-CCNC: 34 U/L (ref 12–78)
ANION GAP SERPL CALCULATED.3IONS-SCNC: 7 MMOL/L (ref 4–13)
AST SERPL W P-5'-P-CCNC: 20 U/L (ref 5–45)
BASOPHILS # BLD AUTO: 0.1 THOUSANDS/ΜL (ref 0–0.1)
BASOPHILS NFR BLD AUTO: 1 % (ref 0–1)
BILIRUB SERPL-MCNC: 0.5 MG/DL (ref 0.2–1)
BUN SERPL-MCNC: 13 MG/DL (ref 5–25)
CALCIUM SERPL-MCNC: 9.1 MG/DL (ref 8.3–10.1)
CHLORIDE SERPL-SCNC: 102 MMOL/L (ref 100–108)
CHOLEST SERPL-MCNC: 161 MG/DL (ref 50–200)
CO2 SERPL-SCNC: 26 MMOL/L (ref 21–32)
CREAT SERPL-MCNC: 0.98 MG/DL (ref 0.6–1.3)
EOSINOPHIL # BLD AUTO: 0.33 THOUSAND/ΜL (ref 0–0.61)
EOSINOPHIL NFR BLD AUTO: 4 % (ref 0–6)
ERYTHROCYTE [DISTWIDTH] IN BLOOD BY AUTOMATED COUNT: 14.6 % (ref 11.6–15.1)
EST. AVERAGE GLUCOSE BLD GHB EST-MCNC: 128 MG/DL
GFR SERPL CREATININE-BSD FRML MDRD: 79 ML/MIN/1.73SQ M
GLUCOSE P FAST SERPL-MCNC: 129 MG/DL (ref 65–99)
HBA1C MFR BLD: 6.1 %
HCT VFR BLD AUTO: 48.2 % (ref 36.5–49.3)
HDLC SERPL-MCNC: 34 MG/DL
HGB BLD-MCNC: 15.5 G/DL (ref 12–17)
IMM GRANULOCYTES # BLD AUTO: 0.07 THOUSAND/UL (ref 0–0.2)
IMM GRANULOCYTES NFR BLD AUTO: 1 % (ref 0–2)
LDLC SERPL CALC-MCNC: 72 MG/DL (ref 0–100)
LYMPHOCYTES # BLD AUTO: 1.43 THOUSANDS/ΜL (ref 0.6–4.47)
LYMPHOCYTES NFR BLD AUTO: 16 % (ref 14–44)
MCH RBC QN AUTO: 30.3 PG (ref 26.8–34.3)
MCHC RBC AUTO-ENTMCNC: 32.2 G/DL (ref 31.4–37.4)
MCV RBC AUTO: 94 FL (ref 82–98)
MONOCYTES # BLD AUTO: 1.5 THOUSAND/ΜL (ref 0.17–1.22)
MONOCYTES NFR BLD AUTO: 17 % (ref 4–12)
NEUTROPHILS # BLD AUTO: 5.67 THOUSANDS/ΜL (ref 1.85–7.62)
NEUTS SEG NFR BLD AUTO: 61 % (ref 43–75)
NONHDLC SERPL-MCNC: 127 MG/DL
NRBC BLD AUTO-RTO: 0 /100 WBCS
PLATELET # BLD AUTO: 287 THOUSANDS/UL (ref 149–390)
PMV BLD AUTO: 9.9 FL (ref 8.9–12.7)
POTASSIUM SERPL-SCNC: 4.5 MMOL/L (ref 3.5–5.3)
PROT SERPL-MCNC: 7.5 G/DL (ref 6.4–8.2)
RBC # BLD AUTO: 5.11 MILLION/UL (ref 3.88–5.62)
SODIUM SERPL-SCNC: 135 MMOL/L (ref 136–145)
TRIGL SERPL-MCNC: 273 MG/DL
TSH SERPL DL<=0.05 MIU/L-ACNC: 2.35 UIU/ML (ref 0.36–3.74)
WBC # BLD AUTO: 9.1 THOUSAND/UL (ref 4.31–10.16)

## 2020-04-16 PROCEDURE — 80053 COMPREHEN METABOLIC PANEL: CPT

## 2020-04-16 PROCEDURE — 85025 COMPLETE CBC W/AUTO DIFF WBC: CPT

## 2020-04-16 PROCEDURE — 80061 LIPID PANEL: CPT

## 2020-04-16 PROCEDURE — 84443 ASSAY THYROID STIM HORMONE: CPT

## 2020-04-16 PROCEDURE — 83036 HEMOGLOBIN GLYCOSYLATED A1C: CPT

## 2020-04-28 ENCOUNTER — OFFICE VISIT (OUTPATIENT)
Dept: FAMILY MEDICINE CLINIC | Facility: CLINIC | Age: 70
End: 2020-04-28
Payer: MEDICARE

## 2020-04-28 VITALS
HEIGHT: 71 IN | BODY MASS INDEX: 28.56 KG/M2 | HEART RATE: 79 BPM | TEMPERATURE: 97.3 F | DIASTOLIC BLOOD PRESSURE: 81 MMHG | OXYGEN SATURATION: 98 % | SYSTOLIC BLOOD PRESSURE: 134 MMHG | WEIGHT: 204 LBS

## 2020-04-28 DIAGNOSIS — J30.9 ALLERGIC RHINITIS, UNSPECIFIED SEASONALITY, UNSPECIFIED TRIGGER: ICD-10-CM

## 2020-04-28 DIAGNOSIS — D72.828 HIGH BLOOD MONOCYTE COUNT: ICD-10-CM

## 2020-04-28 DIAGNOSIS — K21.9 GASTROESOPHAGEAL REFLUX DISEASE WITHOUT ESOPHAGITIS: ICD-10-CM

## 2020-04-28 DIAGNOSIS — Z00.00 MEDICARE ANNUAL WELLNESS VISIT, SUBSEQUENT: ICD-10-CM

## 2020-04-28 DIAGNOSIS — I10 ESSENTIAL HYPERTENSION: ICD-10-CM

## 2020-04-28 DIAGNOSIS — I71.4 ABDOMINAL AORTIC ANEURYSM (AAA) 3.0 CM TO 5.5 CM IN DIAMETER IN MALE (HCC): ICD-10-CM

## 2020-04-28 DIAGNOSIS — E11.42 TYPE 2 DIABETES MELLITUS WITH DIABETIC POLYNEUROPATHY, WITHOUT LONG-TERM CURRENT USE OF INSULIN (HCC): Primary | ICD-10-CM

## 2020-04-28 DIAGNOSIS — E78.2 MIXED HYPERLIPIDEMIA: ICD-10-CM

## 2020-04-28 DIAGNOSIS — I48.0 PAROXYSMAL ATRIAL FIBRILLATION (HCC): ICD-10-CM

## 2020-04-28 PROCEDURE — 1125F AMNT PAIN NOTED PAIN PRSNT: CPT | Performed by: NURSE PRACTITIONER

## 2020-04-28 PROCEDURE — 3044F HG A1C LEVEL LT 7.0%: CPT | Performed by: NURSE PRACTITIONER

## 2020-04-28 PROCEDURE — 3079F DIAST BP 80-89 MM HG: CPT | Performed by: NURSE PRACTITIONER

## 2020-04-28 PROCEDURE — 1160F RVW MEDS BY RX/DR IN RCRD: CPT | Performed by: NURSE PRACTITIONER

## 2020-04-28 PROCEDURE — 3075F SYST BP GE 130 - 139MM HG: CPT | Performed by: NURSE PRACTITIONER

## 2020-04-28 PROCEDURE — G0439 PPPS, SUBSEQ VISIT: HCPCS | Performed by: NURSE PRACTITIONER

## 2020-04-28 PROCEDURE — 4004F PT TOBACCO SCREEN RCVD TLK: CPT | Performed by: NURSE PRACTITIONER

## 2020-04-28 PROCEDURE — 99214 OFFICE O/P EST MOD 30 MIN: CPT | Performed by: NURSE PRACTITIONER

## 2020-04-28 PROCEDURE — 1123F ACP DISCUSS/DSCN MKR DOCD: CPT | Performed by: NURSE PRACTITIONER

## 2020-04-28 PROCEDURE — 4040F PNEUMOC VAC/ADMIN/RCVD: CPT | Performed by: NURSE PRACTITIONER

## 2020-04-28 PROCEDURE — 2022F DILAT RTA XM EVC RTNOPTHY: CPT | Performed by: NURSE PRACTITIONER

## 2020-04-28 PROCEDURE — 1170F FXNL STATUS ASSESSED: CPT | Performed by: NURSE PRACTITIONER

## 2020-04-30 ENCOUNTER — TELEPHONE (OUTPATIENT)
Dept: FAMILY MEDICINE CLINIC | Facility: CLINIC | Age: 70
End: 2020-04-30

## 2020-04-30 ENCOUNTER — TELEPHONE (OUTPATIENT)
Dept: SURGICAL ONCOLOGY | Facility: CLINIC | Age: 70
End: 2020-04-30

## 2020-04-30 PROBLEM — K44.9 HIATAL HERNIA: Status: ACTIVE | Noted: 2020-04-30

## 2020-04-30 PROBLEM — I71.4 ABDOMINAL AORTIC ANEURYSM (AAA) 3.0 CM TO 5.5 CM IN DIAMETER IN MALE (HCC): Status: ACTIVE | Noted: 2020-04-30

## 2020-04-30 PROBLEM — I71.40 ABDOMINAL AORTIC ANEURYSM (AAA) 3.0 CM TO 5.5 CM IN DIAMETER IN MALE: Status: ACTIVE | Noted: 2020-04-30

## 2020-05-04 ENCOUNTER — TELEMEDICINE (OUTPATIENT)
Dept: CARDIOLOGY CLINIC | Facility: CLINIC | Age: 70
End: 2020-05-04
Payer: MEDICARE

## 2020-05-04 VITALS
BODY MASS INDEX: 28.56 KG/M2 | OXYGEN SATURATION: 99 % | HEART RATE: 75 BPM | WEIGHT: 204 LBS | HEIGHT: 71 IN | DIASTOLIC BLOOD PRESSURE: 74 MMHG | TEMPERATURE: 97.7 F | SYSTOLIC BLOOD PRESSURE: 121 MMHG

## 2020-05-04 DIAGNOSIS — I10 ESSENTIAL HYPERTENSION: Primary | ICD-10-CM

## 2020-05-04 DIAGNOSIS — I48.0 PAROXYSMAL ATRIAL FIBRILLATION (HCC): ICD-10-CM

## 2020-05-04 DIAGNOSIS — E78.2 MIXED HYPERLIPIDEMIA: ICD-10-CM

## 2020-05-04 PROCEDURE — 99214 OFFICE O/P EST MOD 30 MIN: CPT | Performed by: INTERNAL MEDICINE

## 2020-05-04 RX ORDER — FENOFIBRATE 134 MG/1
134 CAPSULE ORAL
Qty: 90 CAPSULE | Refills: 3 | Status: SHIPPED | OUTPATIENT
Start: 2020-05-04 | End: 2021-05-07

## 2020-05-04 RX ORDER — LISINOPRIL 20 MG/1
20 TABLET ORAL DAILY
Qty: 90 TABLET | Refills: 4 | Status: SHIPPED | OUTPATIENT
Start: 2020-05-04 | End: 2021-05-09

## 2020-05-04 RX ORDER — SIMVASTATIN 40 MG
40 TABLET ORAL DAILY
Qty: 90 TABLET | Refills: 4 | Status: SHIPPED | OUTPATIENT
Start: 2020-05-04 | End: 2021-05-23

## 2020-05-05 DIAGNOSIS — K21.9 GASTROESOPHAGEAL REFLUX DISEASE WITHOUT ESOPHAGITIS: Primary | ICD-10-CM

## 2020-05-05 RX ORDER — OMEPRAZOLE 20 MG/1
CAPSULE, DELAYED RELEASE ORAL
Qty: 90 CAPSULE | Refills: 1 | Status: SHIPPED | OUTPATIENT
Start: 2020-05-05 | End: 2020-10-31

## 2020-05-18 LAB
LEFT EYE DIABETIC RETINOPATHY: NORMAL
RIGHT EYE DIABETIC RETINOPATHY: NORMAL

## 2020-05-19 ENCOUNTER — APPOINTMENT (OUTPATIENT)
Dept: LAB | Age: 70
End: 2020-05-19
Payer: MEDICARE

## 2020-05-19 ENCOUNTER — ANTICOAG VISIT (OUTPATIENT)
Dept: CARDIOLOGY CLINIC | Facility: CLINIC | Age: 70
End: 2020-05-19

## 2020-06-01 ENCOUNTER — OFFICE VISIT (OUTPATIENT)
Dept: PODIATRY | Facility: CLINIC | Age: 70
End: 2020-06-01
Payer: MEDICARE

## 2020-06-01 DIAGNOSIS — L84 FOOT CALLUS: ICD-10-CM

## 2020-06-01 DIAGNOSIS — E11.42 TYPE 2 DIABETES MELLITUS WITH DIABETIC POLYNEUROPATHY, WITHOUT LONG-TERM CURRENT USE OF INSULIN (HCC): Primary | ICD-10-CM

## 2020-06-01 DIAGNOSIS — B35.1 ONYCHOMYCOSIS: ICD-10-CM

## 2020-06-01 PROCEDURE — 11056 PARNG/CUTG B9 HYPRKR LES 2-4: CPT | Performed by: PODIATRIST

## 2020-06-01 PROCEDURE — RECHECK: Performed by: PODIATRIST

## 2020-06-01 PROCEDURE — 11721 DEBRIDE NAIL 6 OR MORE: CPT | Performed by: PODIATRIST

## 2020-06-01 PROCEDURE — NC001 PR NO CHARGE: Performed by: PODIATRIST

## 2020-06-04 ENCOUNTER — CONSULT (OUTPATIENT)
Dept: HEMATOLOGY ONCOLOGY | Facility: CLINIC | Age: 70
End: 2020-06-04
Payer: MEDICARE

## 2020-06-04 VITALS
HEIGHT: 71 IN | RESPIRATION RATE: 16 BRPM | OXYGEN SATURATION: 97 % | DIASTOLIC BLOOD PRESSURE: 82 MMHG | WEIGHT: 208 LBS | HEART RATE: 97 BPM | TEMPERATURE: 98 F | SYSTOLIC BLOOD PRESSURE: 144 MMHG | BODY MASS INDEX: 29.12 KG/M2

## 2020-06-04 DIAGNOSIS — D72.828 HIGH BLOOD MONOCYTE COUNT: Primary | ICD-10-CM

## 2020-06-04 DIAGNOSIS — D47.1 CHRONIC MYELOPROLIFERATIVE DISEASE (HCC): ICD-10-CM

## 2020-06-04 PROCEDURE — 99205 OFFICE O/P NEW HI 60 MIN: CPT | Performed by: INTERNAL MEDICINE

## 2020-06-04 PROCEDURE — 3044F HG A1C LEVEL LT 7.0%: CPT | Performed by: INTERNAL MEDICINE

## 2020-06-16 ENCOUNTER — ANTICOAG VISIT (OUTPATIENT)
Dept: CARDIOLOGY CLINIC | Facility: CLINIC | Age: 70
End: 2020-06-16

## 2020-06-16 ENCOUNTER — APPOINTMENT (OUTPATIENT)
Dept: LAB | Age: 70
End: 2020-06-16
Payer: MEDICARE

## 2020-06-16 ENCOUNTER — TRANSCRIBE ORDERS (OUTPATIENT)
Dept: ADMINISTRATIVE | Age: 70
End: 2020-06-16

## 2020-06-16 DIAGNOSIS — Z79.01 ANTICOAGULANT LONG-TERM USE: ICD-10-CM

## 2020-06-16 DIAGNOSIS — I48.91 ATRIAL FIBRILLATION, UNSPECIFIED TYPE (HCC): ICD-10-CM

## 2020-06-16 DIAGNOSIS — Z86.718 HISTORY OF DVT (DEEP VEIN THROMBOSIS): ICD-10-CM

## 2020-07-13 ENCOUNTER — TELEPHONE (OUTPATIENT)
Dept: CARDIOLOGY CLINIC | Facility: CLINIC | Age: 70
End: 2020-07-13

## 2020-07-13 ENCOUNTER — LAB (OUTPATIENT)
Dept: LAB | Age: 70
End: 2020-07-13
Payer: MEDICARE

## 2020-07-13 ENCOUNTER — ANTICOAG VISIT (OUTPATIENT)
Dept: CARDIOLOGY CLINIC | Facility: CLINIC | Age: 70
End: 2020-07-13

## 2020-07-13 ENCOUNTER — TRANSCRIBE ORDERS (OUTPATIENT)
Dept: ADMINISTRATIVE | Age: 70
End: 2020-07-13

## 2020-07-13 DIAGNOSIS — Z86.718 HISTORY OF DVT (DEEP VEIN THROMBOSIS): ICD-10-CM

## 2020-07-13 DIAGNOSIS — D47.1 CHRONIC MYELOPROLIFERATIVE DISEASE (HCC): Primary | ICD-10-CM

## 2020-07-13 DIAGNOSIS — E11.42 TYPE 2 DIABETES MELLITUS WITH DIABETIC POLYNEUROPATHY, WITHOUT LONG-TERM CURRENT USE OF INSULIN (HCC): ICD-10-CM

## 2020-07-13 DIAGNOSIS — I10 ESSENTIAL HYPERTENSION: ICD-10-CM

## 2020-07-13 DIAGNOSIS — Z79.01 ANTICOAGULANT LONG-TERM USE: ICD-10-CM

## 2020-07-13 DIAGNOSIS — E78.2 MIXED HYPERLIPIDEMIA: ICD-10-CM

## 2020-07-13 DIAGNOSIS — D47.1 CHRONIC MYELOPROLIFERATIVE DISEASE (HCC): ICD-10-CM

## 2020-07-13 DIAGNOSIS — D72.828 HIGH BLOOD MONOCYTE COUNT: ICD-10-CM

## 2020-07-13 LAB
ALBUMIN SERPL BCP-MCNC: 4 G/DL (ref 3.5–5)
ALP SERPL-CCNC: 49 U/L (ref 46–116)
ALT SERPL W P-5'-P-CCNC: 32 U/L (ref 12–78)
ANION GAP SERPL CALCULATED.3IONS-SCNC: 6 MMOL/L (ref 4–13)
AST SERPL W P-5'-P-CCNC: 21 U/L (ref 5–45)
BASOPHILS # BLD AUTO: 0.11 THOUSANDS/ΜL (ref 0–0.1)
BASOPHILS NFR BLD AUTO: 1 % (ref 0–1)
BILIRUB SERPL-MCNC: 0.71 MG/DL (ref 0.2–1)
BUN SERPL-MCNC: 17 MG/DL (ref 5–25)
CALCIUM SERPL-MCNC: 9.3 MG/DL (ref 8.3–10.1)
CHLORIDE SERPL-SCNC: 106 MMOL/L (ref 100–108)
CHOLEST SERPL-MCNC: 161 MG/DL (ref 50–200)
CO2 SERPL-SCNC: 24 MMOL/L (ref 21–32)
CREAT SERPL-MCNC: 1.05 MG/DL (ref 0.6–1.3)
EOSINOPHIL # BLD AUTO: 0.33 THOUSAND/ΜL (ref 0–0.61)
EOSINOPHIL NFR BLD AUTO: 4 % (ref 0–6)
ERYTHROCYTE [DISTWIDTH] IN BLOOD BY AUTOMATED COUNT: 14.3 % (ref 11.6–15.1)
EST. AVERAGE GLUCOSE BLD GHB EST-MCNC: 131 MG/DL
GFR SERPL CREATININE-BSD FRML MDRD: 72 ML/MIN/1.73SQ M
GLUCOSE P FAST SERPL-MCNC: 98 MG/DL (ref 65–99)
HBA1C MFR BLD: 6.2 %
HCT VFR BLD AUTO: 46.9 % (ref 36.5–49.3)
HDLC SERPL-MCNC: 34 MG/DL
HGB BLD-MCNC: 15.6 G/DL (ref 12–17)
IMM GRANULOCYTES # BLD AUTO: 0.05 THOUSAND/UL (ref 0–0.2)
IMM GRANULOCYTES NFR BLD AUTO: 1 % (ref 0–2)
LDLC SERPL CALC-MCNC: 70 MG/DL (ref 0–100)
LYMPHOCYTES # BLD AUTO: 2 THOUSANDS/ΜL (ref 0.6–4.47)
LYMPHOCYTES NFR BLD AUTO: 22 % (ref 14–44)
MCH RBC QN AUTO: 31.4 PG (ref 26.8–34.3)
MCHC RBC AUTO-ENTMCNC: 33.3 G/DL (ref 31.4–37.4)
MCV RBC AUTO: 94 FL (ref 82–98)
MONOCYTES # BLD AUTO: 1.2 THOUSAND/ΜL (ref 0.17–1.22)
MONOCYTES NFR BLD AUTO: 13 % (ref 4–12)
NEUTROPHILS # BLD AUTO: 5.26 THOUSANDS/ΜL (ref 1.85–7.62)
NEUTS SEG NFR BLD AUTO: 59 % (ref 43–75)
NRBC BLD AUTO-RTO: 0 /100 WBCS
PLATELET # BLD AUTO: 285 THOUSANDS/UL (ref 149–390)
PMV BLD AUTO: 9.9 FL (ref 8.9–12.7)
POTASSIUM SERPL-SCNC: 4.4 MMOL/L (ref 3.5–5.3)
PROT SERPL-MCNC: 7.8 G/DL (ref 6.4–8.2)
RBC # BLD AUTO: 4.97 MILLION/UL (ref 3.88–5.62)
SODIUM SERPL-SCNC: 136 MMOL/L (ref 136–145)
TRIGL SERPL-MCNC: 285 MG/DL
WBC # BLD AUTO: 8.95 THOUSAND/UL (ref 4.31–10.16)

## 2020-07-13 PROCEDURE — 85025 COMPLETE CBC W/AUTO DIFF WBC: CPT

## 2020-07-13 PROCEDURE — 80053 COMPREHEN METABOLIC PANEL: CPT

## 2020-07-13 PROCEDURE — 84165 PROTEIN E-PHORESIS SERUM: CPT | Performed by: PATHOLOGY

## 2020-07-13 PROCEDURE — 80061 LIPID PANEL: CPT

## 2020-07-13 PROCEDURE — 83036 HEMOGLOBIN GLYCOSYLATED A1C: CPT

## 2020-07-13 PROCEDURE — 84165 PROTEIN E-PHORESIS SERUM: CPT

## 2020-07-14 LAB — SCAN RESULT: NORMAL

## 2020-07-15 ENCOUNTER — TELEPHONE (OUTPATIENT)
Dept: FAMILY MEDICINE CLINIC | Facility: CLINIC | Age: 70
End: 2020-07-15

## 2020-07-15 LAB
ALBUMIN SERPL ELPH-MCNC: 4.53 G/DL (ref 3.5–5)
ALBUMIN SERPL ELPH-MCNC: 60.4 % (ref 52–65)
ALPHA1 GLOB SERPL ELPH-MCNC: 0.29 G/DL (ref 0.1–0.4)
ALPHA1 GLOB SERPL ELPH-MCNC: 3.9 % (ref 2.5–5)
ALPHA2 GLOB SERPL ELPH-MCNC: 0.94 G/DL (ref 0.4–1.2)
ALPHA2 GLOB SERPL ELPH-MCNC: 12.5 % (ref 7–13)
BETA GLOB ABNORMAL SERPL ELPH-MCNC: 0.46 G/DL (ref 0.4–0.8)
BETA1 GLOB SERPL ELPH-MCNC: 6.1 % (ref 5–13)
BETA2 GLOB SERPL ELPH-MCNC: 3.8 % (ref 2–8)
BETA2+GAMMA GLOB SERPL ELPH-MCNC: 0.29 G/DL (ref 0.2–0.5)
GAMMA GLOB ABNORMAL SERPL ELPH-MCNC: 1 G/DL (ref 0.5–1.6)
GAMMA GLOB SERPL ELPH-MCNC: 13.3 % (ref 12–22)
IGG/ALB SER: 1.53 {RATIO} (ref 1.1–1.8)
PROT PATTERN SERPL ELPH-IMP: NORMAL
PROT SERPL-MCNC: 7.5 G/DL (ref 6.4–8.2)

## 2020-07-15 NOTE — RESULT ENCOUNTER NOTE
Please let patient know blood work results  Liver, kidneys electrolytes normal    Cholesterol is stable  A1c is stable at 6 2%  Blood count is stable

## 2020-07-17 LAB
SCAN RESULT: NORMAL
SCAN RESULT: NORMAL

## 2020-07-19 DIAGNOSIS — N23 RENAL COLIC ON RIGHT SIDE: ICD-10-CM

## 2020-07-20 RX ORDER — TAMSULOSIN HYDROCHLORIDE 0.4 MG/1
CAPSULE ORAL
Qty: 90 CAPSULE | Refills: 3 | Status: SHIPPED | OUTPATIENT
Start: 2020-07-20 | End: 2021-07-16

## 2020-08-04 ENCOUNTER — OFFICE VISIT (OUTPATIENT)
Dept: HEMATOLOGY ONCOLOGY | Facility: CLINIC | Age: 70
End: 2020-08-04
Payer: MEDICARE

## 2020-08-04 VITALS
SYSTOLIC BLOOD PRESSURE: 126 MMHG | HEIGHT: 71 IN | RESPIRATION RATE: 16 BRPM | WEIGHT: 208.5 LBS | DIASTOLIC BLOOD PRESSURE: 72 MMHG | BODY MASS INDEX: 29.19 KG/M2 | OXYGEN SATURATION: 96 % | TEMPERATURE: 96.7 F | HEART RATE: 84 BPM

## 2020-08-04 DIAGNOSIS — D72.828 HIGH BLOOD MONOCYTE COUNT: Primary | ICD-10-CM

## 2020-08-04 PROCEDURE — 4004F PT TOBACCO SCREEN RCVD TLK: CPT | Performed by: INTERNAL MEDICINE

## 2020-08-04 PROCEDURE — 1160F RVW MEDS BY RX/DR IN RCRD: CPT | Performed by: INTERNAL MEDICINE

## 2020-08-04 PROCEDURE — 3044F HG A1C LEVEL LT 7.0%: CPT | Performed by: INTERNAL MEDICINE

## 2020-08-04 PROCEDURE — 99214 OFFICE O/P EST MOD 30 MIN: CPT | Performed by: INTERNAL MEDICINE

## 2020-08-04 PROCEDURE — 3078F DIAST BP <80 MM HG: CPT | Performed by: INTERNAL MEDICINE

## 2020-08-04 PROCEDURE — 2022F DILAT RTA XM EVC RTNOPTHY: CPT | Performed by: INTERNAL MEDICINE

## 2020-08-04 PROCEDURE — 3074F SYST BP LT 130 MM HG: CPT | Performed by: INTERNAL MEDICINE

## 2020-08-04 PROCEDURE — 4040F PNEUMOC VAC/ADMIN/RCVD: CPT | Performed by: INTERNAL MEDICINE

## 2020-08-04 NOTE — PROGRESS NOTES
Hematology/Oncology Outpatient Follow- up Note  Amira Flowers 72 y o  male MRN: @ Encounter: [id number]        Date:  8/4/2020    Presenting Complaint/Diagnosis : Monocytosis of on known etiology    HPI:    Amira Flowers is seen for initial consultation 6/4/2020 regarding mild monocytosis on his blood work which has fluctuated over the last 5 years  Looking through the patient's blood work he has had episodes where his white count has gone up but then returned to normal but all the way back to 2015 he has had a very mild monocytosis which has fluctuated between normal to slightly elevated  The patient himself denies any complaints  He states he is at baseline  He is up-to-date on his cancer screening  He has had various imaging studies over the last 2 years including a CT angiogram of the chest abdomen and pelvis which showed some kidney stones and hydronephrosis but no evidence of malignancy  Even his monocyte count has fluctuated and has been normal also  He denies any drenching night sweats  Previous Hematologic/ Oncologic History:    Workup    Current Hematologic/ Oncologic Treatment:    Workup    Interval History:    The patient returns for follow-up visit  He states he is doing well  His most recent blood work shows no evidence of myeloproliferative disorder  His flow cytometry, JAK2 mutation and PCR for bcr/ABL were all negative  The patient himself is at baseline  Denies any nausea denies any vomiting denies any diarrhea  The rest of his 14 point review of systems today was negative  Test Results:    Imaging: No results found      Labs:   Lab Results   Component Value Date    WBC 8 95 07/13/2020    HGB 15 6 07/13/2020    HCT 46 9 07/13/2020    MCV 94 07/13/2020     07/13/2020     Lab Results   Component Value Date     (L) 12/14/2015    K 4 4 07/13/2020     07/13/2020    CO2 24 07/13/2020    ANIONGAP 7 12/14/2015    BUN 17 07/13/2020    CREATININE 1 05 07/13/2020 GLUCOSE 159 (H) 12/14/2015    GLUF 98 07/13/2020    CALCIUM 9 3 07/13/2020    AST 21 07/13/2020    ALT 32 07/13/2020    ALKPHOS 49 07/13/2020    PROT 7 3 12/14/2015    BILITOT 0 56 12/14/2015    EGFR 72 07/13/2020         Lab Results   Component Value Date    SPEP  07/13/2020     No monoclonal bands noted  Reviewed by: Aditi Rivera DO **Electronic Signature**       Lab Results   Component Value Date    PSA 1 4 03/05/2019    PSA 1 0 04/03/2017    PSA 1 0 12/14/2015       Lab Results   Component Value Date    DZWIBLJZ07 384 03/05/2019         ROS: As stated in the history of present illness otherwise his 14 point review of systems today was negative        Active Problems:   Patient Active Problem List   Diagnosis    Essential hypertension    Mixed hyperlipidemia    Type 2 diabetes mellitus with diabetic polyneuropathy, without long-term current use of insulin (HCC)    Fatty liver    History of DVT (deep vein thrombosis)    Anticoagulated on Coumadin    Type 2 diabetes mellitus with diabetic neuropathy (HCC)    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)    Atrial fibrillation (HCC)    History of colon polyps    Gastroesophageal reflux disease without esophagitis    Anticoagulant long-term use    Ureteric colic    Ureteral stone with hydronephrosis    Warthin's tumor    Hiatal hernia    Abdominal aortic aneurysm (AAA) 3 0 cm to 5 5 cm in diameter in male [facility])       Past Medical History:   Past Medical History:   Diagnosis Date    Claudication, intermittent ([address] )     Colon polyp     6 polyps 3 years ago    Diabetes mellitus ([facility] [address] )     type 2    Diabetic neuropathic arthropathy (HCC)     causes weakness in LE and uses a cane as assist to walk    Diabetic neuropathy (HCC)     GERD (gastroesophageal reflux disease)     History of DVT (deep vein thrombosis) 2015    left LE    Hyperlipidemia     Hypertension     Parotid tumor     Spinal stenosis Surgical History:   Past Surgical History:   Procedure Laterality Date    COLONOSCOPY  05/16/2019    completed by Dr Abigail Bruce, Repeat 3 years    [facility] RETROGRADE PYELOGRAM  5/17/2019    PAROTIDECTOMY Left     MO CYSTO/URETERO W/LITHOTRIPSY &INDWELL STENT INSRT Right 5/17/2019    Procedure: CYSTOSCOPY URETEROSCOPY WITH LITHOTRIPSY HOLMIUM LASER, RETROGRADE PYELOGRAM AND INSERTION STENT URETERAL--possible stent only;  Surgeon: Cory Guzmán MD;  [facility];  Service: Urology    MO EXC PAROTD,LAT LOBE,DISSECT 5TH NERV Right 10/18/2017    Procedure: SUPERFICIAL PAROTIDECTOMY WITH NERVE DISSECTION AND PRESERVATION;  Surgeon: Debra Johnson MD;  [facility];  Service: ENT    ROTATOR CUFF REPAIR Bilateral     TONSILLECTOMY         Family History:    Family History   Problem Relation Age of Onset    Lupus Mother     Rheum arthritis Mother     Cirrhosis Father     Alcohol abuse Father     Substance Abuse Brother        Cancer-related family history is not on file      Social History:   Social History     Socioeconomic History    Marital status: /Civil Union     Spouse name: Not on file    Number of children: Not on file    Years of education: Not on file    Highest education level: Not on file   Occupational History    Not on file   Social Needs    Financial resource strain: Not on file    Food insecurity     Worry: Not on file     Inability: Not on file   [employer] needs     Medical: Not on file     Non-medical: Not on file   Tobacco Use    Smoking status: Current Every Day Smoker     Types: Pipe    Smokeless tobacco: Never Used    Tobacco comment: pipe   Substance and Sexual Activity    Alcohol use: Yes     Frequency: 2-4 times a month     Comment: socially    Drug use: No    Sexual activity: Not on file   Lifestyle    Physical activity     Days per week: Not on file     Minutes per session: Not on file    Stress: Not on file   Relationships    Social connections Talks on phone: Not on file     Gets together: Not on file     Attends [employer] service: Not on file     Active member of club or organization: Not on file     Attends meetings of clubs or organizations: Not on file     Relationship status: Not on file    Intimate partner violence     Fear of current or ex partner: Not on file     Emotionally abused: Not on file     Physically abused: Not on file     Forced sexual activity: Not on file   Other Topics Concern    Not on file   Social History Narrative               Current Medications:   Current Outpatient Medications   Medication Sig Dispense Refill    acetaminophen (TYLENOL) 325 mg tablet 2, by mouth, every 6 hours as needed for mild to moderate pain   30 tablet 0    aspirin (ECOTRIN LOW STRENGTH) 81 mg EC tablet Take 81 mg by mouth daily      fenofibrate micronized (LOFIBRA) 134 MG capsule Take 1 capsule (134 mg total) by mouth daily with breakfast 90 capsule 3    lisinopril (ZESTRIL) 20 mg tablet Take 1 tablet (20 mg total) by mouth daily 90 tablet 4    MAGNESIUM OXIDE PO Take 400 mg by mouth daily      metFORMIN (GLUCOPHAGE) 1000 MG tablet TAKE 1 TABLET BY MOUTH TWICE A DAY WITH MEALS 180 tablet 1    omeprazole (PriLOSEC) 20 mg delayed release capsule TAKE 1 CAPSULE BY MOUTH EVERY DAY 90 capsule 1    PROAIR  (90 Base) MCG/ACT inhaler Inhale 2 puffs every 4 (four) hours as needed for wheezing or shortness of breath 18 g 1    Propylhexedrine (BENZEDREX NA) 2 sprays into each nostril as needed      simvastatin (ZOCOR) 40 mg tablet Take 1 tablet (40 mg total) by mouth daily 90 tablet 4    sitaGLIPtin (JANUVIA) 100 mg tablet Take 1 tablet (100 mg total) by mouth daily 90 tablet 3    tamsulosin (FLOMAX) 0 4 mg TAKE 1 CAPSULE BY MOUTH EVERY DAY WITH DINNER 90 capsule 3    triamterene-hydrochlorothiazide (DYAZIDE) 37 5-25 mg per capsule Take 1 capsule by mouth daily 90 capsule 3    warfarin (COUMADIN) 2 5 mg tablet Take 1 tablet by mouth daily T-Th-S-S takes 2 5mg     M-W-F takes 5 0 mg      warfarin (COUMADIN) 5 mg tablet TAKE 1 TABLET EVERY DAY OR AS DIRECTED BY MD 90 tablet 3     No current facility-administered medications for this visit  Allergies: No Known Allergies    Physical Exam:    Body surface area is 2 15 meters squared  Wt Readings from Last 3 Encounters:   08/04/20 94 6 kg (208 lb 8 oz)   06/04/20 94 3 kg (208 lb)   05/04/20 92 5 kg (204 lb)        Temp Readings from Last 3 Encounters:   08/04/20 (!) 96 7 °F (35 9 °C) (Tympanic)   06/04/20 98 °F (36 7 °C) (Tympanic)   05/04/20 97 7 °F (36 5 °C)        BP Readings from Last 3 Encounters:   08/04/20 126/72   06/04/20 144/82   05/04/20 121/74         Pulse Readings from Last 3 Encounters:   08/04/20 84   06/04/20 97   05/04/20 75        Physical Exam     Constitutional   General appearance: No acute distress, well appearing and well nourished  Eyes   Conjunctiva and lids: No swelling, erythema or discharge  Pupils and irises: Equal, round and reactive to light  Ears, Nose, Mouth, and Throat   External inspection of ears and nose: Normal     Nasal mucosa, septum, and turbinates: Normal without edema or erythema  Oropharynx: Normal with no erythema, edema, exudate or lesions  Pulmonary   Respiratory effort: No increased work of breathing or signs of respiratory distress  Auscultation of lungs: Clear to auscultation  Cardiovascular   Palpation of heart: Normal PMI, no thrills  Auscultation of heart: Normal rate and rhythm, normal S1 and S2, without murmurs  Examination of extremities for edema and/or varicosities: Normal     Carotid pulses: Normal     Abdomen   Abdomen: Non-tender, no masses  Liver and spleen: No hepatomegaly or splenomegaly  Lymphatic   Palpation of lymph nodes in neck: No lymphadenopathy  Musculoskeletal   Gait and station: Normal     Digits and nails: Normal without clubbing or cyanosis      Inspection/palpation of joints, bones, and muscles: Normal     Skin   Skin and subcutaneous tissue: Normal without rashes or lesions  Neurologic   Cranial nerves: Cranial nerves 2-12 intact  Sensation: No sensory loss  Psychiatric   Orientation to person, place, and time: Normal     Mood and affect: Normal         Assessment / Plan:      The patient is a pleasant 56-year-old male was had a fluctuating monocyte count over the last few years  I suspect this is benign  He does smoke a pipe  He has had imaging done which shows no evidence of metastatic disease or malignancy  He is up-to-date on his screening  His flow cytometry shows no evidence of any abnormal cells  This point I will continue him on observation  I will see him back in 6 months  If he has any questions he will call our office  If he has any other symptoms like drenching night sweats or weight loss he will call our office  Most recent blood work did show a normal CBC although the monocytes are slightly high  Goals and Barriers:  Current Goal:  Prolong Survival from monocytosis  Barriers: None  Patient's Capacity to Self Care:  Patient able to self care  Portions of the record may have been created with voice recognition software   Occasional wrong word or "sound a like" substitutions may have occurred due to the inherent limitations of voice recognition software   Read the chart carefully and recognize, using context, where substitutions have occurred

## 2020-08-10 ENCOUNTER — ANTICOAG VISIT (OUTPATIENT)
Dept: CARDIOLOGY CLINIC | Facility: CLINIC | Age: 70
End: 2020-08-10

## 2020-08-10 ENCOUNTER — APPOINTMENT (OUTPATIENT)
Dept: LAB | Age: 70
End: 2020-08-10
Payer: MEDICARE

## 2020-08-10 DIAGNOSIS — Z86.718 HISTORY OF DVT (DEEP VEIN THROMBOSIS): ICD-10-CM

## 2020-08-10 DIAGNOSIS — Z79.01 ANTICOAGULANT LONG-TERM USE: ICD-10-CM

## 2020-08-10 PROBLEM — I70.25 ATHEROSCLEROSIS OF NATIVE ARTERIES OF THE EXTREMITIES WITH ULCERATION (HCC): Status: RESOLVED | Noted: 2019-03-21 | Resolved: 2020-08-10

## 2020-08-17 ENCOUNTER — CLINICAL SUPPORT (OUTPATIENT)
Dept: FAMILY MEDICINE CLINIC | Facility: CLINIC | Age: 70
End: 2020-08-17
Payer: MEDICARE

## 2020-08-17 VITALS — TEMPERATURE: 97.6 F

## 2020-08-17 DIAGNOSIS — Z23 ENCOUNTER FOR IMMUNIZATION: Primary | ICD-10-CM

## 2020-08-17 PROCEDURE — G0009 ADMIN PNEUMOCOCCAL VACCINE: HCPCS

## 2020-08-17 PROCEDURE — 90732 PPSV23 VACC 2 YRS+ SUBQ/IM: CPT

## 2020-08-18 ENCOUNTER — TELEMEDICINE (OUTPATIENT)
Dept: FAMILY MEDICINE CLINIC | Facility: CLINIC | Age: 70
End: 2020-08-18
Payer: MEDICARE

## 2020-08-18 VITALS
BODY MASS INDEX: 28.98 KG/M2 | SYSTOLIC BLOOD PRESSURE: 120 MMHG | DIASTOLIC BLOOD PRESSURE: 70 MMHG | TEMPERATURE: 97.6 F | HEIGHT: 71 IN | WEIGHT: 207 LBS | HEART RATE: 83 BPM | OXYGEN SATURATION: 96 %

## 2020-08-18 DIAGNOSIS — I48.91 ATRIAL FIBRILLATION, UNSPECIFIED TYPE (HCC): ICD-10-CM

## 2020-08-18 DIAGNOSIS — E11.42 TYPE 2 DIABETES MELLITUS WITH DIABETIC POLYNEUROPATHY, WITHOUT LONG-TERM CURRENT USE OF INSULIN (HCC): Primary | ICD-10-CM

## 2020-08-18 DIAGNOSIS — I71.4 ABDOMINAL AORTIC ANEURYSM (AAA) 3.0 CM TO 5.5 CM IN DIAMETER IN MALE (HCC): ICD-10-CM

## 2020-08-18 DIAGNOSIS — I10 ESSENTIAL HYPERTENSION: ICD-10-CM

## 2020-08-18 DIAGNOSIS — E78.2 MIXED HYPERLIPIDEMIA: ICD-10-CM

## 2020-08-18 PROCEDURE — 99213 OFFICE O/P EST LOW 20 MIN: CPT | Performed by: NURSE PRACTITIONER

## 2020-08-18 PROCEDURE — 3078F DIAST BP <80 MM HG: CPT | Performed by: NURSE PRACTITIONER

## 2020-08-18 PROCEDURE — 3074F SYST BP LT 130 MM HG: CPT | Performed by: NURSE PRACTITIONER

## 2020-08-18 PROCEDURE — 3044F HG A1C LEVEL LT 7.0%: CPT | Performed by: NURSE PRACTITIONER

## 2020-08-18 PROCEDURE — 1160F RVW MEDS BY RX/DR IN RCRD: CPT | Performed by: NURSE PRACTITIONER

## 2020-08-18 PROCEDURE — 4004F PT TOBACCO SCREEN RCVD TLK: CPT | Performed by: NURSE PRACTITIONER

## 2020-08-18 PROCEDURE — 2022F DILAT RTA XM EVC RTNOPTHY: CPT | Performed by: NURSE PRACTITIONER

## 2020-08-18 PROCEDURE — 4040F PNEUMOC VAC/ADMIN/RCVD: CPT | Performed by: NURSE PRACTITIONER

## 2020-08-20 DIAGNOSIS — E11.42 TYPE 2 DIABETES MELLITUS WITH DIABETIC POLYNEUROPATHY, WITHOUT LONG-TERM CURRENT USE OF INSULIN (HCC): ICD-10-CM

## 2020-08-23 NOTE — ASSESSMENT & PLAN NOTE
Borderline aneurysmal dilatation of the proximal abdominal aorta measuring up to 3 cm transverse seen on abdominal, AAA screening ultrasound on March 22, 2019  Will repeat ultrasound in 2021

## 2020-08-23 NOTE — ASSESSMENT & PLAN NOTE
Blood pressure is under good control 120/70 today  Will continue lisinopril 20 mg daily, Dyazide 1 capsule every other day

## 2020-08-23 NOTE — PROGRESS NOTES
Virtual Regular Visit      Assessment/Plan:    Problem List Items Addressed This Visit        Endocrine    Type 2 diabetes mellitus with diabetic polyneuropathy, without long-term current use of insulin ([facility] [address] ) - Primary       Lab Results   Component Value Date    HGBA1C 6 2 (H) 07/13/2020     Hemoglobin A1c is under excellent control with Januvia and metformin  Follows with Podiatry for diabetic foot care in neuropathy  Needs diabetic shoes  Eye exam is up-to-date  Cardiovascular and Mediastinum    Essential hypertension     Blood pressure is under good control 120/70 today  Will continue lisinopril 20 mg daily, Dyazide 1 capsule every other day  Atrial fibrillation ([facility] [address] )     Anticoagulated on Coumadin  Follows with Dr Rudolph Pickens, cardiology  Abdominal aortic aneurysm (AAA) 3 0 cm to 5 5 cm in diameter in male [facility])     Borderline aneurysmal dilatation of the proximal abdominal aorta measuring up to 3 cm transverse seen on abdominal, AAA screening ultrasound on March 22, 2019  Will repeat ultrasound in 2021  Other    Mixed hyperlipidemia     LDL stable at 70  Triglycerides mildly elevated 285, this is stable  Will continue fenofibrate 134 mg daily, and simvastatin 40 mg daily  Repeat blood work and follow up in office in 4 months  BMI Counseling: Body mass index is 28 87 kg/m²  The BMI is above normal  Nutrition recommendations include encouraging healthy choices of fruits and vegetables, moderation in carbohydrate intake and increasing intake of lean protein  Exercise recommendations include exercising 3-5 times per week             Reason for visit is   Chief Complaint   Patient presents with    Hand Pain     Trigger finger rt hand, shoe referral    Virtual Regular Visit        Encounter provider [address] [place], [place] [address]DAYAMI    Provider located at [address] [place] [id number]      Recent Visits  Date Type Provider Dept   08/18/20 Telemedicine [facility]   Showing recent visits within past 7 days and meeting all other requirements     Future Appointments  No visits were found meeting these conditions  Showing future appointments within next 150 days and meeting all other requirements        The patient was identified by name and date of birth  Mai Castillo was informed that this is a telemedicine visit and that the visit is being conducted through [employer] and patient was informed that this is not a secure, HIPAA-complaint platform  He agrees to proceed     My office door was closed  No one else was in the room  He acknowledged consent and understanding of privacy and security of the video platform  The patient has agreed to participate and understands they can discontinue the visit at any time  Patient is aware this is a billable service  Subjective  Mai Castillo is a 71-year-old male presenting today for follow-up  He is feeling well  Right hand 4th finger clicks when opening his hand, sometimes gets stuck  He is planning to follow with hand specialist, Dr Marci Aranda regarding this  Follows with Podiatry, Dr Simi Cook for diabetic peripheral neuropathy  Needs diabetic shoes           Past Medical History:   Diagnosis Date    Atherosclerosis of native arteries of the extremities with ulceration ([facility] [address] ) 3/21/2019    Claudication, intermittent (HCC)     Colon polyp     6 polyps 3 years ago    Diabetes mellitus ([facility] [address] )     type 2    Diabetic neuropathic arthropathy (HCC)     causes weakness in LE and uses a cane as assist to walk    Diabetic neuropathy (HCC)     GERD (gastroesophageal reflux disease)     History of DVT (deep vein thrombosis) 2015    left LE    Hyperlipidemia     Hypertension     Parotid tumor     Spinal stenosis        Past Surgical History:   Procedure Laterality Date    COLONOSCOPY  05/16/2019    completed by Dr Nohemy Mckoy, Repeat 3 years    [place] RETROGRADE PYELOGRAM  5/17/2019    PAROTIDECTOMY Left     [place] CYSTO/URETERO W/LITHOTRIPSY &INDWELL STENT INSRT Right 5/17/2019    Procedure: CYSTOSCOPY URETEROSCOPY WITH LITHOTRIPSY HOLMIUM LASER, RETROGRADE PYELOGRAM AND INSERTION STENT URETERAL--possible stent only;  Surgeon: Nadege Cordova MD;  [facility];  Service: Urology    GA EXC PAROTD,LAT LOBE,DISSECT 5TH NERV Right 10/18/2017    Procedure: SUPERFICIAL PAROTIDECTOMY WITH NERVE DISSECTION AND PRESERVATION;  Surgeon: Earlene Oswald MD;  [facility];  Service: ENT    ROTATOR CUFF REPAIR Bilateral     TONSILLECTOMY         Current Outpatient Medications   Medication Sig Dispense Refill    acetaminophen (TYLENOL) 325 mg tablet 2, by mouth, every 6 hours as needed for mild to moderate pain   30 tablet 0    aspirin (ECOTRIN LOW STRENGTH) 81 mg EC tablet Take 81 mg by mouth daily      fenofibrate micronized (LOFIBRA) 134 MG capsule Take 1 capsule (134 mg total) by mouth daily with breakfast 90 capsule 3    lisinopril (ZESTRIL) 20 mg tablet Take 1 tablet (20 mg total) by mouth daily 90 tablet 4    MAGNESIUM OXIDE PO Take 400 mg by mouth daily      omeprazole (PriLOSEC) 20 mg delayed release capsule TAKE 1 CAPSULE BY MOUTH EVERY DAY 90 capsule 1    PROAIR  (90 Base) MCG/ACT inhaler Inhale 2 puffs every 4 (four) hours as needed for wheezing or shortness of breath 18 g 1    Propylhexedrine (BENZEDREX NA) 2 sprays into each nostril as needed      simvastatin (ZOCOR) 40 mg tablet Take 1 tablet (40 mg total) by mouth daily 90 tablet 4    sitaGLIPtin (JANUVIA) 100 mg tablet Take 1 tablet (100 mg total) by mouth daily 90 tablet 3    tamsulosin (FLOMAX) 0 4 mg TAKE 1 CAPSULE BY MOUTH EVERY DAY WITH DINNER 90 capsule 3    triamterene-hydrochlorothiazide (DYAZIDE) 37 5-25 mg per capsule Take 1 capsule by mouth daily 90 capsule 3    warfarin (COUMADIN) 2 5 mg tablet Take 1 tablet by mouth daily T-Th-S-S takes 2 5mg     M-W-F takes 5 0 mg  warfarin (COUMADIN) 5 mg tablet TAKE 1 TABLET EVERY DAY OR AS DIRECTED BY MD 90 tablet 3    metFORMIN (GLUCOPHAGE) 1000 MG tablet TAKE 1 TABLET BY MOUTH TWICE A DAY WITH MEALS 180 tablet 1     No current facility-administered medications for this visit  No Known Allergies    Review of Systems   Constitutional: Negative  HENT: Negative  Eyes: Negative  Respiratory: Negative  Cardiovascular: Negative  Gastrointestinal: Negative  Endocrine: Negative  Genitourinary: Negative  Musculoskeletal: Negative for arthralgias  Right hand as noted in HPI   Skin: Negative  Allergic/Immunologic: Negative  Neurological: Negative  Hematological: Negative  Psychiatric/Behavioral: Negative  Video Exam    Vitals:    08/18/20 1106   BP: 120/70   Pulse: 83   Temp: 97 6 °F (36 4 °C)   TempSrc: Temporal   SpO2: 96%   Weight: 93 9 kg (207 lb)   Height: 5' 11" (1 803 m)       Physical Exam  Vitals signs and nursing note reviewed  Constitutional:       General: He is not in acute distress  Appearance: Normal appearance  He is not ill-appearing, toxic-appearing or diaphoretic  HENT:      Head: Normocephalic and atraumatic  Cardiovascular:      Rate and Rhythm: Normal rate  Pulmonary:      Effort: Pulmonary effort is normal  No respiratory distress  Musculoskeletal:      Right lower leg: No edema  Left lower leg: No edema  Neurological:      Mental Status: He is alert and oriented to person, place, and time  Psychiatric:         Mood and Affect: Mood normal          Behavior: Behavior normal           I spent 15 minutes directly with the patient during this visit      Lobo Talon acknowledges that he has consented to an online visit or consultation   He understands that the online visit is based solely on information provided by him, and that, in the absence of a face-to-face physical evaluation by the physician, the diagnosis he receives is both limited and provisional in terms of accuracy and completeness  This is not intended to replace a full medical face-to-face evaluation by the physician  King Siu understands and accepts these terms

## 2020-08-23 NOTE — ASSESSMENT & PLAN NOTE
Lab Results   Component Value Date    HGBA1C 6 2 (H) 07/13/2020     Hemoglobin A1c is under excellent control with Januvia and metformin  Follows with Podiatry for diabetic foot care in neuropathy  Needs diabetic shoes  Eye exam is up-to-date

## 2020-08-23 NOTE — ASSESSMENT & PLAN NOTE
LDL stable at 70  Triglycerides mildly elevated 285, this is stable  Will continue fenofibrate 134 mg daily, and simvastatin 40 mg daily

## 2020-08-31 ENCOUNTER — OFFICE VISIT (OUTPATIENT)
Dept: PODIATRY | Facility: CLINIC | Age: 70
End: 2020-08-31
Payer: MEDICARE

## 2020-08-31 VITALS
HEIGHT: 71 IN | TEMPERATURE: 96.9 F | HEART RATE: 74 BPM | SYSTOLIC BLOOD PRESSURE: 133 MMHG | DIASTOLIC BLOOD PRESSURE: 68 MMHG | BODY MASS INDEX: 28.87 KG/M2

## 2020-08-31 DIAGNOSIS — E11.42 TYPE 2 DIABETES MELLITUS WITH DIABETIC POLYNEUROPATHY, WITHOUT LONG-TERM CURRENT USE OF INSULIN (HCC): ICD-10-CM

## 2020-08-31 DIAGNOSIS — B35.1 ONYCHOMYCOSIS: Primary | ICD-10-CM

## 2020-08-31 DIAGNOSIS — L84 FOOT CALLUS: ICD-10-CM

## 2020-08-31 PROCEDURE — 11721 DEBRIDE NAIL 6 OR MORE: CPT | Performed by: PODIATRIST

## 2020-08-31 PROCEDURE — RECHECK: Performed by: PODIATRIST

## 2020-08-31 PROCEDURE — 11055 PARING/CUTG B9 HYPRKER LES 1: CPT | Performed by: PODIATRIST

## 2020-09-08 ENCOUNTER — APPOINTMENT (OUTPATIENT)
Dept: LAB | Age: 70
End: 2020-09-08
Payer: MEDICARE

## 2020-09-08 ENCOUNTER — TRANSCRIBE ORDERS (OUTPATIENT)
Dept: ADMINISTRATIVE | Age: 70
End: 2020-09-08

## 2020-09-08 ENCOUNTER — ANTICOAG VISIT (OUTPATIENT)
Dept: CARDIOLOGY CLINIC | Facility: CLINIC | Age: 70
End: 2020-09-08

## 2020-09-08 DIAGNOSIS — Z79.01 ANTICOAGULANT LONG-TERM USE: ICD-10-CM

## 2020-09-08 DIAGNOSIS — Z86.718 HISTORY OF DVT (DEEP VEIN THROMBOSIS): ICD-10-CM

## 2020-09-08 DIAGNOSIS — I48.20 CHRONIC ATRIAL FIBRILLATION (HCC): Primary | ICD-10-CM

## 2020-09-14 ENCOUNTER — OFFICE VISIT (OUTPATIENT)
Dept: OBGYN CLINIC | Facility: CLINIC | Age: 70
End: 2020-09-14
Payer: MEDICARE

## 2020-09-14 VITALS
DIASTOLIC BLOOD PRESSURE: 75 MMHG | WEIGHT: 208 LBS | SYSTOLIC BLOOD PRESSURE: 149 MMHG | BODY MASS INDEX: 29.12 KG/M2 | HEIGHT: 71 IN | HEART RATE: 80 BPM

## 2020-09-14 DIAGNOSIS — M65.341 TRIGGER RING FINGER OF RIGHT HAND: Primary | ICD-10-CM

## 2020-09-14 PROCEDURE — 99214 OFFICE O/P EST MOD 30 MIN: CPT | Performed by: SURGERY

## 2020-09-14 PROCEDURE — 20550 NJX 1 TENDON SHEATH/LIGAMENT: CPT | Performed by: SURGERY

## 2020-09-14 RX ORDER — DEXAMETHASONE SODIUM PHOSPHATE 100 MG/10ML
40 INJECTION INTRAMUSCULAR; INTRAVENOUS
Status: COMPLETED | OUTPATIENT
Start: 2020-09-14 | End: 2020-09-14

## 2020-09-14 RX ORDER — LIDOCAINE HYDROCHLORIDE 10 MG/ML
1 INJECTION, SOLUTION INFILTRATION; PERINEURAL
Status: COMPLETED | OUTPATIENT
Start: 2020-09-14 | End: 2020-09-14

## 2020-09-14 RX ADMIN — DEXAMETHASONE SODIUM PHOSPHATE 40 MG: 100 INJECTION INTRAMUSCULAR; INTRAVENOUS at 08:38

## 2020-09-14 RX ADMIN — LIDOCAINE HYDROCHLORIDE 1 ML: 10 INJECTION, SOLUTION INFILTRATION; PERINEURAL at 08:38

## 2020-09-14 NOTE — PROGRESS NOTES
ASSESSMENT/PLAN:      72 y o  male with a right ring finger trigger finger  The etiology of a trigger finger was discussed with Conchis Mason today as well as treatment options  It was discussed today that being a diabetic does pre dispose him to a trigger finger  A right ring finger trigger finger CSI was performed in the office today without complication  Post injection protocol was discussed  If the CSI is beneficial, it can be repeated in 6 weeks time  If the CSI was not helpful, surgical intervention may be discussed  Follow up in 6 weeks time  The patient verbalized understanding of exam findings and treatment plan  We engaged in the shared decision-making process and treatment options were discussed at length with the patient  Surgical and conservative management discussed today along with risks and benefits  Diagnoses and all orders for this visit:    Trigger ring finger of right hand  -     Hand/upper extremity injection: R ring A1      Follow Up:  Return in about 6 weeks (around 10/26/2020)  To Do Next Visit:  Re-evaluation of current issue    ____________________________________________________________________________________________________________________________________________      CHIEF COMPLAINT:  Chief Complaint   Patient presents with    Right Ring Finger - Pain, Clicking, Locking       SUBJECTIVE:  Gretchen Messer is a 72y o  year old RHD male who presents to the office today for right ring finger clicking, catching and locking  Conchis Mason states that this has been ongoing for aprox  2-3 months  He denies any injury or trama  He notes that his right ring finger clicking and locks multiple times a day  He does have to use the other hand to unlock the finger  He also notes pain to his A1 pulley  He does take Tylenol if needed for pain control  He is unable to take NSAID's as he is on a blood thinner  I have personally reviewed all the relevant PMH, PSH, SH, FH, Medications and allergies  PAST MEDICAL HISTORY:  Past Medical History:   Diagnosis Date    Atherosclerosis of native arteries of the extremities with ulceration ([facility] [address] ) 3/21/2019    Claudication, intermittent (HCC)     Colon polyp     6 polyps 3 years ago    Diabetes mellitus ([facility] [address] )     type 2    Diabetic neuropathic arthropathy (HCC)     causes weakness in LE and uses a cane as assist to walk    Diabetic neuropathy (HCC)     GERD (gastroesophageal reflux disease)     History of DVT (deep vein thrombosis) 2015    left LE    Hyperlipidemia     Hypertension     Parotid tumor     Spinal stenosis        PAST SURGICAL HISTORY:  Past Surgical History:   Procedure Laterality Date    COLONOSCOPY  05/16/2019    completed by Dr Willy Sultana, Repeat 3 years    [facility] RETROGRADE PYELOGRAM  5/17/2019    PAROTIDECTOMY Left     MT CYSTO/URETERO W/LITHOTRIPSY &INDWELL STENT INSRT Right 5/17/2019    Procedure: CYSTOSCOPY URETEROSCOPY WITH LITHOTRIPSY HOLMIUM LASER, RETROGRADE PYELOGRAM AND INSERTION STENT URETERAL--possible stent only;  Surgeon: Varghese Palomares MD;  [facility];  Service: Urology    MT EXC PAROTD,LAT LOBE,DISSECT 5TH NERV Right 10/18/2017    Procedure: SUPERFICIAL PAROTIDECTOMY WITH NERVE DISSECTION AND PRESERVATION;  Surgeon: Abdirizak Malloy MD;  [facility];  Service: ENT    ROTATOR CUFF REPAIR Bilateral     TONSILLECTOMY         FAMILY HISTORY:  Family History   Problem Relation Age of Onset    Lupus Mother     Rheum arthritis Mother     Cirrhosis Father     Alcohol abuse Father     Substance Abuse Brother        SOCIAL HISTORY:  Social History     Tobacco Use    Smoking status: Current Every Day Smoker     Types: Pipe    Smokeless tobacco: Never Used    Tobacco comment: pipe   Substance Use Topics    Alcohol use: Yes     Frequency: 2-4 times a month     Comment: socially    Drug use: No       MEDICATIONS:    Current Outpatient Medications:     acetaminophen (TYLENOL) 325 mg tablet, 2, by mouth, every 6 hours as needed for mild to moderate pain , Disp: 30 tablet, Rfl: 0    fenofibrate micronized (LOFIBRA) 134 MG capsule, Take 1 capsule (134 mg total) by mouth daily with breakfast, Disp: 90 capsule, Rfl: 3    lisinopril (ZESTRIL) 20 mg tablet, Take 1 tablet (20 mg total) by mouth daily, Disp: 90 tablet, Rfl: 4    MAGNESIUM OXIDE PO, Take 400 mg by mouth daily, Disp: , Rfl:     metFORMIN (GLUCOPHAGE) 1000 MG tablet, TAKE 1 TABLET BY MOUTH TWICE A DAY WITH MEALS, Disp: 180 tablet, Rfl: 1    omeprazole (PriLOSEC) 20 mg delayed release capsule, TAKE 1 CAPSULE BY MOUTH EVERY DAY, Disp: 90 capsule, Rfl: 1    PROAIR  (90 Base) MCG/ACT inhaler, Inhale 2 puffs every 4 (four) hours as needed for wheezing or shortness of breath, Disp: 18 g, Rfl: 1    Propylhexedrine (BENZEDREX NA), 2 sprays into each nostril as needed, Disp: , Rfl:     simvastatin (ZOCOR) 40 mg tablet, Take 1 tablet (40 mg total) by mouth daily, Disp: 90 tablet, Rfl: 4    sitaGLIPtin (JANUVIA) 100 mg tablet, Take 1 tablet (100 mg total) by mouth daily, Disp: 90 tablet, Rfl: 3    tamsulosin (FLOMAX) 0 4 mg, TAKE 1 CAPSULE BY MOUTH EVERY DAY WITH DINNER, Disp: 90 capsule, Rfl: 3    triamterene-hydrochlorothiazide (DYAZIDE) 37 5-25 mg per capsule, Take 1 capsule by mouth daily, Disp: 90 capsule, Rfl: 3    warfarin (COUMADIN) 2 5 mg tablet, Take 1 tablet by mouth daily T-Th-S-S takes 2 5mg    M-W-F takes 5 0 mg, Disp: , Rfl:     warfarin (COUMADIN) 5 mg tablet, TAKE 1 TABLET EVERY DAY OR AS DIRECTED BY MD, Disp: 90 tablet, Rfl: 3    aspirin (ECOTRIN LOW STRENGTH) 81 mg EC tablet, Take 81 mg by mouth daily, Disp: , Rfl:     ALLERGIES:  No Known Allergies    REVIEW OF SYSTEMS:  Review of Systems   Constitutional: Negative for chills, fever and unexpected weight change  HENT: Negative for hearing loss, nosebleeds and sore throat  Eyes: Negative for pain, redness and visual disturbance     Respiratory: Negative for cough, shortness of breath and wheezing  Cardiovascular: Negative for chest pain, palpitations and leg swelling  Gastrointestinal: Negative for abdominal pain, nausea and vomiting  Endocrine: Negative for polydipsia and polyuria  Genitourinary: Negative for difficulty urinating and hematuria  Musculoskeletal: Negative for arthralgias, joint swelling and myalgias  Skin: Negative for rash and wound  Neurological: Negative for dizziness, numbness and headaches  Psychiatric/Behavioral: Negative for decreased concentration, dysphoric mood and suicidal ideas  The patient is not nervous/anxious  VITALS:  Vitals:    09/14/20 0831   BP: 149/75   Pulse: 80       LABS:  HgA1c:   Lab Results   Component Value Date    HGBA1C 6 2 (H) 07/13/2020     BMP:   Lab Results   Component Value Date    GLUCOSE 159 (H) 12/14/2015    CALCIUM 9 3 07/13/2020     (L) 12/14/2015    K 4 4 07/13/2020    CO2 24 07/13/2020     07/13/2020    BUN 17 07/13/2020    CREATININE 1 05 07/13/2020       _____________________________________________________  PHYSICAL EXAMINATION:  General: well developed and well nourished, alert, oriented times 3 and appears comfortable  Psychiatric: Normal  HEENT: Normocephalic, Atraumatic Trachea Midline, No torticollis  Pulmonary: No audible wheezing or respiratory distress   Cardiovascular: No pitting edema, 2+ radial pulse   Skin: No masses, erythema, lacerations, fluctation, ulcerations  Neurovascular: Sensation Intact to the Median, Ulnar, Radial Nerve, Motor Intact to the Median, Ulnar, Radial Nerve and Pulses Intact  Musculoskeletal: Normal, except as noted in detailed exam and in HPI  MUSCULOSKELETAL EXAMINATION:    right ring finger:  Positive palpable nodule over the A1 pulley  Positive tenderness to palpation over A1 pulley  Positive catching  Positive clicking    Full composite fist  Brisk capillary refill      ___________________________________________________  STUDIES REVIEWED:  No imaging to review           PROCEDURES PERFORMED:  Hand/upper extremity injection: R ring A1  Date/Time: 9/14/2020 8:38 AM  Consent given by: patient  Site marked: site marked  Timeout: Immediately prior to procedure a time out was called to verify the correct patient, procedure, equipment, support staff and site/side marked as required   Supporting Documentation  Indications: pain   Procedure Details  Condition:trigger finger Location: ring finger - R ring A1   Preparation: Patient was prepped and draped in the usual sterile fashion  Needle size: 25 G  Ultrasound guidance: no  Medications administered: 1 mL lidocaine 1 %; 40 mg dexamethasone 100 mg/10 mL    Patient tolerance: patient tolerated the procedure well with no immediate complications  Dressing:  Sterile dressing applied            _____________________________________________________      Scribe Attestation    I,:   Faith Haley am acting as a scribe while in the presence of the attending physician :        I,:   Molly Ross MD personally performed the services described in this documentation    as scribed in my presence :

## 2020-10-04 DIAGNOSIS — J30.9 ALLERGIC RHINITIS, UNSPECIFIED SEASONALITY, UNSPECIFIED TRIGGER: ICD-10-CM

## 2020-10-05 ENCOUNTER — APPOINTMENT (OUTPATIENT)
Dept: LAB | Age: 70
End: 2020-10-05
Payer: MEDICARE

## 2020-10-05 ENCOUNTER — ANTICOAG VISIT (OUTPATIENT)
Dept: CARDIOLOGY CLINIC | Facility: CLINIC | Age: 70
End: 2020-10-05

## 2020-10-05 ENCOUNTER — TELEPHONE (OUTPATIENT)
Dept: CARDIOLOGY CLINIC | Facility: CLINIC | Age: 70
End: 2020-10-05

## 2020-10-05 DIAGNOSIS — Z12.5 PROSTATE CANCER SCREENING: ICD-10-CM

## 2020-10-05 DIAGNOSIS — E78.2 MIXED HYPERLIPIDEMIA: ICD-10-CM

## 2020-10-05 DIAGNOSIS — E11.42 TYPE 2 DIABETES MELLITUS WITH DIABETIC POLYNEUROPATHY, WITHOUT LONG-TERM CURRENT USE OF INSULIN (HCC): Primary | ICD-10-CM

## 2020-10-05 DIAGNOSIS — Z79.01 ANTICOAGULANT LONG-TERM USE: ICD-10-CM

## 2020-10-05 DIAGNOSIS — I10 ESSENTIAL HYPERTENSION: ICD-10-CM

## 2020-10-05 DIAGNOSIS — Z86.718 HISTORY OF DVT (DEEP VEIN THROMBOSIS): ICD-10-CM

## 2020-10-26 ENCOUNTER — TRANSCRIBE ORDERS (OUTPATIENT)
Dept: LAB | Facility: CLINIC | Age: 70
End: 2020-10-26

## 2020-10-26 ENCOUNTER — OFFICE VISIT (OUTPATIENT)
Dept: LAB | Facility: CLINIC | Age: 70
End: 2020-10-26
Payer: MEDICARE

## 2020-10-26 ENCOUNTER — OFFICE VISIT (OUTPATIENT)
Dept: OBGYN CLINIC | Facility: CLINIC | Age: 70
End: 2020-10-26
Payer: MEDICARE

## 2020-10-26 VITALS
HEIGHT: 71 IN | HEART RATE: 79 BPM | SYSTOLIC BLOOD PRESSURE: 147 MMHG | DIASTOLIC BLOOD PRESSURE: 75 MMHG | BODY MASS INDEX: 29.01 KG/M2

## 2020-10-26 DIAGNOSIS — Z01.812 ENCOUNTER FOR PREPROCEDURAL LABORATORY EXAMINATION: ICD-10-CM

## 2020-10-26 DIAGNOSIS — M65.341 TRIGGER RING FINGER OF RIGHT HAND: ICD-10-CM

## 2020-10-26 DIAGNOSIS — M65.341 TRIGGER RING FINGER OF RIGHT HAND: Primary | ICD-10-CM

## 2020-10-26 LAB
ATRIAL RATE: 71 BPM
P AXIS: 68 DEGREES
PR INTERVAL: 182 MS
QRS AXIS: 59 DEGREES
QRSD INTERVAL: 88 MS
QT INTERVAL: 370 MS
QTC INTERVAL: 402 MS
T WAVE AXIS: 51 DEGREES
VENTRICULAR RATE: 71 BPM

## 2020-10-26 PROCEDURE — 93005 ELECTROCARDIOGRAM TRACING: CPT

## 2020-10-26 PROCEDURE — 93010 ELECTROCARDIOGRAM REPORT: CPT | Performed by: INTERNAL MEDICINE

## 2020-10-26 PROCEDURE — 99213 OFFICE O/P EST LOW 20 MIN: CPT | Performed by: SURGERY

## 2020-10-27 ENCOUNTER — ANESTHESIA EVENT (OUTPATIENT)
Dept: PERIOP | Facility: AMBULARY SURGERY CENTER | Age: 70
End: 2020-10-27
Payer: MEDICARE

## 2020-10-31 DIAGNOSIS — E11.42 TYPE 2 DIABETES MELLITUS WITH DIABETIC POLYNEUROPATHY, WITHOUT LONG-TERM CURRENT USE OF INSULIN (HCC): ICD-10-CM

## 2020-10-31 DIAGNOSIS — I82.409 DEEP VEIN THROMBOSIS (DVT) OF LOWER EXTREMITY, UNSPECIFIED CHRONICITY, UNSPECIFIED LATERALITY, UNSPECIFIED VEIN (HCC): ICD-10-CM

## 2020-10-31 DIAGNOSIS — K21.9 GASTROESOPHAGEAL REFLUX DISEASE WITHOUT ESOPHAGITIS: ICD-10-CM

## 2020-10-31 RX ORDER — SITAGLIPTIN 100 MG/1
TABLET, FILM COATED ORAL
Qty: 90 TABLET | Refills: 3 | Status: SHIPPED | OUTPATIENT
Start: 2020-10-31 | End: 2021-11-02

## 2020-10-31 RX ORDER — OMEPRAZOLE 20 MG/1
CAPSULE, DELAYED RELEASE ORAL
Qty: 90 CAPSULE | Refills: 1 | Status: SHIPPED | OUTPATIENT
Start: 2020-10-31 | End: 2021-05-07

## 2020-11-02 ENCOUNTER — LAB (OUTPATIENT)
Dept: LAB | Age: 70
End: 2020-11-02
Payer: MEDICARE

## 2020-11-02 ENCOUNTER — TRANSCRIBE ORDERS (OUTPATIENT)
Dept: ADMINISTRATIVE | Age: 70
End: 2020-11-02

## 2020-11-02 ENCOUNTER — ANTICOAG VISIT (OUTPATIENT)
Dept: CARDIOLOGY CLINIC | Facility: CLINIC | Age: 70
End: 2020-11-02

## 2020-11-02 ENCOUNTER — OFFICE VISIT (OUTPATIENT)
Dept: CARDIOLOGY CLINIC | Facility: CLINIC | Age: 70
End: 2020-11-02
Payer: MEDICARE

## 2020-11-02 VITALS
HEIGHT: 71 IN | OXYGEN SATURATION: 98 % | SYSTOLIC BLOOD PRESSURE: 136 MMHG | BODY MASS INDEX: 29.26 KG/M2 | HEART RATE: 77 BPM | DIASTOLIC BLOOD PRESSURE: 72 MMHG | WEIGHT: 209 LBS

## 2020-11-02 DIAGNOSIS — I48.0 PAROXYSMAL ATRIAL FIBRILLATION (HCC): Primary | ICD-10-CM

## 2020-11-02 DIAGNOSIS — I48.0 PAROXYSMAL ATRIAL FIBRILLATION (HCC): ICD-10-CM

## 2020-11-02 DIAGNOSIS — E78.2 MIXED HYPERLIPIDEMIA: ICD-10-CM

## 2020-11-02 DIAGNOSIS — I71.4 ABDOMINAL AORTIC ANEURYSM (AAA) 3.0 CM TO 5.5 CM IN DIAMETER IN MALE (HCC): ICD-10-CM

## 2020-11-02 DIAGNOSIS — I10 ESSENTIAL HYPERTENSION: ICD-10-CM

## 2020-11-02 DIAGNOSIS — Z86.718 HISTORY OF DVT (DEEP VEIN THROMBOSIS): ICD-10-CM

## 2020-11-02 DIAGNOSIS — E78.5 HYPERLIPIDEMIA, UNSPECIFIED HYPERLIPIDEMIA TYPE: Primary | ICD-10-CM

## 2020-11-02 DIAGNOSIS — Z79.01 ANTICOAGULANT LONG-TERM USE: ICD-10-CM

## 2020-11-02 PROCEDURE — 99214 OFFICE O/P EST MOD 30 MIN: CPT | Performed by: INTERNAL MEDICINE

## 2020-11-02 RX ORDER — WARFARIN SODIUM 5 MG/1
TABLET ORAL
Qty: 90 TABLET | Refills: 3 | Status: SHIPPED | OUTPATIENT
Start: 2020-11-02 | End: 2021-11-01

## 2020-11-03 ENCOUNTER — HOSPITAL ENCOUNTER (OUTPATIENT)
Facility: AMBULARY SURGERY CENTER | Age: 70
Setting detail: OUTPATIENT SURGERY
Discharge: HOME | End: 2020-11-03
Attending: SURGERY | Admitting: SURGERY
Payer: MEDICARE

## 2020-11-03 ENCOUNTER — ANESTHESIA (OUTPATIENT)
Dept: PERIOP | Facility: AMBULARY SURGERY CENTER | Age: 70
End: 2020-11-03
Payer: MEDICARE

## 2020-11-03 VITALS
WEIGHT: 205 LBS | SYSTOLIC BLOOD PRESSURE: 158 MMHG | TEMPERATURE: 97 F | RESPIRATION RATE: 18 BRPM | HEART RATE: 70 BPM | DIASTOLIC BLOOD PRESSURE: 79 MMHG | BODY MASS INDEX: 28.7 KG/M2 | HEIGHT: 71 IN | OXYGEN SATURATION: 98 %

## 2020-11-03 VITALS — HEART RATE: 70 BPM

## 2020-11-03 DIAGNOSIS — Z47.89 AFTERCARE FOLLOWING SURGERY OF THE MUSCULOSKELETAL SYSTEM: ICD-10-CM

## 2020-11-03 DIAGNOSIS — M65.341 TRIGGER RING FINGER OF RIGHT HAND: Primary | ICD-10-CM

## 2020-11-03 PROBLEM — F17.200 SMOKING: Status: ACTIVE | Noted: 2020-11-03

## 2020-11-03 PROBLEM — N42.9 PROSTATIC DISORDER: Status: ACTIVE | Noted: 2020-11-03

## 2020-11-03 PROBLEM — J44.9 CHRONIC OBSTRUCTIVE PULMONARY DISEASE (HCC): Status: ACTIVE | Noted: 2020-11-03

## 2020-11-03 PROBLEM — IMO0001 SMOKING: Status: ACTIVE | Noted: 2020-11-03

## 2020-11-03 PROCEDURE — 26055 INCISE FINGER TENDON SHEATH: CPT | Performed by: SURGERY

## 2020-11-03 PROCEDURE — 26055 INCISE FINGER TENDON SHEATH: CPT | Performed by: PHYSICIAN ASSISTANT

## 2020-11-03 RX ORDER — CEFAZOLIN SODIUM 2 G/50ML
2000 SOLUTION INTRAVENOUS ONCE
Status: COMPLETED | OUTPATIENT
Start: 2020-11-03 | End: 2020-11-03

## 2020-11-03 RX ORDER — LIDOCAINE HYDROCHLORIDE 10 MG/ML
INJECTION, SOLUTION EPIDURAL; INFILTRATION; INTRACAUDAL; PERINEURAL AS NEEDED
Status: DISCONTINUED | OUTPATIENT
Start: 2020-11-03 | End: 2020-11-03

## 2020-11-03 RX ORDER — HYDROCODONE BITARTRATE AND ACETAMINOPHEN 5; 325 MG/1; MG/1
1 TABLET ORAL EVERY 6 HOURS PRN
Qty: 5 TABLET | Refills: 0 | Status: SHIPPED | OUTPATIENT
Start: 2020-11-03 | End: 2020-11-13

## 2020-11-03 RX ORDER — SODIUM CHLORIDE, SODIUM LACTATE, POTASSIUM CHLORIDE, CALCIUM CHLORIDE 600; 310; 30; 20 MG/100ML; MG/100ML; MG/100ML; MG/100ML
125 INJECTION, SOLUTION INTRAVENOUS CONTINUOUS
Status: DISCONTINUED | OUTPATIENT
Start: 2020-11-03 | End: 2020-11-03

## 2020-11-03 RX ORDER — FENTANYL CITRATE/PF 50 MCG/ML
25 SYRINGE (ML) INJECTION
Status: DISCONTINUED | OUTPATIENT
Start: 2020-11-03 | End: 2020-11-03 | Stop reason: HOSPADM

## 2020-11-03 RX ORDER — PROPOFOL 10 MG/ML
INJECTION, EMULSION INTRAVENOUS CONTINUOUS PRN
Status: DISCONTINUED | OUTPATIENT
Start: 2020-11-03 | End: 2020-11-03

## 2020-11-03 RX ORDER — FENTANYL CITRATE 50 UG/ML
INJECTION, SOLUTION INTRAMUSCULAR; INTRAVENOUS AS NEEDED
Status: DISCONTINUED | OUTPATIENT
Start: 2020-11-03 | End: 2020-11-03

## 2020-11-03 RX ORDER — ONDANSETRON 2 MG/ML
4 INJECTION INTRAMUSCULAR; INTRAVENOUS ONCE AS NEEDED
Status: DISCONTINUED | OUTPATIENT
Start: 2020-11-03 | End: 2020-11-03 | Stop reason: HOSPADM

## 2020-11-03 RX ORDER — SODIUM CHLORIDE, SODIUM LACTATE, POTASSIUM CHLORIDE, CALCIUM CHLORIDE 600; 310; 30; 20 MG/100ML; MG/100ML; MG/100ML; MG/100ML
20 INJECTION, SOLUTION INTRAVENOUS CONTINUOUS
Status: DISCONTINUED | OUTPATIENT
Start: 2020-11-03 | End: 2020-11-03 | Stop reason: HOSPADM

## 2020-11-03 RX ORDER — PROPOFOL 10 MG/ML
INJECTION, EMULSION INTRAVENOUS AS NEEDED
Status: DISCONTINUED | OUTPATIENT
Start: 2020-11-03 | End: 2020-11-03

## 2020-11-03 RX ORDER — ONDANSETRON 2 MG/ML
INJECTION INTRAMUSCULAR; INTRAVENOUS AS NEEDED
Status: DISCONTINUED | OUTPATIENT
Start: 2020-11-03 | End: 2020-11-03

## 2020-11-03 RX ADMIN — CEFAZOLIN SODIUM 2000 MG: 2 SOLUTION INTRAVENOUS at 09:06

## 2020-11-03 RX ADMIN — PROPOFOL 80 MCG/KG/MIN: 10 INJECTION, EMULSION INTRAVENOUS at 09:11

## 2020-11-03 RX ADMIN — FENTANYL CITRATE 50 MCG: 50 INJECTION, SOLUTION INTRAMUSCULAR; INTRAVENOUS at 09:11

## 2020-11-03 RX ADMIN — LIDOCAINE HYDROCHLORIDE 30 MG: 10 INJECTION, SOLUTION EPIDURAL; INFILTRATION; INTRACAUDAL; PERINEURAL at 09:11

## 2020-11-03 RX ADMIN — PROPOFOL 40 MG: 10 INJECTION, EMULSION INTRAVENOUS at 09:13

## 2020-11-03 RX ADMIN — PROPOFOL 50 MG: 10 INJECTION, EMULSION INTRAVENOUS at 09:11

## 2020-11-03 RX ADMIN — SODIUM CHLORIDE, SODIUM LACTATE, POTASSIUM CHLORIDE, AND CALCIUM CHLORIDE: .6; .31; .03; .02 INJECTION, SOLUTION INTRAVENOUS at 08:26

## 2020-11-03 RX ADMIN — ONDANSETRON 4 MG: 2 INJECTION INTRAMUSCULAR; INTRAVENOUS at 09:24

## 2020-11-09 ENCOUNTER — OFFICE VISIT (OUTPATIENT)
Dept: PODIATRY | Facility: CLINIC | Age: 70
End: 2020-11-09
Payer: MEDICARE

## 2020-11-09 ENCOUNTER — TELEPHONE (OUTPATIENT)
Dept: OBGYN CLINIC | Facility: HOSPITAL | Age: 70
End: 2020-11-09

## 2020-11-09 VITALS — HEIGHT: 71 IN | TEMPERATURE: 97.8 F | BODY MASS INDEX: 29.68 KG/M2 | WEIGHT: 212 LBS

## 2020-11-09 DIAGNOSIS — L84 FOOT CALLUS: ICD-10-CM

## 2020-11-09 DIAGNOSIS — E11.42 TYPE 2 DIABETES MELLITUS WITH DIABETIC POLYNEUROPATHY, WITHOUT LONG-TERM CURRENT USE OF INSULIN (HCC): Primary | ICD-10-CM

## 2020-11-09 DIAGNOSIS — B35.1 ONYCHOMYCOSIS: ICD-10-CM

## 2020-11-09 DIAGNOSIS — M20.41 HAMMER TOES OF BOTH FEET: ICD-10-CM

## 2020-11-09 DIAGNOSIS — M20.42 HAMMER TOES OF BOTH FEET: ICD-10-CM

## 2020-11-09 PROCEDURE — 11055 PARING/CUTG B9 HYPRKER LES 1: CPT | Performed by: PODIATRIST

## 2020-11-09 PROCEDURE — 11721 DEBRIDE NAIL 6 OR MORE: CPT | Performed by: PODIATRIST

## 2020-11-11 ENCOUNTER — APPOINTMENT (OUTPATIENT)
Dept: LAB | Age: 70
End: 2020-11-11
Payer: MEDICARE

## 2020-11-11 DIAGNOSIS — D72.828 HIGH BLOOD MONOCYTE COUNT: ICD-10-CM

## 2020-11-11 DIAGNOSIS — E11.42 TYPE 2 DIABETES MELLITUS WITH DIABETIC POLYNEUROPATHY, WITHOUT LONG-TERM CURRENT USE OF INSULIN (HCC): ICD-10-CM

## 2020-11-11 DIAGNOSIS — Z12.5 PROSTATE CANCER SCREENING: ICD-10-CM

## 2020-11-11 DIAGNOSIS — E78.5 HYPERLIPIDEMIA, UNSPECIFIED HYPERLIPIDEMIA TYPE: ICD-10-CM

## 2020-11-11 DIAGNOSIS — I10 ESSENTIAL HYPERTENSION: ICD-10-CM

## 2020-11-11 DIAGNOSIS — I48.20 CHRONIC ATRIAL FIBRILLATION (HCC): ICD-10-CM

## 2020-11-11 DIAGNOSIS — E78.2 MIXED HYPERLIPIDEMIA: ICD-10-CM

## 2020-11-11 LAB
ALBUMIN SERPL BCP-MCNC: 3.8 G/DL (ref 3.5–5)
ALP SERPL-CCNC: 59 U/L (ref 46–116)
ALT SERPL W P-5'-P-CCNC: 36 U/L (ref 12–78)
ANION GAP SERPL CALCULATED.3IONS-SCNC: 2 MMOL/L (ref 4–13)
AST SERPL W P-5'-P-CCNC: 22 U/L (ref 5–45)
BASOPHILS # BLD AUTO: 0.11 THOUSANDS/ΜL (ref 0–0.1)
BASOPHILS NFR BLD AUTO: 1 % (ref 0–1)
BILIRUB SERPL-MCNC: 0.5 MG/DL (ref 0.2–1)
BUN SERPL-MCNC: 13 MG/DL (ref 5–25)
CALCIUM SERPL-MCNC: 8.9 MG/DL (ref 8.3–10.1)
CHLORIDE SERPL-SCNC: 107 MMOL/L (ref 100–108)
CHOLEST SERPL-MCNC: 158 MG/DL (ref 50–200)
CO2 SERPL-SCNC: 27 MMOL/L (ref 21–32)
CREAT SERPL-MCNC: 1.06 MG/DL (ref 0.6–1.3)
EOSINOPHIL # BLD AUTO: 0.38 THOUSAND/ΜL (ref 0–0.61)
EOSINOPHIL NFR BLD AUTO: 5 % (ref 0–6)
ERYTHROCYTE [DISTWIDTH] IN BLOOD BY AUTOMATED COUNT: 13.7 % (ref 11.6–15.1)
EST. AVERAGE GLUCOSE BLD GHB EST-MCNC: 131 MG/DL
GFR SERPL CREATININE-BSD FRML MDRD: 71 ML/MIN/1.73SQ M
GLUCOSE P FAST SERPL-MCNC: 123 MG/DL (ref 65–99)
HBA1C MFR BLD: 6.2 %
HCT VFR BLD AUTO: 47.6 % (ref 36.5–49.3)
HDLC SERPL-MCNC: 33 MG/DL
HGB BLD-MCNC: 15.2 G/DL (ref 12–17)
IMM GRANULOCYTES # BLD AUTO: 0.04 THOUSAND/UL (ref 0–0.2)
IMM GRANULOCYTES NFR BLD AUTO: 1 % (ref 0–2)
LDLC SERPL CALC-MCNC: 59 MG/DL (ref 0–100)
LYMPHOCYTES # BLD AUTO: 1.62 THOUSANDS/ΜL (ref 0.6–4.47)
LYMPHOCYTES NFR BLD AUTO: 20 % (ref 14–44)
MCH RBC QN AUTO: 30.5 PG (ref 26.8–34.3)
MCHC RBC AUTO-ENTMCNC: 31.9 G/DL (ref 31.4–37.4)
MCV RBC AUTO: 95 FL (ref 82–98)
MONOCYTES # BLD AUTO: 1.12 THOUSAND/ΜL (ref 0.17–1.22)
MONOCYTES NFR BLD AUTO: 14 % (ref 4–12)
NEUTROPHILS # BLD AUTO: 4.68 THOUSANDS/ΜL (ref 1.85–7.62)
NEUTS SEG NFR BLD AUTO: 59 % (ref 43–75)
NRBC BLD AUTO-RTO: 0 /100 WBCS
PLATELET # BLD AUTO: 288 THOUSANDS/UL (ref 149–390)
PMV BLD AUTO: 10.3 FL (ref 8.9–12.7)
POTASSIUM SERPL-SCNC: 4.3 MMOL/L (ref 3.5–5.3)
PROT SERPL-MCNC: 7.5 G/DL (ref 6.4–8.2)
PSA SERPL-MCNC: 1.2 NG/ML (ref 0–4)
RBC # BLD AUTO: 4.99 MILLION/UL (ref 3.88–5.62)
SODIUM SERPL-SCNC: 136 MMOL/L (ref 136–145)
TRIGL SERPL-MCNC: 328 MG/DL
WBC # BLD AUTO: 7.95 THOUSAND/UL (ref 4.31–10.16)

## 2020-11-11 PROCEDURE — G0103 PSA SCREENING: HCPCS

## 2020-11-11 PROCEDURE — 83036 HEMOGLOBIN GLYCOSYLATED A1C: CPT

## 2020-11-11 PROCEDURE — 80061 LIPID PANEL: CPT

## 2020-11-11 PROCEDURE — 80053 COMPREHEN METABOLIC PANEL: CPT

## 2020-11-11 PROCEDURE — 85025 COMPLETE CBC W/AUTO DIFF WBC: CPT

## 2020-11-11 PROCEDURE — 36415 COLL VENOUS BLD VENIPUNCTURE: CPT

## 2020-11-12 ENCOUNTER — TELEPHONE (OUTPATIENT)
Dept: FAMILY MEDICINE CLINIC | Facility: CLINIC | Age: 70
End: 2020-11-12

## 2020-11-18 ENCOUNTER — OFFICE VISIT (OUTPATIENT)
Dept: OBGYN CLINIC | Facility: CLINIC | Age: 70
End: 2020-11-18

## 2020-11-18 VITALS — TEMPERATURE: 96.3 F

## 2020-11-18 DIAGNOSIS — Z98.890 S/P TRIGGER FINGER RELEASE: Primary | ICD-10-CM

## 2020-11-18 PROCEDURE — 99024 POSTOP FOLLOW-UP VISIT: CPT | Performed by: SURGERY

## 2020-12-01 ENCOUNTER — ANTICOAG VISIT (OUTPATIENT)
Dept: CARDIOLOGY CLINIC | Facility: CLINIC | Age: 70
End: 2020-12-01

## 2020-12-01 ENCOUNTER — LAB (OUTPATIENT)
Dept: LAB | Age: 70
End: 2020-12-01
Payer: MEDICARE

## 2020-12-01 DIAGNOSIS — I48.0 PAF (PAROXYSMAL ATRIAL FIBRILLATION) (HCC): Primary | ICD-10-CM

## 2020-12-01 DIAGNOSIS — Z86.718 HISTORY OF DVT (DEEP VEIN THROMBOSIS): ICD-10-CM

## 2020-12-01 DIAGNOSIS — I48.20 CHRONIC ATRIAL FIBRILLATION (HCC): ICD-10-CM

## 2020-12-01 DIAGNOSIS — Z79.01 ANTICOAGULANT LONG-TERM USE: ICD-10-CM

## 2020-12-01 LAB
INR PPP: 2.22 (ref 0.84–1.19)
PROTHROMBIN TIME: 24.5 SECONDS (ref 11.6–14.5)

## 2020-12-01 PROCEDURE — 85610 PROTHROMBIN TIME: CPT

## 2020-12-01 PROCEDURE — 36415 COLL VENOUS BLD VENIPUNCTURE: CPT

## 2020-12-22 DIAGNOSIS — I10 ESSENTIAL HYPERTENSION: ICD-10-CM

## 2020-12-22 RX ORDER — TRIAMTERENE AND HYDROCHLOROTHIAZIDE 37.5; 25 MG/1; MG/1
CAPSULE ORAL
Qty: 90 CAPSULE | Refills: 3 | Status: SHIPPED | OUTPATIENT
Start: 2020-12-22 | End: 2021-12-28

## 2020-12-29 ENCOUNTER — TRANSCRIBE ORDERS (OUTPATIENT)
Dept: ADMINISTRATIVE | Age: 70
End: 2020-12-29

## 2020-12-29 ENCOUNTER — LAB (OUTPATIENT)
Dept: LAB | Age: 70
End: 2020-12-29
Payer: MEDICARE

## 2020-12-29 ENCOUNTER — ANTICOAG VISIT (OUTPATIENT)
Dept: CARDIOLOGY CLINIC | Facility: CLINIC | Age: 70
End: 2020-12-29

## 2020-12-29 DIAGNOSIS — Z86.718 HISTORY OF DVT (DEEP VEIN THROMBOSIS): ICD-10-CM

## 2020-12-29 DIAGNOSIS — I48.91 ATRIAL FIBRILLATION, UNSPECIFIED TYPE (HCC): ICD-10-CM

## 2020-12-29 DIAGNOSIS — Z79.01 ANTICOAGULANT LONG-TERM USE: ICD-10-CM

## 2020-12-29 LAB
INR PPP: 2.2 (ref 0.84–1.19)
PROTHROMBIN TIME: 24.3 SECONDS (ref 11.6–14.5)

## 2020-12-29 PROCEDURE — 85610 PROTHROMBIN TIME: CPT

## 2020-12-29 PROCEDURE — 36415 COLL VENOUS BLD VENIPUNCTURE: CPT

## 2021-01-08 ENCOUNTER — TELEMEDICINE (OUTPATIENT)
Dept: FAMILY MEDICINE CLINIC | Facility: CLINIC | Age: 71
End: 2021-01-08
Payer: MEDICARE

## 2021-01-08 DIAGNOSIS — I10 ESSENTIAL HYPERTENSION: ICD-10-CM

## 2021-01-08 DIAGNOSIS — E11.42 TYPE 2 DIABETES MELLITUS WITH DIABETIC POLYNEUROPATHY, WITHOUT LONG-TERM CURRENT USE OF INSULIN (HCC): Primary | ICD-10-CM

## 2021-01-08 DIAGNOSIS — D72.828 HIGH BLOOD MONOCYTE COUNT: ICD-10-CM

## 2021-01-08 DIAGNOSIS — N42.9 PROSTATIC DISORDER: ICD-10-CM

## 2021-01-08 DIAGNOSIS — E78.2 MIXED HYPERLIPIDEMIA: ICD-10-CM

## 2021-01-08 DIAGNOSIS — I48.91 ATRIAL FIBRILLATION, UNSPECIFIED TYPE (HCC): ICD-10-CM

## 2021-01-08 PROCEDURE — 99442 PR PHYS/QHP TELEPHONE EVALUATION 11-20 MIN: CPT | Performed by: NURSE PRACTITIONER

## 2021-01-10 VITALS
HEIGHT: 71 IN | WEIGHT: 208 LBS | TEMPERATURE: 97.6 F | DIASTOLIC BLOOD PRESSURE: 70 MMHG | SYSTOLIC BLOOD PRESSURE: 117 MMHG | OXYGEN SATURATION: 97 % | HEART RATE: 75 BPM | BODY MASS INDEX: 29.12 KG/M2

## 2021-01-10 PROBLEM — D72.828 HIGH BLOOD MONOCYTE COUNT: Status: ACTIVE | Noted: 2021-01-10

## 2021-01-10 PROBLEM — I71.4 ABDOMINAL AORTIC ANEURYSM (AAA) 3.0 CM TO 5.5 CM IN DIAMETER IN MALE (HCC): Status: RESOLVED | Noted: 2020-04-30 | Resolved: 2021-01-10

## 2021-01-10 PROBLEM — I71.40 ABDOMINAL AORTIC ANEURYSM (AAA) 3.0 CM TO 5.5 CM IN DIAMETER IN MALE: Status: RESOLVED | Noted: 2020-04-30 | Resolved: 2021-01-10

## 2021-01-10 NOTE — PROGRESS NOTES
Virtual Brief Visit    Assessment/Plan:    Problem List Items Addressed This Visit        Endocrine    Type 2 diabetes mellitus with diabetic polyneuropathy, without long-term current use of insulin ([facility] [address] ) - Primary       Lab Results   Component Value Date    HGBA1C 6 2 (H) 11/11/2020       Hemoglobin A1c is excellent stable at 6 2%  Continue metformin and Januvia  Up-to-date on foot exam and eye exam   Maintained on ACE-inhibitor and statin therapy  Relevant Orders    Hemoglobin A1C       Cardiovascular and Mediastinum    Essential hypertension       Blood pressure stable on lisinopril and Dyazide  Relevant Orders    CBC and differential    Comprehensive metabolic panel    TSH, 3rd generation    Atrial fibrillation (HCC)     Stable  Anticoagulated on Coumadin  Following with cardiology  Genitourinary    Prostatic disorder     PSA is stable at 1 2  Continue urology follow up  Other    Mixed hyperlipidemia       LDL is stable at 59 on simvastatin 40 mg daily  We discussed HDL is low at 33 and triglycerides elevated at 328  We discussed dietary and lifestyle modifications that can improve  HDL and triglycerides  Relevant Orders    Lipid panel    High blood monocyte count     Persistent elevated, but stable monocyte count  Following with hematology  Repeat blood work in 6 months and follow up in 6 months or sooner if needed  Reason for visit is   Chief Complaint   Patient presents with    Virtual Brief Visit        Encounter provider DAYAMI Omalley    Provider located at [address] [place] [id number]    Recent Visits  Date Type Provider Dept   01/08/21 Telemedicine [facility], [address] [place] [address] recent visits within past 7 days and meeting all other requirements     Future Appointments  No visits were found meeting these conditions     Showing future appointments within next 150 days and meeting all other requirements        After connecting through telephone, the patient was identified by name and date of birth  Bibiana Lindsay was informed that this is a telemedicine visit and that the visit is being conducted through telephone  My office door was closed  No one else was in the room  He acknowledged consent and understanding of privacy and security of the platform  The patient has agreed to participate and understands he can discontinue the visit at any time  Patient is aware this is a billable service  It was my intent to perform this visit via video technology but the patient was not able to do a video connection so the visit was completed via audio telephone only  Subjective    Bibiana Lindsay is a 73-year-old male presenting today for follow-up visit review of recent blood work  He is feeling well and has no complaints today        Is due for handicap placard renewal        Past Medical History:   Diagnosis Date    Arthritis     Atherosclerosis of native arteries of the extremities with ulceration ([facility] [address] ) 3/21/2019    Claudication, intermittent (HCC)     Colon polyp     6 polyps 3 years ago    Diabetes mellitus ([facility] [address] )     type 2    Diabetic neuropathic arthropathy (HCC)     causes weakness in LE and uses a cane as assist to walk    Diabetic neuropathy (HCC)     Fatty liver     GERD (gastroesophageal reflux disease)     History of DVT (deep vein thrombosis) 2015    left LE    Hyperlipidemia     Hypertension     Kidney stone     Parotid tumor     Seasonal allergies     Spinal stenosis        Past Surgical History:   Procedure Laterality Date    COLONOSCOPY  05/16/2019    completed by Dr Siria Gomez, Repeat 3 years    [facility] RETROGRADE PYELOGRAM  5/17/2019    PAROTIDECTOMY Left     RI CYSTO/URETERO W/LITHOTRIPSY &INDWELL STENT INSRT Right 5/17/2019    Procedure: CYSTOSCOPY URETEROSCOPY WITH LITHOTRIPSY HOLMIUM LASER, RETROGRADE PYELOGRAM AND INSERTION STENT URETERAL--possible stent only;  Surgeon: Jackie Marquez MD;  [facility];  Service: Urology    NM EXC PAROTD,LAT LOBE,DISSECT 5TH NERV Right 10/18/2017    Procedure: SUPERFICIAL PAROTIDECTOMY WITH NERVE DISSECTION AND PRESERVATION;  Surgeon: Nelly Morales MD;  [facility];  Service: ENT    NM INCISE FINGER TENDON SHEATH Right 11/3/2020    Procedure: RING TRIGGER FINGER RELEASE;  Surgeon: Anu Rhodes MD;  [facility];  Service: Orthopedics    ROTATOR CUFF REPAIR Bilateral     TONSILLECTOMY         Current Outpatient Medications   Medication Sig Dispense Refill    acetaminophen (TYLENOL) 325 mg tablet 2, by mouth, every 6 hours as needed for mild to moderate pain   30 tablet 0    aspirin (ECOTRIN LOW STRENGTH) 81 mg EC tablet Take 81 mg by mouth daily      Cholecalciferol (VITAMIN D3 PO) Take 50 mcg by mouth daily      Cyanocobalamin (VITAMIN B 12 PO) Take 1,000 mcg by mouth daily      fenofibrate micronized (LOFIBRA) 134 MG capsule Take 1 capsule (134 mg total) by mouth daily with breakfast 90 capsule 3    Januvia 100 MG tablet TAKE 1 TABLET BY MOUTH EVERY DAY 90 tablet 3    lisinopril (ZESTRIL) 20 mg tablet Take 1 tablet (20 mg total) by mouth daily 90 tablet 4    Magnesium 400 MG CAPS Take by mouth daily      metFORMIN (GLUCOPHAGE) 1000 MG tablet TAKE 1 TABLET BY MOUTH TWICE A DAY WITH MEALS 180 tablet 1    omeprazole (PriLOSEC) 20 mg delayed release capsule TAKE 1 CAPSULE BY MOUTH EVERY DAY 90 capsule 1    ProAir  (90 Base) MCG/ACT inhaler INHALE 2 PUFFS EVERY 4 (FOUR) HOURS AS NEEDED FOR WHEEZING OR SHORTNESS OF BREATH  18 Inhaler 1    Propylhexedrine (BENZEDREX NA) 2 sprays into each nostril as needed Pt currently not using      simvastatin (ZOCOR) 40 mg tablet Take 1 tablet (40 mg total) by mouth daily 90 tablet 4    tamsulosin (FLOMAX) 0 4 mg TAKE 1 CAPSULE BY MOUTH EVERY DAY WITH DINNER 90 capsule 3    triamterene-hydrochlorothiazide (DYAZIDE) 37 5-25 mg per capsule TAKE 1 CAPSULE BY MOUTH EVERY DAY 90 capsule 3    warfarin (COUMADIN) 2 5 mg tablet Take 1 tablet by mouth daily T-Th-S-S takes 2 5mg     M-W-F takes 5 0 mg      warfarin (COUMADIN) 5 mg tablet TAKE 1 TABLET EVERY DAY OR AS DIRECTED BY MD 90 tablet 3     No current facility-administered medications for this visit  No Known Allergies    Review of Systems   Constitutional: Negative  HENT: Negative  Eyes: Negative  Respiratory: Negative  Cardiovascular: Negative  Gastrointestinal: Negative  Endocrine: Negative  Genitourinary: Negative  Musculoskeletal: Negative  Skin: Negative  Allergic/Immunologic: Negative  Neurological: Negative  Hematological: Negative  Psychiatric/Behavioral: Negative  Vitals:    01/10/21 1130   BP: 117/70   Pulse: 75   Temp: 97 6 °F (36 4 °C)   SpO2: 97%   Weight: 94 3 kg (208 lb)   Height: 5' 11" (1 803 m)         I spent 20 minutes directly with the patient during this visit    VIRTUAL VISIT [place] acknowledges that he has consented to an online visit or consultation  He understands that the online visit is based solely on information provided by him, and that, in the absence of a face-to-face physical evaluation by the physician, the diagnosis he receives is both limited and provisional in terms of accuracy and completeness  This is not intended to replace a full medical face-to-face evaluation by the physician  Monica Garza understands and accepts these terms

## 2021-01-10 NOTE — ASSESSMENT & PLAN NOTE
LDL is stable at 59 on simvastatin 40 mg daily  We discussed HDL is low at 33 and triglycerides elevated at 328  We discussed dietary and lifestyle modifications that can improve  HDL and triglycerides

## 2021-01-10 NOTE — ASSESSMENT & PLAN NOTE
Lab Results   Component Value Date    HGBA1C 6 2 (H) 11/11/2020       Hemoglobin A1c is excellent stable at 6 2%  Continue metformin and Januvia  Up-to-date on foot exam and eye exam   Maintained on ACE-inhibitor and statin therapy

## 2021-01-18 ENCOUNTER — OFFICE VISIT (OUTPATIENT)
Dept: PODIATRY | Facility: CLINIC | Age: 71
End: 2021-01-18
Payer: MEDICARE

## 2021-01-18 VITALS
SYSTOLIC BLOOD PRESSURE: 130 MMHG | HEIGHT: 71 IN | BODY MASS INDEX: 29.4 KG/M2 | DIASTOLIC BLOOD PRESSURE: 90 MMHG | WEIGHT: 210 LBS

## 2021-01-18 DIAGNOSIS — B35.1 ONYCHOMYCOSIS: ICD-10-CM

## 2021-01-18 DIAGNOSIS — E11.42 TYPE 2 DIABETES MELLITUS WITH DIABETIC POLYNEUROPATHY, WITHOUT LONG-TERM CURRENT USE OF INSULIN (HCC): Primary | ICD-10-CM

## 2021-01-18 DIAGNOSIS — L84 FOOT CALLUS: ICD-10-CM

## 2021-01-18 PROCEDURE — 11721 DEBRIDE NAIL 6 OR MORE: CPT | Performed by: PODIATRIST

## 2021-01-18 PROCEDURE — 11055 PARING/CUTG B9 HYPRKER LES 1: CPT | Performed by: PODIATRIST

## 2021-01-18 NOTE — PROGRESS NOTES
Marlon Ashish  1950  AT RISK FOOT CARE    1  Type 2 diabetes mellitus with diabetic polyneuropathy, without long-term current use of insulin (HCC)  Lesion Destruction   2  Onychomycosis     3  Foot callus  Lesion Destruction       Patient presents for at-risk foot care  Patient has no acute concerns today  Patient has significant lower extremity risk due to neuropathy, parasthesia, edema, and trophic skin changes to the lower extremity  On exam patient has thickened, hypertrophic, discolored, brittle toenails with subungual debris and tenderness x10   Patient has callus on left 1st met  Patient has lower extremity edema  PAtients skin is atrophic, thickened nails, and decreased pedal hair  Patient has decreased pinprick and vibratory sensation to his feet and parasthesia    Today's treatment includes:  Debridement of toenails  Using nail nipper, nadya, and curette, nails were sharply debrided, reduced in thickness and length  Devitalized nail tissue and fungal debris excised and removed  Patient tolerated well  Lesion Destruction    Date/Time: 1/18/2021 8:38 AM  Performed by: Dimitrios Funez DPM  Authorized by: Dimitrios Funez DPM   Universal Protocol:  Risks and benefits: risks, benefits and alternatives were discussed  Consent given by: patient  Timeout called at: 1/18/2021 8:39 AM   Patient understanding: patient states understanding of the procedure being performed  Patient identity confirmed: verbally with patient      Procedure Details - Lesion Destruction:     Number of Lesions:  1  Lesion 1:     Body area:  Lower extremity    Lower extremity location:  L foot    Malignancy: benign hyperkeratotic lesion      Destruction method: scissors used for extraction       Pared callus with #15          Discussed proper shoe gear, daily inspections of feet, and general foot health with patient  Patient has Q9  findings and is recommended for at risk foot care every 9-10 weeks      Patients most recent complete clinical foot exam was on: 3/16/2020

## 2021-01-21 DIAGNOSIS — E11.42 TYPE 2 DIABETES MELLITUS WITH DIABETIC POLYNEUROPATHY, WITHOUT LONG-TERM CURRENT USE OF INSULIN (HCC): ICD-10-CM

## 2021-01-25 ENCOUNTER — LAB (OUTPATIENT)
Dept: LAB | Age: 71
End: 2021-01-25
Payer: MEDICARE

## 2021-01-25 ENCOUNTER — ANTICOAG VISIT (OUTPATIENT)
Dept: CARDIOLOGY CLINIC | Facility: CLINIC | Age: 71
End: 2021-01-25

## 2021-01-25 DIAGNOSIS — I48.91 ATRIAL FIBRILLATION, UNSPECIFIED TYPE (HCC): ICD-10-CM

## 2021-01-25 DIAGNOSIS — Z86.718 HISTORY OF DVT (DEEP VEIN THROMBOSIS): ICD-10-CM

## 2021-01-25 DIAGNOSIS — Z79.01 ANTICOAGULANT LONG-TERM USE: ICD-10-CM

## 2021-02-02 ENCOUNTER — TELEPHONE (OUTPATIENT)
Dept: HEMATOLOGY ONCOLOGY | Facility: CLINIC | Age: 71
End: 2021-02-02

## 2021-02-02 NOTE — TELEPHONE ENCOUNTER
Appointment Cancellation Or Reschedule     Person calling in Patient  wife   Provider Dr Radha Castaneda    Office Visit Date and Time 2/4 8:20 am    Office Visit Location [facility]    Did patient reschedule their appointment? no   Is this patient a treatment patient? no   When is their next infusion visit? n/a   Reason for Cancellation or Reschedule weather     If the patient is a treatment patient, please route this to the office nurse

## 2021-02-22 ENCOUNTER — ANTICOAG VISIT (OUTPATIENT)
Dept: CARDIOLOGY CLINIC | Facility: CLINIC | Age: 71
End: 2021-02-22

## 2021-02-22 ENCOUNTER — TRANSCRIBE ORDERS (OUTPATIENT)
Dept: ADMINISTRATIVE | Age: 71
End: 2021-02-22

## 2021-02-22 ENCOUNTER — LAB (OUTPATIENT)
Dept: LAB | Age: 71
End: 2021-02-22
Payer: MEDICARE

## 2021-02-22 DIAGNOSIS — Z86.718 HISTORY OF DVT (DEEP VEIN THROMBOSIS): ICD-10-CM

## 2021-02-22 DIAGNOSIS — Z79.01 ANTICOAGULANT LONG-TERM USE: ICD-10-CM

## 2021-02-22 DIAGNOSIS — I48.91 ATRIAL FIBRILLATION, UNSPECIFIED TYPE (HCC): ICD-10-CM

## 2021-02-24 ENCOUNTER — TELEPHONE (OUTPATIENT)
Dept: HEMATOLOGY ONCOLOGY | Facility: CLINIC | Age: 71
End: 2021-02-24

## 2021-02-24 NOTE — TELEPHONE ENCOUNTER
Per Dr Neda Marinelli, "we are only offering virtual video visits if there's an emergency or snow storm " Not wanting to drive for lab results is not a good reason  They have to come in

## 2021-02-24 NOTE — TELEPHONE ENCOUNTER
Inbound Calls to Reschedule/Cancel Appointments   Patient Details:     Lauren Goldstein     1950     [id number]           Who is calling? Patient    Date of original appointment 3/30 9:40 am    Visit Type Follow up    Who is the Provider and Location? Dr Ynes Stahl    Is the patient on active treatment? Chemo? Radiation? no   The patient would like to cancel, reschedule or make into a virtual appointment? Virtual    Reason for rescheduling or cancelation? Prefers not to have to drive for lab results    [address] Phone    Next Steps:           HopeLine: After completing the above information, please route to Evelyn Bowie and [id number] [place] [employer] Team: Please review and reach out to the patient

## 2021-02-24 NOTE — TELEPHONE ENCOUNTER
Appointment Confirmation     Appointment with  [facility]   Appointment date & time 3- @ 9:40am    Location [place]    Patient verbilized Understanding

## 2021-03-10 DIAGNOSIS — Z23 ENCOUNTER FOR IMMUNIZATION: ICD-10-CM

## 2021-03-11 DIAGNOSIS — J30.9 ALLERGIC RHINITIS, UNSPECIFIED SEASONALITY, UNSPECIFIED TRIGGER: ICD-10-CM

## 2021-03-17 ENCOUNTER — APPOINTMENT (OUTPATIENT)
Dept: LAB | Age: 71
End: 2021-03-17
Payer: MEDICARE

## 2021-03-17 ENCOUNTER — ANTICOAG VISIT (OUTPATIENT)
Dept: CARDIOLOGY CLINIC | Facility: CLINIC | Age: 71
End: 2021-03-17

## 2021-03-17 DIAGNOSIS — I48.91 ATRIAL FIBRILLATION, UNSPECIFIED TYPE (HCC): ICD-10-CM

## 2021-03-17 DIAGNOSIS — Z86.718 HISTORY OF DVT (DEEP VEIN THROMBOSIS): ICD-10-CM

## 2021-03-17 DIAGNOSIS — I10 ESSENTIAL HYPERTENSION: ICD-10-CM

## 2021-03-17 DIAGNOSIS — Z79.01 ANTICOAGULANT LONG-TERM USE: ICD-10-CM

## 2021-03-17 DIAGNOSIS — E11.42 TYPE 2 DIABETES MELLITUS WITH DIABETIC POLYNEUROPATHY, WITHOUT LONG-TERM CURRENT USE OF INSULIN (HCC): ICD-10-CM

## 2021-03-17 DIAGNOSIS — E78.2 MIXED HYPERLIPIDEMIA: ICD-10-CM

## 2021-03-17 LAB
ALBUMIN SERPL BCP-MCNC: 4 G/DL (ref 3.5–5)
ALP SERPL-CCNC: 64 U/L (ref 46–116)
ALT SERPL W P-5'-P-CCNC: 67 U/L (ref 12–78)
ANION GAP SERPL CALCULATED.3IONS-SCNC: 6 MMOL/L (ref 4–13)
AST SERPL W P-5'-P-CCNC: 36 U/L (ref 5–45)
BASOPHILS # BLD AUTO: 0.1 THOUSANDS/ΜL (ref 0–0.1)
BASOPHILS NFR BLD AUTO: 1 % (ref 0–1)
BILIRUB SERPL-MCNC: 0.51 MG/DL (ref 0.2–1)
BUN SERPL-MCNC: 13 MG/DL (ref 5–25)
CALCIUM SERPL-MCNC: 9.1 MG/DL (ref 8.3–10.1)
CHLORIDE SERPL-SCNC: 103 MMOL/L (ref 100–108)
CHOLEST SERPL-MCNC: 170 MG/DL (ref 50–200)
CO2 SERPL-SCNC: 25 MMOL/L (ref 21–32)
CREAT SERPL-MCNC: 1.15 MG/DL (ref 0.6–1.3)
EOSINOPHIL # BLD AUTO: 0.32 THOUSAND/ΜL (ref 0–0.61)
EOSINOPHIL NFR BLD AUTO: 4 % (ref 0–6)
ERYTHROCYTE [DISTWIDTH] IN BLOOD BY AUTOMATED COUNT: 14.1 % (ref 11.6–15.1)
EST. AVERAGE GLUCOSE BLD GHB EST-MCNC: 140 MG/DL
GFR SERPL CREATININE-BSD FRML MDRD: 65 ML/MIN/1.73SQ M
GLUCOSE P FAST SERPL-MCNC: 184 MG/DL (ref 65–99)
HBA1C MFR BLD: 6.5 %
HCT VFR BLD AUTO: 49.2 % (ref 36.5–49.3)
HDLC SERPL-MCNC: 30 MG/DL
HGB BLD-MCNC: 16.2 G/DL (ref 12–17)
IMM GRANULOCYTES # BLD AUTO: 0.06 THOUSAND/UL (ref 0–0.2)
IMM GRANULOCYTES NFR BLD AUTO: 1 % (ref 0–2)
LDLC SERPL CALC-MCNC: 66 MG/DL (ref 0–100)
LYMPHOCYTES # BLD AUTO: 1.58 THOUSANDS/ΜL (ref 0.6–4.47)
LYMPHOCYTES NFR BLD AUTO: 18 % (ref 14–44)
MCH RBC QN AUTO: 30.7 PG (ref 26.8–34.3)
MCHC RBC AUTO-ENTMCNC: 32.9 G/DL (ref 31.4–37.4)
MCV RBC AUTO: 93 FL (ref 82–98)
MONOCYTES # BLD AUTO: 1.04 THOUSAND/ΜL (ref 0.17–1.22)
MONOCYTES NFR BLD AUTO: 12 % (ref 4–12)
NEUTROPHILS # BLD AUTO: 5.86 THOUSANDS/ΜL (ref 1.85–7.62)
NEUTS SEG NFR BLD AUTO: 64 % (ref 43–75)
NONHDLC SERPL-MCNC: 140 MG/DL
NRBC BLD AUTO-RTO: 0 /100 WBCS
PLATELET # BLD AUTO: 296 THOUSANDS/UL (ref 149–390)
PMV BLD AUTO: 9.9 FL (ref 8.9–12.7)
POTASSIUM SERPL-SCNC: 4.1 MMOL/L (ref 3.5–5.3)
PROT SERPL-MCNC: 7.5 G/DL (ref 6.4–8.2)
RBC # BLD AUTO: 5.27 MILLION/UL (ref 3.88–5.62)
SODIUM SERPL-SCNC: 134 MMOL/L (ref 136–145)
TRIGL SERPL-MCNC: 371 MG/DL
TSH SERPL DL<=0.05 MIU/L-ACNC: 1.8 UIU/ML (ref 0.36–3.74)
WBC # BLD AUTO: 8.96 THOUSAND/UL (ref 4.31–10.16)

## 2021-03-17 PROCEDURE — 80061 LIPID PANEL: CPT

## 2021-03-17 PROCEDURE — 85025 COMPLETE CBC W/AUTO DIFF WBC: CPT

## 2021-03-17 PROCEDURE — 84443 ASSAY THYROID STIM HORMONE: CPT

## 2021-03-17 PROCEDURE — 83036 HEMOGLOBIN GLYCOSYLATED A1C: CPT

## 2021-03-17 PROCEDURE — 80053 COMPREHEN METABOLIC PANEL: CPT

## 2021-03-29 ENCOUNTER — OFFICE VISIT (OUTPATIENT)
Dept: PODIATRY | Facility: CLINIC | Age: 71
End: 2021-03-29
Payer: MEDICARE

## 2021-03-29 VITALS
DIASTOLIC BLOOD PRESSURE: 67 MMHG | HEART RATE: 83 BPM | BODY MASS INDEX: 30.41 KG/M2 | WEIGHT: 217.2 LBS | SYSTOLIC BLOOD PRESSURE: 144 MMHG | HEIGHT: 71 IN

## 2021-03-29 DIAGNOSIS — M20.41 HAMMER TOES OF BOTH FEET: ICD-10-CM

## 2021-03-29 DIAGNOSIS — E11.42 TYPE 2 DIABETES MELLITUS WITH DIABETIC POLYNEUROPATHY, WITHOUT LONG-TERM CURRENT USE OF INSULIN (HCC): Primary | ICD-10-CM

## 2021-03-29 DIAGNOSIS — M20.42 HAMMER TOES OF BOTH FEET: ICD-10-CM

## 2021-03-29 DIAGNOSIS — L84 FOOT CALLUS: ICD-10-CM

## 2021-03-29 DIAGNOSIS — B35.1 ONYCHOMYCOSIS: ICD-10-CM

## 2021-03-29 PROCEDURE — 99214 OFFICE O/P EST MOD 30 MIN: CPT | Performed by: PODIATRIST

## 2021-03-29 NOTE — PROGRESS NOTES
Assessment/Plan:       Diagnoses and all orders for this visit:    Type 2 diabetes mellitus with diabetic polyneuropathy, without long-term current use of insulin (HCC)    Onychomycosis    Foot callus    Hammer toes of both feet       Diagnosis and options discussed     Patient has known diabetic neuropathy, trophic changes skin, thick fungal toenails, callus  He is considered moderate risk (Q9)  Recommend continued at-risk foot care  Diabetic foot exam performed today  See below       -Educated on DM risk to lower extremities, proper shoe gear, and daily monitoring of feet    -Educated on A1C and the risks of poorly controlled Diabetes   -Discussed weight loss and suitable exercise regiment    -Patient has trouble getting insurance to cover diabetic shoes and inserts  He is wearing very good sneakers and inserts at the moment and I see no need to in force diabetic shoes at this time  Data Reviewed: Independent review of image: none  Labs: 6 5 A1C on 3/17/21  Other provider documentation: 1/8/21, Dr Christie Cancer, PCP  DM well controlled, no changes to current management  Remains on coumadin for AFib  Risk of Complication:  moderate      Subjective:      Patient ID: Nga Beck is a 72 y o  male  Patient arrives for annual DM foot exam  His A1C was 6 5  HE states starting Januvia made a world of difference with his BG control  He does have thick fungal toenails and difficulty trimming them due to his neuropathy  He often gets muscle spasms in his legs and feet which he thinks is from his neuropathy as well  He denies any progression of disease and is not getting much pain from this condition but does states it is mildly annoying  He denies any wounds to his feet  He reports no other changes in his medical history        The following portions of the patient's history were reviewed and updated as appropriate:   He  has a past medical history of Arthritis, Atherosclerosis of native arteries of the extremities with ulceration ([facility] ) (3/21/2019), Claudication, intermittent ([facility] ), Colon polyp, Diabetes mellitus ([facility] ), Diabetic neuropathic arthropathy ([facility] ), Diabetic neuropathy ([facility] ), Fatty liver, GERD (gastroesophageal reflux disease), History of DVT (deep vein thrombosis) (2015), Hyperlipidemia, Hypertension, Kidney stone, Parotid tumor, Seasonal allergies, and Spinal stenosis  He   Patient Active Problem List    Diagnosis Date Noted    High blood monocyte count 01/10/2021    Smoking 11/03/2020    Chronic obstructive pulmonary disease ([facility] ) 11/03/2020    Prostatic disorder 11/03/2020    Trigger ring finger of right hand 11/03/2020    Hiatal hernia 04/30/2020    Abdominal aortic aneurysm (AAA) 3 0 cm to 5 5 cm in diameter in [facility]) 04/30/2020    Warthin's tumor 87/02/5533    Ureteric colic 18/28/1269    Ureteral stone with hydronephrosis 05/17/2019    History of colon polyps 04/17/2019    Gastroesophageal reflux disease without esophagitis 04/17/2019    Anticoagulant long-term use 04/17/2019    Atrial fibrillation ([facility] ) 03/21/2019    Type 2 diabetes mellitus with diabetic neuropathy ([facility] ) 03/13/2019    Type 2 diabetes mellitus with diabetic polyneuropathy, without long-term current use of insulin ([facility] ) 01/18/2019    Fatty liver 01/18/2019    History of DVT (deep vein thrombosis) 01/18/2019    Anticoagulated on Coumadin 01/18/2019    Essential hypertension 11/21/2018    Mixed hyperlipidemia 11/21/2018     He  has a past surgical history that includes Rotator cuff repair (Bilateral); Colonoscopy (05/16/2019); Parotidectomy (Left); pr exc parotd,lat lobe,dissect 5th nerv (Right, 10/18/2017); Tonsillectomy; [place] retrograde pyelogram (5/17/2019); pr cysto/uretero w/lithotripsy &indwell stent insrt (Right, 5/17/2019); and pr incise finger tendon sheath (Right, 11/3/2020)    His family history includes Alcohol abuse in his father; Cirrhosis in his father; Lupus in his mother; Rheum arthritis in his mother; Substance Abuse in his brother  He  reports that he has been smoking pipe  He has never used smokeless tobacco  He reports current alcohol use  He reports that he does not use drugs  Current Outpatient Medications   Medication Sig Dispense Refill    acetaminophen (TYLENOL) 325 mg tablet 2, by mouth, every 6 hours as needed for mild to moderate pain  30 tablet 0    aspirin (ECOTRIN LOW STRENGTH) 81 mg EC tablet Take 81 mg by mouth daily      Cholecalciferol (VITAMIN D3 PO) Take 50 mcg by mouth daily      Cyanocobalamin (VITAMIN B 12 PO) Take 1,000 mcg by mouth daily      fenofibrate micronized (LOFIBRA) 134 MG capsule Take 1 capsule (134 mg total) by mouth daily with breakfast 90 capsule 3    Januvia 100 MG tablet TAKE 1 TABLET BY MOUTH EVERY DAY 90 tablet 3    lisinopril (ZESTRIL) 20 mg tablet Take 1 tablet (20 mg total) by mouth daily 90 tablet 4    Magnesium 400 MG CAPS Take by mouth daily      metFORMIN (GLUCOPHAGE) 1000 MG tablet TAKE 1 TABLET BY MOUTH TWICE A DAY WITH MEALS 180 tablet 1    omeprazole (PriLOSEC) 20 mg delayed release capsule TAKE 1 CAPSULE BY MOUTH EVERY DAY 90 capsule 1    ProAir  (90 Base) MCG/ACT inhaler INHALE 2 PUFFS EVERY 4 (FOUR) HOURS AS NEEDED FOR WHEEZING OR SHORTNESS OF BREATH  17 Inhaler 1    Propylhexedrine (BENZEDREX NA) 2 sprays into each nostril as needed Pt currently not using      simvastatin (ZOCOR) 40 mg tablet Take 1 tablet (40 mg total) by mouth daily 90 tablet 4    tamsulosin (FLOMAX) 0 4 mg TAKE 1 CAPSULE BY MOUTH EVERY DAY WITH DINNER 90 capsule 3    triamterene-hydrochlorothiazide (DYAZIDE) 37 5-25 mg per capsule TAKE 1 CAPSULE BY MOUTH EVERY DAY 90 capsule 3    warfarin (COUMADIN) 2 5 mg tablet Take 1 tablet by mouth daily T-Th-S-S takes 2 5mg     M-W-F takes 5 0 mg      warfarin (COUMADIN) 5 mg tablet TAKE 1 TABLET EVERY DAY OR AS DIRECTED BY MD 90 tablet 3     No current facility-administered medications for this visit  Current Outpatient Medications on File Prior to Visit   Medication Sig    acetaminophen (TYLENOL) 325 mg tablet 2, by mouth, every 6 hours as needed for mild to moderate pain   aspirin (ECOTRIN LOW STRENGTH) 81 mg EC tablet Take 81 mg by mouth daily    Cholecalciferol (VITAMIN D3 PO) Take 50 mcg by mouth daily    Cyanocobalamin (VITAMIN B 12 PO) Take 1,000 mcg by mouth daily    fenofibrate micronized (LOFIBRA) 134 MG capsule Take 1 capsule (134 mg total) by mouth daily with breakfast    Januvia 100 MG tablet TAKE 1 TABLET BY MOUTH EVERY DAY    lisinopril (ZESTRIL) 20 mg tablet Take 1 tablet (20 mg total) by mouth daily    Magnesium 400 MG CAPS Take by mouth daily    metFORMIN (GLUCOPHAGE) 1000 MG tablet TAKE 1 TABLET BY MOUTH TWICE A DAY WITH MEALS    omeprazole (PriLOSEC) 20 mg delayed release capsule TAKE 1 CAPSULE BY MOUTH EVERY DAY    ProAir  (90 Base) MCG/ACT inhaler INHALE 2 PUFFS EVERY 4 (FOUR) HOURS AS NEEDED FOR WHEEZING OR SHORTNESS OF BREATH   Propylhexedrine (BENZEDREX NA) 2 sprays into each nostril as needed Pt currently not using    simvastatin (ZOCOR) 40 mg tablet Take 1 tablet (40 mg total) by mouth daily    tamsulosin (FLOMAX) 0 4 mg TAKE 1 CAPSULE BY MOUTH EVERY DAY WITH DINNER    triamterene-hydrochlorothiazide (DYAZIDE) 37 5-25 mg per capsule TAKE 1 CAPSULE BY MOUTH EVERY DAY    warfarin (COUMADIN) 2 5 mg tablet Take 1 tablet by mouth daily T-Th-S-S takes 2 5mg     M-W-F takes 5 0 mg    warfarin (COUMADIN) 5 mg tablet TAKE 1 TABLET EVERY DAY OR AS DIRECTED BY MD     No current facility-administered medications on file prior to visit  He has No Known Allergies       Review of Systems   Constitutional: Negative  HENT: Negative for sinus pressure and sinus pain  Respiratory: Negative for cough and shortness of breath  Cardiovascular: Negative for leg swelling  Gastrointestinal: Negative for diarrhea, nausea and vomiting     Musculoskeletal: Negative for arthralgias, gait problem and joint swelling  Skin: Negative for color change, rash and wound  Neurological: Positive for numbness  Negative for weakness  Psychiatric/Behavioral: The patient is not nervous/anxious  Objective:      /67   Pulse 83   Ht 5' 11" (1 803 m) Comment: verbal  Wt 98 5 kg (217 lb 3 2 oz)   BMI 30 29 kg/m²     Contains abnormal data Hemoglobin A1C  Order: [id number]  Status:  Final result   Visible to patient:  Yes ([facility] MyChart)   Next appt:  03/30/2021 at 09:40 AM in Hematology and Oncology (Americo Johnson MD)   Dx:  Type 2 diabetes mellitus with diabeti    Ref Range & Units 3/17/21 8:26 AM   Hemoglobin A1C Normal 3 8-5 6%; PreDiabetic 5 7-6 4%; Diabetic >=6 5%; Glycemic control for adults with diabetes <7 0% % 6 5High     EAG mg/dl 140          Specimen Collected: 03/17/21  8:26 AM   Last Resulted: 03/17/21 12:36 PM                Physical Exam  Vitals signs reviewed  Constitutional:       Appearance: He is obese  He is not ill-appearing or diaphoretic  HENT:      Nose: No congestion or rhinorrhea  Cardiovascular:      Rate and Rhythm: Normal rate  Pulses: no weak pulses          Dorsalis pedis pulses are 1+ on the right side and 1+ on the left side  Posterior tibial pulses are 1+ on the right side and 0 on the left side  Pulmonary:      Effort: Pulmonary effort is normal  No respiratory distress  Musculoskeletal:      Right lower leg: Edema present  Left lower leg: Edema present  Right foot: Decreased range of motion (  Ankle stiffness)  Prominent metatarsal heads present  No foot drop  Left foot: Normal range of motion  Prominent metatarsal heads present  No foot drop  Feet:      Right foot:      Protective Sensation: 10 sites tested  6 sites sensed  Skin integrity: Callus and dry skin present  No ulcer, blister, skin breakdown or erythema  Toenail Condition: Right toenails are abnormally thick   Fungal disease present  Left foot:      Protective Sensation: 10 sites tested  5 sites sensed  Skin integrity: Callus and dry skin present  No ulcer, blister, skin breakdown or erythema  Toenail Condition: Left toenails are abnormally thick  Fungal disease present  Skin:     Capillary Refill: Capillary refill takes less than 2 seconds  Findings: No erythema or rash  Neurological:      Mental Status: He is alert and oriented to person, place, and time  Sensory: Sensory deficit present  Motor: No weakness  Gait: Gait normal    Psychiatric:         Mood and Affect: Mood normal        Diabetic Foot Exam    Patient's shoes and socks removed  Right Foot/Ankle   Right Foot Inspection  Skin Exam: skin intact, dry skin, callus, abnormal color and callus no blister, no erythema, no maceration and no ulcer                          Toe Exam: right toe deformityno swelling, no tenderness and erythema  Sensory   Vibration: absent  Proprioception: diminished   Monofilament testing: diminished  Vascular  Capillary refills: < 3 seconds  The right DP pulse is 1+  The right PT pulse is 1+  Right Toe  - Comprehensive Exam  Arch: pes planus  Hammertoes: fifth toe  Swelling: none         Left Foot/Ankle  Left Foot Inspection  Skin Exam: skin intact, dry skin, abnormal color and callusno erythema, no maceration and no ulcer                         Toe Exam: left toe deformityno swelling, no tenderness and no erythema                   Sensory   Vibration: absent  Proprioception: diminished  Monofilament: diminished  Vascular  Capillary refills: < 3 seconds  The left DP pulse is 1+  The left PT pulse is 0  Left Toe  - Comprehensive Exam  Arch: pes planus  Hammertoes: fifth toe  Swelling: none       Assign Risk Category:  Deformity present; Loss of protective sensation;  No weak pulses       Risk: 2

## 2021-03-30 ENCOUNTER — OFFICE VISIT (OUTPATIENT)
Dept: HEMATOLOGY ONCOLOGY | Facility: CLINIC | Age: 71
End: 2021-03-30
Payer: MEDICARE

## 2021-03-30 VITALS
WEIGHT: 215 LBS | RESPIRATION RATE: 16 BRPM | DIASTOLIC BLOOD PRESSURE: 72 MMHG | OXYGEN SATURATION: 97 % | HEART RATE: 94 BPM | TEMPERATURE: 98.1 F | BODY MASS INDEX: 30.1 KG/M2 | HEIGHT: 71 IN | SYSTOLIC BLOOD PRESSURE: 146 MMHG

## 2021-03-30 DIAGNOSIS — D47.1 CHRONIC MYELOPROLIFERATIVE DISEASE (HCC): Primary | ICD-10-CM

## 2021-03-30 PROCEDURE — 99214 OFFICE O/P EST MOD 30 MIN: CPT | Performed by: INTERNAL MEDICINE

## 2021-03-30 NOTE — PROGRESS NOTES
Hematology/Oncology Outpatient Follow- up Note  Shaq Davila 72 y o  male MRN: @ Encounter: [id number]        Date:  3/30/2021    Presenting Complaint/Diagnosis :  Monocytosis of on known etiology    HPI:    Jorge L Hubbard seen for initial consultation 6/4/2020 regarding mild monocytosis on his blood work which has fluctuated over the last 5 years   Looking through the patient's blood work he has had episodes where his white count has gone up but then returned to normal but all the way back to 2015 he has had a very mild monocytosis which has fluctuated between normal to slightly elevated   The patient himself denies any complaints  [facility] states he is at baseline  [facility] is up-to-date on his cancer screening  [facility] has had various imaging studies over the last 2 years including a CT angiogram of the chest abdomen and pelvis which showed some kidney stones and hydronephrosis but no evidence of malignancy    Even his monocyte count has fluctuated and has been normal also   He denies any drenching night sweats       Previous Hematologic/ Oncologic History:      Workup    Current Hematologic/ Oncologic Treatment:     workup    Interval History:       the patient returns for follow-up visit  His most recent blood work shows no evidence of leukemia or myeloproliferative disorder  Differential is actually normal at this point  I explained to the patient that at this point he can continue to follow with his primary care provider and see me as needed  The patient is in agreement with this  Denies any new complaints  Denies any nausea denies any vomiting denies any diarrhea  The rest of his 14 point review of systems today was negative  Test Results:    Imaging: No results found      Labs:   Lab Results   Component Value Date    WBC 8 96 03/17/2021    HGB 16 2 03/17/2021    HCT 49 2 03/17/2021    MCV 93 03/17/2021     03/17/2021     Lab Results   Component Value Date     (L) 12/14/2015    K 4 1 03/17/2021  03/17/2021    CO2 25 03/17/2021    ANIONGAP 7 12/14/2015    BUN 13 03/17/2021    CREATININE 1 15 03/17/2021    GLUCOSE 159 (H) 12/14/2015    GLUF 184 (H) 03/17/2021    CALCIUM 9 1 03/17/2021    AST 36 03/17/2021    ALT 67 03/17/2021    ALKPHOS 64 03/17/2021    PROT 7 3 12/14/2015    BILITOT 0 56 12/14/2015    EGFR 65 03/17/2021         Lab Results   Component Value Date    SPEP  07/13/2020     No monoclonal bands noted  Reviewed by: Sharri Vela DO **Electronic Signature**       Lab Results   Component Value Date    PSA 1 2 11/11/2020    PSA 1 4 03/05/2019    PSA 1 0 04/03/2017       Lab Results   Component Value Date    BVAJFBOG01 384 03/05/2019         ROS: As stated in the history of present illness otherwise his 14 point review of systems today was negative        Active Problems:   Patient Active Problem List   Diagnosis    Essential hypertension    Mixed hyperlipidemia    Type 2 diabetes mellitus with diabetic polyneuropathy, without long-term current use of insulin (HCC)    Fatty liver    History of DVT (deep vein thrombosis)    Anticoagulated on Coumadin    Type 2 diabetes mellitus with diabetic neuropathy (HCC)    Atrial fibrillation (HCC)    History of colon polyps    Gastroesophageal reflux disease without esophagitis    Anticoagulant long-term use    Ureteric colic    Ureteral stone with hydronephrosis    Warthin's tumor    Hiatal hernia    Abdominal aortic aneurysm (AAA) 3 0 cm to 5 5 cm in diameter in male (HCC)    Smoking    Chronic obstructive pulmonary disease (HCC)    Prostatic disorder    Trigger ring finger of right hand    High blood monocyte count       Past Medical History:   Past Medical History:   Diagnosis Date    Arthritis     Atherosclerosis of native arteries of the extremities with ulceration ([facility] ) 3/21/2019    Claudication, intermittent (HCC)     Colon polyp     6 polyps 3 years ago    Diabetes mellitus ([facility] )     type 2    Diabetic neuropathic arthropathy (HCC)     causes weakness in LE and uses a cane as assist to walk    Diabetic neuropathy (HCC)     Fatty liver     GERD (gastroesophageal reflux disease)     History of DVT (deep vein thrombosis) 2015    left LE    Hyperlipidemia     Hypertension     Kidney stone     Parotid tumor     Seasonal allergies     Spinal stenosis        Surgical History:   Past Surgical History:   Procedure Laterality Date    COLONOSCOPY  05/16/2019    completed by Dr Bolivar Johnson, Repeat 3 years    [facility] RETROGRADE PYELOGRAM  5/17/2019    PAROTIDECTOMY Left     WI CYSTO/URETERO W/LITHOTRIPSY &INDWELL STENT INSRT Right 5/17/2019    Procedure: CYSTOSCOPY URETEROSCOPY WITH LITHOTRIPSY HOLMIUM LASER, RETROGRADE PYELOGRAM AND INSERTION STENT URETERAL--possible stent only;  Surgeon: Adam Bagley MD;  [facility];  Service: Urology    WI EXC PAROTD,LAT LOBE,DISSECT 5TH NERV Right 10/18/2017    Procedure: SUPERFICIAL PAROTIDECTOMY WITH NERVE DISSECTION AND PRESERVATION;  Surgeon: Yola Gould MD;  [facility];  Service: ENT    WI INCISE FINGER TENDON SHEATH Right 11/3/2020    Procedure: RING TRIGGER FINGER RELEASE;  Surgeon: Viri Mckenzie MD;  [facility];  Service: Orthopedics    ROTATOR CUFF REPAIR Bilateral     TONSILLECTOMY         Family History:    Family History   Problem Relation Age of Onset    Lupus Mother     Rheum arthritis Mother     Cirrhosis Father     Alcohol abuse Father     Substance Abuse Brother        Cancer-related family history is not on file      Social History:   Social History     Socioeconomic History    Marital status: /Civil Union     Spouse name: Not on file    Number of children: Not on file    Years of education: Not on file    Highest education level: Not on file   Occupational History    Not on file   Social Needs    Financial resource strain: Not on file    Food insecurity     Worry: Not on file     Inability: Not on file   Ester Ibarra Transportation needs     Medical: Not on file     Non-medical: Not on file   Tobacco Use    Smoking status: Current Every Day Smoker     Types: Pipe    Smokeless tobacco: Never Used    Tobacco comment: smokes 2 pipes daily   Substance and Sexual Activity    Alcohol use: Yes     Frequency: 2-4 times a month     Drinks per session: 1 or 2     Comment: socially    Drug use: No    Sexual activity: Not on file   Lifestyle    Physical activity     Days per week: Not on file     Minutes per session: Not on file    Stress: Not on file   Relationships    Social connections     Talks on phone: Not on file     Gets together: Not on file     Attends Advent service: Not on file     Active member of club or organization: Not on file     Attends meetings of clubs or organizations: Not on file     Relationship status: Not on file    Intimate partner violence     Fear of current or ex partner: Not on file     Emotionally abused: Not on file     Physically abused: Not on file     Forced sexual activity: Not on file   Other Topics Concern    Not on file   Social History Narrative               Current Medications:   Current Outpatient Medications   Medication Sig Dispense Refill    acetaminophen (TYLENOL) 325 mg tablet 2, by mouth, every 6 hours as needed for mild to moderate pain   30 tablet 0    aspirin (ECOTRIN LOW STRENGTH) 81 mg EC tablet Take 81 mg by mouth daily      Cholecalciferol (VITAMIN D3 PO) Take 50 mcg by mouth daily      Cyanocobalamin (VITAMIN B 12 PO) Take 1,000 mcg by mouth daily      fenofibrate micronized (LOFIBRA) 134 MG capsule Take 1 capsule (134 mg total) by mouth daily with breakfast 90 capsule 3    Januvia 100 MG tablet TAKE 1 TABLET BY MOUTH EVERY DAY 90 tablet 3    lisinopril (ZESTRIL) 20 mg tablet Take 1 tablet (20 mg total) by mouth daily 90 tablet 4    Magnesium 400 MG CAPS Take by mouth daily      metFORMIN (GLUCOPHAGE) 1000 MG tablet TAKE 1 TABLET BY MOUTH TWICE A DAY WITH MEALS 180 tablet 1    omeprazole (PriLOSEC) 20 mg delayed release capsule TAKE 1 CAPSULE BY MOUTH EVERY DAY 90 capsule 1    ProAir  (90 Base) MCG/ACT inhaler INHALE 2 PUFFS EVERY 4 (FOUR) HOURS AS NEEDED FOR WHEEZING OR SHORTNESS OF BREATH  17 Inhaler 1    Propylhexedrine (BENZEDREX NA) 2 sprays into each nostril as needed Pt currently not using      simvastatin (ZOCOR) 40 mg tablet Take 1 tablet (40 mg total) by mouth daily 90 tablet 4    tamsulosin (FLOMAX) 0 4 mg TAKE 1 CAPSULE BY MOUTH EVERY DAY WITH DINNER 90 capsule 3    triamterene-hydrochlorothiazide (DYAZIDE) 37 5-25 mg per capsule TAKE 1 CAPSULE BY MOUTH EVERY DAY 90 capsule 3    warfarin (COUMADIN) 2 5 mg tablet Take 1 tablet by mouth daily T-Th-S-S takes 2 5mg     M-W-F takes 5 0 mg      warfarin (COUMADIN) 5 mg tablet TAKE 1 TABLET EVERY DAY OR AS DIRECTED BY MD 90 tablet 3     No current facility-administered medications for this visit  Allergies: No Known Allergies    Physical Exam:    Body surface area is 2 17 meters squared  Wt Readings from Last 3 Encounters:   03/30/21 97 5 kg (215 lb)   03/29/21 98 5 kg (217 lb 3 2 oz)   01/18/21 95 3 kg (210 lb)        Temp Readings from Last 3 Encounters:   03/30/21 98 1 °F (36 7 °C) (Temporal)   01/10/21 97 6 °F (36 4 °C)   11/18/20 (!) 96 3 °F (35 7 °C)        BP Readings from Last 3 Encounters:   03/30/21 146/72   03/29/21 144/67   01/18/21 130/90         Pulse Readings from Last 3 Encounters:   03/30/21 94   03/29/21 83   01/10/21 75         Physical Exam     Constitutional   General appearance: No acute distress, well appearing and well nourished  Eyes   Conjunctiva and lids: No swelling, erythema or discharge  Pupils and irises: Equal, round and reactive to light  Ears, Nose, Mouth, and Throat   External inspection of ears and nose: Normal     Nasal mucosa, septum, and turbinates: Normal without edema or erythema      Oropharynx: Normal with no erythema, edema, exudate or lesions  Pulmonary   Respiratory effort: No increased work of breathing or signs of respiratory distress  Auscultation of lungs: Clear to auscultation  Cardiovascular   Palpation of heart: Normal PMI, no thrills  Auscultation of heart: Normal rate and rhythm, normal S1 and S2, without murmurs  Examination of extremities for edema and/or varicosities: Normal     Carotid pulses: Normal     Abdomen   Abdomen: Non-tender, no masses  Liver and spleen: No hepatomegaly or splenomegaly  Lymphatic   Palpation of lymph nodes in neck: No lymphadenopathy  Musculoskeletal   Gait and station: Normal     Digits and nails: Normal without clubbing or cyanosis  Inspection/palpation of joints, bones, and muscles: Normal     Skin   Skin and subcutaneous tissue: Normal without rashes or lesions  Neurologic   Cranial nerves: Cranial nerves 2-12 intact  Sensation: No sensory loss  Psychiatric   Orientation to person, place, and time: Normal     Mood and affect: Normal         Assessment / Plan:      The patient is a pleasant 13-year-old male was had a fluctuating monocyte count over the last few years   I suspected this is benign  [employer] does smoke a pipe   He has had imaging done which shows no evidence of metastatic disease or malignancy   He is up-to-date on his screening  His flow cytometry showed no evidence of any abnormal cells  I advised him at this point with a normal differential and no evidence of worsening of his counts he can follow with his primary care physician  He states he sees them every 4 months or so with blood work  If his blood counts changed or if he has any abnormal cells or if his white count consistently goes up he can always come back to see me but for now he can see me as needed and follow-up with his primary care provider  The patient agrees with this  He states this was his wish also  I will see him as needed    He will follow regularly with his primary care physician  Goals and Barriers:  Current Goal:  Prolong Survival from   Abnormal blood counts which have now normalized  Barriers: None  Patient's Capacity to Self Care:  Patient able to self care  Portions of the record may have been created with voice recognition software   Occasional wrong word or "sound a like" substitutions may have occurred due to the inherent limitations of voice recognition software   Read the chart carefully and recognize, using context, where substitutions have occurred

## 2021-04-12 LAB
LEFT EYE DIABETIC RETINOPATHY: NORMAL
RIGHT EYE DIABETIC RETINOPATHY: NORMAL

## 2021-04-19 ENCOUNTER — ANTICOAG VISIT (OUTPATIENT)
Dept: CARDIOLOGY CLINIC | Facility: CLINIC | Age: 71
End: 2021-04-19

## 2021-04-19 ENCOUNTER — OFFICE VISIT (OUTPATIENT)
Dept: FAMILY MEDICINE CLINIC | Facility: CLINIC | Age: 71
End: 2021-04-19
Payer: MEDICARE

## 2021-04-19 ENCOUNTER — APPOINTMENT (OUTPATIENT)
Dept: LAB | Facility: CLINIC | Age: 71
End: 2021-04-19
Payer: MEDICARE

## 2021-04-19 VITALS
OXYGEN SATURATION: 97 % | HEART RATE: 78 BPM | DIASTOLIC BLOOD PRESSURE: 80 MMHG | WEIGHT: 214.2 LBS | RESPIRATION RATE: 20 BRPM | BODY MASS INDEX: 29.99 KG/M2 | SYSTOLIC BLOOD PRESSURE: 120 MMHG | TEMPERATURE: 98.7 F | HEIGHT: 71 IN

## 2021-04-19 DIAGNOSIS — F17.200 SMOKING: ICD-10-CM

## 2021-04-19 DIAGNOSIS — I71.4 ABDOMINAL AORTIC ANEURYSM (AAA) 3.0 CM TO 5.5 CM IN DIAMETER IN MALE (HCC): ICD-10-CM

## 2021-04-19 DIAGNOSIS — I73.9 INTERMITTENT CLAUDICATION (HCC): ICD-10-CM

## 2021-04-19 DIAGNOSIS — Z79.01 ANTICOAGULANT LONG-TERM USE: ICD-10-CM

## 2021-04-19 DIAGNOSIS — K44.9 HIATAL HERNIA: ICD-10-CM

## 2021-04-19 DIAGNOSIS — I10 ESSENTIAL HYPERTENSION: ICD-10-CM

## 2021-04-19 DIAGNOSIS — Z00.00 MEDICARE ANNUAL WELLNESS VISIT, SUBSEQUENT: ICD-10-CM

## 2021-04-19 DIAGNOSIS — E11.42 TYPE 2 DIABETES MELLITUS WITH DIABETIC POLYNEUROPATHY, WITHOUT LONG-TERM CURRENT USE OF INSULIN (HCC): ICD-10-CM

## 2021-04-19 DIAGNOSIS — I48.0 PAROXYSMAL ATRIAL FIBRILLATION (HCC): ICD-10-CM

## 2021-04-19 DIAGNOSIS — I48.91 ATRIAL FIBRILLATION, UNSPECIFIED TYPE (HCC): ICD-10-CM

## 2021-04-19 DIAGNOSIS — E78.2 MIXED HYPERLIPIDEMIA: ICD-10-CM

## 2021-04-19 DIAGNOSIS — J44.9 CHRONIC OBSTRUCTIVE PULMONARY DISEASE, UNSPECIFIED COPD TYPE (HCC): Primary | ICD-10-CM

## 2021-04-19 DIAGNOSIS — Z86.718 HISTORY OF DVT (DEEP VEIN THROMBOSIS): ICD-10-CM

## 2021-04-19 PROCEDURE — 1123F ACP DISCUSS/DSCN MKR DOCD: CPT | Performed by: NURSE PRACTITIONER

## 2021-04-19 PROCEDURE — G0439 PPPS, SUBSEQ VISIT: HCPCS | Performed by: NURSE PRACTITIONER

## 2021-04-19 PROCEDURE — 99214 OFFICE O/P EST MOD 30 MIN: CPT | Performed by: NURSE PRACTITIONER

## 2021-04-19 NOTE — PROGRESS NOTES
Assessment and Plan:     Problem List Items Addressed This Visit        Endocrine    Type 2 diabetes mellitus with diabetic polyneuropathy, without long-term current use of insulin ([facility] )       Lab Results   Component Value Date    HGBA1C 6 5 (H) 03/17/2021     Stable on metformin and Januvia  Up-to-date on eye exam and foot exam            Relevant Orders    Hemoglobin A1C       Respiratory    Chronic obstructive pulmonary disease ([facility] [address] ) - Primary       Emphysema noted changes on CT a chest in 2019  Denies dyspnea  Occasional  Albuterol inhaler use  May want to consider obtaining pulmonary function studies in the future  Cardiovascular and Mediastinum    Essential hypertension       Stable on lisinopril and Dyazide  Relevant Orders    Comprehensive metabolic panel    CBC and differential    Atrial fibrillation (HCC)       Anticoagulated on Coumadin and aspirin  Regular rate and rhythm on exam today  Follows with Cardiology, Dr Purdy Kinds  Abdominal aortic aneurysm (AAA) 3 0 cm to 5 5 cm in diameter in [facility])     US AAA screening March 2019:  Mild atherosclerosis  Borderline aneurysmal dilatation of the proximal abdominal aorta measuring up to 3 cm transverse  Follow-up recommended within the next 3 years  CTA chest, abdomen,pelvis May 2019, does not note AAA  Will repeat US AAA to monitor in the next one year  Prescription provided today  Relevant Orders    US abdominal aorta       Other    Mixed hyperlipidemia     Total cholesterol 170, HDL 30, LDL 60  Triglycerides are elevated at 371, this is up from 285 one year ago  Patient attributes this to snacking at night time  Continue Simvastatin and fenofibrate  Notes he is experiencing myalgias, leg cramping, and ankles overturn easily, he is concerned simvastatin may be contributing to symptoms  Discussed Simvastatin may contribute to myalgias, but unlikely leg cramping and ankles overturning    May trial holding simvastatin for two weeks, and monitor for improvement  Update me in 2 weeks  Relevant Orders    Lipid panel    Hiatal hernia      GERD symptoms well controlled on omeprazole 20 mg daily  Smoking     Advised to quit  He is aware of risks of continued smoking  Other Visit Diagnoses     Intermittent claudication ([facility] )        Relevant Orders    VAS lower limb arterial duplex, complete bilateral    Medicare annual wellness visit, subsequent               Preventive health issues were discussed with patient, and age appropriate screening tests were ordered as noted in patient's After Visit Summary  Personalized health advice and appropriate referrals for health education or preventive services given if needed, as noted in patient's After Visit Summary       History of Present Illness:     Patient presents for Medicare Annual Wellness visit    Patient Care Team:  Lety Cortez as PCP - General (Family Medicine)  Andre Esposito MD (Ophthalmology)  Janet Lara MD (Otolaryngology)  [facility] David DDS  Felicia Mcmillan DDS (Oral Surgery)  Juni Escalona DPM (Podiatry)     Problem List:     Patient Active Problem List   Diagnosis    Essential hypertension    Mixed hyperlipidemia    Type 2 diabetes mellitus with diabetic polyneuropathy, without long-term current use of insulin ([facility] )    Fatty liver    History of DVT (deep vein thrombosis)    Anticoagulated on Coumadin    Type 2 diabetes mellitus with diabetic neuropathy [facility]    Atrial fibrillation ([facility] )    History of colon polyps    Gastroesophageal reflux disease without esophagitis    Anticoagulant long-term use    Ureteric colic    Ureteral stone with hydronephrosis    Warthin's tumor    Hiatal hernia    Abdominal aortic aneurysm (AAA) 3 0 cm to 5 5 cm in diameter in male ([facility] )    Smoking    Chronic obstructive pulmonary disease (HCC)    Prostatic disorder    Trigger ring finger of right hand    High blood monocyte count      Past Medical and Surgical History:     Past Medical History:   Diagnosis Date    Arthritis     Atherosclerosis of native arteries of the extremities with ulceration ([facility] ) 3/21/2019    Claudication, intermittent (HCC)     Colon polyp     6 polyps 3 years ago    Diabetes mellitus ([facility] )     type 2    Diabetic neuropathic arthropathy (HCC)     causes weakness in LE and uses a cane as assist to walk    Diabetic neuropathy (HCC)     Fatty liver     GERD (gastroesophageal reflux disease)     History of DVT (deep vein thrombosis) 2015    left LE    Hyperlipidemia     Hypertension     Kidney stone     Parotid tumor     Seasonal allergies     Spinal stenosis      Past Surgical History:   Procedure Laterality Date    COLONOSCOPY  05/16/2019    completed by   [facility], Repeat 3 years    [facility] RETROGRADE PYELOGRAM  5/17/2019    PAROTIDECTOMY Left     WI CYSTO/URETERO W/LITHOTRIPSY &INDWELL STENT INSRT Right 5/17/2019    Procedure: CYSTOSCOPY URETEROSCOPY WITH LITHOTRIPSY HOLMIUM LASER, RETROGRADE PYELOGRAM AND INSERTION STENT URETERAL--possible stent only;  Surgeon: Deuce Vazquez MD;  [facility];  Service: Urology    WI EXC PAROTD,LAT LOBE,DISSECT 5TH NERV Right 10/18/2017    Procedure: SUPERFICIAL PAROTIDECTOMY WITH NERVE DISSECTION AND PRESERVATION;  Surgeon: Cristino Saravia MD;  [facility];  Service: ENT    WI INCISE FINGER TENDON SHEATH Right 11/3/2020    Procedure: RING TRIGGER FINGER RELEASE;  Surgeon: Deborrah Hodgkins, MD;  [facility];  Service: Orthopedics    ROTATOR CUFF REPAIR Bilateral     TONSILLECTOMY        Family History:     Family History   Problem Relation Age of Onset    Lupus Mother     Rheum arthritis Mother     Cirrhosis Father     Alcohol abuse Father     Substance Abuse Brother       Social History:     E-Cigarette/Vaping    E-Cigarette Use Never User      E-Cigarette/Vaping Substances    Nicotine No     THC No     CBD No     Flavoring No     Other No     Unknown No      Social History     Socioeconomic History    Marital status: /Civil Union     Spouse name: None    Number of children: None    Years of education: None    Highest education level: None   Occupational History    None   Social Needs    Financial resource strain: None    Food insecurity     Worry: None     Inability: None    Transportation needs     Medical: None     Non-medical: None   Tobacco Use    Smoking status: Current Every Day Smoker     Types: Pipe    Smokeless tobacco: Never Used    Tobacco comment: smokes 2 pipes daily   Substance and Sexual Activity    Alcohol use: Yes     Frequency: 2-4 times a month     Drinks per session: 1 or 2     Comment: socially    Drug use: No    Sexual activity: None   Lifestyle    Physical activity     Days per week: None     Minutes per session: None    Stress: None   Relationships    Social connections     Talks on phone: None     Gets together: None     Attends [employer] service: None     Active member of club or organization: None     Attends meetings of clubs or organizations: None     Relationship status: None    Intimate partner violence     Fear of current or ex partner: None     Emotionally abused: None     Physically abused: None     Forced sexual activity: None   Other Topics Concern    None   Social History Narrative        [place]      Medications and Allergies:     Current Outpatient Medications   Medication Sig Dispense Refill    acetaminophen (TYLENOL) 325 mg tablet 2, by mouth, every 6 hours as needed for mild to moderate pain   30 tablet 0    aspirin (ECOTRIN LOW STRENGTH) 81 mg EC tablet Take 81 mg by mouth daily      Cholecalciferol (VITAMIN D3 PO) Take 50 mcg by mouth daily      Cyanocobalamin (VITAMIN B 12 PO) Take 1,000 mcg by mouth daily      fenofibrate micronized (LOFIBRA) 134 MG capsule Take 1 capsule (134 mg total) by mouth daily with breakfast 90 capsule 3    Januvia 100 MG tablet TAKE 1 TABLET BY MOUTH EVERY DAY 90 tablet 3    lisinopril (ZESTRIL) 20 mg tablet Take 1 tablet (20 mg total) by mouth daily 90 tablet 4    Magnesium 400 MG CAPS Take by mouth daily      metFORMIN (GLUCOPHAGE) 1000 MG tablet TAKE 1 TABLET BY MOUTH TWICE A DAY WITH MEALS 180 tablet 1    omeprazole (PriLOSEC) 20 mg delayed release capsule TAKE 1 CAPSULE BY MOUTH EVERY DAY 90 capsule 1    ProAir  (90 Base) MCG/ACT inhaler INHALE 2 PUFFS EVERY 4 (FOUR) HOURS AS NEEDED FOR WHEEZING OR SHORTNESS OF BREATH  17 Inhaler 1    simvastatin (ZOCOR) 40 mg tablet Take 1 tablet (40 mg total) by mouth daily 90 tablet 4    tamsulosin (FLOMAX) 0 4 mg TAKE 1 CAPSULE BY MOUTH EVERY DAY WITH DINNER 90 capsule 3    triamterene-hydrochlorothiazide (DYAZIDE) 37 5-25 mg per capsule TAKE 1 CAPSULE BY MOUTH EVERY DAY 90 capsule 3    warfarin (COUMADIN) 2 5 mg tablet Take 1 tablet by mouth daily T-Th-S-S takes 2 5mg     M-W-F takes 5 0 mg      warfarin (COUMADIN) 5 mg tablet TAKE 1 TABLET EVERY DAY OR AS DIRECTED BY MD 90 tablet 3     No current facility-administered medications for this visit        No Known Allergies   Immunizations:     Immunization History   Administered Date(s) Administered    Hep B, adult 03/19/2019, 04/23/2019, 09/19/2019    INFLUENZA 09/13/2018, 09/17/2018    Influenza, high dose seasonal 0 7 mL 09/19/2019, 08/24/2020    Pneumococcal Conjugate 13-Valent 03/19/2019    Pneumococcal Polysaccharide PPV23 08/17/2020    SARS-CoV-2 / COVID-19 mRNA IM (Pfizer-BioNTech) 03/05/2021, 03/26/2021    Zoster Vaccine Recombinant 07/30/2018, 09/29/2018      Health Maintenance:         Topic Date Due    Colonoscopy Surveillance  11/21/2022    Colorectal Cancer Screening  11/21/2029    Hepatitis C Screening  Completed         Topic Date Due    DTaP,Tdap,and Td Vaccines (1 - Tdap) Never done      Medicare Health Risk Assessment:     /80   Pulse 78   Temp 98 7 °F (37 1 °C) (Temporal)   Resp 20 Ht 5' 11" (1 803 m)   Wt 97 2 kg (214 lb 3 2 oz)   SpO2 97%   BMI 29 87 kg/m²      [facility] is here for his Subsequent Wellness visit  Health Risk Assessment:   Patient rates overall health as good  Patient feels that their physical health rating is same  Patient is satisfied with their life  Eyesight was rated as same  Hearing was rated as same  Patient feels that their emotional and mental health rating is same  Patients states they are never, rarely angry  Patient states they are sometimes unusually tired/fatigued  Pain experienced in the last 7 days has been some  Patient's pain rating has been 6/10  Patient states that he has experienced no weight loss or gain in last 6 months  Depression Screening:   PHQ-2 Score: 0      Fall Risk Screening: In the past year, patient has experienced: no history of falling in past year      Home Safety:  Patient does not have trouble with stairs inside or outside of their home  Patient has working smoke alarms and has no working carbon monoxide detector  Home safety hazards include: none  Nutrition:   Current diet is Regular  Medications:   Patient is currently taking over-the-counter supplements  OTC medications include: see medication list  Patient is able to manage medications  Activities of Daily Living (ADLs)/Instrumental Activities of Daily Living (IADLs):   Walk and transfer into and out of bed and chair?: Yes  Dress and groom yourself?: Yes    Bathe or shower yourself?: Yes    Feed yourself?  Yes  Do your laundry/housekeeping?: No  Manage your money, pay your bills and track your expenses?: Yes  Make your own meals?: Yes    Do your own shopping?: Yes    Previous Hospitalizations:   Any hospitalizations or ED visits within the last 12 months?: Yes    How many hospitalizations have you had in the last year?: 1-2    Advance Care Planning:   Living will: No    Durable POA for healthcare: No    Advanced directive: Yes      Comments: Patient and his wife are planning to complete  Cognitive Screening:   Provider or family/friend/caregiver concerned regarding cognition?: No    PREVENTIVE SCREENINGS      Cardiovascular Screening:    General: Screening Not Indicated and History Lipid Disorder      Diabetes Screening:     General: Screening Not Indicated and History Diabetes      Colorectal Cancer Screening:     General: Screening Current      Prostate Cancer Screening:    General: Screening Current      Osteoporosis Screening:    General: Screening Not Indicated      Abdominal Aortic Aneurysm (AAA) Screening:    Risk factors include: age between 73-69 yo and tobacco use        General: Screening Not Indicated and History AAA      Lung Cancer Screening:     General: Screening Not Indicated      Hepatitis C Screening:    General: Screening Current    Screening, Brief Intervention, and Referral to Treatment (SBIRT)    Screening  Typical number of drinks in a day: 1  Typical number of drinks in a week: 7  Interpretation: Low risk drinking behavior  AUDIT-C Screenin) How often did you have a drink containing alcohol in the past year? 2 to 4 times a month  2) How many drinks did you have on a typical day when you were drinking in the past year? 1 to 2    Single Item Drug Screening:  How often have you used an illegal drug (including marijuana) or a prescription medication for non-medical reasons in the past year? never    Single Item Drug Screen Score: 0  Interpretation: Negative screen for possible drug use disorder    Brief Intervention  Alcohol & drug use screenings were reviewed  No concerns regarding substance use disorder identified         Hedy Singh

## 2021-04-19 NOTE — PATIENT INSTRUCTIONS
Medicare Preventive Visit Patient Instructions  Thank you for completing your Welcome to Medicare Visit or Medicare Annual Wellness Visit today  Your next wellness visit will be due in one year (4/20/2022)  The screening/preventive services that you may require over the next 5-10 years are detailed below  Some tests may not apply to you based off risk factors and/or age  Screening tests ordered at today's visit but not completed yet may show as past due  Also, please note that scanned in results may not display below  Preventive Screenings:  Service Recommendations Previous Testing/Comments   Colorectal Cancer Screening  · Colonoscopy    · Fecal Occult Blood Test (FOBT)/Fecal Immunochemical Test (FIT)  · Fecal DNA/Cologuard Test  · Flexible Sigmoidoscopy Age: 54-65 years old   Colonoscopy: every 10 years (May be performed more frequently if at higher risk)  OR  FOBT/FIT: every 1 year  OR  Cologuard: every 3 years  OR  Sigmoidoscopy: every 5 years  Screening may be recommended earlier than age 48 if at higher risk for colorectal cancer  Also, an individualized decision between you and your healthcare provider will decide whether screening between the ages of 74-80 would be appropriate   Colonoscopy: 11/21/2019  FOBT/FIT: Not on file  Cologuard: Not on file  Sigmoidoscopy: Not on file    Screening Current     Prostate Cancer Screening Individualized decision between patient and health care provider in men between ages of 53-78   Medicare will cover every 12 months beginning on the day after your 50th birthday PSA: 1 2 ng/mL     Screening Current     Hepatitis C Screening Once for adults born between 1945 and 1965  More frequently in patients at high risk for Hepatitis C Hep C Antibody: 03/05/2019    Screening Current   Diabetes Screening 1-2 times per year if you're at risk for diabetes or have pre-diabetes Fasting glucose: 184 mg/dL   A1C: 6 5 %    Screening Not Indicated  History Diabetes   Cholesterol Screening Once every 5 years if you don't have a lipid disorder  May order more often based on risk factors  Lipid panel: 03/17/2021    Screening Not Indicated  History Lipid Disorder      Other Preventive Screenings Covered by Medicare:  1  Abdominal Aortic Aneurysm (AAA) Screening: covered once if your at risk  You're considered to be at risk if you have a family history of AAA or a male between the age of 73-68 who smoking at least 100 cigarettes in your lifetime  2  Lung Cancer Screening: covers low dose CT scan once per year if you meet all of the following conditions: (1) Age 50-69; (2) No signs or symptoms of lung cancer; (3) Current smoker or have quit smoking within the last 15 years; (4) You have a tobacco smoking history of at least 30 pack years (packs per day x number of years you smoked); (5) You get a written order from a healthcare provider  3  Glaucoma Screening: covered annually if you're considered high risk: (1) You have diabetes OR (2) Family history of glaucoma OR (3)  aged 48 and older OR (3)  American aged 72 and older  3  Osteoporosis Screening: covered every 2 years if you meet one of the following conditions: (1) Have a vertebral abnormality; (2) On glucocorticoid therapy for more than 3 months; (3) Have primary hyperparathyroidism; (4) On osteoporosis medications and need to assess response to drug therapy  5  HIV Screening: covered annually if you're between the age of 12-76  Also covered annually if you are younger than 13 and older than 72 with risk factors for HIV infection  For pregnant patients, it is covered up to 3 times per pregnancy      Immunizations:  Immunization Recommendations   Influenza Vaccine Annual influenza vaccination during flu season is recommended for all persons aged >= 6 months who do not have contraindications   Pneumococcal Vaccine (Prevnar and Pneumovax)  * Prevnar = PCV13  * Pneumovax = PPSV23 Adults 25-60 years old: 1-3 doses may be recommended based on certain risk factors  Adults 72 years old: Prevnar (PCV13) vaccine recommended followed by Pneumovax (PPSV23) vaccine  If already received PPSV23 since turning 65, then PCV13 recommended at least one year after PPSV23 dose  Hepatitis B Vaccine 3 dose series if at intermediate or high risk (ex: diabetes, end stage renal disease, liver disease)   Tetanus (Td) Vaccine - COST NOT COVERED BY MEDICARE PART B Following completion of primary series, a booster dose should be given every 10 years to maintain immunity against tetanus  Td may also be given as tetanus wound prophylaxis  Tdap Vaccine - COST NOT COVERED BY MEDICARE PART B Recommended at least once for all adults  For pregnant patients, recommended with each pregnancy  Shingles Vaccine (Shingrix) - COST NOT COVERED BY MEDICARE PART B  2 shot series recommended in those aged 48 and above     Health Maintenance Due:      Topic Date Due    Colonoscopy Surveillance  11/21/2022    Colorectal Cancer Screening  11/21/2029    Hepatitis C Screening  Completed     Immunizations Due:      Topic Date Due    DTaP,Tdap,and Td Vaccines (1 - Tdap) Never done     Advance Directives   What are advance directives? Advance directives are legal documents that state your wishes and plans for medical care  These plans are made ahead of time in case you lose your ability to make decisions for yourself  Advance directives can apply to any medical decision, such as the treatments you want, and if you want to donate organs  What are the types of advance directives? There are many types of advance directives, and each state has rules about how to use them  You may choose a combination of any of the following:  · Living will: This is a written record of the treatment you want  You can also choose which treatments you do not want, which to limit, and which to stop at a certain time  This includes surgery, medicine, IV fluid, and tube feedings     · Durable power of  for healthcare [facility]): This is a written record that states who you want to make healthcare choices for you when you are unable to make them for yourself  This person, called a proxy, is usually a family member or a friend  You may choose more than 1 proxy  · Do not resuscitate (DNR) order:  A DNR order is used in case your heart stops beating or you stop breathing  It is a request not to have certain forms of treatment, such as CPR  A DNR order may be included in other types of advance directives  · Medical directive: This covers the care that you want if you are in a coma, near death, or unable to make decisions for yourself  You can list the treatments you want for each condition  Treatment may include pain medicine, surgery, blood transfusions, dialysis, IV or tube feedings, and a ventilator (breathing machine)  · Values history: This document has questions about your views, beliefs, and how you feel and think about life  This information can help others choose the care that you would choose  Why are advance directives important? An advance directive helps you control your care  Although spoken wishes may be used, it is better to have your wishes written down  Spoken wishes can be misunderstood, or not followed  Treatments may be given even if you do not want them  An advance directive may make it easier for your family to make difficult choices about your care  Cigarette Smoking and Your Health   Risks to your health if you smoke:  Nicotine and other chemicals found in tobacco damage every cell in your body  Even if you are a light smoker, you have an increased risk for cancer, heart disease, and lung disease  If you are pregnant or have diabetes, smoking increases your risk for complications  Benefits to your health if you stop smoking:   · You decrease respiratory symptoms such as coughing, wheezing, and shortness of breath     · You reduce your risk for cancers of the lung, mouth, throat, kidney, bladder, pancreas, stomach, and cervix  If you already have cancer, you increase the benefits of chemotherapy  You also reduce your risk for cancer returning or a second cancer from developing  · You reduce your risk for heart disease, blood clots, heart attack, and stroke  · You reduce your risk for lung infections, and diseases such as pneumonia, asthma, chronic bronchitis, and emphysema  · Your circulation improves  More oxygen can be delivered to your body  If you have diabetes, you lower your risk for complications, such as kidney, artery, and eye diseases  You also lower your risk for nerve damage  Nerve damage can lead to amputations, poor vision, and blindness  · You improve your body's ability to heal and to fight infections  For more information and support to stop smoking:   · [employer]  Phone: 6- 653 - [phone]  Web Address: [employer]  Weight Management   Why it is important to manage your weight:  Being overweight increases your risk of health conditions such as heart disease, high blood pressure, type 2 diabetes, and certain types of cancer  It can also increase your risk for osteoarthritis, sleep apnea, and other respiratory problems  Aim for a slow, steady weight loss  Even a small amount of weight loss can lower your risk of health problems  How to lose weight safely:  A safe and healthy way to lose weight is to eat fewer calories and get regular exercise  You can lose up about 1 pound a week by decreasing the number of calories you eat by 500 calories each day  Healthy meal plan for weight management:  A healthy meal plan includes a variety of foods, contains fewer calories, and helps you stay healthy  A healthy meal plan includes the following:  · Eat whole-grain foods more often  A healthy meal plan should contain fiber  Fiber is the part of grains, fruits, and vegetables that is not broken down by your body   Whole-grain foods are healthy and provide extra fiber in your diet  Some examples of whole-grain foods are whole-wheat breads and pastas, oatmeal, brown rice, and bulgur  · Eat a variety of vegetables every day  Include dark, leafy greens such as spinach, kale, carito greens, and mustard greens  Eat yellow and orange vegetables such as carrots, sweet potatoes, and winter squash  · Eat a variety of fruits every day  Choose fresh or canned fruit (canned in its own juice or light syrup) instead of juice  Fruit juice has very little or no fiber  · Eat low-fat dairy foods  Drink fat-free (skim) milk or 1% milk  Eat fat-free yogurt and low-fat cottage cheese  Try low-fat cheeses such as mozzarella and other reduced-fat cheeses  · Choose meat and other protein foods that are low in fat  Choose beans or other legumes such as split peas or lentils  Choose fish, skinless poultry (chicken or turkey), or lean cuts of red meat (beef or pork)  Before you cook meat or poultry, cut off any visible fat  · Use less fat and oil  Try baking foods instead of frying them  Add less fat, such as margarine, sour cream, regular salad dressing and mayonnaise to foods  Eat fewer high-fat foods  Some examples of high-fat foods include french fries, doughnuts, ice cream, and cakes  · Eat fewer sweets  Limit foods and drinks that are high in sugar  This includes candy, cookies, regular soda, and sweetened drinks  Exercise:  Exercise at least 30 minutes per day on most days of the week  Some examples of exercise include walking, biking, dancing, and swimming  You can also fit in more physical activity by taking the stairs instead of the elevator or parking farther away from stores  Ask your healthcare provider about the best exercise plan for you  © Copyright [employer] 2018 Information is for End User's use only and may not be sold, redistributed or otherwise used for commercial purposes   All illustrations and images included in CareNotes® are the copyrighted property of [employer]  or [address] [place] [address]

## 2021-04-19 NOTE — PROGRESS NOTES
FAMILY PRACTICE OFFICE VISIT       NAME: Mayte Poole  AGE: 72 y o  SEX: male       : 1950        MRN: [id number]    Assessment and Plan     Problem List Items Addressed This Visit        Endocrine    Type 2 diabetes mellitus with diabetic polyneuropathy, without long-term current use of insulin ([facility] )       Lab Results   Component Value Date    HGBA1C 6 5 (H) 2021     Stable on metformin and Januvia  Up-to-date on eye exam and foot exam            Relevant Orders    Hemoglobin A1C       Respiratory    Chronic obstructive pulmonary disease ([facility] ) - Primary       Emphysema noted changes on CT a chest in 2019  Denies dyspnea  Occasional  Albuterol inhaler use  May want to consider obtaining pulmonary function studies in the future  Cardiovascular and Mediastinum    Essential hypertension       Stable on lisinopril and Dyazide  Relevant Orders    Comprehensive metabolic panel    CBC and differential    Atrial fibrillation (HCC)       Anticoagulated on Coumadin and aspirin  Regular rate and rhythm on exam today  Follows with Cardiology, Dr Millie Gardner  Abdominal aortic aneurysm (AAA) 3 0 cm to 5 5 cm in diameter in [facility])     US AAA screening 2019:  Mild atherosclerosis  Borderline aneurysmal dilatation of the proximal abdominal aorta measuring up to 3 cm transverse  Follow-up recommended within the next 3 years  CTA chest, abdomen,pelvis May 2019, does not note AAA  Will repeat US AAA to monitor in the next one year  Prescription provided today  Relevant Orders    US abdominal aorta       Other    Mixed hyperlipidemia     Total cholesterol 170, HDL 30, LDL 60  Triglycerides are elevated at 371, this is up from 285 one year ago  Patient attributes this to snacking at night time  Continue Simvastatin and fenofibrate     Notes he is experiencing myalgias, leg cramping, and ankles overturn easily, he is concerned simvastatin may be contributing to symptoms  Discussed Simvastatin may contribute to myalgias, but unlikely leg cramping and ankles overturning  May trial holding simvastatin for two weeks, and monitor for improvement  Update me in 2 weeks  Relevant Orders    Lipid panel    Hiatal hernia      GERD symptoms well controlled on omeprazole 20 mg daily  Smoking     Advised to quit  He is aware of risks of continued smoking  Other Visit Diagnoses     Intermittent claudication ([facility] )        Bilateral lower extremity cramping with walking any distance  Even walking half a block, he has to stop and rest until cramping stops  Cramping does stop with rest   Will check bilateral lower extremity arterial Doppler  Relevant Orders    VAS lower limb arterial duplex, complete bilateral    Medicare annual wellness visit, subsequent             Repeat blood work in 6 months and follow-up in office in 6 months  Patient Instructions       Medicare Preventive Visit Patient Instructions  Thank you for completing your Welcome to Medicare Visit or Medicare Annual Wellness Visit today  Your next wellness visit will be due in one year (4/20/2022)  The screening/preventive services that you may require over the next 5-10 years are detailed below  Some tests may not apply to you based off risk factors and/or age  Screening tests ordered at today's visit but not completed yet may show as past due  Also, please note that scanned in results may not display below    Preventive Screenings:  Service Recommendations Previous Testing/Comments   Colorectal Cancer Screening  · Colonoscopy    · Fecal Occult Blood Test (FOBT)/Fecal Immunochemical Test (FIT)  · Fecal DNA/Cologuard Test  · Flexible Sigmoidoscopy Age: 54-65 years old   Colonoscopy: every 10 years (May be performed more frequently if at higher risk)  OR  FOBT/FIT: every 1 year  OR  Cologuard: every 3 years  OR  Sigmoidoscopy: every 5 years  Screening may be recommended earlier than age 48 if at higher risk for colorectal cancer  Also, an individualized decision between you and your healthcare provider will decide whether screening between the ages of 74-80 would be appropriate  Colonoscopy: 11/21/2019  FOBT/FIT: Not on file  Cologuard: Not on file  Sigmoidoscopy: Not on file    Screening Current     Prostate Cancer Screening Individualized decision between patient and health care provider in men between ages of 53-78   Medicare will cover every 12 months beginning on the day after your 50th birthday PSA: 1 2 ng/mL     Screening Current     Hepatitis C Screening Once for adults born between 1945 and 1965  More frequently in patients at high risk for Hepatitis C Hep C Antibody: 03/05/2019    Screening Current   Diabetes Screening 1-2 times per year if you're at risk for diabetes or have pre-diabetes Fasting glucose: 184 mg/dL   A1C: 6 5 %    Screening Not Indicated  History Diabetes   Cholesterol Screening Once every 5 years if you don't have a lipid disorder  May order more often based on risk factors  Lipid panel: 03/17/2021    Screening Not Indicated  History Lipid Disorder      Other Preventive Screenings Covered by Medicare:  1  Abdominal Aortic Aneurysm (AAA) Screening: covered once if your at risk  You're considered to be at risk if you have a family history of AAA or a male between the age of 73-68 who smoking at least 100 cigarettes in your lifetime  2  Lung Cancer Screening: covers low dose CT scan once per year if you meet all of the following conditions: (1) Age 50-69; (2) No signs or symptoms of lung cancer; (3) Current smoker or have quit smoking within the last 15 years; (4) You have a tobacco smoking history of at least 30 pack years (packs per day x number of years you smoked); (5) You get a written order from a healthcare provider    3  Glaucoma Screening: covered annually if you're considered high risk: (1) You have diabetes OR (2) Family history of glaucoma OR (3)  aged 48 and older OR (3)  American aged 72 and older  3  Osteoporosis Screening: covered every 2 years if you meet one of the following conditions: (1) Have a vertebral abnormality; (2) On glucocorticoid therapy for more than 3 months; (3) Have primary hyperparathyroidism; (4) On osteoporosis medications and need to assess response to drug therapy  5  HIV Screening: covered annually if you're between the age of 12-76  Also covered annually if you are younger than 13 and older than 72 with risk factors for HIV infection  For pregnant patients, it is covered up to 3 times per pregnancy  Immunizations:  Immunization Recommendations   Influenza Vaccine Annual influenza vaccination during flu season is recommended for all persons aged >= 6 months who do not have contraindications   Pneumococcal Vaccine (Prevnar and Pneumovax)  * Prevnar = PCV13  * Pneumovax = PPSV23 Adults 25-60 years old: 1-3 doses may be recommended based on certain risk factors  Adults 72 years old: Prevnar (PCV13) vaccine recommended followed by Pneumovax (PPSV23) vaccine  If already received PPSV23 since turning 65, then PCV13 recommended at least one year after PPSV23 dose  Hepatitis B Vaccine 3 dose series if at intermediate or high risk (ex: diabetes, end stage renal disease, liver disease)   Tetanus (Td) Vaccine - COST NOT COVERED BY MEDICARE PART B Following completion of primary series, a booster dose should be given every 10 years to maintain immunity against tetanus  Td may also be given as tetanus wound prophylaxis  Tdap Vaccine - COST NOT COVERED BY MEDICARE PART B Recommended at least once for all adults  For pregnant patients, recommended with each pregnancy     Shingles Vaccine (Shingrix) - COST NOT COVERED BY MEDICARE PART B  2 shot series recommended in those aged 48 and above     Health Maintenance Due:      Topic Date Due    Colonoscopy Surveillance  11/21/2022    Colorectal Cancer Screening  11/21/2029    Hepatitis C Screening  Completed     Immunizations Due:      Topic Date Due    DTaP,Tdap,and Td Vaccines (1 - Tdap) Never done     Advance Directives   What are advance directives? Advance directives are legal documents that state your wishes and plans for medical care  These plans are made ahead of time in case you lose your ability to make decisions for yourself  Advance directives can apply to any medical decision, such as the treatments you want, and if you want to donate organs  What are the types of advance directives? There are many types of advance directives, and each state has rules about how to use them  You may choose a combination of any of the following:  · Living will: This is a written record of the treatment you want  You can also choose which treatments you do not want, which to limit, and which to stop at a certain time  This includes surgery, medicine, IV fluid, and tube feedings  · Durable power of  for healthcare [facility]): This is a written record that states who you want to make healthcare choices for you when you are unable to make them for yourself  This person, called a proxy, is usually a family member or a friend  You may choose more than 1 proxy  · Do not resuscitate (DNR) order:  A DNR order is used in case your heart stops beating or you stop breathing  It is a request not to have certain forms of treatment, such as CPR  A DNR order may be included in other types of advance directives  · Medical directive: This covers the care that you want if you are in a coma, near death, or unable to make decisions for yourself  You can list the treatments you want for each condition  Treatment may include pain medicine, surgery, blood transfusions, dialysis, IV or tube feedings, and a ventilator (breathing machine)  · Values history: This document has questions about your views, beliefs, and how you feel and think about life   This information can help others choose the care that you would choose  Why are advance directives important? An advance directive helps you control your care  Although spoken wishes may be used, it is better to have your wishes written down  Spoken wishes can be misunderstood, or not followed  Treatments may be given even if you do not want them  An advance directive may make it easier for your family to make difficult choices about your care  Cigarette Smoking and Your Health   Risks to your health if you smoke:  Nicotine and other chemicals found in tobacco damage every cell in your body  Even if you are a light smoker, you have an increased risk for cancer, heart disease, and lung disease  If you are pregnant or have diabetes, smoking increases your risk for complications  Benefits to your health if you stop smoking:   · You decrease respiratory symptoms such as coughing, wheezing, and shortness of breath  · You reduce your risk for cancers of the lung, mouth, throat, kidney, bladder, pancreas, stomach, and cervix  If you already have cancer, you increase the benefits of chemotherapy  You also reduce your risk for cancer returning or a second cancer from developing  · You reduce your risk for heart disease, blood clots, heart attack, and stroke  · You reduce your risk for lung infections, and diseases such as pneumonia, asthma, chronic bronchitis, and emphysema  · Your circulation improves  More oxygen can be delivered to your body  If you have diabetes, you lower your risk for complications, such as kidney, artery, and eye diseases  You also lower your risk for nerve damage  Nerve damage can lead to amputations, poor vision, and blindness  · You improve your body's ability to heal and to fight infections  For more information and support to stop smoking:   · [employer]  Phone: 6- 527 - [phone]  Web Address: www [employer]  Weight Management   Why it is important to manage your weight:  Being overweight increases your risk of health conditions such as heart disease, high blood pressure, type 2 diabetes, and certain types of cancer  It can also increase your risk for osteoarthritis, sleep apnea, and other respiratory problems  Aim for a slow, steady weight loss  Even a small amount of weight loss can lower your risk of health problems  How to lose weight safely:  A safe and healthy way to lose weight is to eat fewer calories and get regular exercise  You can lose up about 1 pound a week by decreasing the number of calories you eat by 500 calories each day  Healthy meal plan for weight management:  A healthy meal plan includes a variety of foods, contains fewer calories, and helps you stay healthy  A healthy meal plan includes the following:  · Eat whole-grain foods more often  A healthy meal plan should contain fiber  Fiber is the part of grains, fruits, and vegetables that is not broken down by your body  Whole-grain foods are healthy and provide extra fiber in your diet  Some examples of whole-grain foods are whole-wheat breads and pastas, oatmeal, brown rice, and bulgur  · Eat a variety of vegetables every day  Include dark, leafy greens such as spinach, kale, carito greens, and mustard greens  Eat yellow and orange vegetables such as carrots, sweet potatoes, and winter squash  · Eat a variety of fruits every day  Choose fresh or canned fruit (canned in its own juice or light syrup) instead of juice  Fruit juice has very little or no fiber  · Eat low-fat dairy foods  Drink fat-free (skim) milk or 1% milk  Eat fat-free yogurt and low-fat cottage cheese  Try low-fat cheeses such as mozzarella and other reduced-fat cheeses  · Choose meat and other protein foods that are low in fat  Choose beans or other legumes such as split peas or lentils  Choose fish, skinless poultry (chicken or turkey), or lean cuts of red meat (beef or pork)  Before you cook meat or poultry, cut off any visible fat  · Use less fat and oil    Try baking foods instead of frying them  Add less fat, such as margarine, sour cream, regular salad dressing and mayonnaise to foods  Eat fewer high-fat foods  Some examples of high-fat foods include french fries, doughnuts, ice cream, and cakes  · Eat fewer sweets  Limit foods and drinks that are high in sugar  This includes candy, cookies, regular soda, and sweetened drinks  Exercise:  Exercise at least 30 minutes per day on most days of the week  Some examples of exercise include walking, biking, dancing, and swimming  You can also fit in more physical activity by taking the stairs instead of the elevator or parking farther away from stores  Ask your healthcare provider about the best exercise plan for you  © Copyright [employer] 2018 Information is for End User's use only and may not be sold, redistributed or otherwise used for commercial purposes  All illustrations and images included in CareNotes® are the copyrighted property of A D A M , Inc  or [facility] [employer]       1  Chronic obstructive pulmonary disease, unspecified COPD type ([address] )     2  Abdominal aortic aneurysm (AAA) 3 0 cm to 5 5 cm in diameter in male [facility])  US abdominal aorta   3  Paroxysmal atrial fibrillation (HCC)     4  Essential hypertension  Comprehensive metabolic panel    CBC and differential   5  Intermittent claudication (HCC)  VAS lower limb arterial duplex, complete bilateral   6  Type 2 diabetes mellitus with diabetic polyneuropathy, without long-term current use of insulin (HCC)  Hemoglobin A1C   7  Mixed hyperlipidemia  Lipid panel   8  Hiatal hernia     9  Smoking     10  Medicare annual wellness visit, subsequent             Chief Complaint     Chief Complaint   Patient presents with    Medicare Wellness Visit       History of Present Illness     Austin White 62-year-old male presenting today for follow-up on chronic conditions  Has muscle aches, leg cramping with walking, and ankles that easily over turn    He is wondering if simvastatin is contributing to these symptoms  Experiences burning and cramping with walking any distance  He tries to walk for exercise, but can only get about half a block before he has to stop due to burning and cramping in his calves  Bilateral knee pain, left is greater than the right  States many years ago he had a left knee injury, when in ammunition trailer back in to his left knee  At the time,  He was told it was just bruised  He is considering orthopedic evaluation, but is not ready yet  Breathing feels good  Occasional use of albuterol inhaler  Quit smoking cigarettes 30 years ago  Now smoking a pipe  Daily omeprazole is effective  For GERD symptoms  Diabetes:  Eye exam and foot exam are up-to-date  Triglycerides are elevated today  Notes nighttime snacks are his weakness  Review of Systems   Review of Systems   Constitutional: Negative  HENT: Negative  Respiratory: Negative  Negative for cough, chest tightness, shortness of breath and wheezing  Cardiovascular: Negative  Negative for chest pain, palpitations and leg swelling  Gastrointestinal: Negative  Genitourinary: Negative  Musculoskeletal: Positive for arthralgias (knee pain) and myalgias  Leg cramping   Skin: Negative  Neurological: Positive for numbness (bilateral neuropathy of feet)  Hematological: Negative  Psychiatric/Behavioral: Negative          Active Problem List     Patient Active Problem List   Diagnosis    Essential hypertension    Mixed hyperlipidemia    Type 2 diabetes mellitus with diabetic polyneuropathy, without long-term current use of insulin (HCC)    Fatty liver    History of DVT (deep vein thrombosis)    Anticoagulated on Coumadin    Type 2 diabetes mellitus with diabetic neuropathy (HCC)    Atrial fibrillation (HCC)    History of colon polyps    Gastroesophageal reflux disease without esophagitis    Anticoagulant long-term use    Ureteric colic  Ureteral stone with hydronephrosis    Warthin's tumor    Hiatal hernia    Abdominal aortic aneurysm (AAA) 3 0 cm to 5 5 cm in diameter in male (HCC)    Smoking    Chronic obstructive pulmonary disease (HCC)    Prostatic disorder    Trigger ring finger of right hand    High blood monocyte count       Past Medical History:  Past Medical History:   Diagnosis Date    Arthritis     Atherosclerosis of native arteries of the extremities with ulceration ([facility] [facility] ) 3/21/2019    Claudication, intermittent (HCC)     Colon polyp     6 polyps 3 years ago    Diabetes mellitus ([facility] )     type 2    Diabetic neuropathic arthropathy (HCC)     causes weakness in LE and uses a cane as assist to walk    Diabetic neuropathy ([facility] )     Fatty liver     GERD (gastroesophageal reflux disease)     History of DVT (deep vein thrombosis) 2015    left LE    Hyperlipidemia     Hypertension     Kidney stone     Parotid tumor     Seasonal allergies     Spinal stenosis        Past Surgical History:  Past Surgical History:   Procedure Laterality Date    COLONOSCOPY  05/16/2019    completed by Dr Naomy Hermosillo, Repeat 3 years    [facility] RETROGRADE PYELOGRAM  5/17/2019    PAROTIDECTOMY Left     ME CYSTO/URETERO W/LITHOTRIPSY &INDWELL STENT INSRT Right 5/17/2019    Procedure: CYSTOSCOPY URETEROSCOPY WITH LITHOTRIPSY HOLMIUM LASER, RETROGRADE PYELOGRAM AND INSERTION STENT URETERAL--possible stent only;  Surgeon: Kiera Zamora MD;  [facility];  Service: Urology    ME EXC PAROTD,LAT LOBE,DISSECT 5TH NERV Right 10/18/2017    Procedure: SUPERFICIAL PAROTIDECTOMY WITH NERVE DISSECTION AND PRESERVATION;  Surgeon: Brian Contreras MD;  [facility];  Service: ENT    ME INCISE FINGER TENDON SHEATH Right 11/3/2020    Procedure: RING TRIGGER FINGER RELEASE;  Surgeon: Vandana Lowe MD;  [facility];  Service: Orthopedics    ROTATOR CUFF REPAIR Bilateral     TONSILLECTOMY         Family History:  Family History   Problem Relation Age of Onset    Lupus Mother     Rheum arthritis Mother     Cirrhosis Father     Alcohol abuse Father     Substance Abuse Brother        Social History:  Social History     Socioeconomic History    Marital status: /Civil Union     Spouse name: Not on file    Number of children: Not on file    Years of education: Not on file    Highest education level: Not on file   Occupational History    Not on file   Social Needs    Financial resource strain: Not on file    Food insecurity     Worry: Not on file     Inability: Not on file   [employer] needs     Medical: Not on file     Non-medical: Not on file   Tobacco Use    Smoking status: Current Every Day Smoker     Types: Pipe    Smokeless tobacco: Never Used    Tobacco comment: smokes 2 pipes daily   Substance and Sexual Activity    Alcohol use: Yes     Frequency: 2-4 times a month     Drinks per session: 1 or 2     Comment: socially    Drug use: No    Sexual activity: Not on file   Lifestyle    Physical activity     Days per week: Not on file     Minutes per session: Not on file    Stress: Not on file   Relationships    Social connections     Talks on phone: Not on file     Gets together: Not on file     Attends Mandaeism service: Not on file     Active member of club or organization: Not on file     Attends meetings of clubs or organizations: Not on file     Relationship status: Not on file    Intimate partner violence     Fear of current or ex partner: Not on file     Emotionally abused: Not on file     Physically abused: Not on file     Forced sexual activity: Not on file   Other Topics Concern    Not on file   Social History Narrative        [place]       I have reviewed the patient's medical history in detail; there are no changes to the history as noted in the electronic medical record      Objective     Vitals:    04/19/21 0919   BP: 120/80   Pulse: 78   Resp: 20   Temp: 98 7 °F (37 1 °C)   TempSrc: Temporal   SpO2: 97%   Weight: 97 2 kg (214 lb 3 2 oz)   Height: 5' 11" (1 803 m)     Wt Readings from Last 3 Encounters:   04/19/21 97 2 kg (214 lb 3 2 oz)   03/30/21 97 5 kg (215 lb)   03/29/21 98 5 kg (217 lb 3 2 oz)     Physical Exam  Vitals signs and nursing note reviewed  Constitutional:       General: He is not in acute distress  Appearance: Normal appearance  He is not ill-appearing, toxic-appearing or diaphoretic  HENT:      Head: Normocephalic and atraumatic  Right Ear: Tympanic membrane and ear canal normal       Left Ear: Tympanic membrane and ear canal normal       Mouth/Throat:      Mouth: Mucous membranes are moist       Pharynx: Oropharynx is clear  Eyes:      Conjunctiva/sclera: Conjunctivae normal       Pupils: Pupils are equal, round, and reactive to light  Neck:      Musculoskeletal: Normal range of motion and neck supple  Thyroid: No thyromegaly  Vascular: No carotid bruit  Cardiovascular:      Rate and Rhythm: Normal rate and regular rhythm  Heart sounds: No murmur  Comments: Bilateral lower extremities with PVD discoloration, hairless  Pulmonary:      Effort: Pulmonary effort is normal  No respiratory distress  Breath sounds: Normal breath sounds  No wheezing or rales  Musculoskeletal:      Right lower leg: No edema  Left lower leg: No edema  Lymphadenopathy:      Cervical: No cervical adenopathy  Skin:     General: Skin is warm and dry  Neurological:      Mental Status: He is alert and oriented to person, place, and time  Comments:   Ambulates with a cane  Psychiatric:         Mood and Affect: Mood normal        BMI Counseling: Body mass index is 29 87 kg/m²  The BMI is above normal  Nutrition recommendations include moderation in carbohydrate intake and increasing intake of lean protein  Tobacco Cessation Counseling: Tobacco cessation counseling and education was provided   The patient is sincerely urged to quit consumption of tobacco  He is not ready to quit tobacco  The numerous health risks of tobacco consumption were discussed  He will call if needing any assistance with cessation       ALLERGIES:  No Known Allergies    Current Medications     Current Outpatient Medications   Medication Sig Dispense Refill    acetaminophen (TYLENOL) 325 mg tablet 2, by mouth, every 6 hours as needed for mild to moderate pain  30 tablet 0    aspirin (ECOTRIN LOW STRENGTH) 81 mg EC tablet Take 81 mg by mouth daily      Cholecalciferol (VITAMIN D3 PO) Take 50 mcg by mouth daily      Cyanocobalamin (VITAMIN B 12 PO) Take 1,000 mcg by mouth daily      fenofibrate micronized (LOFIBRA) 134 MG capsule Take 1 capsule (134 mg total) by mouth daily with breakfast 90 capsule 3    Januvia 100 MG tablet TAKE 1 TABLET BY MOUTH EVERY DAY 90 tablet 3    lisinopril (ZESTRIL) 20 mg tablet Take 1 tablet (20 mg total) by mouth daily 90 tablet 4    Magnesium 400 MG CAPS Take by mouth daily      metFORMIN (GLUCOPHAGE) 1000 MG tablet TAKE 1 TABLET BY MOUTH TWICE A DAY WITH MEALS 180 tablet 1    omeprazole (PriLOSEC) 20 mg delayed release capsule TAKE 1 CAPSULE BY MOUTH EVERY DAY 90 capsule 1    ProAir  (90 Base) MCG/ACT inhaler INHALE 2 PUFFS EVERY 4 (FOUR) HOURS AS NEEDED FOR WHEEZING OR SHORTNESS OF BREATH  17 Inhaler 1    simvastatin (ZOCOR) 40 mg tablet Take 1 tablet (40 mg total) by mouth daily 90 tablet 4    tamsulosin (FLOMAX) 0 4 mg TAKE 1 CAPSULE BY MOUTH EVERY DAY WITH DINNER 90 capsule 3    triamterene-hydrochlorothiazide (DYAZIDE) 37 5-25 mg per capsule TAKE 1 CAPSULE BY MOUTH EVERY DAY 90 capsule 3    warfarin (COUMADIN) 2 5 mg tablet Take 1 tablet by mouth daily T-Th-S-S takes 2 5mg     M-W-F takes 5 0 mg      warfarin (COUMADIN) 5 mg tablet TAKE 1 TABLET EVERY DAY OR AS DIRECTED BY MD 90 tablet 3     No current facility-administered medications for this visit            Health Maintenance     Health Maintenance   Topic Date Due    DTaP,Tdap,and Td Vaccines (1 - Tdap) Never done    OT PLAN OF CARE  08/18/2019    Fall Risk  04/28/2021    Medicare Annual Wellness Visit (AWV)  04/28/2021    HEMOGLOBIN A1C  09/17/2021    Diabetic Foot Exam  03/29/2022    Depression Screening PHQ  04/19/2022    BMI: Followup Plan  04/19/2022    BMI: Adult  04/19/2022    Colonoscopy Surveillance  11/21/2022    DM Eye Exam  04/12/2023    Colorectal Cancer Screening  11/21/2029    Hepatitis C Screening  Completed    Pneumococcal Vaccine: 65+ Years  Completed    Influenza Vaccine  Completed    COVID-19 Vaccine  Completed    HIB Vaccine  Aged Out    Hepatitis B Vaccine  Aged Out    IPV Vaccine  Aged Out    Hepatitis A Vaccine  Aged Out    Meningococcal ACWY Vaccine  Aged Out    HPV Vaccine  Aged Out     Immunization History   Administered Date(s) Administered    Hep B, adult 03/19/2019, 04/23/2019, 09/19/2019    INFLUENZA 09/13/2018, 09/17/2018    Influenza, high dose seasonal 0 7 mL 09/19/2019, 08/24/2020    Pneumococcal Conjugate 13-Valent 03/19/2019    Pneumococcal Polysaccharide PPV23 08/17/2020    SARS-CoV-2 / COVID-19 mRNA IM (Pfizer-BioNTech) 03/05/2021, 03/26/2021    Zoster Vaccine Recombinant 07/30/2018, 09/29/2018       DAYAMI Sims Statement Selected

## 2021-04-21 NOTE — ASSESSMENT & PLAN NOTE
Total cholesterol 170, HDL 30, LDL 60  Triglycerides are elevated at 371, this is up from 285 one year ago  Patient attributes this to snacking at night time  Continue Simvastatin and fenofibrate  Notes he is experiencing myalgias, leg cramping, and ankles overturn easily, he is concerned simvastatin may be contributing to symptoms  Discussed Simvastatin may contribute to myalgias, but unlikely leg cramping and ankles overturning  May trial holding simvastatin for two weeks, and monitor for improvement  Update me in 2 weeks

## 2021-04-21 NOTE — ASSESSMENT & PLAN NOTE
Emphysema noted changes on CT a chest in 2019  Denies dyspnea  Occasional  Albuterol inhaler use  May want to consider obtaining pulmonary function studies in the future

## 2021-04-21 NOTE — ASSESSMENT & PLAN NOTE
Anticoagulated on Coumadin and aspirin  Regular rate and rhythm on exam today  Follows with Cardiology, Dr Janelle Wills

## 2021-04-21 NOTE — ASSESSMENT & PLAN NOTE
Lab Results   Component Value Date    HGBA1C 6 5 (H) 03/17/2021     Stable on metformin and Januvia    Up-to-date on eye exam and foot exam

## 2021-04-21 NOTE — ASSESSMENT & PLAN NOTE
US AAA screening March 2019:  Mild atherosclerosis  Borderline aneurysmal dilatation of the proximal abdominal aorta measuring up to 3 cm transverse  Follow-up recommended within the next 3 years  CTA chest, abdomen,pelvis May 2019, does not note AAA  Will repeat US AAA to monitor in the next one year  Prescription provided today

## 2021-04-21 NOTE — ASSESSMENT & PLAN NOTE
Anticoagulated on Coumadin and aspirin  Regular rate and rhythm on exam today  Follows with Cardiology, Dr Lynsey Alberto

## 2021-05-07 DIAGNOSIS — K21.9 GASTROESOPHAGEAL REFLUX DISEASE WITHOUT ESOPHAGITIS: ICD-10-CM

## 2021-05-07 DIAGNOSIS — E78.2 MIXED HYPERLIPIDEMIA: ICD-10-CM

## 2021-05-07 RX ORDER — OMEPRAZOLE 20 MG/1
CAPSULE, DELAYED RELEASE ORAL
Qty: 90 CAPSULE | Refills: 1 | Status: SHIPPED | OUTPATIENT
Start: 2021-05-07 | End: 2021-10-29

## 2021-05-07 RX ORDER — FENOFIBRATE 134 MG/1
134 CAPSULE ORAL
Qty: 90 CAPSULE | Refills: 3 | Status: SHIPPED | OUTPATIENT
Start: 2021-05-07 | End: 2022-04-19

## 2021-05-08 DIAGNOSIS — I10 ESSENTIAL HYPERTENSION: ICD-10-CM

## 2021-05-09 RX ORDER — LISINOPRIL 20 MG/1
TABLET ORAL
Qty: 90 TABLET | Refills: 4 | Status: SHIPPED | OUTPATIENT
Start: 2021-05-09 | End: 2022-05-01

## 2021-05-11 ENCOUNTER — OFFICE VISIT (OUTPATIENT)
Dept: CARDIOLOGY CLINIC | Facility: CLINIC | Age: 71
End: 2021-05-11
Payer: MEDICARE

## 2021-05-11 VITALS
BODY MASS INDEX: 30.07 KG/M2 | DIASTOLIC BLOOD PRESSURE: 78 MMHG | HEART RATE: 81 BPM | HEIGHT: 71 IN | SYSTOLIC BLOOD PRESSURE: 146 MMHG | OXYGEN SATURATION: 98 % | WEIGHT: 214.8 LBS

## 2021-05-11 DIAGNOSIS — E78.2 MIXED HYPERLIPIDEMIA: ICD-10-CM

## 2021-05-11 DIAGNOSIS — I71.4 ABDOMINAL AORTIC ANEURYSM (AAA) 3.0 CM TO 5.5 CM IN DIAMETER IN MALE (HCC): ICD-10-CM

## 2021-05-11 DIAGNOSIS — I73.9 INTERMITTENT CLAUDICATION (HCC): ICD-10-CM

## 2021-05-11 DIAGNOSIS — I48.91 ATRIAL FIBRILLATION, UNSPECIFIED TYPE (HCC): ICD-10-CM

## 2021-05-11 DIAGNOSIS — I10 ESSENTIAL HYPERTENSION: Primary | ICD-10-CM

## 2021-05-11 PROCEDURE — 99214 OFFICE O/P EST MOD 30 MIN: CPT | Performed by: INTERNAL MEDICINE

## 2021-05-11 NOTE — ASSESSMENT & PLAN NOTE
A remote history of paroxysmal atrial fibrillation, stable  The patient is in regular rhythm  We will continue present medications

## 2021-05-11 NOTE — ASSESSMENT & PLAN NOTE
The patient describes leg pain upon exertion suggestive of leg claudication  The patient is scheduled for vascular study on the lower extremities tomorrow

## 2021-05-11 NOTE — PROGRESS NOTES
Assessment/Plan:    Abdominal aortic aneurysm (AAA) 3 0 cm to 5 5 cm in diameter in male [facility])  History of abdominal aortic aneurysm  The patient is scheduled for follow-up aortic abdominal ultrasound  Atrial fibrillation (HCC)  A remote history of paroxysmal atrial fibrillation, stable  The patient is in regular rhythm  We will continue present medications  Essential hypertension  Hypertension, stable and adequately controlled  Mixed hyperlipidemia  Hyperlipidemia, stable  The patient will continue fenofibrate at 134 mg daily and simvastatin at 40 mg daily  Intermittent claudication (HCC)  The patient describes leg pain upon exertion suggestive of leg claudication  The patient is scheduled for vascular study on the lower extremities tomorrow  Diagnoses and all orders for this visit:    Essential hypertension    Atrial fibrillation, unspecified type ([address] )    Abdominal aortic aneurysm (AAA) 3 0 cm to 5 5 cm in diameter in male [facility])    Mixed hyperlipidemia    Intermittent claudication (HCC)          Subjective:  Leg pain upon walking  Patient ID: Daryl Gotti is a 72 y o  male  The patient presented to this office for the purpose of cardiac follow-up  He is known to have history of hypertension hyperlipidemia as well as diabetes mellitus  He also a history of DVT and paroxysmal atrial fibrillation in the past   He has been observed for evidence of abdominal aortic aneurysm  The patient has been feeling well from the cardiac standpoint denying any symptoms of chest pain, shortness of breath, palpitation, dizziness or lightheadedness  He has no leg edema however he has leg pain upon walking        The following portions of the patient's history were reviewed and updated as appropriate: allergies, current medications, past family history, past medical history, past social history, past surgical history and problem list     Review of Systems   Respiratory: Negative for apnea, cough, chest tightness, shortness of breath and wheezing  Cardiovascular: Negative for chest pain, palpitations and leg swelling  Gastrointestinal: Negative for abdominal pain  Neurological: Negative for dizziness and light-headedness  Psychiatric/Behavioral: Negative  Objective:  Stable cardiac-wise  /78 (BP Location: Left arm, Patient Position: Sitting, Cuff Size: Standard)   Pulse 81   Ht 5' 11" (1 803 m)   Wt 97 4 kg (214 lb 12 8 oz)   SpO2 98%   BMI 29 96 kg/m²          Physical Exam  Vitals signs reviewed  Constitutional:       General: He is not in acute distress  Appearance: He is well-developed  He is not diaphoretic  HENT:      Head: Normocephalic  Eyes:      Pupils: Pupils are equal, round, and reactive to light  Neck:      Musculoskeletal: Normal range of motion  Thyroid: No thyromegaly  Vascular: No JVD  Cardiovascular:      Rate and Rhythm: Normal rate and regular rhythm  Heart sounds: S1 normal and S2 normal  Murmur present  Crescendo  systolic murmur present with a grade of 1/6  No friction rub  No gallop  Pulmonary:      Effort: Pulmonary effort is normal  No respiratory distress  Breath sounds: Normal breath sounds  No wheezing or rales  Chest:      Chest wall: No tenderness  Abdominal:      Palpations: Abdomen is soft  Musculoskeletal: Normal range of motion  General: No tenderness or deformity  Right lower leg: No edema  Left lower leg: No edema  Skin:     General: Skin is warm and dry  Neurological:      Mental Status: He is alert and oriented to person, place, and time     Psychiatric:         Mood and Affect: Mood normal          Behavior: Behavior normal

## 2021-05-11 NOTE — LETTER
May 11, 2021     DAYAMI Wilkins  [address] [facility] [address]    Patient: Blanka Álvarez   YOB: 1950   Date of Visit: 5/11/2021       Dear Dr Delonte Benavidez: Thank you for referring Chico Cordovajoana to me for evaluation  Below are my notes for this consultation  If you have questions, please do not hesitate to call me  I look forward to following your patient along with you  Sincerely,        Ben Gray MD        CC: No Recipients  Ben Gray MD  5/11/2021  9:27 AM  Sign when Signing Visit  Assessment/Plan:    Abdominal aortic aneurysm (AAA) 3 0 cm to 5 5 cm in diameter in [facility])  History of abdominal aortic aneurysm  The patient is scheduled for follow-up aortic abdominal ultrasound  Atrial fibrillation (HCC)  A remote history of paroxysmal atrial fibrillation, stable  The patient is in regular rhythm  We will continue present medications  Essential hypertension  Hypertension, stable and adequately controlled  Mixed hyperlipidemia  Hyperlipidemia, stable  The patient will continue fenofibrate at 134 mg daily and simvastatin at 40 mg daily  Intermittent claudication (HCC)  The patient describes leg pain upon exertion suggestive of leg claudication  The patient is scheduled for vascular study on the lower extremities tomorrow  Diagnoses and all orders for this visit:    Essential hypertension    Atrial fibrillation, unspecified type ([address] )    Abdominal aortic aneurysm (AAA) 3 0 cm to 5 5 cm in diameter in [facility])    Mixed hyperlipidemia    Intermittent claudication (HCC)          Subjective:  Leg pain upon walking  Patient ID: Blanka Álvarez is a 72 y o  male  The patient presented to this office for the purpose of cardiac follow-up  He is known to have history of hypertension hyperlipidemia as well as diabetes mellitus  He also a history of DVT and paroxysmal atrial fibrillation in the past   He has been observed for evidence of abdominal aortic aneurysm  The patient has been feeling well from the cardiac standpoint denying any symptoms of chest pain, shortness of breath, palpitation, dizziness or lightheadedness  He has no leg edema however he has leg pain upon walking  The following portions of the patient's history were reviewed and updated as appropriate: allergies, current medications, past family history, past medical history, past social history, past surgical history and problem list     Review of Systems   Respiratory: Negative for apnea, cough, chest tightness, shortness of breath and wheezing  Cardiovascular: Negative for chest pain, palpitations and leg swelling  Gastrointestinal: Negative for abdominal pain  Neurological: Negative for dizziness and light-headedness  Psychiatric/Behavioral: Negative  Objective:  Stable cardiac-wise  /78 (BP Location: Left arm, Patient Position: Sitting, Cuff Size: Standard)   Pulse 81   Ht 5' 11" (1 803 m)   Wt 97 4 kg (214 lb 12 8 oz)   SpO2 98%   BMI 29 96 kg/m²          Physical Exam  Vitals signs reviewed  Constitutional:       General: He is not in acute distress  Appearance: He is well-developed  He is not diaphoretic  HENT:      Head: Normocephalic  Eyes:      Pupils: Pupils are equal, round, and reactive to light  Neck:      Musculoskeletal: Normal range of motion  Thyroid: No thyromegaly  Vascular: No JVD  Cardiovascular:      Rate and Rhythm: Normal rate and regular rhythm  Heart sounds: S1 normal and S2 normal  Murmur present  Crescendo  systolic murmur present with a grade of 1/6  No friction rub  No gallop  Pulmonary:      Effort: Pulmonary effort is normal  No respiratory distress  Breath sounds: Normal breath sounds  No wheezing or rales  Chest:      Chest wall: No tenderness  Abdominal:      Palpations: Abdomen is soft  Musculoskeletal: Normal range of motion  General: No tenderness or deformity        Right lower leg: No edema  Left lower leg: No edema  Skin:     General: Skin is warm and dry  Neurological:      Mental Status: He is alert and oriented to person, place, and time     Psychiatric:         Mood and Affect: Mood normal          Behavior: Behavior normal

## 2021-05-11 NOTE — ASSESSMENT & PLAN NOTE
History of abdominal aortic aneurysm  The patient is scheduled for follow-up aortic abdominal ultrasound

## 2021-05-11 NOTE — ASSESSMENT & PLAN NOTE
Hyperlipidemia, stable  The patient will continue fenofibrate at 134 mg daily and simvastatin at 40 mg daily

## 2021-05-12 ENCOUNTER — HOSPITAL ENCOUNTER (OUTPATIENT)
Dept: NON INVASIVE DIAGNOSTICS | Facility: CLINIC | Age: 71
Discharge: HOME | End: 2021-05-12
Payer: MEDICARE

## 2021-05-12 ENCOUNTER — HOSPITAL ENCOUNTER (OUTPATIENT)
Dept: ULTRASOUND IMAGING | Facility: HOSPITAL | Age: 71
Discharge: HOME | End: 2021-05-12
Payer: MEDICARE

## 2021-05-12 DIAGNOSIS — I73.9 INTERMITTENT CLAUDICATION (HCC): ICD-10-CM

## 2021-05-12 DIAGNOSIS — I71.4 ABDOMINAL AORTIC ANEURYSM (AAA) 3.0 CM TO 5.5 CM IN DIAMETER IN MALE (HCC): ICD-10-CM

## 2021-05-12 PROCEDURE — 93923 UPR/LXTR ART STDY 3+ LVLS: CPT

## 2021-05-12 PROCEDURE — 93922 UPR/L XTREMITY ART 2 LEVELS: CPT | Performed by: SURGERY

## 2021-05-12 PROCEDURE — 93925 LOWER EXTREMITY STUDY: CPT | Performed by: SURGERY

## 2021-05-12 PROCEDURE — 76775 US EXAM ABDO BACK WALL LIM: CPT

## 2021-05-12 PROCEDURE — 93925 LOWER EXTREMITY STUDY: CPT

## 2021-05-14 ENCOUNTER — TELEPHONE (OUTPATIENT)
Dept: FAMILY MEDICINE CLINIC | Facility: CLINIC | Age: 71
End: 2021-05-14

## 2021-05-14 DIAGNOSIS — I73.9 PERIPHERAL ARTERIAL DISEASE (HCC): Primary | ICD-10-CM

## 2021-05-14 NOTE — TELEPHONE ENCOUNTER
Please contact patient with results of recent testing  Ultrasound for abdominal aortic aneurysm is normal  There is no aneurysm detected on this ultrasound  No need for further follow up on this  Lower extremity arterial doppler study:  Right leg has one artery that has 50-75% narrowing  This could be cause of right leg cramping  Left leg has no narrowing of arteries  Please follow up with vascular surgery for this, Dr Jonathan Adhikari Doctor  Please provide contact information

## 2021-05-17 ENCOUNTER — ANTICOAG VISIT (OUTPATIENT)
Dept: CARDIOLOGY CLINIC | Facility: CLINIC | Age: 71
End: 2021-05-17

## 2021-05-17 ENCOUNTER — APPOINTMENT (OUTPATIENT)
Dept: LAB | Age: 71
End: 2021-05-17
Payer: MEDICARE

## 2021-05-17 ENCOUNTER — TRANSCRIBE ORDERS (OUTPATIENT)
Dept: ADMINISTRATIVE | Age: 71
End: 2021-05-17

## 2021-05-17 DIAGNOSIS — Z86.718 HISTORY OF DVT (DEEP VEIN THROMBOSIS): ICD-10-CM

## 2021-05-17 DIAGNOSIS — E78.2 MIXED HYPERLIPIDEMIA: ICD-10-CM

## 2021-05-17 DIAGNOSIS — I48.91 ATRIAL FIBRILLATION, UNSPECIFIED TYPE (HCC): ICD-10-CM

## 2021-05-17 DIAGNOSIS — I10 ESSENTIAL HYPERTENSION: ICD-10-CM

## 2021-05-17 DIAGNOSIS — E11.42 TYPE 2 DIABETES MELLITUS WITH DIABETIC POLYNEUROPATHY, WITHOUT LONG-TERM CURRENT USE OF INSULIN (HCC): ICD-10-CM

## 2021-05-17 DIAGNOSIS — Z79.01 ANTICOAGULANT LONG-TERM USE: ICD-10-CM

## 2021-05-23 DIAGNOSIS — E78.2 MIXED HYPERLIPIDEMIA: ICD-10-CM

## 2021-05-23 RX ORDER — SIMVASTATIN 40 MG
TABLET ORAL
Qty: 90 TABLET | Refills: 4 | Status: SHIPPED | OUTPATIENT
Start: 2021-05-23 | End: 2022-07-25

## 2021-06-03 ENCOUNTER — CONSULT (OUTPATIENT)
Dept: VASCULAR SURGERY | Facility: CLINIC | Age: 71
End: 2021-06-03
Payer: MEDICARE

## 2021-06-03 VITALS
WEIGHT: 214 LBS | HEART RATE: 86 BPM | BODY MASS INDEX: 29.96 KG/M2 | DIASTOLIC BLOOD PRESSURE: 76 MMHG | TEMPERATURE: 98.6 F | SYSTOLIC BLOOD PRESSURE: 140 MMHG | HEIGHT: 71 IN

## 2021-06-03 DIAGNOSIS — E11.40 TYPE 2 DIABETES MELLITUS WITH DIABETIC NEUROPATHY, WITHOUT LONG-TERM CURRENT USE OF INSULIN (HCC): ICD-10-CM

## 2021-06-03 DIAGNOSIS — I73.9 PERIPHERAL ARTERIAL DISEASE (HCC): ICD-10-CM

## 2021-06-03 DIAGNOSIS — F17.200 SMOKING: ICD-10-CM

## 2021-06-03 DIAGNOSIS — I73.9 PERIPHERAL ARTERIAL DISEASE (HCC): Primary | ICD-10-CM

## 2021-06-03 DIAGNOSIS — Z86.718 HISTORY OF DVT (DEEP VEIN THROMBOSIS): ICD-10-CM

## 2021-06-03 DIAGNOSIS — I77.819 AORTIC ECTASIA (HCC): ICD-10-CM

## 2021-06-03 DIAGNOSIS — I48.91 ATRIAL FIBRILLATION, UNSPECIFIED TYPE (HCC): ICD-10-CM

## 2021-06-03 PROBLEM — I71.40 ABDOMINAL AORTIC ANEURYSM (AAA) 3.0 CM TO 5.5 CM IN DIAMETER IN MALE: Status: RESOLVED | Noted: 2020-04-30 | Resolved: 2021-06-03

## 2021-06-03 PROBLEM — Z79.01 ANTICOAGULANT LONG-TERM USE: Status: RESOLVED | Noted: 2019-04-17 | Resolved: 2021-06-03

## 2021-06-03 PROBLEM — I71.4 ABDOMINAL AORTIC ANEURYSM (AAA) 3.0 CM TO 5.5 CM IN DIAMETER IN MALE: Status: RESOLVED | Noted: 2020-04-30 | Resolved: 2021-06-03

## 2021-06-03 PROCEDURE — 99204 OFFICE O/P NEW MOD 45 MIN: CPT | Performed by: SURGERY

## 2021-06-03 RX ORDER — CILOSTAZOL 100 MG/1
100 TABLET ORAL 2 TIMES DAILY
Qty: 180 TABLET | Refills: 0 | Status: SHIPPED | OUTPATIENT
Start: 2021-06-03 | End: 2021-06-04

## 2021-06-03 NOTE — PROGRESS NOTES
Assessment/Plan:    Type 2 diabetes mellitus with diabetic neuropathy ([facility] [address] )  DMII well controlled on metformin, januvia  Continue management per PCP  Lab Results   Component Value Date    HGBA1C 6 5 (H) 03/17/2021       Peripheral arterial disease (HCC)  Bilateral calf claudication, particularly when walking up an incline or at a fast pace  1/2 block claudication symptoms  Smokes cigars regularly  Calf cramping at night  No rest pain or tissue loss  Arterial duplex demonstrates right AARTI 1 05/QMK860/GTP80 with 50-75% proximal SFA stenosis and popliteal ectasia 0 9cm, left AARTI 1 17/MTP94/GTP81 with popliteal ectasia 1cm  -We discussed the pathophysiology of arterial occlusive disease, available treatment options and indications for treatment  Mild PAD with popliteal ectasia bilaterally noted on arterial duplex  Likely has mixed venous insufficiency with evidence of brawny venous stasis skin changes of the anterior shins and ankles with some varicosities and history of left leg DVT  -Continue medical optimization  Currently on ASA and statin  Goal hgb A1c<7 0, LDL<70, BP <130/80  -Will start pletal 100mg BID for claudication symptoms  Discussed potential medication side effects  No history of CHF  -Recommend walking program, ambulating at least 30 minutes for 3-5 times weekly  -Recommend moisturization of the lower extremities, avoidance of injury and close observation of bilateral feet for any new wounds  -Counseled on importance of complete smoking cessation in setting of arterial ectasia and PAD  He is not interested in pharmacologic agents at this time and will work on smoking cessation   -Follow-up in 6 months with repeat arterial duplex to re-evaluate symptoms  Atrial fibrillation (HCC)  Paroxysmal Afib on chronic coumadin per cardiologist  Continue management per cardiology      History of DVT (deep vein thrombosis)  History of extensive left leg DVT from distal femoral vein to tibial veins in 2015  Follow-up venous duplex did demonstrate resolution of DVT  Unclear of any risk factors  Remains on coumadin given history of Afib as well  Smoking  Smokes cigars for 15 years  Typically smokes after doing yard work which he states is out of habit  Counseled on importance of smoking cessation in relation to PAD, arterial ectasia and DVT  He will work on smoking cessation  Discussed referral to smoking cessation program and pharmacologic therapy but he is not amenable at this time  Aortic ectasia (HCC)  Aortic ectasia of 2 8cm noted in proximal aorta on recent screening AAA US  No evidence of AAA noted on previous CT scans  Continues to smoke cigars      -We discussed the pathophysiology of aneurysmal disease, available treatment options and indications for treatment  Discussed criteria for intervention including size>=5 5cm, growth >0 5cm in 6 months, symptomatic aneurysm or rupture    -Discussed risk factors including smoking, male gender, connective tissue disorders, HTN, COPD, family history and possible genetic predisposition  Also discussed association with aneurysms elsewhere, most commonly the popliteal artery  He does have evidence of bilateral popliteal ectasia  -Would benefit from repeat surveillance ultrasound in 5 years  -Counseled on importance of complete smoking cessation in setting of arterial/aortic ectasia  Diagnoses and all orders for this visit:    Peripheral arterial disease ([facility] [address] )  -     Ambulatory referral to Vascular Surgery  -     cilostazol (PLETAL) 100 mg tablet;  Take 1 tablet (100 mg total) by mouth 2 (two) times a day  -     VAS lower limb arterial duplex, complete bilateral; Future    Aortic ectasia (HCC)    Atrial fibrillation, unspecified type (HCC)    History of DVT (deep vein thrombosis)    Type 2 diabetes mellitus with diabetic neuropathy, without long-term current use of insulin (HCC)    Smoking        I have spent 45 minutes with Patient  today in which greater than 50% of this time was spent in counseling/coordination of care regarding Intructions for management, Importance of tx compliance and Impressions  Subjective:      Patient ID: Jyoti Morales is a 80 y o  male  Patient is new and referred by PCP  Patient is here to rev US abd/ NABEEL done on 5/12/21  Patient says he can only walk about 1/2 a block before having to stop for about 10-15 second  Patient feels B/l calf cramping and muscle spasms  Patient does get some rest pain at night  Patient is taking simvastatin, warfarin, and ASA 81mg  HPI  Mr Art Major is a 68yo male smoker with history of GERD, DMII, COPD, HTN, HLD, paroxysmal Afib on coumadin, hx left leg DVT 2015, aortic ectasia, PAD with claudication who presents as a new referral  He reports ongoing calf claudication over the past year, particularly with increased activity, walking up inclines or at a fast pace after walking about 1/2 block  He also has some calf spasms/cramping at night  He does not have rest pain or tissue loss  He has a history of left leg DVT for which he was started on coumadin and has remained on coumadin ever since  He denies chest pain or shortness of breath  He smokes cigars for the last 15 years which he states is more out of habit particularly after doing yard work as a break  He denies abdominal or back pain  The following portions of the patient's history were reviewed and updated as appropriate: allergies, current medications, past family history, past medical history, past social history, past surgical history and problem list     I have reviewed and made appropriate changes to the review of systems input by the medical assistant      Vitals:    06/03/21 1036   BP: 140/76   BP Location: Right arm   Patient Position: Sitting   Cuff Size: Standard   Pulse: 86   Temp: 98 6 °F (37 °C)   TempSrc: Tympanic   Weight: 97 1 kg (214 lb)   Height: 5' 11" (1 803 m)       Patient Active Problem List   Diagnosis    Essential hypertension    Mixed hyperlipidemia    Type 2 diabetes mellitus with diabetic polyneuropathy, without long-term current use of insulin (HCC)    Fatty liver    History of DVT (deep vein thrombosis)    Anticoagulated on Coumadin    Type 2 diabetes mellitus with diabetic neuropathy (HCC)    Peripheral arterial disease (HCC)    Atrial fibrillation ([address] )    History of colon polyps    Gastroesophageal reflux disease without esophagitis    Ureteric colic    Ureteral stone with hydronephrosis    Warthin's tumor    Hiatal hernia    Smoking    Chronic obstructive pulmonary disease (HCC)    Prostatic disorder    Trigger ring finger of right hand    High blood monocyte count    Intermittent claudication ([address] )    Aortic ectasia ([address] )       Past Surgical History:   Procedure Laterality Date    COLONOSCOPY  05/16/2019    completed by Dr Elva Harris, Repeat 3 years    [facility] RETROGRADE PYELOGRAM  5/17/2019    PAROTIDECTOMY Left     IN CYSTO/URETERO W/LITHOTRIPSY &INDWELL STENT INSRT Right 5/17/2019    Procedure: CYSTOSCOPY URETEROSCOPY WITH LITHOTRIPSY HOLMIUM LASER, RETROGRADE PYELOGRAM AND INSERTION STENT URETERAL--possible stent only;  Surgeon: Vanessa Hines MD;  [facility];  Service: Urology    IN EXC PAROTD,LAT LOBE,DISSECT 5TH NERV Right 10/18/2017    Procedure: SUPERFICIAL PAROTIDECTOMY WITH NERVE DISSECTION AND PRESERVATION;  Surgeon: Randall Pak MD;  [facility];  Service: ENT    IN INCISE FINGER TENDON SHEATH Right 11/3/2020    Procedure: RING TRIGGER FINGER RELEASE;  Surgeon: Rayo De Los Santos MD;  [facility];  Service: Orthopedics    ROTATOR CUFF REPAIR Bilateral     TONSILLECTOMY         Family History   Problem Relation Age of Onset    Lupus Mother     Rheum arthritis Mother     Cirrhosis Father     Alcohol abuse Father     Substance Abuse Brother        Social History     Socioeconomic History    Marital status: /Civil Union     Spouse name: Not on file    Number of children: Not on file    Years of education: Not on file    Highest education level: Not on file   Occupational History    Not on file   Social Needs    Financial resource strain: Not on file    Food insecurity     Worry: Not on file     Inability: Not on file    Transportation needs     Medical: Not on file     Non-medical: Not on file   Tobacco Use    Smoking status: Current Every Day Smoker     Types: Pipe    Smokeless tobacco: Never Used    Tobacco comment: smokes 2 pipes daily   Substance and Sexual Activity    Alcohol use: Yes     Frequency: 2-4 times a month     Drinks per session: 1 or 2     Comment: socially    Drug use: No    Sexual activity: Not on file   Lifestyle    Physical activity     Days per week: Not on file     Minutes per session: Not on file    Stress: Not on file   Relationships    Social connections     Talks on phone: Not on file     Gets together: Not on file     Attends Sabianist service: Not on file     Active member of club or organization: Not on file     Attends meetings of clubs or organizations: Not on file     Relationship status: Not on file    Intimate partner violence     Fear of current or ex partner: Not on file     Emotionally abused: Not on file     Physically abused: Not on file     Forced sexual activity: Not on file   Other Topics Concern    Not on file   Social History Narrative               No Known Allergies      Current Outpatient Medications:     acetaminophen (TYLENOL) 325 mg tablet, 2, by mouth, every 6 hours as needed for mild to moderate pain , Disp: 30 tablet, Rfl: 0    aspirin (ECOTRIN LOW STRENGTH) 81 mg EC tablet, Take 81 mg by mouth daily, Disp: , Rfl:     Cholecalciferol (VITAMIN D3 PO), Take 50 mcg by mouth daily, Disp: , Rfl:     Cyanocobalamin (VITAMIN B 12 PO), Take 1,000 mcg by mouth daily, Disp: , Rfl:     fenofibrate micronized (LOFIBRA) 134 MG capsule, TAKE 1 CAPSULE (134 MG TOTAL) BY MOUTH DAILY WITH BREAKFAST, Disp: 90 capsule, Rfl: 3    Januvia 100 MG tablet, TAKE 1 TABLET BY MOUTH EVERY DAY, Disp: 90 tablet, Rfl: 3    lisinopril (ZESTRIL) 20 mg tablet, TAKE 1 TABLET BY MOUTH EVERY DAY, Disp: 90 tablet, Rfl: 4    Magnesium 400 MG CAPS, Take by mouth daily, Disp: , Rfl:     metFORMIN (GLUCOPHAGE) 1000 MG tablet, TAKE 1 TABLET BY MOUTH TWICE A DAY WITH MEALS, Disp: 180 tablet, Rfl: 1    omeprazole (PriLOSEC) 20 mg delayed release capsule, TAKE 1 CAPSULE BY MOUTH EVERY DAY, Disp: 90 capsule, Rfl: 1    ProAir  (90 Base) MCG/ACT inhaler, INHALE 2 PUFFS EVERY 4 (FOUR) HOURS AS NEEDED FOR WHEEZING OR SHORTNESS OF BREATH , Disp: 17 Inhaler, Rfl: 1    simvastatin (ZOCOR) 40 mg tablet, TAKE 1 TABLET BY MOUTH EVERY DAY, Disp: 90 tablet, Rfl: 4    tamsulosin (FLOMAX) 0 4 mg, TAKE 1 CAPSULE BY MOUTH EVERY DAY WITH DINNER, Disp: 90 capsule, Rfl: 3    triamterene-hydrochlorothiazide (DYAZIDE) 37 5-25 mg per capsule, TAKE 1 CAPSULE BY MOUTH EVERY DAY, Disp: 90 capsule, Rfl: 3    warfarin (COUMADIN) 2 5 mg tablet, Take 1 tablet by mouth daily T-Th-S-S takes 2 5mg    M-W-F takes 5 0 mg, Disp: , Rfl:     warfarin (COUMADIN) 5 mg tablet, TAKE 1 TABLET EVERY DAY OR AS DIRECTED BY MD, Disp: 90 tablet, Rfl: 3    cilostazol (PLETAL) 100 mg tablet, Take 1 tablet (100 mg total) by mouth 2 (two) times a day, Disp: 180 tablet, Rfl: 0    Review of Systems   Constitutional: Negative  HENT: Positive for postnasal drip  Eyes: Negative  Respiratory: Positive for shortness of breath  Cardiovascular: Positive for leg swelling  Gastrointestinal: Negative  Endocrine: Negative  Genitourinary: Negative  Musculoskeletal: Positive for arthralgias, back pain, gait problem, joint swelling, myalgias and neck pain  Skin: Negative  Allergic/Immunologic: Negative  Neurological: Positive for dizziness and headaches  Hematological: Bruises/bleeds easily  Psychiatric/Behavioral: Negative  I have personally reviewed the ROS entered by MA and agree as documented  Objective:      /76 (BP Location: Right arm, Patient Position: Sitting, Cuff Size: Standard)   Pulse 86   Temp 98 6 °F (37 °C) (Tympanic)   Ht 5' 11" (1 803 m)   Wt 97 1 kg (214 lb)   BMI 29 85 kg/m²          Physical Exam  Constitutional:       Appearance: Normal appearance  HENT:      Head: Normocephalic and atraumatic  Neck:      Musculoskeletal: Normal range of motion and neck supple  Cardiovascular:      Rate and Rhythm: Normal rate  Pulses:           Radial pulses are 2+ on the right side and 2+ on the left side  Femoral pulses are 2+ on the right side and 2+ on the left side  Popliteal pulses are 1+ on the right side and 1+ on the left side  Dorsalis pedis pulses are 1+ on the right side and 1+ on the left side  Posterior tibial pulses are 0 on the right side and 0 on the left side  Pulmonary:      Effort: Pulmonary effort is normal    Abdominal:      General: There is no distension  Palpations: Abdomen is soft  Tenderness: There is no abdominal tenderness  Musculoskeletal: Normal range of motion  Skin:     General: Skin is warm and dry  Capillary Refill: Capillary refill takes less than 2 seconds  Comments: Shiny skin/hair loss, brawny venous stasis skin changes of bilateral anterior shins and right medial ankle, left calf truncal varicosities   Neurological:      General: No focal deficit present  Mental Status: He is alert and oriented to person, place, and time  Psychiatric:         Mood and Affect: Mood normal          Behavior: Behavior normal          Thought Content:  Thought content normal          Judgment: Judgment normal

## 2021-06-03 NOTE — LETTER
Katherin 3, 2021     [address] [place] [id number]    Patient: Fredrik Spatz   YOB: 1950   Date of Visit: 6/3/2021       Dear Dr Wagner Jewell: Thank you for referring Donita Yaron to me for evaluation  Below are the relevant portions of my assessment and plan of care  Diagnoses and all orders for this visit:    Type 2 diabetes mellitus with diabetic neuropathy ([address] )  DMII well controlled on metformin, januvia  Continue management per PCP            Lab Results   Component Value Date     HGBA1C 6 5 (H) 03/17/2021         Peripheral arterial disease (HCC)  Bilateral calf claudication, particularly when walking up an incline or at a fast pace  1/2 block claudication symptoms  Smokes cigars regularly  Calf cramping at night  No rest pain or tissue loss      Arterial duplex demonstrates right AARTI 1 05/EVM840/GTP80 with 50-75% proximal SFA stenosis and popliteal ectasia 0 9cm, left AARTI 1 17/MTP94/GTP81 with popliteal ectasia 1cm     -We discussed the pathophysiology of arterial occlusive disease, available treatment options and indications for treatment  Mild PAD with popliteal ectasia bilaterally noted on arterial duplex  Likely has mixed venous insufficiency with evidence of brawny venous stasis skin changes of the anterior shins and ankles with some varicosities and history of left leg DVT  -Continue medical optimization  Currently on ASA and statin  Goal hgb A1c<7 0, LDL<70, BP <130/80  -Will start pletal 100mg BID for claudication symptoms  Discussed potential medication side effects  No history of CHF  -Recommend walking program, ambulating at least 30 minutes for 3-5 times weekly  -Recommend moisturization of the lower extremities, avoidance of injury and close observation of bilateral feet for any new wounds  -Counseled on importance of complete smoking cessation in setting of arterial ectasia and PAD   He is not interested in pharmacologic agents at this time and will work on smoking cessation   -Follow-up in 6 months with repeat arterial duplex to re-evaluate symptoms         Atrial fibrillation (HCC)  Paroxysmal Afib on chronic coumadin per cardiologist  Continue management per cardiology      History of DVT (deep vein thrombosis)  History of extensive left leg DVT from distal femoral vein to tibial veins in 2015  Follow-up venous duplex did demonstrate resolution of DVT  Unclear of any risk factors  Remains on coumadin given history of Afib as well      Smoking  Smokes cigars for 15 years  Typically smokes after doing yard work which he states is out of habit  Counseled on importance of smoking cessation in relation to PAD, arterial ectasia and DVT  He will work on smoking cessation  Discussed referral to smoking cessation program and pharmacologic therapy but he is not amenable at this time      Aortic ectasia (HCC)  Aortic ectasia of 2 8cm noted in proximal aorta on recent screening AAA US  No evidence of AAA noted on previous CT scans  Continues to smoke cigars       -We discussed the pathophysiology of aneurysmal disease, available treatment options and indications for treatment  Discussed criteria for intervention including size>=5 5cm, growth >0 5cm in 6 months, symptomatic aneurysm or rupture    -Discussed risk factors including smoking, male gender, connective tissue disorders, HTN, COPD, family history and possible genetic predisposition  Also discussed association with aneurysms elsewhere, most commonly the popliteal artery  He does have evidence of bilateral popliteal ectasia  -Would benefit from repeat surveillance ultrasound in 5 years  -Counseled on importance of complete smoking cessation in setting of arterial/aortic ectasia          If you have questions, please do not hesitate to call me  I look forward to following Hayden Alicea along with you           Sincerely,        Alia Kang MD        CC: Alyce Almanza MD

## 2021-06-03 NOTE — ASSESSMENT & PLAN NOTE
Smokes cigars for 15 years  Typically smokes after doing yard work which he states is out of habit  Counseled on importance of smoking cessation in relation to PAD, arterial ectasia and DVT  He will work on smoking cessation  Discussed referral to smoking cessation program and pharmacologic therapy but he is not amenable at this time

## 2021-06-03 NOTE — ASSESSMENT & PLAN NOTE
Bilateral calf claudication, particularly when walking up an incline or at a fast pace  1/2 block claudication symptoms  Smokes cigars regularly  Calf cramping at night  No rest pain or tissue loss  Arterial duplex demonstrates right AARTI 1 05/SUT229/GTP80 with 50-75% proximal SFA stenosis and popliteal ectasia 0 9cm, left AARTI 1 17/MTP94/GTP81 with popliteal ectasia 1cm  -We discussed the pathophysiology of arterial occlusive disease, available treatment options and indications for treatment  Mild PAD with popliteal ectasia bilaterally noted on arterial duplex  Likely has mixed venous insufficiency with evidence of brawny venous stasis skin changes of the anterior shins and ankles with some varicosities and history of left leg DVT  -Continue medical optimization  Currently on ASA and statin  Goal hgb A1c<7 0, LDL<70, BP <130/80  -Will start pletal 100mg BID for claudication symptoms  Discussed potential medication side effects  No history of CHF  -Recommend walking program, ambulating at least 30 minutes for 3-5 times weekly  -Recommend moisturization of the lower extremities, avoidance of injury and close observation of bilateral feet for any new wounds  -Counseled on importance of complete smoking cessation in setting of arterial ectasia and PAD  He is not interested in pharmacologic agents at this time and will work on smoking cessation   -Follow-up in 6 months with repeat arterial duplex to re-evaluate symptoms

## 2021-06-03 NOTE — ASSESSMENT & PLAN NOTE
History of extensive left leg DVT from distal femoral vein to tibial veins in 2015  Follow-up venous duplex did demonstrate resolution of DVT  Unclear of any risk factors  Remains on coumadin given history of Afib as well

## 2021-06-03 NOTE — PATIENT INSTRUCTIONS
Type 2 diabetes mellitus with diabetic neuropathy ([facility] [address] )  DMII well controlled on metformin, januvia  Continue management per PCP            Lab Results   Component Value Date     HGBA1C 6 5 (H) 03/17/2021         Peripheral arterial disease (HCC)  Bilateral calf claudication, particularly when walking up an incline or at a fast pace  1/2 block claudication symptoms  Smokes cigars regularly  Calf cramping at night  No rest pain or tissue loss      Arterial duplex demonstrates right AARTI 1 05/WIQ934/GTP80 with 50-75% proximal SFA stenosis and popliteal ectasia 0 9cm, left AARTI 1 17/MTP94/GTP81 with popliteal ectasia 1cm     -We discussed the pathophysiology of arterial occlusive disease, available treatment options and indications for treatment  Mild PAD with popliteal ectasia bilaterally noted on arterial duplex  Likely has mixed venous insufficiency with evidence of brawny venous stasis skin changes of the anterior shins and ankles with some varicosities and history of left leg DVT  -Continue medical optimization  Currently on ASA and statin  Goal hgb A1c<7 0, LDL<70, BP <130/80  -Will start pletal 100mg BID for claudication symptoms  Discussed potential medication side effects  No history of CHF  -Recommend walking program, ambulating at least 30 minutes for 3-5 times weekly  -Recommend moisturization of the lower extremities, avoidance of injury and close observation of bilateral feet for any new wounds  -Counseled on importance of complete smoking cessation in setting of arterial ectasia and PAD  He is not interested in pharmacologic agents at this time and will work on smoking cessation   -Follow-up in 6 months with repeat arterial duplex to re-evaluate symptoms         Atrial fibrillation (HCC)  Paroxysmal Afib on chronic coumadin per cardiologist  Continue management per cardiology      History of DVT (deep vein thrombosis)  History of extensive left leg DVT from distal femoral vein to tibial veins in 2015  Follow-up venous duplex did demonstrate resolution of DVT  Unclear of any risk factors  Remains on coumadin given history of Afib as well      Smoking  Smokes cigars for 15 years  Typically smokes after doing yard work which he states is out of habit  Counseled on importance of smoking cessation in relation to PAD, arterial ectasia and DVT  He will work on smoking cessation  Discussed referral to smoking cessation program and pharmacologic therapy but he is not amenable at this time      Aortic ectasia (HCC)  Aortic ectasia of 2 8cm noted in proximal aorta on recent screening AAA US  No evidence of AAA noted on previous CT scans  Continues to smoke cigars       -We discussed the pathophysiology of aneurysmal disease, available treatment options and indications for treatment  Discussed criteria for intervention including size>=5 5cm, growth >0 5cm in 6 months, symptomatic aneurysm or rupture    -Discussed risk factors including smoking, male gender, connective tissue disorders, HTN, COPD, family history and possible genetic predisposition  Also discussed association with aneurysms elsewhere, most commonly the popliteal artery   He does have evidence of bilateral popliteal ectasia  -Would benefit from repeat surveillance ultrasound in 5 years  -Counseled on importance of complete smoking cessation in setting of arterial/aortic ectasia

## 2021-06-03 NOTE — ASSESSMENT & PLAN NOTE
DMII well controlled on metformin, januvia  Continue management per PCP      Lab Results   Component Value Date    HGBA1C 6 5 (H) 03/17/2021

## 2021-06-03 NOTE — ASSESSMENT & PLAN NOTE
Aortic ectasia of 2 8cm noted in proximal aorta on recent screening AAA US  No evidence of AAA noted on previous CT scans  Continues to smoke cigars      -We discussed the pathophysiology of aneurysmal disease, available treatment options and indications for treatment  Discussed criteria for intervention including size>=5 5cm, growth >0 5cm in 6 months, symptomatic aneurysm or rupture    -Discussed risk factors including smoking, male gender, connective tissue disorders, HTN, COPD, family history and possible genetic predisposition  Also discussed association with aneurysms elsewhere, most commonly the popliteal artery  He does have evidence of bilateral popliteal ectasia  -Would benefit from repeat surveillance ultrasound in 5 years  -Counseled on importance of complete smoking cessation in setting of arterial/aortic ectasia

## 2021-06-04 RX ORDER — CILOSTAZOL 100 MG/1
TABLET ORAL
Qty: 180 TABLET | Refills: 0 | Status: SHIPPED | OUTPATIENT
Start: 2021-06-04 | End: 2021-08-18

## 2021-06-04 NOTE — TELEPHONE ENCOUNTER
Patient is prescribed pletal for claudication symptoms  There are no alternative agents   He can switch to pepcid for acid reflux symptoms or choose not to take pletal

## 2021-06-07 ENCOUNTER — TELEPHONE (OUTPATIENT)
Dept: VASCULAR SURGERY | Facility: CLINIC | Age: 71
End: 2021-06-07

## 2021-06-07 ENCOUNTER — OFFICE VISIT (OUTPATIENT)
Dept: PODIATRY | Facility: CLINIC | Age: 71
End: 2021-06-07
Payer: MEDICARE

## 2021-06-07 VITALS
WEIGHT: 216.8 LBS | BODY MASS INDEX: 30.35 KG/M2 | HEART RATE: 88 BPM | DIASTOLIC BLOOD PRESSURE: 64 MMHG | SYSTOLIC BLOOD PRESSURE: 104 MMHG | HEIGHT: 71 IN

## 2021-06-07 DIAGNOSIS — B35.1 ONYCHOMYCOSIS: ICD-10-CM

## 2021-06-07 DIAGNOSIS — E11.42 TYPE 2 DIABETES MELLITUS WITH DIABETIC POLYNEUROPATHY, WITHOUT LONG-TERM CURRENT USE OF INSULIN (HCC): Primary | ICD-10-CM

## 2021-06-07 DIAGNOSIS — L84 FOOT CALLUS: ICD-10-CM

## 2021-06-07 PROCEDURE — 11056 PARNG/CUTG B9 HYPRKR LES 2-4: CPT | Performed by: PODIATRIST

## 2021-06-07 NOTE — PROGRESS NOTES
Ruy Swann  1950  AT RISK FOOT CARE    1  Type 2 diabetes mellitus with diabetic polyneuropathy, without long-term current use of insulin (HCC)  Lesion Destruction   2  Onychomycosis     3  Foot callus  Lesion Destruction       Patient presents for at-risk foot care  Patient has no acute concerns today  Patient has significant lower extremity risk due to neuropathy, parasthesia, edema, and trophic skin changes to the lower extremity  On exam patient has thickened, hypertrophic, discolored, brittle toenails with subungual debris and tenderness x10   Callus: right hallux, left hallux, left sub met 1    Patient has lower extremity edema  PAtients skin is atrophic, thickened nails, and decreased pedal hair  Patient has decreased pinprick and vibratory sensation to his feet and parasthesia    Today's treatment includes:  Debridement of toenails  Using nail nipper, nadya, and curette, nails were sharply debrided, reduced in thickness and length  Devitalized nail tissue and fungal debris excised and removed  Patient tolerated well  Lesion Destruction    Date/Time: 6/7/2021 8:53 AM  Performed by: Jen Vera DPM  Authorized by: Jen Vera DPM   Universal Protocol:  Consent: Verbal consent obtained    Risks and benefits: risks, benefits and alternatives were discussed  Consent given by: patient  Timeout called at: 6/7/2021 8:53 AM   Patient understanding: patient states understanding of the procedure being performed  Patient identity confirmed: verbally with patient      Procedure Details - Lesion Destruction:     Number of Lesions:  3  Lesion 1:     Body area:  Lower extremity    Lower extremity location:  R big toe    Malignancy: benign hyperkeratotic lesion      Destruction method: scissors used for extraction    Lesion 2:     Body area:  Lower extremity    Lower extremity location:  L big toe    Malignancy: benign hyperkeratotic lesion      Destruction method: scissors used for extraction    Lesion 3:     Body area:  Lower extremity    Lower extremity location:  L foot    Malignancy: benign hyperkeratotic lesion      Destruction method: scissors used for extraction          Discussed proper shoe gear, daily inspections of feet, and general foot health with patient  Patient has Q9  findings and is recommended for at risk foot care every 9-10 weeks      Patients most recent complete clinical foot exam was on: 3/29/21

## 2021-06-07 NOTE — TELEPHONE ENCOUNTER
Patient was ordered cilostazol by Dr Maggie Morocho last week  Patient tried it over the weekend and had experienced dizziness, elevated heart rate, sleeplessness, and "spacey feeling"  These symptoms have resolved since stopping cilostazol  Advised to remain off of med and if elevated heart rate or any other symptoms return, to notify cardiologist   Wife verbalized understanding  Informed wife this would be sent to triage provider to make aware of what happened

## 2021-06-14 ENCOUNTER — ANTICOAG VISIT (OUTPATIENT)
Dept: CARDIOLOGY CLINIC | Facility: CLINIC | Age: 71
End: 2021-06-14

## 2021-06-14 ENCOUNTER — APPOINTMENT (OUTPATIENT)
Dept: LAB | Age: 71
End: 2021-06-14
Payer: MEDICARE

## 2021-06-14 DIAGNOSIS — I48.0 PAROXYSMAL ATRIAL FIBRILLATION (HCC): ICD-10-CM

## 2021-06-14 DIAGNOSIS — Z86.718 HISTORY OF DVT (DEEP VEIN THROMBOSIS): Primary | ICD-10-CM

## 2021-06-14 NOTE — PROGRESS NOTES
Tc to pt, will continue current dose, inr due 4 weeks  Patient aware to call if any changes in health, medication, diet ot bleeding

## 2021-07-12 ENCOUNTER — APPOINTMENT (OUTPATIENT)
Dept: LAB | Age: 71
End: 2021-07-12
Payer: MEDICARE

## 2021-07-12 ENCOUNTER — ANTICOAG VISIT (OUTPATIENT)
Dept: CARDIOLOGY CLINIC | Facility: CLINIC | Age: 71
End: 2021-07-12

## 2021-07-12 DIAGNOSIS — Z86.718 HISTORY OF DVT (DEEP VEIN THROMBOSIS): Primary | ICD-10-CM

## 2021-07-12 NOTE — PROGRESS NOTES
Tc to pt, will continue current dose, inr due 4 weeks  Reports he has been taking tylenol for joint pains

## 2021-07-14 DIAGNOSIS — N23 RENAL COLIC ON RIGHT SIDE: ICD-10-CM

## 2021-07-16 RX ORDER — TAMSULOSIN HYDROCHLORIDE 0.4 MG/1
CAPSULE ORAL
Qty: 90 CAPSULE | Refills: 3 | Status: SHIPPED | OUTPATIENT
Start: 2021-07-16 | End: 2022-07-05

## 2021-07-24 DIAGNOSIS — E11.42 TYPE 2 DIABETES MELLITUS WITH DIABETIC POLYNEUROPATHY, WITHOUT LONG-TERM CURRENT USE OF INSULIN (HCC): ICD-10-CM

## 2021-08-12 ENCOUNTER — ANTICOAG VISIT (OUTPATIENT)
Dept: CARDIOLOGY CLINIC | Facility: CLINIC | Age: 71
End: 2021-08-12

## 2021-08-12 ENCOUNTER — APPOINTMENT (OUTPATIENT)
Dept: LAB | Age: 71
End: 2021-08-12
Payer: MEDICARE

## 2021-08-12 DIAGNOSIS — Z86.718 HISTORY OF DVT (DEEP VEIN THROMBOSIS): Primary | ICD-10-CM

## 2021-08-12 LAB
ALBUMIN SERPL BCP-MCNC: 3.5 G/DL (ref 3.5–5)
ALP SERPL-CCNC: 64 U/L (ref 46–116)
ALT SERPL W P-5'-P-CCNC: 69 U/L (ref 12–78)
ANION GAP SERPL CALCULATED.3IONS-SCNC: 6 MMOL/L (ref 4–13)
AST SERPL W P-5'-P-CCNC: 48 U/L (ref 5–45)
BASOPHILS # BLD AUTO: 0.08 THOUSANDS/ΜL (ref 0–0.1)
BASOPHILS NFR BLD AUTO: 1 % (ref 0–1)
BILIRUB SERPL-MCNC: 0.65 MG/DL (ref 0.2–1)
BUN SERPL-MCNC: 11 MG/DL (ref 5–25)
CALCIUM SERPL-MCNC: 9 MG/DL (ref 8.3–10.1)
CHLORIDE SERPL-SCNC: 104 MMOL/L (ref 100–108)
CHOLEST SERPL-MCNC: 152 MG/DL (ref 50–200)
CO2 SERPL-SCNC: 25 MMOL/L (ref 21–32)
CREAT SERPL-MCNC: 0.93 MG/DL (ref 0.6–1.3)
EOSINOPHIL # BLD AUTO: 0.37 THOUSAND/ΜL (ref 0–0.61)
EOSINOPHIL NFR BLD AUTO: 5 % (ref 0–6)
ERYTHROCYTE [DISTWIDTH] IN BLOOD BY AUTOMATED COUNT: 14.2 % (ref 11.6–15.1)
EST. AVERAGE GLUCOSE BLD GHB EST-MCNC: 146 MG/DL
GFR SERPL CREATININE-BSD FRML MDRD: 83 ML/MIN/1.73SQ M
GLUCOSE P FAST SERPL-MCNC: 121 MG/DL (ref 65–99)
HBA1C MFR BLD: 6.7 %
HCT VFR BLD AUTO: 46.3 % (ref 36.5–49.3)
HDLC SERPL-MCNC: 31 MG/DL
HGB BLD-MCNC: 15.3 G/DL (ref 12–17)
IMM GRANULOCYTES # BLD AUTO: 0.09 THOUSAND/UL (ref 0–0.2)
IMM GRANULOCYTES NFR BLD AUTO: 1 % (ref 0–2)
LDLC SERPL CALC-MCNC: 47 MG/DL (ref 0–100)
LYMPHOCYTES # BLD AUTO: 1.39 THOUSANDS/ΜL (ref 0.6–4.47)
LYMPHOCYTES NFR BLD AUTO: 18 % (ref 14–44)
MCH RBC QN AUTO: 31.6 PG (ref 26.8–34.3)
MCHC RBC AUTO-ENTMCNC: 33 G/DL (ref 31.4–37.4)
MCV RBC AUTO: 96 FL (ref 82–98)
MONOCYTES # BLD AUTO: 1.08 THOUSAND/ΜL (ref 0.17–1.22)
MONOCYTES NFR BLD AUTO: 14 % (ref 4–12)
NEUTROPHILS # BLD AUTO: 4.83 THOUSANDS/ΜL (ref 1.85–7.62)
NEUTS SEG NFR BLD AUTO: 61 % (ref 43–75)
NONHDLC SERPL-MCNC: 121 MG/DL
NRBC BLD AUTO-RTO: 0 /100 WBCS
PLATELET # BLD AUTO: 293 THOUSANDS/UL (ref 149–390)
PMV BLD AUTO: 10.6 FL (ref 8.9–12.7)
POTASSIUM SERPL-SCNC: 4.3 MMOL/L (ref 3.5–5.3)
PROT SERPL-MCNC: 7.3 G/DL (ref 6.4–8.2)
RBC # BLD AUTO: 4.84 MILLION/UL (ref 3.88–5.62)
SODIUM SERPL-SCNC: 135 MMOL/L (ref 136–145)
TRIGL SERPL-MCNC: 368 MG/DL
WBC # BLD AUTO: 7.84 THOUSAND/UL (ref 4.31–10.16)

## 2021-08-12 PROCEDURE — 83036 HEMOGLOBIN GLYCOSYLATED A1C: CPT

## 2021-08-12 PROCEDURE — 80061 LIPID PANEL: CPT

## 2021-08-12 PROCEDURE — 80053 COMPREHEN METABOLIC PANEL: CPT

## 2021-08-12 PROCEDURE — 85025 COMPLETE CBC W/AUTO DIFF WBC: CPT

## 2021-08-12 PROCEDURE — 36415 COLL VENOUS BLD VENIPUNCTURE: CPT

## 2021-08-23 ENCOUNTER — OFFICE VISIT (OUTPATIENT)
Dept: FAMILY MEDICINE CLINIC | Facility: CLINIC | Age: 71
End: 2021-08-23
Payer: MEDICARE

## 2021-08-23 VITALS
WEIGHT: 213 LBS | RESPIRATION RATE: 18 BRPM | BODY MASS INDEX: 29.82 KG/M2 | SYSTOLIC BLOOD PRESSURE: 110 MMHG | OXYGEN SATURATION: 96 % | DIASTOLIC BLOOD PRESSURE: 60 MMHG | HEART RATE: 84 BPM | HEIGHT: 71 IN | TEMPERATURE: 98.7 F

## 2021-08-23 DIAGNOSIS — E78.2 MIXED HYPERLIPIDEMIA: ICD-10-CM

## 2021-08-23 DIAGNOSIS — J30.9 ALLERGIC RHINITIS, UNSPECIFIED SEASONALITY, UNSPECIFIED TRIGGER: ICD-10-CM

## 2021-08-23 DIAGNOSIS — I10 ESSENTIAL HYPERTENSION: ICD-10-CM

## 2021-08-23 DIAGNOSIS — I73.9 PERIPHERAL ARTERIAL DISEASE (HCC): ICD-10-CM

## 2021-08-23 DIAGNOSIS — E53.8 VITAMIN B12 DEFICIENCY: ICD-10-CM

## 2021-08-23 DIAGNOSIS — E11.42 TYPE 2 DIABETES MELLITUS WITH DIABETIC POLYNEUROPATHY, WITHOUT LONG-TERM CURRENT USE OF INSULIN (HCC): Primary | ICD-10-CM

## 2021-08-23 DIAGNOSIS — G62.9 NEUROPATHY: ICD-10-CM

## 2021-08-23 PROCEDURE — 99214 OFFICE O/P EST MOD 30 MIN: CPT | Performed by: NURSE PRACTITIONER

## 2021-08-23 RX ORDER — GABAPENTIN 100 MG/1
100 CAPSULE ORAL
Qty: 90 CAPSULE | Refills: 1 | Status: SHIPPED | OUTPATIENT
Start: 2021-08-23 | End: 2021-09-27 | Stop reason: ALTCHOICE

## 2021-08-23 NOTE — PROGRESS NOTES
FAMILY PRACTICE OFFICE VISIT       NAME: Mercedez Moya  AGE: 80 y o  SEX: male       : 1950        MRN: [id number]    Assessment and Plan     Problem List Items Addressed This Visit        Endocrine    Type 2 diabetes mellitus with diabetic polyneuropathy, without long-term current use of insulin ([facility] ) - Primary       Lab Results   Component Value Date    HGBA1C 6 7 (H) 2021     Hemoglobin A1c is at goal which is less than 7 % on metformin 1000 mg twice daily and Januvia 100 mg daily  He will continue to work on lifestyle modifications  Foot exam is up-to-date  Eye exam is up-to-date  Will start gabapentin for neuropathy, will start with 100 mg daily at bedtime for 2 weeks, if tolerating well and symptoms are still continuing, at 2 weeks will increase to 200 mg at bedtime  Two weeks after that he will call me with an update on how this medication is working  We reviewed side effect profile of this medication, including dizziness drowsiness, increased risk for falls  He would like to try low-dose with slow increase to avoid side effects  Will call if he has any problems with this medication  Relevant Orders    Hemoglobin A1C       Cardiovascular and Mediastinum    Essential hypertension     Stable on current medications lisinopril 20 mg daily and Dyazide 37 5-25 mg daily  Relevant Orders    CBC    Comprehensive metabolic panel    TSH, 3rd generation    Peripheral arterial disease ([facility] )       Bilateral lower extremity peripheral arterial disease  Following with vascular surgery  Due for repeat Doppler in December  He is aware importance of smoking cessation  Failed trial of Pletal, which caused tachycardia  Encouraged to stay active, walk daily  Other    Mixed hyperlipidemia      LDL is stable at 47 on simvastatin 40 mg daily  Triglycerides are elevated 368  Will continue fenofibrate 134 mg daily    He will work on lifestyle modifications, staying active in reducing carbohydrates in diet  Relevant Orders    Lipid panel      Other Visit Diagnoses     Vitamin B12 deficiency        Relevant Orders    Vitamin B12    Neuropathy        Relevant Medications    gabapentin (NEURONTIN) 100 mg capsule    Other Relevant Orders    Vitamin B12          1  Type 2 diabetes mellitus with diabetic polyneuropathy, without long-term current use of insulin (HCC)  Hemoglobin A1C   2  Essential hypertension  CBC    Comprehensive metabolic panel    TSH, 3rd generation   3  Mixed hyperlipidemia  Lipid panel   4  Peripheral arterial disease ([facility] )     5  Vitamin B12 deficiency  Vitamin B12   6  Neuropathy  gabapentin (NEURONTIN) 100 mg capsule    Vitamin B12           Chief Complaint     Chief Complaint   Patient presents with    Follow-up     Pt is here for f/u BW       History of Present Illness     Efren Blanchard  Is a 67-year-old male presenting today for follow-up on chronic conditions  Has been active mowing lawns all summer  Mows his own lawn, neighbor's lawn, and [employer]  Uses a push mower  Has lost some weight with this  Recently tried pletal under the direction of vascular surgery for PAD, but could not tolerate medication, caused tachycardia  Due for repeat doppler in December  Leg cramps, worse at night,   Sometimes with ankle rotation  Taknig B12 and taking Magnesium  No change in cramping with mag and B12  Occurs every day  Sometimes legs feel dead like much not sensation  Tried stopping simvastain for 2 weeks, with no change  Tried eating bananas for potassium  Review of Systems   Review of Systems   Constitutional: Negative  HENT: Negative  Respiratory: Negative  Cardiovascular: Negative  Gastrointestinal: Negative  Genitourinary: Negative  Musculoskeletal: Negative  Leg cramps   Skin: Negative  Neurological: Negative  Hematological: Negative  Psychiatric/Behavioral: Negative          Active Problem List     Patient Active Problem List   Diagnosis    Essential hypertension    Mixed hyperlipidemia    Type 2 diabetes mellitus with diabetic polyneuropathy, without long-term current use of insulin (HCC)    Fatty liver    History of DVT (deep vein thrombosis)    Anticoagulated on Coumadin    Peripheral arterial disease (HCC)    Atrial fibrillation ([address] )    History of colon polyps    Gastroesophageal reflux disease without esophagitis    Warthin's tumor    Hiatal hernia    Smoking    Chronic obstructive pulmonary disease (HCC)    Prostatic disorder    Trigger ring finger of right hand    High blood monocyte count    Intermittent claudication ([address] )    Aortic ectasia (HCC)       Past Medical History:  Past Medical History:   Diagnosis Date    Arthritis     Atherosclerosis of native arteries of the extremities with ulceration ([address] ) 3/21/2019    Claudication, intermittent (HCC)     Colon polyp     6 polyps 3 years ago    Diabetes mellitus ([address] )     type 2    Diabetic neuropathic arthropathy (HCC)     causes weakness in LE and uses a cane as assist to walk    Diabetic neuropathy ([address] )     Fatty liver     GERD (gastroesophageal reflux disease)     History of DVT (deep vein thrombosis) 2015    left LE    Hyperlipidemia     Hypertension     Kidney stone     Parotid tumor     Seasonal allergies     Spinal stenosis     Ureteral stone with hydronephrosis 5/17/2019    Added automatically from request for surgery [id number]       Past Surgical History:  Past Surgical History:   Procedure Laterality Date    COLONOSCOPY  05/16/2019    completed by Dr Xavier Hyde, Repeat 3 years    [facility] RETROGRADE PYELOGRAM  5/17/2019    PAROTIDECTOMY Left     HI CYSTO/URETERO W/LITHOTRIPSY &INDWELL STENT INSRT Right 5/17/2019    Procedure: CYSTOSCOPY URETEROSCOPY WITH LITHOTRIPSY HOLMIUM LASER, RETROGRADE PYELOGRAM AND INSERTION STENT URETERAL--possible stent only;  Surgeon: Giana Mays MD;  [facility]; Service: Urology    IN EXC PAROTD,LAT LOBE,DISSECT 5TH NERV Right 10/18/2017    Procedure: SUPERFICIAL PAROTIDECTOMY WITH NERVE DISSECTION AND PRESERVATION;  Surgeon: Zofia Ryan MD;  [facility];  Service: ENT    IN INCISE FINGER TENDON SHEATH Right 11/3/2020    Procedure: RING TRIGGER FINGER RELEASE;  Surgeon: Ewelina Duong MD;  [facility];  Service: Orthopedics    ROTATOR CUFF REPAIR Bilateral     TONSILLECTOMY         Family History:  Family History   Problem Relation Age of Onset    Lupus Mother     Rheum arthritis Mother     Cirrhosis Father     Alcohol abuse Father     Substance Abuse Brother        Social History:  Social History     Socioeconomic History    Marital status: /Civil Union     Spouse name: Not on file    Number of children: Not on file    Years of education: Not on file    Highest education level: Not on file   Occupational History    Not on file   Tobacco Use    Smoking status: Current Every Day Smoker     Types: Pipe    Smokeless tobacco: Never Used    Tobacco comment: smokes 2 pipes daily   Vaping Use    Vaping Use: Never used   Substance and Sexual Activity    Alcohol use: Yes     Comment: socially    Drug use: No    Sexual activity: Not on file   Other Topics Concern    Not on file   Social History Narrative        [place]     Social Determinants of Health     Financial Resource Strain:     Difficulty of Paying Living Expenses:    Food Insecurity:     Worried About Running Out of Food in the Last Year:     Ran Out of Food in the Last Year:    Transportation Needs:     Lack of Transportation (Medical):      Lack of Transportation (Non-Medical):    Physical Activity:     Days of Exercise per Week:     Minutes of Exercise per Session:    Stress:     Feeling of Stress :    Social Connections:     Frequency of Communication with Friends and Family:     Frequency of Social Gatherings with Friends and Family:     Attends [employer] Services:     Active Member of Clubs or Organizations:     Attends Club or Organization Meetings:     Marital Status:    Intimate Partner Violence:     Fear of Current or Ex-Partner:     Emotionally Abused:     Physically Abused:     Sexually Abused:        I have reviewed the patient's medical history in detail; there are no changes to the history as noted in the electronic medical record  Objective     Vitals:    08/23/21 0930   BP: 110/60   Pulse: 84   Resp: 18   Temp: 98 7 °F (37 1 °C)   TempSrc: Tympanic   SpO2: 96%   Weight: 96 6 kg (213 lb)   Height: 5' 11" (1 803 m)     Wt Readings from Last 3 Encounters:   08/23/21 96 6 kg (213 lb)   06/07/21 98 3 kg (216 lb 12 8 oz)   06/03/21 97 1 kg (214 lb)     Physical Exam  Vitals and nursing note reviewed  Constitutional:       General: He is not in acute distress  Appearance: Normal appearance  He is not ill-appearing  HENT:      Head: Normocephalic and atraumatic  Right Ear: Tympanic membrane and ear canal normal       Left Ear: Tympanic membrane and ear canal normal       Mouth/Throat:      Mouth: Mucous membranes are moist       Pharynx: Oropharynx is clear  Eyes:      Conjunctiva/sclera: Conjunctivae normal    Neck:      Thyroid: No thyromegaly  Vascular: No carotid bruit  Cardiovascular:      Rate and Rhythm: Normal rate and regular rhythm  Heart sounds: No murmur heard  Pulmonary:      Effort: Pulmonary effort is normal  No respiratory distress  Breath sounds: Normal breath sounds  No wheezing or rales  Musculoskeletal:      Cervical back: Normal range of motion and neck supple  Right lower leg: No edema  Left lower leg: No edema  Lymphadenopathy:      Cervical: No cervical adenopathy  Skin:     General: Skin is warm and dry  Findings: No rash  Neurological:      Mental Status: He is alert        Gait: Gait normal    Psychiatric:         Mood and Affect: Mood normal  ALLERGIES:  Allergies   Allergen Reactions    Pletal [Cilostazol] Tachycardia       Current Medications     Current Outpatient Medications   Medication Sig Dispense Refill    acetaminophen (TYLENOL) 325 mg tablet 2, by mouth, every 6 hours as needed for mild to moderate pain  30 tablet 0    aspirin (ECOTRIN LOW STRENGTH) 81 mg EC tablet Take 81 mg by mouth daily      Cholecalciferol (VITAMIN D3 PO) Take 50 mcg by mouth daily      Cyanocobalamin (VITAMIN B 12 PO) Take 1,000 mcg by mouth daily      fenofibrate micronized (LOFIBRA) 134 MG capsule TAKE 1 CAPSULE (134 MG TOTAL) BY MOUTH DAILY WITH BREAKFAST 90 capsule 3    Januvia 100 MG tablet TAKE 1 TABLET BY MOUTH EVERY DAY 90 tablet 3    lisinopril (ZESTRIL) 20 mg tablet TAKE 1 TABLET BY MOUTH EVERY DAY 90 tablet 4    metFORMIN (GLUCOPHAGE) 1000 MG tablet TAKE 1 TABLET BY MOUTH TWICE A DAY WITH MEALS 180 tablet 1    omeprazole (PriLOSEC) 20 mg delayed release capsule TAKE 1 CAPSULE BY MOUTH EVERY DAY 90 capsule 1    simvastatin (ZOCOR) 40 mg tablet TAKE 1 TABLET BY MOUTH EVERY DAY 90 tablet 4    tamsulosin (FLOMAX) 0 4 mg TAKE 1 CAPSULE BY MOUTH EVERY DAY WITH DINNER 90 capsule 3    triamterene-hydrochlorothiazide (DYAZIDE) 37 5-25 mg per capsule TAKE 1 CAPSULE BY MOUTH EVERY DAY 90 capsule 3    warfarin (COUMADIN) 2 5 mg tablet Take 1 tablet by mouth daily T-Th-S-S takes 2 5mg     M-W-F takes 5 0 mg      warfarin (COUMADIN) 5 mg tablet TAKE 1 TABLET EVERY DAY OR AS DIRECTED BY MD 90 tablet 3    gabapentin (NEURONTIN) 100 mg capsule Take 1 capsule (100 mg total) by mouth daily at bedtime 90 capsule 1    ProAir  (90 Base) MCG/ACT inhaler INHALE 2 PUFFS EVERY 4 (FOUR) HOURS AS NEEDED FOR WHEEZING OR SHORTNESS OF BREATH  18 g 1     No current facility-administered medications for this visit           Health Maintenance     Health Maintenance   Topic Date Due    DTaP,Tdap,and Td Vaccines (1 - Tdap) Never done    OT PLAN OF CARE  08/18/2019  Influenza Vaccine (1) 09/01/2021    HEMOGLOBIN A1C  02/12/2022    Diabetic Foot Exam  03/29/2022    Fall Risk  04/19/2022    Depression Screening PHQ  04/19/2022    Medicare Annual Wellness Visit (AWV)  04/19/2022    BMI: Followup Plan  04/19/2022    BMI: Adult  08/23/2022    Colorectal Cancer Screening  11/21/2022    DM Eye Exam  04/12/2023    Hepatitis C Screening  Completed    Pneumococcal Vaccine: 65+ Years  Completed    COVID-19 Vaccine  Completed    HIB Vaccine  Aged Out    Hepatitis B Vaccine  Aged Out    IPV Vaccine  Aged Out    Hepatitis A Vaccine  Aged Out    Meningococcal ACWY Vaccine  Aged Out    HPV Vaccine  Aged Out     Immunization History   Administered Date(s) Administered    Hep B, adult 03/19/2019, 04/23/2019, 09/19/2019    INFLUENZA 09/13/2018, 09/17/2018    Influenza, high dose seasonal 0 7 mL 09/19/2019, 08/24/2020    Pneumococcal Conjugate 13-Valent 03/19/2019    Pneumococcal Polysaccharide PPV23 08/17/2020    SARS-CoV-2 / COVID-19 mRNA IM (Pfizer-BioNTech) 03/05/2021, 03/26/2021    Zoster Vaccine Recombinant 07/30/2018, 09/29/2018       DAYAMI Rodriguez

## 2021-08-29 PROBLEM — N13.2 URETERAL STONE WITH HYDRONEPHROSIS: Status: RESOLVED | Noted: 2019-05-17 | Resolved: 2021-08-29

## 2021-08-29 NOTE — ASSESSMENT & PLAN NOTE
Bilateral lower extremity peripheral arterial disease  Following with vascular surgery  Due for repeat Doppler in December  He is aware importance of smoking cessation  Failed trial of Pletal, which caused tachycardia  Encouraged to stay active, walk daily

## 2021-08-29 NOTE — ASSESSMENT & PLAN NOTE
Lab Results   Component Value Date    HGBA1C 6 7 (H) 08/12/2021     Hemoglobin A1c is at goal which is less than 7 % on metformin 1000 mg twice daily and Januvia 100 mg daily  He will continue to work on lifestyle modifications  Foot exam is up-to-date  Eye exam is up-to-date  Will start gabapentin for neuropathy, will start with 100 mg daily at bedtime for 2 weeks, if tolerating well and symptoms are still continuing, at 2 weeks will increase to 200 mg at bedtime  Two weeks after that he will call me with an update on how this medication is working  We reviewed side effect profile of this medication, including dizziness drowsiness, increased risk for falls  He would like to try low-dose with slow increase to avoid side effects  Will call if he has any problems with this medication

## 2021-08-29 NOTE — ASSESSMENT & PLAN NOTE
LDL is stable at 47 on simvastatin 40 mg daily  Triglycerides are elevated 368  Will continue fenofibrate 134 mg daily  He will work on lifestyle modifications, staying active in reducing carbohydrates in diet

## 2021-08-30 ENCOUNTER — OFFICE VISIT (OUTPATIENT)
Dept: PODIATRY | Facility: CLINIC | Age: 71
End: 2021-08-30
Payer: MEDICARE

## 2021-08-30 VITALS
HEIGHT: 71 IN | BODY MASS INDEX: 29.88 KG/M2 | DIASTOLIC BLOOD PRESSURE: 80 MMHG | SYSTOLIC BLOOD PRESSURE: 130 MMHG | WEIGHT: 213.4 LBS | HEART RATE: 70 BPM

## 2021-08-30 DIAGNOSIS — B35.1 ONYCHOMYCOSIS: ICD-10-CM

## 2021-08-30 DIAGNOSIS — E11.42 TYPE 2 DIABETES MELLITUS WITH DIABETIC POLYNEUROPATHY, WITHOUT LONG-TERM CURRENT USE OF INSULIN (HCC): Primary | ICD-10-CM

## 2021-08-30 DIAGNOSIS — L84 FOOT CALLUS: ICD-10-CM

## 2021-08-30 PROCEDURE — 11721 DEBRIDE NAIL 6 OR MORE: CPT | Performed by: PODIATRIST

## 2021-08-30 PROCEDURE — 11056 PARNG/CUTG B9 HYPRKR LES 2-4: CPT | Performed by: PODIATRIST

## 2021-08-30 NOTE — PROGRESS NOTES
Molly Odom  1950  AT RISK FOOT CARE    1  Type 2 diabetes mellitus with diabetic polyneuropathy, without long-term current use of insulin (HCC)  Lesion Destruction   2  Onychomycosis     3  Foot callus  Lesion Destruction       Patient presents for at-risk foot care  Patient has no acute concerns today  Patient has significant lower extremity risk due to neuropathy, parasthesia, edema, and trophic skin changes to the lower extremity  On exam patient has thickened, hypertrophic, discolored, brittle toenails with subungual debris and tenderness x10   Callus: B/L hallux, left sub met 1  Patient has lower extremity edema  PAtients skin is atrophic, thickened nails, and decreased pedal hair  Patient has decreased pinprick and vibratory sensation to his feet and parasthesia    Today's treatment includes:  Debridement of toenails  Using nail nipper, nadya, and curette, nails were sharply debrided, reduced in thickness and length  Devitalized nail tissue and fungal debris excised and removed  Patient tolerated well      Lesion Destruction    Date/Time: 8/30/2021 8:27 AM  Performed by: Jimmy Talley DPM  Authorized by: Jimmy Talley DPM   Universal Protocol:  Risks and benefits: risks, benefits and alternatives were discussed  Consent given by: patient  Timeout called at: 8/30/2021 8:27 AM   Patient understanding: patient states understanding of the procedure being performed      Procedure Details - Lesion Destruction:     Number of Lesions:  3  Lesion 1:     Body area:  Lower extremity    Lower extremity location:  L foot    Malignancy: benign hyperkeratotic lesion      Destruction method: scissors used for extraction    Lesion 2:     Body area:  Lower extremity    Lower extremity location:  L big toe    Malignancy: benign hyperkeratotic lesion      Destruction method: scissors used for extraction    Lesion 3:     Body area:  Lower extremity    Lower extremity location:  R foot    Malignancy: benign hyperkeratotic lesion      Destruction method: scissors used for extraction            Discussed proper shoe gear, daily inspections of feet, and general foot health with patient  Patient has Q9  findings and is recommended for at risk foot care every 9-10 weeks      Patients most recent complete clinical foot exam was on: 3/29/21

## 2021-09-03 ENCOUNTER — APPOINTMENT (OUTPATIENT)
Dept: LAB | Facility: CLINIC | Age: 71
End: 2021-09-03
Payer: MEDICARE

## 2021-09-03 DIAGNOSIS — E53.8 VITAMIN B12 DEFICIENCY: ICD-10-CM

## 2021-09-03 DIAGNOSIS — G62.9 NEUROPATHY: ICD-10-CM

## 2021-09-03 DIAGNOSIS — R74.01 ELEVATED AST (SGOT): ICD-10-CM

## 2021-09-03 DIAGNOSIS — I10 ESSENTIAL HYPERTENSION: ICD-10-CM

## 2021-09-03 DIAGNOSIS — E11.42 TYPE 2 DIABETES MELLITUS WITH DIABETIC POLYNEUROPATHY, WITHOUT LONG-TERM CURRENT USE OF INSULIN (HCC): ICD-10-CM

## 2021-09-03 DIAGNOSIS — E78.2 MIXED HYPERLIPIDEMIA: ICD-10-CM

## 2021-09-11 ENCOUNTER — APPOINTMENT (EMERGENCY)
Dept: CT IMAGING | Facility: HOSPITAL | Age: 71
DRG: 871 | End: 2021-09-11
Payer: MEDICARE

## 2021-09-11 ENCOUNTER — APPOINTMENT (EMERGENCY)
Dept: RADIOLOGY | Facility: HOSPITAL | Age: 71
DRG: 871 | End: 2021-09-11
Payer: MEDICARE

## 2021-09-11 ENCOUNTER — HOSPITAL ENCOUNTER (INPATIENT)
Facility: HOSPITAL | Age: 71
LOS: 1 days | Discharge: HOME | DRG: 871 | End: 2021-09-12
Attending: EMERGENCY MEDICINE | Admitting: INTERNAL MEDICINE
Payer: MEDICARE

## 2021-09-11 DIAGNOSIS — R11.10 VOMITING: Primary | ICD-10-CM

## 2021-09-11 DIAGNOSIS — N17.9 AKI (ACUTE KIDNEY INJURY) (HCC): ICD-10-CM

## 2021-09-11 DIAGNOSIS — E83.51 HYPOCALCEMIA: ICD-10-CM

## 2021-09-11 DIAGNOSIS — F17.200 SMOKING: ICD-10-CM

## 2021-09-11 DIAGNOSIS — A41.9 SEPSIS (HCC): ICD-10-CM

## 2021-09-11 PROBLEM — R79.89 ELEVATED LACTIC ACID LEVEL: Status: ACTIVE | Noted: 2021-09-11

## 2021-09-11 PROBLEM — E11.49 TYPE 2 DIABETES MELLITUS WITH NEUROLOGIC COMPLICATION, WITHOUT LONG-TERM CURRENT USE OF INSULIN (HCC): Status: ACTIVE | Noted: 2019-01-18

## 2021-09-11 PROBLEM — R19.7 VOMITING AND DIARRHEA: Status: ACTIVE | Noted: 2021-09-11

## 2021-09-11 PROBLEM — E87.1 HYPONATREMIA: Status: ACTIVE | Noted: 2021-09-11

## 2021-09-11 PROBLEM — R65.10 SIRS (SYSTEMIC INFLAMMATORY RESPONSE SYNDROME) (HCC): Status: ACTIVE | Noted: 2021-09-11

## 2021-09-11 PROBLEM — R31.9 HEMATURIA: Status: ACTIVE | Noted: 2021-09-11

## 2021-09-11 LAB
ALBUMIN SERPL BCP-MCNC: 3.8 G/DL (ref 3.5–5)
ALP SERPL-CCNC: 64 U/L (ref 46–116)
ALT SERPL W P-5'-P-CCNC: 77 U/L (ref 12–78)
ANION GAP SERPL CALCULATED.3IONS-SCNC: 10 MMOL/L (ref 4–13)
AST SERPL W P-5'-P-CCNC: 68 U/L (ref 5–45)
BACTERIA UR QL AUTO: ABNORMAL /HPF
BASOPHILS # BLD AUTO: 0.06 THOUSANDS/ΜL (ref 0–0.1)
BASOPHILS NFR BLD AUTO: 0 % (ref 0–1)
BILIRUB SERPL-MCNC: 0.88 MG/DL (ref 0.2–1)
BILIRUB UR QL STRIP: NEGATIVE
BUN SERPL-MCNC: 27 MG/DL (ref 5–25)
CALCIUM SERPL-MCNC: 8.9 MG/DL (ref 8.3–10.1)
CHLORIDE SERPL-SCNC: 96 MMOL/L (ref 100–108)
CLARITY UR: CLEAR
CO2 SERPL-SCNC: 27 MMOL/L (ref 21–32)
COLOR UR: YELLOW
CREAT SERPL-MCNC: 1.59 MG/DL (ref 0.6–1.3)
EOSINOPHIL # BLD AUTO: 0.04 THOUSAND/ΜL (ref 0–0.61)
EOSINOPHIL NFR BLD AUTO: 0 % (ref 0–6)
ERYTHROCYTE [DISTWIDTH] IN BLOOD BY AUTOMATED COUNT: 14.4 % (ref 11.6–15.1)
GFR SERPL CREATININE-BSD FRML MDRD: 43 ML/MIN/1.73SQ M
GLUCOSE SERPL-MCNC: 120 MG/DL (ref 65–140)
GLUCOSE SERPL-MCNC: 171 MG/DL (ref 65–140)
GLUCOSE UR STRIP-MCNC: NEGATIVE MG/DL
HCT VFR BLD AUTO: 46.9 % (ref 36.5–49.3)
HGB BLD-MCNC: 15.4 G/DL (ref 12–17)
HGB UR QL STRIP.AUTO: ABNORMAL
HYALINE CASTS #/AREA URNS LPF: ABNORMAL /LPF
IMM GRANULOCYTES # BLD AUTO: 0.14 THOUSAND/UL (ref 0–0.2)
IMM GRANULOCYTES NFR BLD AUTO: 1 % (ref 0–2)
INR PPP: 2.35 (ref 0.84–1.19)
KETONES UR STRIP-MCNC: NEGATIVE MG/DL
LACTATE SERPL-SCNC: 1.6 MMOL/L (ref 0.5–2)
LACTATE SERPL-SCNC: 2.5 MMOL/L (ref 0.5–2)
LEUKOCYTE ESTERASE UR QL STRIP: NEGATIVE
LIPASE SERPL-CCNC: 99 U/L (ref 73–393)
LYMPHOCYTES # BLD AUTO: 0.6 THOUSANDS/ΜL (ref 0.6–4.47)
LYMPHOCYTES NFR BLD AUTO: 4 % (ref 14–44)
MCH RBC QN AUTO: 30.9 PG (ref 26.8–34.3)
MCHC RBC AUTO-ENTMCNC: 32.8 G/DL (ref 31.4–37.4)
MCV RBC AUTO: 94 FL (ref 82–98)
MONOCYTES # BLD AUTO: 1.42 THOUSAND/ΜL (ref 0.17–1.22)
MONOCYTES NFR BLD AUTO: 9 % (ref 4–12)
NEUTROPHILS # BLD AUTO: 13.43 THOUSANDS/ΜL (ref 1.85–7.62)
NEUTS SEG NFR BLD AUTO: 86 % (ref 43–75)
NITRITE UR QL STRIP: NEGATIVE
NON-SQ EPI CELLS URNS QL MICRO: ABNORMAL /HPF
NRBC BLD AUTO-RTO: 0 /100 WBCS
PH UR STRIP.AUTO: 6 [PH]
PLATELET # BLD AUTO: 333 THOUSANDS/UL (ref 149–390)
PMV BLD AUTO: 9.5 FL (ref 8.9–12.7)
POTASSIUM SERPL-SCNC: 4.3 MMOL/L (ref 3.5–5.3)
PROT SERPL-MCNC: 7.8 G/DL (ref 6.4–8.2)
PROT UR STRIP-MCNC: NEGATIVE MG/DL
PROTHROMBIN TIME: 25.4 SECONDS (ref 11.6–14.5)
RBC # BLD AUTO: 4.99 MILLION/UL (ref 3.88–5.62)
RBC #/AREA URNS AUTO: ABNORMAL /HPF
SARS-COV-2 RNA RESP QL NAA+PROBE: NEGATIVE
SODIUM SERPL-SCNC: 133 MMOL/L (ref 136–145)
SP GR UR STRIP.AUTO: 1.02 (ref 1–1.03)
UROBILINOGEN UR QL STRIP.AUTO: 0.2 E.U./DL
WBC # BLD AUTO: 15.69 THOUSAND/UL (ref 4.31–10.16)
WBC #/AREA URNS AUTO: ABNORMAL /HPF

## 2021-09-11 PROCEDURE — 85025 COMPLETE CBC W/AUTO DIFF WBC: CPT | Performed by: EMERGENCY MEDICINE

## 2021-09-11 PROCEDURE — 83605 ASSAY OF LACTIC ACID: CPT | Performed by: EMERGENCY MEDICINE

## 2021-09-11 PROCEDURE — 87505 NFCT AGENT DETECTION GI: CPT | Performed by: INTERNAL MEDICINE

## 2021-09-11 PROCEDURE — 83605 ASSAY OF LACTIC ACID: CPT

## 2021-09-11 PROCEDURE — 82948 REAGENT STRIP/BLOOD GLUCOSE: CPT

## 2021-09-11 PROCEDURE — 87493 C DIFF AMPLIFIED PROBE: CPT | Performed by: INTERNAL MEDICINE

## 2021-09-11 PROCEDURE — 96361 HYDRATE IV INFUSION ADD-ON: CPT

## 2021-09-11 PROCEDURE — 85610 PROTHROMBIN TIME: CPT | Performed by: INTERNAL MEDICINE

## 2021-09-11 PROCEDURE — 71046 X-RAY EXAM CHEST 2 VIEWS: CPT

## 2021-09-11 PROCEDURE — 36415 COLL VENOUS BLD VENIPUNCTURE: CPT

## 2021-09-11 PROCEDURE — 80053 COMPREHEN METABOLIC PANEL: CPT | Performed by: EMERGENCY MEDICINE

## 2021-09-11 PROCEDURE — 81001 URINALYSIS AUTO W/SCOPE: CPT

## 2021-09-11 PROCEDURE — 74176 CT ABD & PELVIS W/O CONTRAST: CPT

## 2021-09-11 PROCEDURE — 99285 EMERGENCY DEPT VISIT HI MDM: CPT

## 2021-09-11 PROCEDURE — U0005 INFEC AGEN DETEC AMPLI PROBE: HCPCS | Performed by: EMERGENCY MEDICINE

## 2021-09-11 PROCEDURE — 99284 EMERGENCY DEPT VISIT MOD MDM: CPT | Performed by: EMERGENCY MEDICINE

## 2021-09-11 PROCEDURE — 96374 THER/PROPH/DIAG INJ IV PUSH: CPT

## 2021-09-11 PROCEDURE — U0003 INFECTIOUS AGENT DETECTION BY NUCLEIC ACID (DNA OR RNA); SEVERE ACUTE RESPIRATORY SYNDROME CORONAVIRUS 2 (SARS-COV-2) (CORONAVIRUS DISEASE [COVID-19]), AMPLIFIED PROBE TECHNIQUE, MAKING USE OF HIGH THROUGHPUT TECHNOLOGIES AS DESCRIBED BY CMS-2020-01-R: HCPCS | Performed by: EMERGENCY MEDICINE

## 2021-09-11 PROCEDURE — 83690 ASSAY OF LIPASE: CPT | Performed by: EMERGENCY MEDICINE

## 2021-09-11 PROCEDURE — 99223 1ST HOSP IP/OBS HIGH 75: CPT | Performed by: INTERNAL MEDICINE

## 2021-09-11 PROCEDURE — G1004 CDSM NDSC: HCPCS

## 2021-09-11 PROCEDURE — 87040 BLOOD CULTURE FOR BACTERIA: CPT | Performed by: EMERGENCY MEDICINE

## 2021-09-11 RX ORDER — ONDANSETRON 2 MG/ML
4 INJECTION INTRAMUSCULAR; INTRAVENOUS EVERY 4 HOURS PRN
Status: DISCONTINUED | OUTPATIENT
Start: 2021-09-11 | End: 2021-09-12 | Stop reason: HOSPADM

## 2021-09-11 RX ORDER — TAMSULOSIN HYDROCHLORIDE 0.4 MG/1
0.4 CAPSULE ORAL
Status: DISCONTINUED | OUTPATIENT
Start: 2021-09-11 | End: 2021-09-12 | Stop reason: HOSPADM

## 2021-09-11 RX ORDER — PRAVASTATIN SODIUM 20 MG
20 TABLET ORAL
Status: DISCONTINUED | OUTPATIENT
Start: 2021-09-11 | End: 2021-09-12 | Stop reason: HOSPADM

## 2021-09-11 RX ORDER — ASPIRIN 81 MG/1
81 TABLET ORAL DAILY
Status: DISCONTINUED | OUTPATIENT
Start: 2021-09-12 | End: 2021-09-12 | Stop reason: HOSPADM

## 2021-09-11 RX ORDER — WARFARIN SODIUM 2.5 MG/1
2.5 TABLET ORAL
Status: DISCONTINUED | OUTPATIENT
Start: 2021-09-11 | End: 2021-09-11

## 2021-09-11 RX ORDER — ACETAMINOPHEN 325 MG/1
325 TABLET ORAL EVERY 6 HOURS PRN
Status: DISCONTINUED | OUTPATIENT
Start: 2021-09-11 | End: 2021-09-12 | Stop reason: HOSPADM

## 2021-09-11 RX ORDER — ONDANSETRON 2 MG/ML
4 INJECTION INTRAMUSCULAR; INTRAVENOUS ONCE
Status: COMPLETED | OUTPATIENT
Start: 2021-09-11 | End: 2021-09-11

## 2021-09-11 RX ORDER — WARFARIN SODIUM 2.5 MG/1
2.5 TABLET ORAL
Status: DISCONTINUED | OUTPATIENT
Start: 2021-09-11 | End: 2021-09-12 | Stop reason: HOSPADM

## 2021-09-11 RX ORDER — SODIUM CHLORIDE 9 MG/ML
125 INJECTION, SOLUTION INTRAVENOUS CONTINUOUS
Status: DISCONTINUED | OUTPATIENT
Start: 2021-09-11 | End: 2021-09-12 | Stop reason: HOSPADM

## 2021-09-11 RX ORDER — PANTOPRAZOLE SODIUM 40 MG/1
40 TABLET, DELAYED RELEASE ORAL
Status: DISCONTINUED | OUTPATIENT
Start: 2021-09-12 | End: 2021-09-12 | Stop reason: HOSPADM

## 2021-09-11 RX ORDER — MELATONIN
2000 DAILY
Status: DISCONTINUED | OUTPATIENT
Start: 2021-09-12 | End: 2021-09-12 | Stop reason: HOSPADM

## 2021-09-11 RX ORDER — ALBUTEROL SULFATE 90 UG/1
2 AEROSOL, METERED RESPIRATORY (INHALATION) EVERY 4 HOURS PRN
Status: DISCONTINUED | OUTPATIENT
Start: 2021-09-11 | End: 2021-09-12 | Stop reason: HOSPADM

## 2021-09-11 RX ORDER — GABAPENTIN 100 MG/1
100 CAPSULE ORAL
Status: DISCONTINUED | OUTPATIENT
Start: 2021-09-11 | End: 2021-09-12 | Stop reason: HOSPADM

## 2021-09-11 RX ORDER — WARFARIN SODIUM 2.5 MG/1
5 TABLET ORAL
Status: DISCONTINUED | OUTPATIENT
Start: 2021-09-13 | End: 2021-09-12 | Stop reason: HOSPADM

## 2021-09-11 RX ADMIN — AZITHROMYCIN MONOHYDRATE 500 MG: 500 INJECTION, POWDER, LYOPHILIZED, FOR SOLUTION INTRAVENOUS at 18:55

## 2021-09-11 RX ADMIN — TAMSULOSIN HYDROCHLORIDE 0.4 MG: 0.4 CAPSULE ORAL at 20:22

## 2021-09-11 RX ADMIN — ONDANSETRON 4 MG: 2 INJECTION INTRAMUSCULAR; INTRAVENOUS at 16:50

## 2021-09-11 RX ADMIN — GABAPENTIN 100 MG: 100 CAPSULE ORAL at 21:56

## 2021-09-11 RX ADMIN — CEFTRIAXONE 1000 MG: 10 INJECTION, POWDER, FOR SOLUTION INTRAVENOUS at 18:15

## 2021-09-11 RX ADMIN — SODIUM CHLORIDE 1000 ML: 0.9 INJECTION, SOLUTION INTRAVENOUS at 16:51

## 2021-09-11 RX ADMIN — PRAVASTATIN SODIUM 20 MG: 20 TABLET ORAL at 20:22

## 2021-09-11 RX ADMIN — WARFARIN SODIUM 2.5 MG: 2.5 TABLET ORAL at 21:56

## 2021-09-11 RX ADMIN — SODIUM CHLORIDE 125 ML/HR: 0.9 INJECTION, SOLUTION INTRAVENOUS at 20:10

## 2021-09-11 NOTE — SEPSIS NOTE
Sepsis Note   Bhavya Rising 80 y o  male MRN: [id number]  Unit/Bed#: [place] [address] Encounter: [id number]      qSOFA     [address] Name 09/11/21 1745 09/11/21 1707 09/11/21 1704 09/11/21 1551       Altered mental status GCS < 15  --  0  0  --     Respiratory Rate > / =22  0  --  --  0     Systolic BP < / =389  0  --  --  0     Q Sofa Score  0  0  0  0         Initial Sepsis Screening     Row Name 09/11/21 1248                Is the patient's history suggestive of a new or worsening infection? (!) Yes (Proceed)  -KL        Suspected source of infection  pneumonia  -KL        Are two or more of the following signs & symptoms of infection both present and new to the patient?  --        Indicate SIRS criteria  Tachycardia > 90 bpm;Leukocytosis (WBC > 66077 IJL)  -KL        If the answer is yes to both questions, suspicion of sepsis is present  --        If severe sepsis is present AND tissue hypoperfusion perists in the hour after fluid resuscitation or lactate > 4, the patient meets criteria for SEPTIC SHOCK  --        Are any of the following organ dysfunction criteria present within 6 hours of suspected infection and SIRS criteria that are NOT considered to be chronic conditions?   (!) Yes  -KL        Organ dysfunction  Lactate > 2 0 mmol/L  -KL        Date of presentation of severe sepsis  --        Time of presentation of severe sepsis  --        Tissue hypoperfusion persists in the hour after crystalloid fluid administration, evidenced, by either:  --        Was hypotension present within one hour of the conclusion of crystalloid fluid administration?  --        Date of presentation of septic shock  --        Time of presentation of septic shock  --          User Key  (r) = Recorded By, (t) = Taken By, (c) = Cosigned By    [address] Name Provider Je Bello MD Resident

## 2021-09-11 NOTE — QUICK NOTE
[facility] Attending Physician Attestation Note - Admission    I have seen and examined Abner Almazan personally and have reviewed the medical record independently  I have reviewed the case with the resident physician including all assessments and the plan of care for each  I agree with the resident physician and offer the following addendum to the below statements by the resident physician:     Date Evaluated:  September 11, 2020   Time Evaluated: 7 pm      Subjective / HPI:   This is a 80 year male with nausea vomiting and diarrhea which has been ongoing for approximately 24 hours  Today he had vomiting up until 11:00 a m  And has not vomited since  Additionally he reports at least 4 loose stools today  He denies any fevers or chills but is fulfilling SIRS criteria white cell count and tachycardia  Furthermore his lactic is elevated he does have an Alvarez   We will hydrate and trend lactic until less than 2  We will also recheck kidney function tomorrow  CT scan does show nephrolithiasis and cholelithiasis  We will get a liver ultrasound for completion  Will also get stool studies and C diff and also check the patient for COVID  Finally will cover with antibiotics for community-acquired pneumonia given infectious/inflammatory changes seen on CT scan  Exam:   Abdomen soft nondistended  Bowel sounds present  No respiratory distress  All 4 extremities warm and well perfused  No neurological deficits  Alert oriented x3    Assessment and Plan:  · Sepsis secondary to presumed viral gastroenteritis  · Follow-up stool cultures  · Follow-up pro count  · Continue azithromycin and Rocephin for now  · IV fluids  · Recheck renal function in    Education and Discussions with Family / Patient:  Discussed with the patient updated wife at bedside    Time Spent for Care: 45 minutes  More than 50% of total time spent on counseling and coordination of care as described above      Current Patient Status: Inpatient   Anticipated Length of Stay:  Patient will be admitted on an Inpatient basis with an anticipated length of stay of  more than 2 midnights     Justification for Hospital Stay:  Sepsis    Epic / Care Everywhere Records Reviewed Independently: Yes     For detailed history, assessment, and plan of care, please review the statements below by Dr Jose Buck MD

## 2021-09-11 NOTE — ED NOTES
Valdez Gaemilyheike (wife) -[phone]      wife wants an update when the patient gets a bed upstairs     Vilinda Cheadle, [address] [place] [address]  09/11/21 4700

## 2021-09-11 NOTE — ED PROVIDER NOTES
History  Chief Complaint   Patient presents with    Vomiting     Pt reports vomiting every hour since 1 am to 11am, pt also reporting diarrhea, muscle aches that are chronic but worsening      46-year-old male presents to the ED with vomiting associated diarrhea  Patient states he had dinner last night and felt off and went to bed however he woke up around 1 a m  and started having large volumes of bilious nonbloody emesis  Patient states from 1 a m  to 11 a m  He had multiple episodes of emesis  Patient mentions that he lost 5 pounds today  Patient had a recent medication change his gabapentin was increased 100 milligrams to 200 milligrams this past tuesday  Patient has also had nonbloody watery diarrhea associated with this  Patient states today he has not had much oral liquid intake secondary to vomiting  Patient endorses nausea, vomiting, fatigue, lightheadedness, diarhea  Patient denies fever, chills, shortness of breath, dizziness, sore throat  History provided by:  Patient  Vomiting  Severity:  Moderate  Duration:  10 hours  Timing:  Intermittent  Quality:  Bilious material  Progression:  Improving  Chronicity:  New  Recent urination:  Normal  Associated symptoms: diarrhea    Associated symptoms: no abdominal pain, no arthralgias, no chills, no cough, no fever and no sore throat    Risk factors: diabetes        Prior to Admission Medications   Prescriptions Last Dose Informant Patient Reported? Taking? Cholecalciferol (VITAMIN D3 PO)  Self Yes No   Sig: Take 50 mcg by mouth daily   Cyanocobalamin (VITAMIN B 12 PO)  Self Yes No   Sig: Take 1,000 mcg by mouth daily   Januvia 100 MG tablet  Self No No   Sig: TAKE 1 TABLET BY MOUTH EVERY DAY   ProAir  (90 Base) MCG/ACT inhaler   No No   Sig: INHALE 2 PUFFS EVERY 4 (FOUR) HOURS AS NEEDED FOR WHEEZING OR SHORTNESS OF BREATH    acetaminophen (TYLENOL) 325 mg tablet  Self No No   Si, by mouth, every 6 hours as needed for mild to moderate pain  aspirin (ECOTRIN LOW STRENGTH) 81 mg EC tablet  Self Yes No   Sig: Take 81 mg by mouth daily   fenofibrate micronized (LOFIBRA) 134 MG capsule  Self No No   Sig: TAKE 1 CAPSULE (134 MG TOTAL) BY MOUTH DAILY WITH BREAKFAST   gabapentin (NEURONTIN) 100 mg capsule   No No   Sig: Take 1 capsule (100 mg total) by mouth daily at bedtime   lisinopril (ZESTRIL) 20 mg tablet  Self No No   Sig: TAKE 1 TABLET BY MOUTH EVERY DAY   metFORMIN (GLUCOPHAGE) 1000 MG tablet   No No   Sig: TAKE 1 TABLET BY MOUTH TWICE A DAY WITH MEALS   omeprazole (PriLOSEC) 20 mg delayed release capsule  Self No No   Sig: TAKE 1 CAPSULE BY MOUTH EVERY DAY   simvastatin (ZOCOR) 40 mg tablet  Self No No   Sig: TAKE 1 TABLET BY MOUTH EVERY DAY   tamsulosin (FLOMAX) 0 4 mg   No No   Sig: TAKE 1 CAPSULE BY MOUTH EVERY DAY WITH DINNER   triamterene-hydrochlorothiazide (DYAZIDE) 37 5-25 mg per capsule  Self No No   Sig: TAKE 1 CAPSULE BY MOUTH EVERY DAY   warfarin (COUMADIN) 2 5 mg tablet  Self No No   Sig: Take 1 tablet by mouth daily T-Th-S-S takes 2 5mg     M-W-F takes 5 0 mg   warfarin (COUMADIN) 5 mg tablet  Self No No   Sig: TAKE 1 TABLET EVERY DAY OR AS DIRECTED BY MD      Facility-Administered Medications: None       Past Medical History:   Diagnosis Date    Arthritis     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) 3/21/2019    Claudication, intermittent (HCC)     Colon polyp     6 polyps 3 years ago    Diabetes mellitus (HCC)     type 2    Diabetic neuropathic arthropathy (HCC)     causes weakness in LE and uses a cane as assist to walk    Diabetic neuropathy (HCC)     Fatty liver     GERD (gastroesophageal reflux disease)     History of DVT (deep vein thrombosis) 2015    left LE    Hyperlipidemia     Hypertension     Kidney stone     Parotid tumor     Seasonal allergies     Spinal stenosis     Ureteral stone with hydronephrosis 5/17/2019    Added automatically from request for surgery [id number] Past Surgical History:   Procedure Laterality Date    COLONOSCOPY  05/16/2019    completed by Dr Xavier Hyde, Repeat 3 years    [place] RETROGRADE PYELOGRAM  5/17/2019    PAROTIDECTOMY Left     NC CYSTO/URETERO W/LITHOTRIPSY &INDWELL STENT INSRT Right 5/17/2019    Procedure: CYSTOSCOPY URETEROSCOPY WITH LITHOTRIPSY HOLMIUM LASER, RETROGRADE PYELOGRAM AND INSERTION STENT URETERAL--possible stent only;  Surgeon: Giana Mays MD;  [facility];  Service: Urology    NC EXC PAROTD,LAT LOBE,DISSECT 5TH NERV Right 10/18/2017    Procedure: SUPERFICIAL PAROTIDECTOMY WITH NERVE DISSECTION AND PRESERVATION;  Surgeon: Zofia Ryan MD;  [facility];  Service: ENT    NC INCISE FINGER TENDON SHEATH Right 11/3/2020    Procedure: Ashwin Fierro;  Surgeon: Ewelina Duong MD;  [facility];  Service: Orthopedics    ROTATOR CUFF REPAIR Bilateral     TONSILLECTOMY         Family History   Problem Relation Age of Onset    Lupus Mother     Rheum arthritis Mother     Cirrhosis Father     Alcohol abuse Father     Substance Abuse Brother      I have reviewed and agree with the history as documented  E-Cigarette/Vaping    E-Cigarette Use Never User      E-Cigarette/Vaping Substances    Nicotine No     THC No     CBD No     Flavoring No     Other No     Unknown No      Social History     Tobacco Use    Smoking status: Current Every Day Smoker     Types: Pipe    Smokeless tobacco: Never Used    Tobacco comment: smokes 2 pipes daily   Vaping Use    Vaping Use: Never used   Substance Use Topics    Alcohol use: Yes     Comment: socially    Drug use: No        Review of Systems   Constitutional: Positive for fatigue  Negative for chills and fever  HENT: Negative for ear pain and sore throat  Eyes: Negative for pain and visual disturbance  Respiratory: Negative for cough and shortness of breath  Cardiovascular: Negative for chest pain and palpitations  Gastrointestinal: Positive for diarrhea, nausea and vomiting  Negative for abdominal pain  Genitourinary: Negative for dysuria and hematuria  Musculoskeletal: Negative for arthralgias and back pain  Skin: Negative for color change and rash  Neurological: Positive for light-headedness  Negative for seizures and syncope  All other systems reviewed and are negative  Physical Exam  ED Triage Vitals [09/11/21 1551]   Temperature Pulse Respirations Blood Pressure SpO2   99 8 °F (37 7 °C) 99 18 137/68 96 %      Temp Source Heart Rate Source Patient Position - Orthostatic VS BP Location FiO2 (%)   Oral Monitor Sitting Left arm --      Pain Score       4             Orthostatic Vital Signs  Vitals:    09/11/21 1551 09/11/21 1745   BP: 137/68 130/70   Pulse: 99 92   Patient Position - Orthostatic VS: Sitting Sitting       Physical Exam  Vitals and nursing note reviewed  Constitutional:       General: He is not in acute distress  Appearance: He is well-developed  He is not ill-appearing  HENT:      Head: Normocephalic and atraumatic  Eyes:      Conjunctiva/sclera: Conjunctivae normal    Cardiovascular:      Rate and Rhythm: Normal rate and regular rhythm  Heart sounds: No murmur heard  Pulmonary:      Effort: Pulmonary effort is normal  No respiratory distress  Breath sounds: Normal breath sounds  Abdominal:      General: There is no distension  Palpations: Abdomen is soft  Tenderness: There is abdominal tenderness (RLQ tenderness on palpation)  There is no guarding  Musculoskeletal:      Cervical back: Neck supple  Skin:     General: Skin is warm and dry  Neurological:      Mental Status: He is alert and oriented to person, place, and time  Sensory: No sensory deficit  Motor: No weakness           ED Medications  Medications   ceftriaxone (ROCEPHIN) 1 g/50 mL in dextrose IVPB (has no administration in time range)   azithromycin (ZITHROMAX) 500 mg in sodium chloride 0 9% 250mL IVPB 500 mg (has no administration in time range)   ondansetron (ZOFRAN) injection 4 mg (4 mg Intravenous Given 9/11/21 1650)   sodium chloride 0 9 % bolus 1,000 mL (0 mL Intravenous Stopped 9/11/21 1749)       Diagnostic Studies  Results Reviewed     Procedure Component Value Units Date/Time    Blood culture #2 [[id number]] Collected: 09/11/21 1655    Lab Status: In process Specimen: Blood from Hand, Left Updated: 09/11/21 1806    Blood culture #1 [[id number]] Collected: 09/11/21 1650    Lab Status: In process Specimen: Blood from Arm, Left Updated: 09/11/21 1806    UA w Reflex to Microscopic w Reflex to Culture [[id number]]  (Abnormal) Collected: 09/11/21 1730    Lab Status: Final result Specimen: Urine, Clean Catch Updated: 09/11/21 1805     Color, UA Yellow     Clarity, UA Clear     Specific Gravity, UA 1 025     pH, UA 6 0     Leukocytes, UA Negative     Nitrite, UA Negative     Protein, UA Negative mg/dl      Glucose, UA Negative mg/dl      Ketones, UA Negative mg/dl      Urobilinogen, UA 0 2 E U /dl      Bilirubin, UA Negative     Blood, UA Trace-Intact    Urine Microscopic [[id number]] Collected: 09/11/21 1730    Lab Status: In process Specimen: Urine, Clean Catch Updated: 09/11/21 1805    Novel Coronavirus (Covid-19),PCR [facility] - 2 Hour Stat [[id number]]     Lab Status: No result Specimen: Nares from Nose     Lactic acid 2 Hours [[id number]]     Lab Status: No result Specimen: Blood     Lactic acid [[id number]]  (Abnormal) Collected: 09/11/21 1653    Lab Status: Final result Specimen: Blood from Arm, Left Updated: 09/11/21 1746     LACTIC ACID 2 5 mmol/L     Narrative:      Result may be elevated if tourniquet was used during collection      Comprehensive metabolic panel [[id number]]  (Abnormal) Collected: 09/11/21 1559    Lab Status: Final result Specimen: Blood from Hand, Left Updated: 09/11/21 1625     Sodium 133 mmol/L      Potassium 4 3 mmol/L      Chloride 96 mmol/L      CO2 27 mmol/L ANION GAP 10 mmol/L      BUN 27 mg/dL      Creatinine 1 59 mg/dL      Glucose 171 mg/dL      Calcium 8 9 mg/dL      AST 68 U/L      ALT 77 U/L      Alkaline Phosphatase 64 U/L      Total Protein 7 8 g/dL      Albumin 3 8 g/dL      Total Bilirubin 0 88 mg/dL      eGFR 43 ml/min/1 73sq m     Narrative:      [place] guidelines for Chronic Kidney Disease (CKD):     Stage 1 with normal or high GFR (GFR > 90 mL/min/1 73 square meters)    Stage 2 Mild CKD (GFR = 60-89 mL/min/1 73 square meters)    Stage 3A Moderate CKD (GFR = 45-59 mL/min/1 73 square meters)    Stage 3B Moderate CKD (GFR = 30-44 mL/min/1 73 square meters)    Stage 4 Severe CKD (GFR = 15-29 mL/min/1 73 square meters)    Stage 5 End Stage CKD (GFR <15 mL/min/1 73 square meters)  Note: GFR calculation is accurate only with a steady state creatinine    Lipase [[id number]]  (Normal) Collected: 09/11/21 1559    Lab Status: Final result Specimen: Blood from Hand, Left Updated: 09/11/21 1625     Lipase 99 u/L     CBC and differential [[id number]]  (Abnormal) Collected: 09/11/21 1559    Lab Status: Final result Specimen: Blood from Hand, Left Updated: 09/11/21 1609     WBC 15 69 Thousand/uL      RBC 4 99 Million/uL      Hemoglobin 15 4 g/dL      Hematocrit 46 9 %      MCV 94 fL      MCH 30 9 pg      MCHC 32 8 g/dL      RDW 14 4 %      MPV 9 5 fL      Platelets 130 Thousands/uL      nRBC 0 /100 WBCs      Neutrophils Relative 86 %      Immat GRANS % 1 %      Lymphocytes Relative 4 %      Monocytes Relative 9 %      Eosinophils Relative 0 %      Basophils Relative 0 %      Neutrophils Absolute 13 43 Thousands/µL      Immature Grans Absolute 0 14 Thousand/uL      Lymphocytes Absolute 0 60 Thousands/µL      Monocytes Absolute 1 42 Thousand/µL      Eosinophils Absolute 0 04 Thousand/µL      Basophils Absolute 0 06 Thousands/µL                  CT abdomen pelvis wo contrast   Final Result by Renzo oRman MD (09/11 1742)      Small amount of posterior right lower lobe tree in bud opacities, consistent with infectious or inflammatory bronchiolitis (series 2 images 4-10 )        Multiple bilateral punctate renal calyceal stones  There is no hydronephrosis or ureteral stones  Moderate fatty liver change  Cholelithiasis without evidence of acute cholecystitis  Workstation performed: [id number]         XR chest 2 views    (Results Pending)         Procedures  Procedures      ED Course                         Initial Sepsis Screening     Row Name 09/11/21 2273                Is the patient's history suggestive of a new or worsening infection? (!) Yes (Proceed)  -KL        Suspected source of infection  pneumonia  -KL        Are two or more of the following signs & symptoms of infection both present and new to the patient?  --        Indicate SIRS criteria  Tachycardia > 90 bpm;Leukocytosis (WBC > 04967 IJL)  -KL        If the answer is yes to both questions, suspicion of sepsis is present  --        If severe sepsis is present AND tissue hypoperfusion perists in the hour after fluid resuscitation or lactate > 4, the patient meets criteria for SEPTIC SHOCK  --        Are any of the following organ dysfunction criteria present within 6 hours of suspected infection and SIRS criteria that are NOT considered to be chronic conditions?   (!) Yes  -KL        Organ dysfunction  Lactate > 2 0 mmol/L  -KL        Date of presentation of severe sepsis  --        Time of presentation of severe sepsis  --        Tissue hypoperfusion persists in the hour after crystalloid fluid administration, evidenced, by either:  --        Was hypotension present within one hour of the conclusion of crystalloid fluid administration?  --        Date of presentation of septic shock  --        Time of presentation of septic shock  --          User Key  (r) = Recorded By, (t) = Taken By, (c) = Cosigned By    [address] Name Provider Cyndi Alamo MD Resident SBIRT 20yo+      Most Recent Value   SBIRT (24 yo +)   In order to provide better care to our patients, we are screening all of our patients for alcohol and drug use  Would it be okay to ask you these screening questions? Unable to answer at this time Filed at: 09/11/2021 1605                [facility]  Number of Diagnoses or Management Options     Amount and/or Complexity of Data Reviewed  Clinical lab tests: ordered and reviewed  Tests in the radiology section of CPT®: ordered and reviewed  Review and summarize past medical records: yes        Disposition  Final diagnoses:   Vomiting   LISY (acute kidney injury) ([facility] [address] )   Sepsis ([facility] [address] )     Time reflects when diagnosis was documented in both MDM as applicable and the Disposition within this note     Time User Action Codes Description Comment    9/11/2021  6:05 PM Mauricio Forman [R11 10] Vomiting     9/11/2021  6:05 PM Mauricio Foster Add [N17 9] LISY (acute kidney injury) ([facility] [address] )     9/11/2021  6:05 PM Mauricio Forman [A41 9] Sepsis [facility])       ED Disposition     ED Disposition Condition Date/Time Comment    Admit Fair Sat Sep 11, 2021  6:08 PM Case was discussed with Dr Diony Cooper and the patient's admission status was agreed to be Admission Status: inpatient status to the service of Dr Diony Cooper  Follow-up Information    None         Patient's Medications   Discharge Prescriptions    No medications on file     No discharge procedures on file  PDMP Review       Value Time User    PDMP Reviewed  Yes 11/3/2020  9:40 AM Bryce Oneil PA-C           ED Provider  Attending physically available and evaluated Jana Patsy  I managed the patient along with the ED Attending      Electronically Signed by         Chavo Graham MD  09/11/21 6470

## 2021-09-12 ENCOUNTER — APPOINTMENT (INPATIENT)
Dept: ULTRASOUND IMAGING | Facility: HOSPITAL | Age: 71
DRG: 871 | End: 2021-09-12
Payer: MEDICARE

## 2021-09-12 VITALS
WEIGHT: 204.7 LBS | HEART RATE: 88 BPM | TEMPERATURE: 98.6 F | DIASTOLIC BLOOD PRESSURE: 70 MMHG | HEIGHT: 71 IN | SYSTOLIC BLOOD PRESSURE: 138 MMHG | OXYGEN SATURATION: 98 % | RESPIRATION RATE: 18 BRPM | BODY MASS INDEX: 28.66 KG/M2

## 2021-09-12 PROBLEM — A41.9 SEPSIS (HCC): Status: RESOLVED | Noted: 2021-09-11 | Resolved: 2021-09-12

## 2021-09-12 PROBLEM — E83.51 HYPOCALCEMIA: Status: ACTIVE | Noted: 2021-09-12

## 2021-09-12 PROBLEM — R19.7 VOMITING AND DIARRHEA: Status: RESOLVED | Noted: 2021-09-11 | Resolved: 2021-09-12

## 2021-09-12 PROBLEM — E87.1 HYPONATREMIA: Status: RESOLVED | Noted: 2021-09-11 | Resolved: 2021-09-12

## 2021-09-12 PROBLEM — R11.10 VOMITING AND DIARRHEA: Status: RESOLVED | Noted: 2021-09-11 | Resolved: 2021-09-12

## 2021-09-12 LAB
ALBUMIN SERPL BCP-MCNC: 3 G/DL (ref 3.5–5)
ALP SERPL-CCNC: 50 U/L (ref 46–116)
ALT SERPL W P-5'-P-CCNC: 65 U/L (ref 12–78)
ANION GAP SERPL CALCULATED.3IONS-SCNC: 6 MMOL/L (ref 4–13)
AST SERPL W P-5'-P-CCNC: 82 U/L (ref 5–45)
BILIRUB SERPL-MCNC: 0.8 MG/DL (ref 0.2–1)
BUN SERPL-MCNC: 20 MG/DL (ref 5–25)
C DIFF TOX GENS STL QL NAA+PROBE: NEGATIVE
CALCIUM ALBUM COR SERPL-MCNC: 8.5 MG/DL (ref 8.3–10.1)
CALCIUM SERPL-MCNC: 7.7 MG/DL (ref 8.3–10.1)
CAMPYLOBACTER DNA SPEC NAA+PROBE: NORMAL
CHLORIDE SERPL-SCNC: 101 MMOL/L (ref 100–108)
CO2 SERPL-SCNC: 30 MMOL/L (ref 21–32)
CREAT SERPL-MCNC: 1.22 MG/DL (ref 0.6–1.3)
ERYTHROCYTE [DISTWIDTH] IN BLOOD BY AUTOMATED COUNT: 14.2 % (ref 11.6–15.1)
GFR SERPL CREATININE-BSD FRML MDRD: 60 ML/MIN/1.73SQ M
GLUCOSE SERPL-MCNC: 130 MG/DL (ref 65–140)
HCT VFR BLD AUTO: 38.5 % (ref 36.5–49.3)
HGB BLD-MCNC: 12.6 G/DL (ref 12–17)
INR PPP: 2.49 (ref 0.84–1.19)
MCH RBC QN AUTO: 31.1 PG (ref 26.8–34.3)
MCHC RBC AUTO-ENTMCNC: 32.7 G/DL (ref 31.4–37.4)
MCV RBC AUTO: 95 FL (ref 82–98)
PLATELET # BLD AUTO: 259 THOUSANDS/UL (ref 149–390)
PMV BLD AUTO: 9.6 FL (ref 8.9–12.7)
POTASSIUM SERPL-SCNC: 3.8 MMOL/L (ref 3.5–5.3)
PROCALCITONIN SERPL-MCNC: 0.31 NG/ML
PROT SERPL-MCNC: 6.1 G/DL (ref 6.4–8.2)
PROTHROMBIN TIME: 26.5 SECONDS (ref 11.6–14.5)
RBC # BLD AUTO: 4.05 MILLION/UL (ref 3.88–5.62)
SALMONELLA DNA SPEC QL NAA+PROBE: NORMAL
SHIGA TOXIN STX GENE SPEC NAA+PROBE: NORMAL
SHIGELLA DNA SPEC QL NAA+PROBE: NORMAL
SODIUM SERPL-SCNC: 137 MMOL/L (ref 136–145)
WBC # BLD AUTO: 9.2 THOUSAND/UL (ref 4.31–10.16)

## 2021-09-12 PROCEDURE — 85027 COMPLETE CBC AUTOMATED: CPT

## 2021-09-12 PROCEDURE — 99239 HOSP IP/OBS DSCHRG MGMT >30: CPT | Performed by: INTERNAL MEDICINE

## 2021-09-12 PROCEDURE — 84145 PROCALCITONIN (PCT): CPT

## 2021-09-12 PROCEDURE — 76705 ECHO EXAM OF ABDOMEN: CPT

## 2021-09-12 PROCEDURE — 80053 COMPREHEN METABOLIC PANEL: CPT

## 2021-09-12 PROCEDURE — 85610 PROTHROMBIN TIME: CPT | Performed by: INTERNAL MEDICINE

## 2021-09-12 PROCEDURE — 36415 COLL VENOUS BLD VENIPUNCTURE: CPT | Performed by: INTERNAL MEDICINE

## 2021-09-12 RX ORDER — CALCIUM CARBONATE 500(1250)
1 TABLET ORAL
Status: DISCONTINUED | OUTPATIENT
Start: 2021-09-12 | End: 2021-09-12 | Stop reason: HOSPADM

## 2021-09-12 RX ORDER — CALCIUM CARBONATE 500(1250)
1 TABLET ORAL
Qty: 21 TABLET | Refills: 0 | Status: SHIPPED | OUTPATIENT
Start: 2021-09-12 | End: 2022-05-17 | Stop reason: ALTCHOICE

## 2021-09-12 RX ADMIN — ASPIRIN 81 MG: 81 TABLET, COATED ORAL at 09:45

## 2021-09-12 RX ADMIN — Medication 2000 UNITS: at 09:45

## 2021-09-12 NOTE — ASSESSMENT & PLAN NOTE
Lab Results   Component Value Date    HGBA1C 6 7 (H) 08/12/2021       Recent Labs     09/11/21  2155   POCGLU 120       Blood Sugar Average: Last 72 hrs:  (P) 120 patient has a history of diabetes taking metformin sitagliptin at home  Most recent HbA1c not at goal, need better glycemic control      Plan  · Hold home regimen  · Continue sliding scale  · Advanced to diabetic diet today given nausea vomiting resolved currently

## 2021-09-12 NOTE — INCIDENTAL FINDINGS
The following findings require follow up:  Radiographic finding  Incidental Findings:  Abdominal Ultrasound:   · Contracted gallbladder filled with gallstones  No evidence of acute cholecystitis  · Mild hepatomegaly and moderate fatty liver change  CT of Abdomen/Pelvis w/o contrast:   · Small amount of posterior right lower lobe tree in bud opacities, consistent with infectious or inflammatory bronchiolitis   · Multiple bilateral punctate renal calyceal stones  There is no hydronephrosis or ureteral stones  · Moderate fatty liver change  · Cholelithiasis without evidence of acute cholecystitis      Follow-up with Primary Care Physician  Follow up should be done within 1 week(s)

## 2021-09-12 NOTE — DISCHARGE INSTR - AVS FIRST PAGE
Dear Joseph Kwong,     It was our pleasure to care for you here at [facility]  It is our hope that we were always able to exceed the expected standards for your care during your stay  You were hospitalized due to sepsis with nausea, vomiting, and diarrhea  You were cared for on the Emergency Department by Chio Mckeon MD under the service of Mame Guerra [facility] who covers for your primary care physician (PCP), Hedy Law, while you were hospitalized  If you have any questions or concerns related to this hospitalization, you may contact us at [phone]  For follow up as well as any medication refills, we recommend that you follow up with your primary care physician  A registered nurse will reach out to you by phone within a few days after your discharge to answer any additional questions that you may have after going home  However, at this time we provide for you here, the most important instructions / recommendations at discharge:     · Notable Medication Adjustments -   · New prescription added for low calcium level:  Calcium carbonate 500 mg tablet 3 times daily with meals for 7 days   · New prescription added for smoking cessation:  Nicotine patch, place one patch on skin daily  · Testing Required after Discharge -   · BMP blood work need to be completed on Wednesday, 9/15/21 to check on your kidney functions given that they are not completely back to your baseline level today  Please follow-up the result with your primary care physician  · Important follow up information -   · Please follow-up with your primary physician within 1 week of discharge for test results that are still pending currently including stool studies, procalcitonin level, and BMP to be repeated in 3 days as described above    · Other Instructions -   · Please call your primary care provider and return to the hospital if nausea/vomiting/diarrhea recur  · Please review this entire after visit summary as additional general instructions including medication list, appointments, activity, diet, any pertinent wound care, and other additional recommendations from your care team that may be provided for you        Sincerely,     Jacob Dunn MD

## 2021-09-12 NOTE — ASSESSMENT & PLAN NOTE
Patient reports 1 episode of brown more formed stools last night and 1 episode of watery clear diarrhea in the early a m  currently denies nausea, vomiting, or abdominal pain  Sepsis resolved with C diff PCR negative, pending enteric results      Plan:  · See assessment and plan as outlined above for sepsis  · Advanced to diabetic diet  · Continue with IV fluids and Zofran prn  · Follow-up enteric stool results

## 2021-09-12 NOTE — ASSESSMENT & PLAN NOTE
Patient presenting with LISY      Plan:  IV hydration  Hold lisinopril, hold HCTZ  Avoid hypotension  Check BMP in the morning

## 2021-09-12 NOTE — DISCHARGE INSTRUCTIONS
Acute Diarrhea   WHAT YOU NEED TO KNOW:   Acute diarrhea starts quickly and lasts a short time, usually 1 to 3 days  It can last up to 2 weeks  You may not be able to control your diarrhea  Acute diarrhea usually stops on its own  DISCHARGE INSTRUCTIONS:   Return to the emergency department if:   · You feel confused  · Your heartbeat is faster than usual      · Your eyes look deeply sunken, or you have no tears when you cry  · You urinate less than usual, or your urine is dark yellow  · You have blood or mucus in your bowel movements  · You have severe abdominal pain  · You are unable to drink any liquids  Contact your healthcare provider if:   · Your symptoms do not get better with treatment  · You have a fever higher than 101 3°F (38 5°C)  · You have trouble eating and drinking because you are vomiting  · Your diarrhea does not get better in 7 days  · You have questions or concerns about your condition or care  Follow up with your healthcare provider as directed:  Write down your questions so you remember to ask them during your visits  Medicines:  · Diarrhea medicine  is an over-the-counter medicine that helps slow or stop your diarrhea  Do not  take this medicine unless your healthcare provider says it is okay  · Antibiotics  may be given to help treat an infection caused by bacteria  · Antiparasitics  may be given to treat an infection caused by parasites  · Take your medicine as directed  Contact your healthcare provider if you think your medicine is not helping or if you have side effects  Tell him of her if you are allergic to any medicine  Keep a list of the medicines, vitamins, and herbs you take  Include the amounts, and when and why you take them  Bring the list or the pill bottles to follow-up visits  Carry your medicine list with you in case of an emergency  Self-care:   · Drink liquids as directed    Liquids will help prevent dehydration caused by diarrhea  Ask your healthcare provider how much liquid to drink each day and which liquids are best for you  You may need to drink an oral rehydration solution (ORS)  An ORS has the right amounts of water, salts, and sugar you need to replace body fluids  You can buy an ORS at most grocery stores and pharmacies  · Eat foods that are easy to digest   Examples include rice, lentils, cereal, bananas, potatoes, and bread  It also includes some fruits (bananas, melon), well-cooked vegetables, and lean meats  Do not eat foods high in fiber, fat, and sugar  Do not drink alcohol until your diarrhea is gone  Prevent acute diarrhea:   · Wash your hands often  Use soap and water  Wash your hands before you eat or prepare food  Also wash your hands after you use the bathroom  Use an alcohol-based hand gel when soap and water are not available  · Keep bathroom surfaces clean  This helps prevent the spread of germs that cause acute diarrhea  · Wash fruits and vegetables well before you eat them  This can help remove germs that cause diarrhea  If possible, remove the skin from fruits and vegetables, or cook them well before you eat them  · [employer] and poultry as directed  Meat includes beef and pork  Poultry includes chicken, turkey, and duck  ? Cook ground meat  to 160°F      ? Cook ground poultry, whole poultry, or cuts of poultry  to at least 165°F  Remove the poultry from heat  Let it stand for 3 minutes before you eat it  ? Cook whole cuts of meat other than poultry  to at least 145°F  Remove the meat from heat  Let it stand for 3 minutes before you eat it  · Wash dishes that have touched raw meat or poultry with hot water and soap  This includes cutting boards, utensils, dishes, and serving containers  · Place raw or cooked meat or poultry in the refrigerator as soon as possible  Bacteria can grow in meat or poultry that is left at room temperature too long       · Do not eat raw or undercooked oysters, clams, or mussels  These foods may be contaminated and cause infection  · Drink only filtered or treated water when you travel  Do not put ice in your drinks  Drink bottled water whenever possible  © Copyright [employer] 2021 Information is for End User's use only and may not be sold, redistributed or otherwise used for commercial purposes  All illustrations and images included in CareNotes® are the copyrighted property of [employer]  or Elaine Isaac  The above information is an  only  It is not intended as medical advice for individual conditions or treatments  Talk to your doctor, nurse or pharmacist before following any medical regimen to see if it is safe and effective for you  Acute Nausea and Vomiting   WHAT YOU NEED TO KNOW:   Acute nausea and vomiting start suddenly, worsen quickly, and last a short time  DISCHARGE INSTRUCTIONS:   Seek care immediately if:   · You see blood in your vomit or your bowel movements  · You have sudden, severe pain in your chest and upper abdomen after hard vomiting or retching  · You have swelling in your neck and chest      · You are dizzy, cold, and thirsty and your eyes and mouth are dry  · You are urinating very little or not at all  · You have muscle weakness, leg cramps, and trouble breathing  · Your heart is beating much faster than normal      · You continue to vomit for more than 48 hours  Contact your healthcare provider if:   · You have frequent dry heaves (vomiting but nothing comes out)  · Your nausea and vomiting does not get better or go away after you use medicine  · You have questions or concerns about your condition or treatment  Medicines: You may need any of the following:  · Medicines  may be given to calm your stomach and stop your vomiting   You may also need medicines to help you feel more relaxed or to stop nausea and vomiting caused by motion sickness  · Gastrointestinal stimulants  are used to help empty your stomach and bowels  This may help decrease nausea and vomiting  · Take your medicine as directed  Contact your healthcare provider if you think your medicine is not helping or if you have side effects  Tell him or her if you are allergic to any medicine  Keep a list of the medicines, vitamins, and herbs you take  Include the amounts, and when and why you take them  Bring the list or the pill bottles to follow-up visits  Carry your medicine list with you in case of an emergency  Prevent or manage acute nausea and vomiting:   · Do not drink alcohol  Alcohol may upset or irritate your stomach  Too much alcohol can also cause acute nausea and vomiting  · Control stress  Headaches due to stress may cause nausea and vomiting  Find ways to relax and manage your stress  Get more rest and sleep  · Drink more liquids as directed  Vomiting can lead to dehydration  It is important to drink more liquids to help replace lost body fluids  Ask your healthcare provider how much liquid to drink each day and which liquids are best for you  Your provider may recommend that you drink an oral rehydration solution (ORS)  ORS contains water, salts, and sugar that are needed to replace the lost body fluids  Ask what kind of ORS to use, how much to drink, and where to get it  · Eat smaller meals, more often  Eat small amounts of food every 2 to 3 hours, even if you are not hungry  Food in your stomach may decrease your nausea  · Talk to your healthcare provider before you take over-the-counter (OTC) medicines  These medicines can cause serious problems if you use certain other medicines, or you have a medical condition  You may have problems if you use too much or use them for longer than the label says  Follow directions on the label carefully      Follow up with your healthcare provider as directed:  Write down your questions so you remember to ask them during your visits  © Copyright [employer] 2021 Information is for End User's use only and may not be sold, redistributed or otherwise used for commercial purposes  All illustrations and images included in CareNotes® are the copyrighted property of [employer]  or Elaine Isaac  The above information is an  only  It is not intended as medical advice for individual conditions or treatments  Talk to your doctor, nurse or pharmacist before following any medical regimen to see if it is safe and effective for you  Acute Kidney Injury   WHAT YOU NEED TO KNOW:   Acute kidney injury (LISY) is also called acute kidney failure, or acute renal failure  LISY happens when your kidneys suddenly stop working correctly  Normally, the kidneys remove fluid, chemicals, and waste from your blood  These wastes are turned into urine by your kidneys  LISY usually happens over hours or days  When you have LISY, your kidneys do not remove the waste, chemicals, or extra fluid from your body  A normal amount of urine is not produced  LISY is usually temporary, it can take days to months to recover  LISY can also become a chronic kidney condition  DISCHARGE INSTRUCTIONS:   Call 911 if:   · You have sudden chest pain or trouble breathing  Seek care immediately if:   · Your symptoms get worse  Contact your healthcare provider if:   · Your symptoms return  · Your blood sugar or blood pressure level is not within the range your healthcare provider recommends  · You have questions or concerns about your condition or care  Nutrition:  Your healthcare provider may tell you to eat food low in sodium (salt), potassium, phosphorus, or protein  A dietitian can help you plan your meals  Drink liquids as directed: Your healthcare provider may recommend that you drink a certain amount of liquids  This will help your kidneys work better and decrease your risk for dehydration   Ask how much liquid to drink each day and which liquids are best for you  Prevent acute kidney injury:   · Manage other health conditions  such as diabetes, high blood pressure, or heart disease  These conditions increase your risk for acute kidney injury  Take your medicines for these conditions as directed  Also, monitor your blood sugar and blood pressure levels as directed  Contact your healthcare provider if your levels are not in the range he or she says it should be  · Talk to your healthcare provider before you take over-the-counter-medicine  NSAIDs, stomach medicine, or laxatives may harm your kidneys and increase your risk for acute kidney injury  If it is okay to take the medicine, follow the directions on the package  Do not take more than directed  · Tell healthcare providers you have had acute kidney injury  before you get contrast liquid for an x-ray or CT scan  Your healthcare provider may give you medicine to prevent kidney problems caused by the liquid  Follow up with your healthcare provider as directed: You will need to return for more tests to make sure your kidneys are working properly  You may also be referred to a kidney specialist  Write down your questions so you remember to ask them during your visits  © Copyright [employer] 2021 Information is for End User's use only and may not be sold, redistributed or otherwise used for commercial purposes  All illustrations and images included in CareNotes® are the copyrighted property of A D A M [employer] Inc  or [facility] Chance Isaac  The above information is an  only  It is not intended as medical advice for individual conditions or treatments  Talk to your doctor, nurse or pharmacist before following any medical regimen to see if it is safe and effective for you

## 2021-09-12 NOTE — ASSESSMENT & PLAN NOTE
Patient has a trace blood on urinalysis, denies any dysuria, frequency, gross blood in urine    Plan:  Could be secondary to nephrolithiasis found in the renal calices bilaterally   Continue to monitor urine for gross hematuria

## 2021-09-12 NOTE — ED ATTENDING ATTESTATION
9/11/2021  I, Vernie Gowers, MD, saw and evaluated the patient  I have discussed the patient with the resident/non-physician practitioner and agree with the resident's/non-physician practitioner's findings, Plan of Care, and MDM as documented in the resident's/non-physician practitioner's note, except where noted  All available labs and Radiology studies were reviewed  I was present for key portions of any procedure(s) performed by the resident/non-physician practitioner and I was immediately available to provide assistance  At this point I agree with the current assessment done in the Emergency Department  I have conducted an independent evaluation of this patient a history and physical is as follows:    80year-old presenting to ER with nausea vomiting and diarrhea  Septic  Decreased p o  Intake  Fatigue  Lightheaded  Agree with workup by resident  Concern for sepsis  LISY    Will be admitted to the hospital     ED Course         Critical Care Time  Procedures

## 2021-09-12 NOTE — ASSESSMENT & PLAN NOTE
Presents for vomiting and diarrhea in the past 24 hours including 4 episodes of diarrhea today  Emesis is described as bilious nonbloody, diarrhea is described as nonbloody watery  Likely viral gastroenteritis  Wife has no symptoms who ate the same food so unlikely food poisoning  COVID negative    Please see plan under sepsis  Stool cultures, C diff, hydration, p r n   Zofran

## 2021-09-12 NOTE — ASSESSMENT & PLAN NOTE
Patient's sodium on admission is 133 likely due to hypovolemia in the setting of vomiting and diarrhea    Plan:  Giving IV fluids  Repeat labs in the morning  Holding diuretic HCTZ

## 2021-09-12 NOTE — ASSESSMENT & PLAN NOTE
Presented with LISY with elevated creatinine 1 59 yesterday  Creatinine improving with IVF and most recent was 1 22 a in the a m      Plan:  · Continue with IVF hydration  · Hold lisinopril, hold HCTZ  · Avoid hypotension

## 2021-09-12 NOTE — ASSESSMENT & PLAN NOTE
Patient's sodium on admission is 133 likely due to hypovolemia in the setting of vomiting and diarrhea  Resolved with sodium levels 137 WNL today       Plan:  · Giving IV fluids  · Repeat labs in the morning  · Holding diuretic HCTZ

## 2021-09-12 NOTE — ASSESSMENT & PLAN NOTE
Patient takes lisinopril and Dyazide at home patient presents with LISY so these medications will be held in hospital     Plan:  Monitor blood pressure Q shift

## 2021-09-12 NOTE — ASSESSMENT & PLAN NOTE
Patient has a trace blood on urinalysis, denies any dysuria, frequency, gross blood in urine    Plan:  · Could be secondary to nephrolithiasis found in the renal calices bilaterally   · Continue to monitor urine for gross hematuria  · F/u with PCP once discharged for further intervention options

## 2021-09-12 NOTE — ASSESSMENT & PLAN NOTE
Patient presents with history of vomiting, diarrhea within the past 24 hours, possible new onset cough within the past week  CT abdomen pelvis showed possibly infectious or inflammatory bronchiolitis  Patient met sepsis criteria with source of infection:  Lactic elevated 2 5, tachycardic to 92, white cell count 15 with source of infection    Patient is afebrile and denies subjective fevers or chills  Giving fluids, azithromycin, received Rocephin  Lactic acid is already trending down after fluids  COVID negative  UA negative except for trace blood    Plan:  Continue IV hydration  Continue Rocephin and azithromycin  C diff and stool studies pending  Procal ordered and pending

## 2021-09-12 NOTE — ASSESSMENT & PLAN NOTE
Patient with history of Afib on Coumadin  Currently normal rhythm and rate in the 80s    INR from today was 2 49 and within therapeutic goal     Plan:  · Continue home regimens of Coumadin  · Recheck INR

## 2021-09-12 NOTE — PROGRESS NOTES
[address], 1950, 80 y o  male MRN: [id number]   Unit/Bed#: FT 04/FT 04 Encounter: [id number]   Primary Care Physician: DAYAMI Mcdonough   Date and Time Admitted to Hospital: 9/11/2021  4:00 PM     Assessment/Plan:   * Sepsis [facility])  Assessment & Plan  · Patient presents with history of vomiting, diarrhea within the past 24 hours, possible new onset cough within the past week  CT abdomen pelvis showed possibly infectious or inflammatory bronchiolitis  · Patient met sepsis criteria with source of infection:  Lactic elevated 2 5, tachycardic to 92, white cell count 15 with source of infection  · Patient is afebrile and denies subjective fevers or chills  · Giving fluids, azithromycin, received Rocephin  · Lactic acid is already trending down after fluids  · COVID negative  · UA negative except for trace blood  · Resolved s/p IV Rocephin x1 dose and azithromycin x1 dose and IVF  Lactic acid repeat was within normal limits, WBC 9 2 this a m , and heart rate stable in the 80s  · C  Diff PCR negative    Plan:  · Continue IV hydration  · Continue with IV Rocephin and azithromycin pending enteric stool results  · F/u procal    Vomiting and diarrhea  Assessment & Plan  Patient reports 1 episode of brown more formed stools last night and 1 episode of watery clear diarrhea in the early a m  currently denies nausea, vomiting, or abdominal pain  Sepsis resolved with C diff PCR negative, pending enteric results  Plan:  · See assessment and plan as outlined above for sepsis  · Advanced to diabetic diet  · Continue with IV fluids and Zofran prn  · Follow-up enteric stool results    Hypocalcemia  Assessment & Plan  Patient presented with muscle cramping which is now resolved with IV fluid  However, calcium level was 7 7 this a m  (was 8 9 yesterday)      Plan:  · Started calcium supplementation with oral calcium carbonate 500 mg tab t i d  with meals  · Continue with vitamin-D supplement      LISY (acute kidney injury) [facility])  Assessment & Plan  Presented with LISY with elevated creatinine 1 59 yesterday  Creatinine improving with IVF and most recent was 1 22 a in the a m  Plan:  · Continue with IVF hydration  · Hold lisinopril, hold HCTZ  · Avoid hypotension      Essential hypertension  Assessment & Plan  Patient takes lisinopril and Dyazide at home patient presents with LISY so these medications will be held in hospital   BP stable ranging between 130-140/70s  Plan:  · Monitor blood pressure Q shift  · Continue to hold antihypertensive medications    Hematuria  Assessment & Plan  Patient has a trace blood on urinalysis, denies any dysuria, frequency, gross blood in urine    Plan:  · Could be secondary to nephrolithiasis found in the renal calices bilaterally   · Continue to monitor urine for gross hematuria  · F/u with PCP once discharged for further intervention options    Atrial fibrillation [facility])  Assessment & Plan  Patient with history of Afib on Coumadin  Currently normal rhythm and rate in the 80s  INR from today was 2 49 and within therapeutic goal     Plan:  · Continue home regimens of Coumadin  · Recheck INR    History of DVT (deep vein thrombosis)  Assessment & Plan  History of DVT in left lower extremity in 2015    Plan:  · Continue home regimen Coumadin    Fatty liver  Assessment & Plan  CT abdomen showed hepatic steatosis and elevated AST to 68 on admission, today AST recheck was 81  Plan:  · Follow-up with PCP once discharged    Type 2 diabetes mellitus with neurologic complication, without long-term current use of insulin [facility])  Assessment & Plan  Lab Results   Component Value Date    HGBA1C 6 7 (H) 08/12/2021       Recent Labs     09/11/21  2155   POCGLU 120       Blood Sugar Average: Last 72 hrs:  (P) 120 patient has a history of diabetes taking metformin sitagliptin at home    Most recent HbA1c not at goal, need better glycemic control  Plan  · Hold home regimen  · Continue sliding scale  · Advanced to diabetic diet today given nausea vomiting resolved currently    Mixed hyperlipidemia  Assessment & Plan  Patient takes simvastatin and Lofibra at home     Plan:  · Continue with home meds     Hyponatremia-resolved as of 2021  Assessment & Plan  Patient's sodium on admission is 133 likely due to hypovolemia in the setting of vomiting and diarrhea  Resolved with sodium levels 137 WNL today  Plan:  · Giving IV fluids  · Repeat labs in the morning  · Holding diuretic HCTZ       VTE Pharmacologic Prophylaxis: VTE Score: 5 High Risk (Score >/= 5) - Pharmacological DVT Prophylaxis Ordered: warfarin (Coumadin)  Sequential Compression Devices Ordered  Patient Centered Rounds: I performed bedside rounds with nursing staff today  Discussions with Specialists or Other Care Team Provider: Nursing    Education and Discussions with Family / Patient: Updated  (wife) via phone  Current Length of Stay: 1 day(s)  Current Patient Status: Inpatient   Discharge Plan: today in the PM or tomorrow depending on Enteric Stool results     Code Status: Level 1 - Full Code    Subjective:   Patient was seen at bedside this am in no acute distress  He reports 1 episode of clear watery diarrhea this a m  but denies fever, chills, nausea, vomiting, abdominal pain, or muscle cramping  He has no new complaints today and no significant events overnight  Objective:     Vitals:   Temp (24hrs), Av °F (37 2 °C), Min:98 2 °F (36 8 °C), Max:99 8 °F (37 7 °C)    Temp:  [98 2 °F (36 8 °C)-99 8 °F (37 7 °C)] 98 6 °F (37 °C)  HR:  [80-99] 88  Resp:  [18] 18  BP: (130-141)/(68-77) 138/70  SpO2:  [95 %-98 %] 98 %  Body mass index is 28 55 kg/m²  Input and Output Summary (last 24 hours):      Intake/Output Summary (Last 24 hours) at 2021 1207  Last data filed at 2021 1955  Gross per 24 hour   Intake 1300 ml   Output --   Net 1300 ml Physical Exam:   General: appears comfortable resting in bed and in no apparent distress   HEENT: atraumatic and normocephalic  Neck: supple, non-tender; no JVD  Lungs: clear to auscultation bilaterally; no wheezes or rales  Heart: regular rate and rhythm; normal S1 and S2; no murmur or gallop  Abdomen: soft, non-tender; bowel sounds normal  Skin: normal without suspicious lesions or rash on exposed skin  Neuro: alert and oriented x 3; no focal deficits or weakness  Extremities: warm and well-perfused; no edema or cyanosis    Additional Data:     Labs:  Results from last 7 days   Lab Units 09/12/21  0428 09/11/21  1559   WBC Thousand/uL 9 20 15 69*   HEMOGLOBIN g/dL 12 6 15 4   HEMATOCRIT % 38 5 46 9   PLATELETS Thousands/uL 259 333   NEUTROS PCT %  --  86*   LYMPHS PCT %  --  4*   MONOS PCT %  --  9   EOS PCT %  --  0     Results from last 7 days   Lab Units 09/12/21  0428   SODIUM mmol/L 137   POTASSIUM mmol/L 3 8   CHLORIDE mmol/L 101   CO2 mmol/L 30   BUN mg/dL 20   CREATININE mg/dL 1 22   ANION GAP mmol/L 6   CALCIUM mg/dL 7 7*   ALBUMIN g/dL 3 0*   TOTAL BILIRUBIN mg/dL 0 80   ALK PHOS U/L 50   ALT U/L 65   AST U/L 82*   GLUCOSE RANDOM mg/dL 130     Results from last 7 days   Lab Units 09/12/21  0428   INR  2 49*     Results from last 7 days   Lab Units 09/11/21  2155   POC GLUCOSE mg/dl 120         Results from last 7 days   Lab Units 09/11/21  1850 09/11/21  1653   LACTIC ACID mmol/L 1 6 2 5*       Lines/Drains:  Invasive Devices     Peripheral Intravenous Line            Peripheral IV 09/11/21 Right;Ventral (anterior) Forearm <1 day          Drain            Closed/Suction Drain Right Neck Bulb 1425 days    Ureteral Drain/Stent Right ureter 6 Fr  848 days                      Imaging: Reviewed radiology reports from this admission  Recent Cultures (last 7 days):   Results from last 7 days   Lab Units 09/11/21  2119 09/11/21  1655 09/11/21  1650   BLOOD CULTURE   --  Received in Microbiology Lab  Culture in Progress  Received in Microbiology Lab  Culture in Progress  C DIFF TOXIN B BY PCR  Negative  --   --        Last 24 Hours Medication List:   Current Facility-Administered Medications   Medication Dose Route Frequency Provider Last Rate    acetaminophen  325 mg Oral Q6H PRN Miller Cisneros MD      albuterol  2 puff Inhalation Q4H PRN Miller Cisneros MD      aspirin  81 mg Oral Daily Miller Cisneros MD      azithromycin  500 mg Intravenous Q24H Miller Cisneros MD      cefTRIAXone  1,000 mg Intravenous Q24H Miller Cisneros MD      cholecalciferol  2,000 Units Oral Daily Miller Cisneros MD      gabapentin  100 mg Oral HS Miller Cisneros MD      nicotine  1 patch Transdermal Daily Miller Cisneros MD      ondansetron  4 mg Intravenous Q4H PRN Trey Brito DO      pantoprazole  40 mg Oral Early Morning Miller Cisneros MD      pravastatin  20 mg Oral Daily With Cherl Libman, MD      sodium chloride  125 mL/hr Intravenous Continuous Miller Cisneros  mL/hr (09/11/21 2010)    tamsulosin  0 4 mg Oral Daily With Cherl Libman, MD      warfarin  2 5 mg Oral Once per day on Sun Tue Thu Sat Misha Singletary MD      [START ON 9/13/2021] warfarin  5 mg Oral Once per day on Mon Wed Fri Misha Singletary MD          Today, Patient Was Seen By: Wendi Brito MD    **Please Note: This note may have been constructed using a voice recognition system  **

## 2021-09-12 NOTE — ASSESSMENT & PLAN NOTE
Patient presented with muscle cramping which is now resolved with IV fluid  However, calcium level was 7 7 this a m  (was 8 9 yesterday)      Plan:  · Started calcium supplementation with oral calcium carbonate 500 mg tab t i d  with meals  · Continue with vitamin-D supplement

## 2021-09-12 NOTE — ASSESSMENT & PLAN NOTE
CT abdomen showed hepatic steatosis and elevated AST to 68 on admission, today AST recheck was 81      Plan:  · Follow-up with PCP once discharged

## 2021-09-12 NOTE — DISCHARGE SUMMARY
[address] [facility], 1950, 80 y o  male MRN: [id number]   Unit/Bed#: FT 04/FT 04 Encounter: [id number]   Primary Care Physician: DAYAMI Monroy   Date and Time Admitted to Hospital: 9/11/2021  4:00 PM       * Sepsis (HCC)-resolved as of 9/12/2021  Assessment & Plan  · Patient presents with history of vomiting, diarrhea within the past 24 hours, possible new onset cough within the past week  CT abdomen pelvis showed possibly infectious or inflammatory bronchiolitis  · Patient met sepsis criteria with source of infection:  Lactic elevated 2 5, tachycardic to 92, white cell count 15 with source of infection  · Patient is afebrile and denies subjective fevers or chills  · Giving fluids, azithromycin, received Rocephin  · Lactic acid is already trending down after fluids  · COVID negative  · UA negative except for trace blood  · Resolved s/p IV Rocephin x1 dose and azithromycin x1 dose and IVF  Lactic acid repeat was within normal limits, WBC 9 2 this a m , and heart rate stable in the 80s  · C  Diff PCR negative    Plan:  · Continue IV hydration  · Continue with IV Rocephin and azithromycin pending enteric stool results  · F/u procal    Vomiting and diarrhea-resolved as of 9/12/2021  Assessment & Plan  Patient reports 1 episode of brown more formed stools last night and 1 episode of watery clear diarrhea in the early a m  currently denies nausea, vomiting, or abdominal pain  Sepsis resolved with C diff PCR negative, pending enteric results  Plan:  · See assessment and plan as outlined above for sepsis  · Advanced to diabetic diet  · Continue with IV fluids and Zofran prn  · Follow-up enteric stool results    Hypocalcemia  Assessment & Plan  Patient presented with muscle cramping which is now resolved with IV fluid  However, calcium level was 7 7 this a m  (was 8 9 yesterday)      Plan:  · Started calcium supplementation with oral calcium carbonate 500 mg tab t i d  with meals  · Continue with vitamin-D supplement      LISY (acute kidney injury) ([facility] [address] )  Assessment & Plan  Presented with LISY with elevated creatinine 1 59 yesterday  Creatinine improving with IVF and most recent was 1 22 a in the a m  Plan:  · Continue with IVF hydration  · Hold lisinopril, hold HCTZ  · Avoid hypotension      Essential hypertension  Assessment & Plan  Patient takes lisinopril and Dyazide at home patient presents with LISY so these medications will be held in hospital   BP stable ranging between 130-140/70s  Plan:  · Monitor blood pressure Q shift  · Continue to hold antihypertensive medications    Hematuria  Assessment & Plan  Patient has a trace blood on urinalysis, denies any dysuria, frequency, gross blood in urine    Plan:  · Could be secondary to nephrolithiasis found in the renal calices bilaterally   · Continue to monitor urine for gross hematuria  · F/u with PCP once discharged for further intervention options    Atrial fibrillation [facility])  Assessment & Plan  Patient with history of Afib on Coumadin  Currently normal rhythm and rate in the 80s  INR from today was 2 49 and within therapeutic goal     Plan:  · Continue home regimens of Coumadin  · Recheck INR    History of DVT (deep vein thrombosis)  Assessment & Plan  History of DVT in left lower extremity in 2015    Plan:  · Continue home regimen Coumadin    Fatty liver  Assessment & Plan  CT abdomen showed hepatic steatosis and elevated AST to 68 on admission, today AST recheck was 81  Plan:  · Follow-up with PCP once discharged    Type 2 diabetes mellitus with neurologic complication, without long-term current use of insulin [facility])  Assessment & Plan  Lab Results   Component Value Date    HGBA1C 6 7 (H) 08/12/2021       Recent Labs     09/11/21  2155   POCGLU 120       Blood Sugar Average: Last 72 hrs:  (P) 120 patient has a history of diabetes taking metformin sitagliptin at home    Most recent HbA1c not at goal, need better glycemic control  Plan  · Hold home regimen  · Continue sliding scale  · Advanced to diabetic diet today given nausea vomiting resolved currently    Mixed hyperlipidemia  Assessment & Plan  Patient takes simvastatin and Lofibra at home     Plan:  · Continue with home meds     Hyponatremia-resolved as of 9/12/2021  Assessment & Plan  Patient's sodium on admission is 133 likely due to hypovolemia in the setting of vomiting and diarrhea  Resolved with sodium levels 137 WNL today  Plan:  · Giving IV fluids  · Repeat labs in the morning  · Holding diuretic HCTZ         Medical Problems     Resolved Problems  Date Reviewed: 9/12/2021        Resolved    * (Principal) Sepsis ([address] ) 9/12/2021     Resolved by  Silvano Carreon MD    Vomiting and diarrhea 9/12/2021     Resolved by  Silvano Carreon MD    Hyponatremia 9/12/2021     Resolved by  Silvano Carreon MD              Discharging Resident: Silvano Carreon MD  Discharging Attending: No att  providers found  PCP: Mike Carmona  Admission Date:   Admission Orders (From admission, onward)     Ordered        09/11/21 1809  Inpatient Admission  Once                   Discharge Date: 09/12/21    Consultations During Hospital Stay:  · None    Procedures Performed:   · None    Significant Findings / Test Results:   Lab results:  Lactic acid 2 5, white blood cell count 15 69, elevated creatinine 1 59  CT Abdomen/Pelvis: Small amount of posterior right lower lobe tree in bud opacities, consistent with infectious or inflammatory bronchiolitis  Incidental Findings:   Abdominal Ultrasound:   · Contracted gallbladder filled with gallstones  No evidence of acute cholecystitis  · Mild hepatomegaly and moderate fatty liver change  CT of Abdomen/Pelvis w/o contrast:   · Small amount of posterior right lower lobe tree in bud opacities, consistent with infectious or inflammatory bronchiolitis  · Multiple bilateral punctate renal calyceal stones    There is no hydronephrosis or ureteral stones  · Moderate fatty liver change  · Cholelithiasis without evidence of acute cholecystitis  Test Results Pending at Discharge (will require follow up):  · Procalcitonin level  · Enteric stool studies  · Chest XR pending final read  · Patient instructed to follow up above pending test results with his primary care physician within 1 week of discharge     Outpatient Tests Requested:  · BMP to be completed on Wednesday, 9/15/21 and result to be followed up with primary care physician when available    Complications:  None    Reason for Admission:  Sepsis due to suspected gastroenteritis vs  pneumonia    Hospital Course:   Va Coe is a 80 y o  male who presented to the hospital on 9/11/2021 initially due to sepsis due to suspected gastroenteritis vs possible pneumonia and was admitted on observation basis  Patient presented to the ED yesterday with persistent nausea, vomiting, and diarrhea  In the ED he was found to meet sepsis criteria with tachycardia, elevated lactic acid was elevated to 2 5, and leukocytosis of 15 69  He received IV ceftriaxone x1 dose and azithromycin x1 dose empirically, pending stool studies  He was also found to have LISY with elevated creatinine of 1 59 and was started on continuous IV fluid  Today, sepsis resolved with lactic acid and white blood cell count within normal limits  He had 1 episode of watery diarrhea early in the morning with no recurrence and was able to tolerate oral diet well without nausea or vomiting  Stool studies were sent for C diff PCR which returned negative today with stool for enteric infections and procalcitonin level still pending  His creatinine level improved with IV fluids and was 1 22 this a m , and not yet back to his baseline level  However, patient reports he is feeling well and is very eager to be discharged home today   Given that he is medically stable with the sepsis resolved and creatinine near baseline, he will be discharged home today and instructed to repeat BMP in 3 days on Wednesday 9/15/21 and to follow-up with his primary physician in the office within 1 week of discharge to follow-up on test results pending at discharge as described above as well as his BMP results when available  Given that the patient is medically stable with sepsis and symptoms resolved, he will be discharged home today  The patient was made aware of the discharge and agrees with the plan  Please see above list of diagnoses and related plan for additional information  Condition at Discharge: Stable    Discharge Day Visit / Exam:   Subjective:  Patient was seen at bedside this am in no acute distress  He reports 1 episode of clear watery diarrhea this a m  but denies fever, chills, nausea, vomiting, abdominal pain, or muscle cramping  He has no new complaints today and no significant events overnight  Vitals: Blood Pressure: 138/70 (09/12/21 0216)  Pulse: 88 (09/12/21 0216)  Temperature: 98 6 °F (37 °C) (09/11/21 2300)  Temp Source: Oral (09/11/21 2300)  Respirations: 18 (09/12/21 0216)  Height: 5' 11" (180 3 cm) (09/11/21 1745)  Weight - Scale: 92 9 kg (204 lb 11 2 oz) (09/11/21 1551)  SpO2: 98 % (09/12/21 0216)    Physical Exam:   General: appears comfortable resting in bed and in no apparent distress   HEENT: atraumatic and normocephalic  Neck: supple, non-tender; no JVD  Lungs: clear to auscultation bilaterally; no wheezes or rales  Heart: regular rate and rhythm; normal S1 and S2; no murmur or gallop  Abdomen: soft, non-tender; bowel sounds normal  Skin: normal without suspicious lesions or rash on exposed skin  Neuro: alert and oriented x 3; no focal deficits or weakness  Extremities: warm and well-perfused; no edema or cyanosis    Discussion with Family: Updated  (wife) at bedside  Discharge instructions/Information to patient and family:   See after visit summary for information provided to patient and family  Provisions for Follow-Up Care:  See after visit summary for information related to follow-up care and any pertinent home health orders  Disposition:   Home    Planned Readmission: No     Discharge Medications:  See after visit summary for reconciled discharge medications provided to patient and/or family        **Please Note: This note may have been constructed using a voice recognition system**

## 2021-09-12 NOTE — ASSESSMENT & PLAN NOTE
Lab Results   Component Value Date    HGBA1C 6 7 (H) 08/12/2021       No results for input(s): POCGLU in the last 72 hours      Blood Sugar Average: Last 72 hrs:   patient has a history of diabetes taking metformin sitagliptin at home  Plan  Hold home regimen  Continue sliding scale  On clear liquid diet for now

## 2021-09-12 NOTE — ASSESSMENT & PLAN NOTE
· Patient presents with history of vomiting, diarrhea within the past 24 hours, possible new onset cough within the past week  CT abdomen pelvis showed possibly infectious or inflammatory bronchiolitis  · Patient met sepsis criteria with source of infection:  Lactic elevated 2 5, tachycardic to 92, white cell count 15 with source of infection  · Patient is afebrile and denies subjective fevers or chills  · Giving fluids, azithromycin, received Rocephin  · Lactic acid is already trending down after fluids  · COVID negative  · UA negative except for trace blood  · Resolved s/p IV Rocephin x1 dose and azithromycin x1 dose and IVF  Lactic acid repeat was within normal limits, WBC 9 2 this a m , and heart rate stable in the 80s  · C   Diff PCR negative    Plan:  · Continue IV hydration  · Continue with IV Rocephin and azithromycin pending enteric stool results  · F/u procal

## 2021-09-12 NOTE — H&P
[address] [facility] 1950, 80 y o  male MRN: [id number]  Unit/Bed#: S[facility] Anguiano Encounter: [id number]  Primary Care Provider: Ling Srinivasan   Date and time admitted to hospital: 9/11/2021  4:00 PM    Sepsis [facility])  Assessment & Plan  Patient presents with history of vomiting, diarrhea within the past 24 hours, possible new onset cough within the past week  CT abdomen pelvis showed possibly infectious or inflammatory bronchiolitis  Patient met sepsis criteria with source of infection:  Lactic elevated 2 5, tachycardic to 92, white cell count 15 with source of infection  Patient is afebrile and denies subjective fevers or chills  Giving fluids, azithromycin, received Rocephin  Lactic acid is already trending down after fluids  COVID negative  UA negative except for trace blood    Plan:  Continue IV hydration  Continue Rocephin and azithromycin  C diff and stool studies pending  Procal ordered and pending    Hyponatremia  Assessment & Plan  Patient's sodium on admission is 133 likely due to hypovolemia in the setting of vomiting and diarrhea    Plan:  Giving IV fluids  Repeat labs in the morning  Holding diuretic HCTZ    Hematuria  Assessment & Plan  Patient has a trace blood on urinalysis, denies any dysuria, frequency, gross blood in urine    Plan:  Could be secondary to nephrolithiasis found in the renal calices bilaterally   Continue to monitor urine for gross hematuria    LISY (acute kidney injury) [facility])  Assessment & Plan  Patient presenting with LISY  Plan:  IV hydration  Hold lisinopril, hold HCTZ  Avoid hypotension  Check BMP in the morning    Vomiting and diarrhea  Assessment & Plan  Presents for vomiting and diarrhea in the past 24 hours including 4 episodes of diarrhea today  Emesis is described as bilious nonbloody, diarrhea is described as nonbloody watery  Likely viral gastroenteritis    Wife has no symptoms who ate the same food so unlikely food poisoning  COVID negative    Please see plan under sepsis  Stool cultures, C diff, hydration, p r n  Zofran    Atrial fibrillation [facility])  Assessment & Plan  Patient with history of AFib  Continue Coumadin    History of DVT (deep vein thrombosis)  Assessment & Plan  History of DVT in left lower extremity in 2015    Plan:  Continue Coumadin    Fatty liver  Assessment & Plan  Fatty liver which shown on CT abdomen  Elevated AST to 68    Plan: Will recheck CMP tomorrow    Type 2 diabetes mellitus with neurologic complication, without long-term current use of insulin (HCC)  Assessment & Plan  Lab Results   Component Value Date    HGBA1C 6 7 (H) 08/12/2021       No results for input(s): POCGLU in the last 72 hours  Blood Sugar Average: Last 72 hrs:   patient has a history of diabetes taking metformin sitagliptin at home  Plan  Hold home regimen  Continue sliding scale  On clear liquid diet for now    Mixed hyperlipidemia  Assessment & Plan  Patient takes simvastatin and Lofibra at home     continue these in hospital    Essential hypertension  Assessment & Plan  Patient takes lisinopril and Dyazide at home patient presents with LISY so these medications will be held in hospital     Plan:  Monitor blood pressure Q shift    VTE Pharmacologic Prophylaxis: VTE Score: 5 High Risk (Score >/= 5) - Pharmacological DVT Prophylaxis Ordered: warfarin (Coumadin)  Sequential Compression Devices Ordered  Code Status: Level 1 - Full Code discussed with patient and his wife  Discussion with family: Updated  (wife) at bedside  Anticipated Length of Stay: Patient will be admitted on an inpatient basis with an anticipated length of stay of greater than 2 midnights secondary to Sepsis  Chief Complaint:  Vomiting and diarrhea    History of Present Illness:  Adrian Briseno is a 80 y o  male with a PMH of diabetes, hypertension, hyperlipidemia, GERD, DVT, AFib who presents with vomiting and diarrhea over the past 24 hours  Patient states that yesterday he felt queasy and nauseous with diarrhea  However in the middle the night at 2:00 a m  He woke up due to needing to vomit having bilious nonbloody emesis  He stated that he vomited large amount as well as continued to have watery nonbloody diarrhea up to 4 episodes today  Denies any sick contacts, did eat crab cakes at home with wife but wife is experiencing no symptoms, denies recent travel or camping  He does state that he has been having worsening allergy like symptoms with headache, cough for about a week as well as slightly decreased appetite  Has been taking Tylenol at home  Last diarrhea was this afternoon  In addition patient has history of cramping of unknown origin in all his muscles of his body  However today after vomiting and having diarrhea, feeling fatigued, lightheaded, whole body cramping  Even his phase muscles apparently were cramping, and he was having abdominal cramping pain  This has since resolved for now  In ED patient was tachycardic to 92, lactic acid was 2 5, white cell count was 15 69 patient met SIRS criteria and with possible infection deemed sepsis  Afebrile  CT abdomen pelvis showed infectious versus inflammatory bronchiolitis, bilateral punctate renal caliceal stones, moderate fatty liver change, cholelithiasis without cholecystitis  UA showed trace blood  Chest x-ray still pending read  Was also found to have LISY  Started on sepsis protocol including fluids and Rocephin and azithromycin  COVID negative  Differentials include viral gastroenteritis, possible pneumonia, metformin induced GI symptoms with lactic acidosis, COVID  Review of Systems:  Review of Systems   Constitutional: Positive for appetite change  Negative for activity change, chills and fever  HENT: Negative for congestion, ear pain and sore throat  Eyes: Negative for pain and visual disturbance     Respiratory: Negative for cough, chest tightness and shortness of breath  Cardiovascular: Positive for leg swelling (Chronic)  Negative for chest pain and palpitations  Gastrointestinal: Positive for diarrhea, nausea and vomiting  Negative for abdominal pain, blood in stool and constipation  Genitourinary: Negative for dysuria and hematuria  Musculoskeletal: Positive for arthralgias and myalgias  Negative for back pain  Skin: Negative for color change and rash  Neurological: Positive for light-headedness  Negative for dizziness, seizures, syncope, weakness, numbness and headaches  All other systems reviewed and are negative        Past Medical and Surgical History:   Past Medical History:   Diagnosis Date    Arthritis     Atherosclerosis of native arteries of the extremities with ulceration ([facility] [address] ) 3/21/2019    Claudication, intermittent (HCC)     Colon polyp     6 polyps 3 years ago    Diabetes mellitus ([facility] [address] )     type 2    Diabetic neuropathic arthropathy (HCC)     causes weakness in LE and uses a cane as assist to walk    Diabetic neuropathy ([facility] [address] )     Fatty liver     GERD (gastroesophageal reflux disease)     History of DVT (deep vein thrombosis) 2015    left LE    Hyperlipidemia     Hypertension     Kidney stone     Parotid tumor     Seasonal allergies     Spinal stenosis     Ureteral stone with hydronephrosis 5/17/2019    Added automatically from request for surgery [id number]       Past Surgical History:   Procedure Laterality Date    COLONOSCOPY  05/16/2019    completed by Dr Tabitha Barroso, Repeat 3 years    [facility] RETROGRADE PYELOGRAM  5/17/2019    PAROTIDECTOMY Left     KS CYSTO/URETERO W/LITHOTRIPSY &INDWELL STENT INSRT Right 5/17/2019    Procedure: CYSTOSCOPY URETEROSCOPY WITH LITHOTRIPSY HOLMIUM LASER, RETROGRADE PYELOGRAM AND INSERTION STENT URETERAL--possible stent only;  Surgeon: Charu Moore MD;  [facility];  Service: Urology    KS EXC PAROTD,LAT LOBE,DISSECT 5TH NERV Right 10/18/2017    Procedure: SUPERFICIAL PAROTIDECTOMY WITH NERVE DISSECTION AND PRESERVATION;  Surgeon: Jcarlos Goel MD;  [facility];  Service: ENT    CO INCISE FINGER TENDON SHEATH Right 11/3/2020    Procedure: Ashwin Fierro;  Surgeon: Rodger Wheat MD;  [facility];  Service: Orthopedics    ROTATOR CUFF REPAIR Bilateral     TONSILLECTOMY         Meds/Allergies:  Prior to Admission medications    Medication Sig Start Date End Date Taking? Authorizing Provider   acetaminophen (TYLENOL) 325 mg tablet 2, by mouth, every 6 hours as needed for mild to moderate pain   10/18/17   Jcarlos Goel MD   aspirin (ECOTRIN LOW STRENGTH) 81 mg EC tablet Take 81 mg by mouth daily    Historical Provider, MD   Cholecalciferol (VITAMIN D3 PO) Take 50 mcg by mouth daily    Historical Provider, MD   Cyanocobalamin (VITAMIN B 12 PO) Take 1,000 mcg by mouth daily    Historical Provider, MD   fenofibrate micronized (LOFIBRA) 134 MG capsule TAKE 1 CAPSULE (134 MG TOTAL) BY MOUTH DAILY WITH BREAKFAST 5/7/21   Laquita He MD   gabapentin (NEURONTIN) 100 mg capsule Take 1 capsule (100 mg total) by mouth daily at bedtime 8/23/21   DAYAMI Pedroza   Januvia 100 MG tablet TAKE 1 TABLET BY MOUTH EVERY DAY 10/31/20   DAYAMI Guthrie   lisinopril (ZESTRIL) 20 mg tablet TAKE 1 TABLET BY MOUTH EVERY DAY 5/9/21   Laquita He MD   metFORMIN (GLUCOPHAGE) 1000 MG tablet TAKE 1 TABLET BY MOUTH TWICE A DAY WITH MEALS 7/26/21   DAYAMI Guthrie   omeprazole (PriLOSEC) 20 mg delayed release capsule TAKE 1 CAPSULE BY MOUTH EVERY DAY 5/7/21   DAYAMI Pedroza   ProAir  (90 Base) MCG/ACT inhaler INHALE 2 PUFFS EVERY 4 (FOUR) HOURS AS NEEDED FOR WHEEZING OR SHORTNESS OF BREATH  8/23/21   DAYAMI Guthrie   simvastatin (ZOCOR) 40 mg tablet TAKE 1 TABLET BY MOUTH EVERY DAY 5/23/21   Laquita He MD   tamsulosin (FLOMAX) 0 4 mg TAKE 1 CAPSULE BY MOUTH EVERY DAY WITH DINNER 7/16/21   Magdaleno Jamison PA-C   triamterene-hydrochlorothiazide (DYAZIDE) 37 5-25 mg per capsule TAKE 1 CAPSULE BY MOUTH EVERY DAY 12/22/20   Laquita He MD   warfarin (COUMADIN) 2 5 mg tablet Take 1 tablet by mouth daily T-Th-S-S takes 2 5mg     M-W-F takes 5 0 mg 10/20/17   Jcarlos Goel MD   warfarin (COUMADIN) 5 mg tablet TAKE 1 TABLET EVERY DAY OR AS DIRECTED BY MD 11/2/20   Laquita He MD     I have reviewed home medications with patient personally  Allergies: Allergies   Allergen Reactions    Pletal [Cilostazol] Tachycardia       Social History:  Marital Status: /Civil Union   Patient Pre-hospital Living Situation: Home, With spouse  Patient Pre-hospital Level of Mobility: walks with cane    Substance Use History:   Social History     Substance and Sexual Activity   Alcohol Use Yes    Comment: socially     Social History     Tobacco Use   Smoking Status Current Every Day Smoker    Types: Pipe   Smokeless Tobacco Never Used   Tobacco Comment    smokes 2 pipes daily     Social History     Substance and Sexual Activity   Drug Use No       Family History:  Family History   Problem Relation Age of Onset    Lupus Mother     Rheum arthritis Mother     Cirrhosis Father     Alcohol abuse Father     Substance Abuse Brother        Physical Exam:     Vitals:   Blood Pressure: 130/70 (09/11/21 1745)  Pulse: 92 (09/11/21 1745)  Temperature: 99 4 °F (37 4 °C) (09/11/21 1745)  Temp Source: Oral (09/11/21 1745)  Respirations: 18 (09/11/21 1745)  Height: 5' 11" (180 3 cm) (09/11/21 1745)  Weight - Scale: 92 9 kg (204 lb 11 2 oz) (09/11/21 1551)  SpO2: 95 % (09/11/21 1745)    Physical Exam  Vitals and nursing note reviewed  Constitutional:       General: He is not in acute distress  HENT:      Mouth/Throat:      Mouth: Mucous membranes are moist       Pharynx: Oropharynx is clear  Eyes:      Extraocular Movements: Extraocular movements intact  Conjunctiva/sclera: Conjunctivae normal       Pupils: Pupils are equal, round, and reactive to light  Cardiovascular:      Rate and Rhythm: Normal rate   Rhythm irregular  Pulses: Normal pulses  Heart sounds: Normal heart sounds  No murmur heard  Pulmonary:      Effort: Pulmonary effort is normal  No respiratory distress  Breath sounds: Normal breath sounds  Abdominal:      General: Abdomen is flat  Bowel sounds are normal  There is no distension  Palpations: Abdomen is soft  Tenderness: There is no abdominal tenderness  Musculoskeletal:         General: Swelling (2+ pitting bilaterally to the mid calf) present  No tenderness  Cervical back: Neck supple  Skin:     General: Skin is warm and dry  Neurological:      Mental Status: He is alert and oriented to person, place, and time  Psychiatric:         Mood and Affect: Mood normal           Additional Data:     Lab Results:  Results from last 7 days   Lab Units 09/11/21  1559   WBC Thousand/uL 15 69*   HEMOGLOBIN g/dL 15 4   HEMATOCRIT % 46 9   PLATELETS Thousands/uL 333   NEUTROS PCT % 86*   LYMPHS PCT % 4*   MONOS PCT % 9   EOS PCT % 0     Results from last 7 days   Lab Units 09/11/21  1559   SODIUM mmol/L 133*   POTASSIUM mmol/L 4 3   CHLORIDE mmol/L 96*   CO2 mmol/L 27   BUN mg/dL 27*   CREATININE mg/dL 1 59*   ANION GAP mmol/L 10   CALCIUM mg/dL 8 9   ALBUMIN g/dL 3 8   TOTAL BILIRUBIN mg/dL 0 88   ALK PHOS U/L 64   ALT U/L 77   AST U/L 68*   GLUCOSE RANDOM mg/dL 171*     Results from last 7 days   Lab Units 09/11/21  2000   INR  2 35*             Results from last 7 days   Lab Units 09/11/21  1850 09/11/21  1653   LACTIC ACID mmol/L 1 6 2 5*       Imaging: Reviewed radiology reports from this admission including: chest xray and abdominal/pelvic CT  CT abdomen pelvis wo contrast   Final Result by Maribel Mendoza MD (09/11 1742)      Small amount of posterior right lower lobe tree in bud opacities, consistent with infectious or inflammatory bronchiolitis (series 2 images 4-10 )        Multiple bilateral punctate renal calyceal stones  There is no hydronephrosis or ureteral stones  Moderate fatty liver change  Cholelithiasis without evidence of acute cholecystitis  Workstation performed: [id number]         XR chest 2 views    (Results Pending)   US abdomen complete    (Results Pending)       EKG and Other Studies Reviewed on Admission:   · EKG: No EKG obtained  ** Please Note: This note has been constructed using a voice recognition system   **

## 2021-09-12 NOTE — ASSESSMENT & PLAN NOTE
Patient takes lisinopril and Dyazide at home patient presents with LISY so these medications will be held in hospital   BP stable ranging between 130-140/70s      Plan:  · Monitor blood pressure Q shift  · Continue to hold antihypertensive medications

## 2021-09-13 ENCOUNTER — TRANSITIONAL CARE MANAGEMENT (OUTPATIENT)
Dept: FAMILY MEDICINE CLINIC | Facility: CLINIC | Age: 71
End: 2021-09-13

## 2021-09-13 RX ORDER — AZITHROMYCIN 500 MG/1
500 TABLET, FILM COATED ORAL DAILY
Qty: 3 TABLET | Refills: 0 | Status: SHIPPED | OUTPATIENT
Start: 2021-09-13 | End: 2021-09-16

## 2021-09-13 RX ORDER — CEFDINIR 300 MG/1
300 CAPSULE ORAL EVERY 12 HOURS SCHEDULED
Qty: 10 CAPSULE | Refills: 0 | Status: SHIPPED | OUTPATIENT
Start: 2021-09-13 | End: 2021-09-18

## 2021-09-13 NOTE — QUICK NOTE
Called patient informed him that I have sent antibiotics as pharmacy    Given negative stool panel negative C diff and positive procalcitonin which all came back after the patient was discharged I will treat for bronchitis bronchiolitis 7 days of omnicef and 5 days of azithromycin total

## 2021-09-14 ENCOUNTER — APPOINTMENT (OUTPATIENT)
Dept: LAB | Age: 71
End: 2021-09-14
Payer: MEDICARE

## 2021-09-14 DIAGNOSIS — N17.9 AKI (ACUTE KIDNEY INJURY) (HCC): ICD-10-CM

## 2021-09-14 LAB
ALBUMIN SERPL BCP-MCNC: 3.3 G/DL (ref 3.5–5)
ALP SERPL-CCNC: 60 U/L (ref 46–116)
ALT SERPL W P-5'-P-CCNC: 98 U/L (ref 12–78)
ANION GAP SERPL CALCULATED.3IONS-SCNC: 6 MMOL/L (ref 4–13)
AST SERPL W P-5'-P-CCNC: 86 U/L (ref 5–45)
BILIRUB DIRECT SERPL-MCNC: 0.24 MG/DL (ref 0–0.2)
BILIRUB SERPL-MCNC: 0.77 MG/DL (ref 0.2–1)
BUN SERPL-MCNC: 11 MG/DL (ref 5–25)
CALCIUM ALBUM COR SERPL-MCNC: 9 MG/DL (ref 8.3–10.1)
CALCIUM SERPL-MCNC: 8.4 MG/DL (ref 8.3–10.1)
CHLORIDE SERPL-SCNC: 105 MMOL/L (ref 100–108)
CHOLEST SERPL-MCNC: 81 MG/DL (ref 50–200)
CO2 SERPL-SCNC: 23 MMOL/L (ref 21–32)
CREAT SERPL-MCNC: 0.98 MG/DL (ref 0.6–1.3)
ERYTHROCYTE [DISTWIDTH] IN BLOOD BY AUTOMATED COUNT: 14.1 % (ref 11.6–15.1)
EST. AVERAGE GLUCOSE BLD GHB EST-MCNC: 134 MG/DL
GFR SERPL CREATININE-BSD FRML MDRD: 78 ML/MIN/1.73SQ M
GLUCOSE P FAST SERPL-MCNC: 126 MG/DL (ref 65–99)
HBA1C MFR BLD: 6.3 %
HCT VFR BLD AUTO: 45.2 % (ref 36.5–49.3)
HDLC SERPL-MCNC: 28 MG/DL
HGB BLD-MCNC: 14.9 G/DL (ref 12–17)
LDLC SERPL CALC-MCNC: 21 MG/DL (ref 0–100)
MCH RBC QN AUTO: 31.6 PG (ref 26.8–34.3)
MCHC RBC AUTO-ENTMCNC: 33 G/DL (ref 31.4–37.4)
MCV RBC AUTO: 96 FL (ref 82–98)
NONHDLC SERPL-MCNC: 53 MG/DL
PLATELET # BLD AUTO: 290 THOUSANDS/UL (ref 149–390)
PMV BLD AUTO: 10.6 FL (ref 8.9–12.7)
POTASSIUM SERPL-SCNC: 4.4 MMOL/L (ref 3.5–5.3)
PROT SERPL-MCNC: 7.4 G/DL (ref 6.4–8.2)
RBC # BLD AUTO: 4.72 MILLION/UL (ref 3.88–5.62)
SODIUM SERPL-SCNC: 134 MMOL/L (ref 136–145)
TRIGL SERPL-MCNC: 161 MG/DL
TSH SERPL DL<=0.05 MIU/L-ACNC: 1.25 UIU/ML (ref 0.36–3.74)
VIT B12 SERPL-MCNC: 626 PG/ML (ref 100–900)
WBC # BLD AUTO: 7 THOUSAND/UL (ref 4.31–10.16)

## 2021-09-14 PROCEDURE — 83036 HEMOGLOBIN GLYCOSYLATED A1C: CPT

## 2021-09-14 PROCEDURE — 80053 COMPREHEN METABOLIC PANEL: CPT

## 2021-09-14 PROCEDURE — 82248 BILIRUBIN DIRECT: CPT

## 2021-09-14 PROCEDURE — 84443 ASSAY THYROID STIM HORMONE: CPT

## 2021-09-14 PROCEDURE — 82607 VITAMIN B-12: CPT

## 2021-09-14 PROCEDURE — 80061 LIPID PANEL: CPT

## 2021-09-14 PROCEDURE — 85027 COMPLETE CBC AUTOMATED: CPT

## 2021-09-14 PROCEDURE — 36415 COLL VENOUS BLD VENIPUNCTURE: CPT

## 2021-09-15 ENCOUNTER — TELEPHONE (OUTPATIENT)
Dept: FAMILY MEDICINE CLINIC | Facility: CLINIC | Age: 71
End: 2021-09-15

## 2021-09-15 NOTE — TELEPHONE ENCOUNTER
----- Message from [id number] sent at 9/15/2021  9:30 AM EDT -----  Patient is due for TCM office visit, recently hospitalized, Please schedule  Will review blood work at office visit

## 2021-09-16 ENCOUNTER — OFFICE VISIT (OUTPATIENT)
Dept: FAMILY MEDICINE CLINIC | Facility: CLINIC | Age: 71
End: 2021-09-16
Payer: MEDICARE

## 2021-09-16 ENCOUNTER — TELEPHONE (OUTPATIENT)
Dept: FAMILY MEDICINE CLINIC | Facility: CLINIC | Age: 71
End: 2021-09-16

## 2021-09-16 VITALS
DIASTOLIC BLOOD PRESSURE: 60 MMHG | TEMPERATURE: 99.2 F | WEIGHT: 208.4 LBS | OXYGEN SATURATION: 96 % | HEART RATE: 80 BPM | HEIGHT: 71 IN | SYSTOLIC BLOOD PRESSURE: 110 MMHG | BODY MASS INDEX: 29.18 KG/M2 | RESPIRATION RATE: 16 BRPM

## 2021-09-16 DIAGNOSIS — J18.9 PNEUMONIA OF RIGHT LOWER LOBE DUE TO INFECTIOUS ORGANISM: ICD-10-CM

## 2021-09-16 DIAGNOSIS — R79.89 ELEVATED LFTS: ICD-10-CM

## 2021-09-16 DIAGNOSIS — Z09 HOSPITAL DISCHARGE FOLLOW-UP: Primary | ICD-10-CM

## 2021-09-16 DIAGNOSIS — J30.9 ALLERGIC RHINITIS, UNSPECIFIED SEASONALITY, UNSPECIFIED TRIGGER: ICD-10-CM

## 2021-09-16 PROCEDURE — 99496 TRANSJ CARE MGMT HIGH F2F 7D: CPT | Performed by: NURSE PRACTITIONER

## 2021-09-16 RX ORDER — FLUTICASONE PROPIONATE 50 MCG
SPRAY, SUSPENSION (ML) NASAL
Qty: 16 ML | Refills: 1 | Status: SHIPPED | OUTPATIENT
Start: 2021-09-16 | End: 2021-09-16 | Stop reason: ALTCHOICE

## 2021-09-16 NOTE — TELEPHONE ENCOUNTER
Patient's wife called and wanted to double check with PCP that it is okay for patient to take Imodium to help with ongoing diarrhea  Patient has an OV scheduled today and is hoping to take Imodium so he can get through the visit  For f/u on this, please contact patient's spouse Ana Vasquez at [phone]

## 2021-09-16 NOTE — PHYSICIAN ADVISOR
Current patient class: Inpatient  The patient is currently on Hospital Day: 2 at [address]      The patient was admitted to the hospital  on 9/11/21 at  6:09 PM for the following diagnosis:  Vomiting [R11 10]     After review of the relevant documentation, labs, vital signs and test results, this is a PROVIDER LIABLE case  In this particular case the patient was admitted to the hospital as an inpatient  The patient however failed to satisfy the 2 midnight benchmark and closer scrutiny of the case was warranted  After review of the patient presentation and relevant labs the patient was most appropriate for observation or outpatient class at the time of admission  Given that this patient has already been discharged prior to this review they become a provider liable case  Rationale is as follows: The patient is a 80 yrs   Male who presented to the ED at 9/11/2021  4:00 PM with a chief complaint of Vomiting (Pt reports vomiting every hour since 1 am to 11am, pt also reporting diarrhea, muscle aches that are chronic but worsening )   Patient admitted for vomiting and diarrhea over last 24 hours  Ct abd/pelvis showed bronchiolitis  He was treated with IVF and rocephin and zithromax with rapid improvment in symptoms  Symptoms likely related to viral gastroenteritis        The patients vitals on arrival were   ED Triage Vitals [09/11/21 1551]   Temperature Pulse Respirations Blood Pressure SpO2   99 8 °F (37 7 °C) 99 18 137/68 96 %      Temp Source Heart Rate Source Patient Position - Orthostatic VS BP Location FiO2 (%)   Oral Monitor Sitting Left arm --      Pain Score       4           Past Medical History:   Diagnosis Date    Arthritis     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) 3/21/2019    Claudication, intermittent (HCC)     Colon polyp     6 polyps 3 years ago    Diabetes mellitus (HCC)     type 2    Diabetic neuropathic arthropathy ([address] )     causes weakness in LE and uses a cane as assist to walk    Diabetic neuropathy ([address] )     Fatty liver     GERD (gastroesophageal reflux disease)     History of DVT (deep vein thrombosis) 2015    left LE    Hyperlipidemia     Hypertension     Hyponatremia 9/11/2021    Kidney stone     Parotid tumor     Seasonal allergies     Spinal stenosis     Ureteral stone with hydronephrosis 5/17/2019    Added automatically from request for surgery [id number]     Past Surgical History:   Procedure Laterality Date    COLONOSCOPY  05/16/2019    completed by Dr Dorinda Stewart, Repeat 3 years    [facility] RETROGRADE PYELOGRAM  5/17/2019    PAROTIDECTOMY Left     AL CYSTO/URETERO W/LITHOTRIPSY &INDWELL STENT INSRT Right 5/17/2019    Procedure: CYSTOSCOPY URETEROSCOPY WITH LITHOTRIPSY HOLMIUM LASER, RETROGRADE PYELOGRAM AND INSERTION STENT URETERAL--possible stent only;  Surgeon: Janes Zelaya MD;  [facility];  Service: Urology    AL EXC PAROTD,LAT LOBE,DISSECT 5TH NERV Right 10/18/2017    Procedure: SUPERFICIAL PAROTIDECTOMY WITH NERVE DISSECTION AND PRESERVATION;  Surgeon: Jian Prado MD;  [facility];  Service: ENT    AL INCISE FINGER TENDON SHEATH Right 11/3/2020    Procedure: [address];  Surgeon: Araseli Slaughter MD;  [facility];  Service: Orthopedics    ROTATOR CUFF REPAIR Bilateral     TONSILLECTOMY             Consults have been placed to:   None    Vitals:    09/11/21 1745 09/11/21 2120 09/11/21 2300 09/12/21 0216   BP: 130/70 141/70 140/77 138/70   BP Location: Left arm Right arm Right arm Right arm   Pulse: 92 88 80 88   Resp: 18 18 18 18   Temp: 99 4 °F (37 4 °C) 98 2 °F (36 8 °C) 98 6 °F (37 °C)    TempSrc: Oral Oral Oral    SpO2: 95% 95% 96% 98%   Weight:       Height: 5' 11" (1 803 m)          Most recent labs:    Recent Labs     09/14/21  0911   WBC 7 00   HGB 14 9   HCT 45 2      K 4 4   CALCIUM 8 4   BUN 11   CREATININE 0 98   AST 86*   ALT 98* ALKPHOS 60       Scheduled Meds:  Continuous Infusions:No current facility-administered medications for this encounter  PRN Meds:      Surgical procedures (if appropriate):

## 2021-09-16 NOTE — PROGRESS NOTES
FAMILY PRACTICE OFFICE VISIT       NAME: Gardenia Kawasaki  AGE: 80 y o  SEX: male       : 1950        MRN: [id number]    Assessment and Plan     Problem List Items Addressed This Visit     None      Visit Diagnoses     Hospital discharge follow-up    -  Primary    Pneumonia of right lower lobe due to infectious organism        Elevated LFTs        Relevant Orders    Comprehensive metabolic panel        1  Hospital discharge follow-up     2  Pneumonia of right lower lobe due to infectious organism     3  Elevated LFTs  Comprehensive metabolic panel     This -[facility] male with AFib, COPD, hypertension, hepatic steatosis, GERD, hyperlipidemia, peripheral arterial disease, diabetes, and history of DVT anticoagulated on Coumadin, presents today for hospital discharge follow-up  He was admitted to the hospital  through  for sepsis, pneumonia  Lactic acid was elevated at 2 5  White blood cell count was elevated at 15 69  He was tachycardic for these reasons was treated for sepsis  Blood cultures are negative thus far  Chest x-ray was negative  However CT abdomen and pelvis showed right lower lobe tree-in-bud opacities consistent with infectious or inflammatory bronchiolitis  He is currently being treated for suspected pneumonia with a Z-Frank and cefdinir  He is taking antibiotics as prescribed  He is feeling significantly better has some fatigue, all other symptoms have resolved  Recently repeated blood work 2 days ago, with sodium improving at 134  A1c is stable at 6 3%  Liver functions are elevated with AST 86 ALT 98, slightly higher than in-patient  Will repeat CMP in 1 month  Alk-phos is good at 60  Vitamin B12 is improving  Lipid panel is significantly lower than his baseline, will repeat lipid panel in 3 months  Office will call with results of CMP in 1 month  Will repeat routine blood work in 3 months  Will follow up in office in February        CT Abdomen and Pelvis:  ABDOMEN  LOWER CHEST:  Small amount of posterior right lower lobe tree in bud opacities, consistent with infectious or inflammatory bronchiolitis (series 2 images 4-10 )  LIVER/BILIARY TREE:  Moderate fatty liver change  GALLBLADDER:  Contracted gallbladder containing numerous gallstones  SPLEEN:  Small lobulated spleen  Unchanged  PANCREAS:  Unremarkable  ADRENAL GLANDS:  Unremarkable  KIDNEYS/URETERS:  Multiple punctate bilateral renal calyceal stones  There is no hydronephrosis or ureteral stones  STOMACH AND BOWEL:  Unremarkable  APPENDIX:  A normal appendix was visualized  (Series 2 images 49-59 )  ABDOMINOPELVIC CAVITY:  No ascites  No pneumoperitoneum  No lymphadenopathy  VESSELS:  Unremarkable for patient's age  PELVIS  REPRODUCTIVE ORGANS:  Unremarkable for patient's age  URINARY BLADDER:  Unremarkable  ABDOMINAL WALL/INGUINAL REGIONS:  Unremarkable  OSSEOUS STRUCTURES:  No acute fracture or destructive osseous lesion  IMPRESSION:  Small amount of posterior right lower lobe tree in bud opacities, consistent with infectious or inflammatory bronchiolitis (series 2 images 4-10 )    Multiple bilateral punctate renal calyceal stones  There is no hydronephrosis or ureteral stones  Moderate fatty liver change  Cholelithiasis without evidence of acute cholecystitis  Chief Complaint     Chief Complaint   Patient presents with    Transition of Care Management       History of Present Illness     Howie Vizcarra is a 81-year-old male presenting today for hospital discharge follow-up  He was admitted to the hospital September 11 through September 12th for suspected sepsis with pneumonia  He was experiencing nausea, vomiting, diarrhea, body aches that were severe at home  States he has a little bit of a cough  No fevers chills shortness of breath no chest pain  Treated with IV fluids, Zofran, Rocephin and ceftriaxone  Symptoms significantly improved      He is feeling significantly better  Taking antibiotics that were prescribed, a Z-Frank and cefdinir  States he has a little bit of a moist cough, no chest pain  No shortness of breath  Denies nausea, vomiting, diarrhea  No fevers  Is just tired yet  He is accompanied by his wife today  TCM Call (since 8/16/2021)     Date and time call was made  9/13/2021 10:25 AM    Hospital care reviewed  Records reviewed    Patient was hospitialized at  [address] [place] [address]        Date of Admission  09/11/21    Date of discharge  09/12/21    Diagnosis  Vomiting, LISY, Sepsis    Disposition  Home    Were the patients medications reviewed and updated  No    Current Symptoms  None      TCM Call (since 8/16/2021)     Post hospital issues  None    Should patient be enrolled in anticoag monitoring? No    Scheduled for follow up? Yes    Did you obtain your prescribed medications  No    Why were you unable to obtain your medications  as per pt no new meds   were prescribed    Do you need help managing your prescriptions or medications  No    Is transportation to your appointment needed  No    I have advised the patient to call PCP with any new or worsening symptoms    Pia Doyle [address] or Significiant other    Support System  Spouse    Are you recieving any outpatient services  No    Are you recieving home care services  No    Interperter language line needed  No    Counseling  Patient    Counseling topics  Importance of RX compliance    Comments  Apt scheduled for Thursday- 9/16 @ 11am              Review of Systems   Review of Systems   Constitutional: Positive for fatigue and unexpected weight change (loss of 5 pounds)  Negative for chills, diaphoresis and fever  HENT: Negative  Respiratory: Negative  Cardiovascular: Negative  Gastrointestinal: Negative  Genitourinary: Negative  Musculoskeletal: Negative  Skin: Negative  Neurological: Negative  Hematological: Negative      Psychiatric/Behavioral: Negative          Active Problem List     Patient Active Problem List   Diagnosis    Essential hypertension    Mixed hyperlipidemia    Type 2 diabetes mellitus with neurologic complication, without long-term current use of insulin (HCC)    Fatty liver    History of DVT (deep vein thrombosis)    Anticoagulated on Coumadin    Peripheral arterial disease (HCC)    Atrial fibrillation ([address] )    History of colon polyps    Gastroesophageal reflux disease without esophagitis    Warthin's tumor    Hiatal hernia    Smoking    Chronic obstructive pulmonary disease (HCC)    Prostatic disorder    Trigger ring finger of right hand    High blood monocyte count    Intermittent claudication ([address] )    Aortic ectasia (HCC)    LISY (acute kidney injury) ([address] )    Hematuria    Hypocalcemia       Past Medical History:  Past Medical History:   Diagnosis Date    Arthritis     Atherosclerosis of native arteries of the extremities with ulceration ([address] ) 3/21/2019    Claudication, intermittent (HCC)     Colon polyp     6 polyps 3 years ago    Diabetes mellitus (HCC)     type 2    Diabetic neuropathic arthropathy (HCC)     causes weakness in LE and uses a cane as assist to walk    Diabetic neuropathy (HCC)     Fatty liver     GERD (gastroesophageal reflux disease)     History of DVT (deep vein thrombosis) 2015    left LE    Hyperlipidemia     Hypertension     Hyponatremia 9/11/2021    Kidney stone     Parotid tumor     Seasonal allergies     Spinal stenosis     Ureteral stone with hydronephrosis 5/17/2019    Added automatically from request for surgery [id number]       Past Surgical History:  Past Surgical History:   Procedure Laterality Date    COLONOSCOPY  05/16/2019    completed by Dr Greta Partida, Repeat 3 years    [facility] RETROGRADE PYELOGRAM  5/17/2019    PAROTIDECTOMY Left     MT CYSTO/URETERO W/LITHOTRIPSY &INDWELL STENT INSRT Right 5/17/2019    Procedure: CYSTOSCOPY URETEROSCOPY WITH LITHOTRIPSY HOLMIUM LASER, RETROGRADE PYELOGRAM AND INSERTION STENT URETERAL--possible stent only;  Surgeon: Loren Cisse MD;  [facility];  Service: Urology    MI EXC PAROTD,LAT LOBE,DISSECT 5TH NERV Right 10/18/2017    Procedure: SUPERFICIAL PAROTIDECTOMY WITH NERVE DISSECTION AND PRESERVATION;  Surgeon: Morris Tomlin MD;  [facility];  Service: ENT    MI INCISE FINGER TENDON SHEATH Right 11/3/2020    Procedure: RING TRIGGER FINGER RELEASE;  Surgeon: Lamine Jonas MD;  [facility];  Service: Orthopedics    ROTATOR CUFF REPAIR Bilateral     TONSILLECTOMY         Family History:  Family History   Problem Relation Age of Onset    Lupus Mother     Rheum arthritis Mother     Cirrhosis Father     Alcohol abuse Father     Substance Abuse Brother        Social History:  Social History     Socioeconomic History    Marital status: /Civil Union     Spouse name: Not on file    Number of children: Not on file    Years of education: Not on file    Highest education level: Not on file   Occupational History    Not on file   Tobacco Use    Smoking status: Current Every Day Smoker     Types: Pipe    Smokeless tobacco: Never Used    Tobacco comment: smokes 2 pipes daily   Vaping Use    Vaping Use: Never used   Substance and Sexual Activity    Alcohol use: Yes     Comment: socially    Drug use: No    Sexual activity: Not on file   Other Topics Concern    Not on file   Social History Narrative        [place]     Social Determinants of Health     Financial Resource Strain:     Difficulty of Paying Living Expenses:    Food Insecurity:     Worried About Running Out of Food in the Last Year:     Ran Out of Food in the Last Year:    Transportation Needs:     Lack of Transportation (Medical):      Lack of Transportation (Non-Medical):    Physical Activity:     Days of Exercise per Week:     Minutes of Exercise per Session:    Stress:     Feeling of Stress :    Social Connections:     Frequency of Communication with Friends and Family:     Frequency of Social Gatherings with Friends and Family:     Attends Yazidi Services:     Active Member of Clubs or Organizations:     Attends Club or Organization Meetings:     Marital Status:    Intimate Partner Violence:     Fear of Current or Ex-Partner:     Emotionally Abused:     Physically Abused:     Sexually Abused:        I have reviewed the patient's medical history in detail; there are no changes to the history as noted in the electronic medical record  Objective     Vitals:    09/16/21 1101   BP: 110/60   Pulse: 80   Resp: 16   Temp: 99 2 °F (37 3 °C)   TempSrc: Tympanic   SpO2: 96%   Weight: 94 5 kg (208 lb 6 4 oz)   Height: 5' 11" (1 803 m)     Wt Readings from Last 3 Encounters:   09/16/21 94 5 kg (208 lb 6 4 oz)   09/11/21 92 9 kg (204 lb 11 2 oz)   08/30/21 96 8 kg (213 lb 6 4 oz)     Physical Exam  Vitals and nursing note reviewed  Constitutional:       General: He is not in acute distress  Appearance: Normal appearance  He is not ill-appearing  Cardiovascular:      Rate and Rhythm: Normal rate and regular rhythm  Heart sounds: No murmur heard  Pulmonary:      Effort: Pulmonary effort is normal  No respiratory distress  Breath sounds: Normal breath sounds  No wheezing or rales  Musculoskeletal:      Right lower leg: No edema  Left lower leg: No edema  Skin:     Findings: No rash  Neurological:      Mental Status: He is alert  Comments:   Ambulates with a cane   Psychiatric:         Mood and Affect: Mood normal             ALLERGIES:  Allergies   Allergen Reactions    Pletal [Cilostazol] Tachycardia       Current Medications     Current Outpatient Medications   Medication Sig Dispense Refill    acetaminophen (TYLENOL) 325 mg tablet 2, by mouth, every 6 hours as needed for mild to moderate pain   30 tablet 0    aspirin (ECOTRIN LOW STRENGTH) 81 mg EC tablet Take 81 mg by mouth daily      azithromycin (ZITHROMAX) 500 MG tablet Take 1 tablet (500 mg total) by mouth daily for 3 days 3 tablet 0    calcium carbonate (OYSTER SHELL,OSCAL) 500 mg Take 1 tablet by mouth 3 (three) times a day with meals for 7 days 21 tablet 0    cefdinir (OMNICEF) 300 mg capsule Take 1 capsule (300 mg total) by mouth every 12 (twelve) hours for 5 days 10 capsule 0    Cholecalciferol (VITAMIN D3 PO) Take 50 mcg by mouth daily      Cyanocobalamin (VITAMIN B 12 PO) Take 1,000 mcg by mouth daily      fenofibrate micronized (LOFIBRA) 134 MG capsule TAKE 1 CAPSULE (134 MG TOTAL) BY MOUTH DAILY WITH BREAKFAST 90 capsule 3    gabapentin (NEURONTIN) 100 mg capsule Take 1 capsule (100 mg total) by mouth daily at bedtime 90 capsule 1    Januvia 100 MG tablet TAKE 1 TABLET BY MOUTH EVERY DAY 90 tablet 3    lisinopril (ZESTRIL) 20 mg tablet TAKE 1 TABLET BY MOUTH EVERY DAY 90 tablet 4    metFORMIN (GLUCOPHAGE) 1000 MG tablet TAKE 1 TABLET BY MOUTH TWICE A DAY WITH MEALS 180 tablet 1    omeprazole (PriLOSEC) 20 mg delayed release capsule TAKE 1 CAPSULE BY MOUTH EVERY DAY 90 capsule 1    ProAir  (90 Base) MCG/ACT inhaler INHALE 2 PUFFS EVERY 4 (FOUR) HOURS AS NEEDED FOR WHEEZING OR SHORTNESS OF BREATH  18 g 1    simvastatin (ZOCOR) 40 mg tablet TAKE 1 TABLET BY MOUTH EVERY DAY 90 tablet 4    tamsulosin (FLOMAX) 0 4 mg TAKE 1 CAPSULE BY MOUTH EVERY DAY WITH DINNER 90 capsule 3    triamterene-hydrochlorothiazide (DYAZIDE) 37 5-25 mg per capsule TAKE 1 CAPSULE BY MOUTH EVERY DAY 90 capsule 3    warfarin (COUMADIN) 2 5 mg tablet Take 1 tablet by mouth daily T-Th-S-S takes 2 5mg     M-W-F takes 5 0 mg      warfarin (COUMADIN) 5 mg tablet TAKE 1 TABLET EVERY DAY OR AS DIRECTED BY MD 90 tablet 3     No current facility-administered medications for this visit           Health Maintenance     Health Maintenance   Topic Date Due    DTaP,Tdap,and Td Vaccines (1 - Tdap) Never done    OT PLAN OF CARE  08/18/2019    Influenza Vaccine (1) 09/01/2021    HEMOGLOBIN A1C  03/14/2022    Diabetic Foot Exam  03/29/2022    Fall Risk  04/19/2022    Depression Screening  04/19/2022    Medicare Annual Wellness Visit (AWV)  04/19/2022    BMI: Followup Plan  04/19/2022    BMI: Adult  09/16/2022    Colorectal Cancer Screening  11/21/2022    DM Eye Exam  04/12/2023    Hepatitis C Screening  Completed    Pneumococcal Vaccine: 65+ Years  Completed    COVID-19 Vaccine  Completed    HIB Vaccine  Aged Out    Hepatitis B Vaccine  Aged Out    IPV Vaccine  Aged Out    Hepatitis A Vaccine  Aged Out    Meningococcal ACWY Vaccine  Aged Out    HPV Vaccine  Aged Out     Immunization History   Administered Date(s) Administered    Hep B, adult 03/19/2019, 04/23/2019, 09/19/2019    INFLUENZA 09/13/2018, 09/17/2018    Influenza, high dose seasonal 0 7 mL 09/19/2019, 08/24/2020    Pneumococcal Conjugate 13-Valent 03/19/2019    Pneumococcal Polysaccharide PPV23 08/17/2020    SARS-CoV-2 / COVID-19 mRNA IM (Pfizer-BioNTech) 03/05/2021, 03/26/2021    Zoster Vaccine Recombinant 07/30/2018, 09/29/2018       DAYAMI Rodriguez

## 2021-09-17 LAB
BACTERIA BLD CULT: NORMAL
BACTERIA BLD CULT: NORMAL

## 2021-09-27 ENCOUNTER — TELEPHONE (OUTPATIENT)
Dept: FAMILY MEDICINE CLINIC | Facility: CLINIC | Age: 71
End: 2021-09-27

## 2021-09-27 DIAGNOSIS — G62.9 PERIPHERAL POLYNEUROPATHY: Primary | ICD-10-CM

## 2021-09-27 RX ORDER — GABAPENTIN 100 MG/1
200 CAPSULE ORAL
Qty: 180 CAPSULE | Refills: 1 | Status: SHIPPED | OUTPATIENT
Start: 2021-09-27 | End: 2022-04-03

## 2021-09-27 NOTE — TELEPHONE ENCOUNTER
Patient called to have a refill of his Gabapentin  And needs 200 mg for a 90 day supply  He was asked to take 200 mg instead of 100 mg   Please call when sent to pharmacy

## 2021-10-06 ENCOUNTER — ANTICOAG VISIT (OUTPATIENT)
Dept: CARDIOLOGY CLINIC | Facility: CLINIC | Age: 71
End: 2021-10-06

## 2021-10-06 ENCOUNTER — APPOINTMENT (OUTPATIENT)
Dept: LAB | Age: 71
End: 2021-10-06
Payer: MEDICARE

## 2021-10-06 DIAGNOSIS — I82.402 ACUTE DEEP VEIN THROMBOSIS (DVT) OF LEFT LOWER EXTREMITY, UNSPECIFIED VEIN (HCC): Primary | ICD-10-CM

## 2021-10-06 DIAGNOSIS — Z86.718 HISTORY OF DVT (DEEP VEIN THROMBOSIS): Primary | ICD-10-CM

## 2021-10-06 DIAGNOSIS — I48.0 PAROXYSMAL ATRIAL FIBRILLATION (HCC): ICD-10-CM

## 2021-10-06 LAB
INR PPP: 2.08 (ref 0.84–1.19)
PROTHROMBIN TIME: 22.4 SECONDS (ref 11.6–14.5)

## 2021-10-06 PROCEDURE — 36415 COLL VENOUS BLD VENIPUNCTURE: CPT

## 2021-10-06 PROCEDURE — 85610 PROTHROMBIN TIME: CPT

## 2021-10-18 ENCOUNTER — APPOINTMENT (OUTPATIENT)
Dept: LAB | Facility: AMBULARY SURGERY CENTER | Age: 71
End: 2021-10-18
Payer: MEDICARE

## 2021-10-18 DIAGNOSIS — R79.89 ELEVATED LFTS: ICD-10-CM

## 2021-10-18 LAB
ALBUMIN SERPL BCP-MCNC: 3.6 G/DL (ref 3.5–5)
ALP SERPL-CCNC: 61 U/L (ref 46–116)
ALT SERPL W P-5'-P-CCNC: 64 U/L (ref 12–78)
ANION GAP SERPL CALCULATED.3IONS-SCNC: 4 MMOL/L (ref 4–13)
AST SERPL W P-5'-P-CCNC: 53 U/L (ref 5–45)
BILIRUB SERPL-MCNC: 0.64 MG/DL (ref 0.2–1)
BUN SERPL-MCNC: 9 MG/DL (ref 5–25)
CALCIUM SERPL-MCNC: 8.9 MG/DL (ref 8.3–10.1)
CHLORIDE SERPL-SCNC: 104 MMOL/L (ref 100–108)
CO2 SERPL-SCNC: 25 MMOL/L (ref 21–32)
CREAT SERPL-MCNC: 0.91 MG/DL (ref 0.6–1.3)
GFR SERPL CREATININE-BSD FRML MDRD: 85 ML/MIN/1.73SQ M
GLUCOSE P FAST SERPL-MCNC: 120 MG/DL (ref 65–99)
POTASSIUM SERPL-SCNC: 4.4 MMOL/L (ref 3.5–5.3)
PROT SERPL-MCNC: 7.5 G/DL (ref 6.4–8.2)
SODIUM SERPL-SCNC: 133 MMOL/L (ref 136–145)

## 2021-10-18 PROCEDURE — 36415 COLL VENOUS BLD VENIPUNCTURE: CPT

## 2021-10-18 PROCEDURE — 80053 COMPREHEN METABOLIC PANEL: CPT

## 2021-10-20 ENCOUNTER — TELEPHONE (OUTPATIENT)
Dept: FAMILY MEDICINE CLINIC | Facility: CLINIC | Age: 71
End: 2021-10-20

## 2021-10-29 DIAGNOSIS — K21.9 GASTROESOPHAGEAL REFLUX DISEASE WITHOUT ESOPHAGITIS: ICD-10-CM

## 2021-10-29 RX ORDER — OMEPRAZOLE 20 MG/1
CAPSULE, DELAYED RELEASE ORAL
Qty: 90 CAPSULE | Refills: 1 | Status: SHIPPED | OUTPATIENT
Start: 2021-10-29 | End: 2022-04-19

## 2021-10-30 DIAGNOSIS — J30.9 ALLERGIC RHINITIS, UNSPECIFIED SEASONALITY, UNSPECIFIED TRIGGER: ICD-10-CM

## 2021-11-01 ENCOUNTER — ANTICOAG VISIT (OUTPATIENT)
Dept: CARDIOLOGY CLINIC | Facility: CLINIC | Age: 71
End: 2021-11-01

## 2021-11-01 ENCOUNTER — APPOINTMENT (OUTPATIENT)
Dept: LAB | Facility: AMBULARY SURGERY CENTER | Age: 71
End: 2021-11-01
Payer: MEDICARE

## 2021-11-01 DIAGNOSIS — I82.409 DEEP VEIN THROMBOSIS (DVT) OF LOWER EXTREMITY, UNSPECIFIED CHRONICITY, UNSPECIFIED LATERALITY, UNSPECIFIED VEIN (HCC): ICD-10-CM

## 2021-11-01 DIAGNOSIS — Z86.718 HISTORY OF DVT (DEEP VEIN THROMBOSIS): Primary | ICD-10-CM

## 2021-11-01 LAB
INR PPP: 2.52 (ref 0.84–1.19)
PROTHROMBIN TIME: 25.9 SECONDS (ref 11.6–14.5)

## 2021-11-01 PROCEDURE — 36415 COLL VENOUS BLD VENIPUNCTURE: CPT

## 2021-11-01 PROCEDURE — 85610 PROTHROMBIN TIME: CPT

## 2021-11-01 RX ORDER — WARFARIN SODIUM 5 MG/1
TABLET ORAL
Qty: 90 TABLET | Refills: 3 | Status: SHIPPED | OUTPATIENT
Start: 2021-11-01

## 2021-11-02 DIAGNOSIS — E11.42 TYPE 2 DIABETES MELLITUS WITH DIABETIC POLYNEUROPATHY, WITHOUT LONG-TERM CURRENT USE OF INSULIN (HCC): ICD-10-CM

## 2021-11-02 RX ORDER — SITAGLIPTIN 100 MG/1
TABLET, FILM COATED ORAL
Qty: 90 TABLET | Refills: 3 | Status: SHIPPED | OUTPATIENT
Start: 2021-11-02

## 2021-11-07 ENCOUNTER — TELEPHONE (OUTPATIENT)
Dept: OTHER | Facility: OTHER | Age: 71
End: 2021-11-07

## 2021-11-26 ENCOUNTER — ANTICOAG VISIT (OUTPATIENT)
Dept: CARDIOLOGY CLINIC | Facility: CLINIC | Age: 71
End: 2021-11-26

## 2021-11-26 ENCOUNTER — APPOINTMENT (OUTPATIENT)
Dept: LAB | Age: 71
End: 2021-11-26
Payer: MEDICARE

## 2021-11-26 DIAGNOSIS — Z86.718 HISTORY OF DVT (DEEP VEIN THROMBOSIS): Primary | ICD-10-CM

## 2021-12-03 ENCOUNTER — HOSPITAL ENCOUNTER (OUTPATIENT)
Dept: VASCULAR ULTRASOUND | Facility: HOSPITAL | Age: 71
Discharge: HOME | End: 2021-12-03
Payer: MEDICARE

## 2021-12-03 DIAGNOSIS — I73.9 PERIPHERAL ARTERIAL DISEASE (HCC): ICD-10-CM

## 2021-12-03 PROCEDURE — 93925 LOWER EXTREMITY STUDY: CPT

## 2021-12-03 PROCEDURE — 93923 UPR/LXTR ART STDY 3+ LVLS: CPT

## 2021-12-03 PROCEDURE — 93922 UPR/L XTREMITY ART 2 LEVELS: CPT | Performed by: SURGERY

## 2021-12-03 PROCEDURE — 93925 LOWER EXTREMITY STUDY: CPT | Performed by: SURGERY

## 2021-12-09 ENCOUNTER — TELEPHONE (OUTPATIENT)
Dept: FAMILY MEDICINE CLINIC | Facility: CLINIC | Age: 71
End: 2021-12-09

## 2021-12-09 DIAGNOSIS — I10 ESSENTIAL HYPERTENSION: Primary | ICD-10-CM

## 2021-12-09 DIAGNOSIS — I48.91 ATRIAL FIBRILLATION, UNSPECIFIED TYPE (HCC): ICD-10-CM

## 2021-12-09 DIAGNOSIS — I73.9 PERIPHERAL ARTERIAL DISEASE (HCC): ICD-10-CM

## 2021-12-21 ENCOUNTER — OFFICE VISIT (OUTPATIENT)
Dept: CARDIOLOGY CLINIC | Facility: CLINIC | Age: 71
End: 2021-12-21
Payer: MEDICARE

## 2021-12-21 VITALS
DIASTOLIC BLOOD PRESSURE: 74 MMHG | OXYGEN SATURATION: 98 % | HEIGHT: 71 IN | BODY MASS INDEX: 30.1 KG/M2 | HEART RATE: 82 BPM | WEIGHT: 215 LBS | SYSTOLIC BLOOD PRESSURE: 130 MMHG

## 2021-12-21 DIAGNOSIS — I48.91 ATRIAL FIBRILLATION, UNSPECIFIED TYPE (HCC): ICD-10-CM

## 2021-12-21 DIAGNOSIS — I10 ESSENTIAL HYPERTENSION: Primary | ICD-10-CM

## 2021-12-21 DIAGNOSIS — E78.2 MIXED HYPERLIPIDEMIA: ICD-10-CM

## 2021-12-21 DIAGNOSIS — I73.9 PERIPHERAL ARTERIAL DISEASE (HCC): ICD-10-CM

## 2021-12-21 PROCEDURE — 99214 OFFICE O/P EST MOD 30 MIN: CPT | Performed by: INTERNAL MEDICINE

## 2021-12-23 ENCOUNTER — APPOINTMENT (OUTPATIENT)
Dept: LAB | Facility: AMBULARY SURGERY CENTER | Age: 71
End: 2021-12-23
Payer: MEDICARE

## 2021-12-23 ENCOUNTER — TELEPHONE (OUTPATIENT)
Dept: FAMILY MEDICINE CLINIC | Facility: CLINIC | Age: 71
End: 2021-12-23

## 2021-12-23 ENCOUNTER — ANTICOAG VISIT (OUTPATIENT)
Dept: CARDIOLOGY CLINIC | Facility: CLINIC | Age: 71
End: 2021-12-23

## 2021-12-23 DIAGNOSIS — I73.9 PERIPHERAL ARTERIAL DISEASE (HCC): ICD-10-CM

## 2021-12-23 DIAGNOSIS — I48.91 ATRIAL FIBRILLATION, UNSPECIFIED TYPE (HCC): ICD-10-CM

## 2021-12-23 DIAGNOSIS — I10 ESSENTIAL HYPERTENSION: ICD-10-CM

## 2021-12-23 DIAGNOSIS — E78.2 MIXED HYPERLIPIDEMIA: ICD-10-CM

## 2021-12-23 DIAGNOSIS — Z86.718 HISTORY OF DVT (DEEP VEIN THROMBOSIS): Primary | ICD-10-CM

## 2021-12-23 DIAGNOSIS — E11.42 TYPE 2 DIABETES MELLITUS WITH DIABETIC POLYNEUROPATHY, WITHOUT LONG-TERM CURRENT USE OF INSULIN (HCC): ICD-10-CM

## 2021-12-23 LAB
ALBUMIN SERPL BCP-MCNC: 3.9 G/DL (ref 3.5–5)
ALP SERPL-CCNC: 67 U/L (ref 46–116)
ALT SERPL W P-5'-P-CCNC: 75 U/L (ref 12–78)
ANION GAP SERPL CALCULATED.3IONS-SCNC: 9 MMOL/L (ref 4–13)
AST SERPL W P-5'-P-CCNC: 52 U/L (ref 5–45)
BILIRUB SERPL-MCNC: 0.67 MG/DL (ref 0.2–1)
BUN SERPL-MCNC: 15 MG/DL (ref 5–25)
CALCIUM SERPL-MCNC: 9.4 MG/DL (ref 8.3–10.1)
CHLORIDE SERPL-SCNC: 99 MMOL/L (ref 100–108)
CHOLEST SERPL-MCNC: 164 MG/DL
CO2 SERPL-SCNC: 24 MMOL/L (ref 21–32)
CREAT SERPL-MCNC: 1.01 MG/DL (ref 0.6–1.3)
ERYTHROCYTE [DISTWIDTH] IN BLOOD BY AUTOMATED COUNT: 13.6 % (ref 11.6–15.1)
EST. AVERAGE GLUCOSE BLD GHB EST-MCNC: 157 MG/DL
GFR SERPL CREATININE-BSD FRML MDRD: 75 ML/MIN/1.73SQ M
GLUCOSE P FAST SERPL-MCNC: 140 MG/DL (ref 65–99)
HBA1C MFR BLD: 7.1 %
HCT VFR BLD AUTO: 50 % (ref 36.5–49.3)
HDLC SERPL-MCNC: 31 MG/DL
HGB BLD-MCNC: 16.3 G/DL (ref 12–17)
LDLC SERPL CALC-MCNC: 70 MG/DL (ref 0–100)
MAGNESIUM SERPL-MCNC: 1.8 MG/DL (ref 1.6–2.6)
MCH RBC QN AUTO: 31 PG (ref 26.8–34.3)
MCHC RBC AUTO-ENTMCNC: 32.6 G/DL (ref 31.4–37.4)
MCV RBC AUTO: 95 FL (ref 82–98)
NONHDLC SERPL-MCNC: 133 MG/DL
PLATELET # BLD AUTO: 255 THOUSANDS/UL (ref 149–390)
PMV BLD AUTO: 10.1 FL (ref 8.9–12.7)
POTASSIUM SERPL-SCNC: 4.3 MMOL/L (ref 3.5–5.3)
PROT SERPL-MCNC: 7.7 G/DL (ref 6.4–8.2)
RBC # BLD AUTO: 5.25 MILLION/UL (ref 3.88–5.62)
SODIUM SERPL-SCNC: 132 MMOL/L (ref 136–145)
TRIGL SERPL-MCNC: 314 MG/DL
WBC # BLD AUTO: 7.7 THOUSAND/UL (ref 4.31–10.16)

## 2021-12-23 PROCEDURE — 83036 HEMOGLOBIN GLYCOSYLATED A1C: CPT

## 2021-12-23 PROCEDURE — 80053 COMPREHEN METABOLIC PANEL: CPT

## 2021-12-23 PROCEDURE — 80061 LIPID PANEL: CPT

## 2021-12-23 PROCEDURE — 83735 ASSAY OF MAGNESIUM: CPT

## 2021-12-23 PROCEDURE — 85027 COMPLETE CBC AUTOMATED: CPT

## 2021-12-28 DIAGNOSIS — I10 ESSENTIAL HYPERTENSION: ICD-10-CM

## 2021-12-28 RX ORDER — TRIAMTERENE AND HYDROCHLOROTHIAZIDE 37.5; 25 MG/1; MG/1
CAPSULE ORAL
Qty: 90 CAPSULE | Refills: 3 | Status: SHIPPED | OUTPATIENT
Start: 2021-12-28 | End: 2022-05-17 | Stop reason: ALTCHOICE

## 2022-01-10 ENCOUNTER — OFFICE VISIT (OUTPATIENT)
Dept: VASCULAR SURGERY | Facility: CLINIC | Age: 72
End: 2022-01-10
Payer: MEDICARE

## 2022-01-10 VITALS
BODY MASS INDEX: 29.82 KG/M2 | DIASTOLIC BLOOD PRESSURE: 80 MMHG | TEMPERATURE: 98 F | WEIGHT: 213 LBS | HEIGHT: 71 IN | HEART RATE: 91 BPM | SYSTOLIC BLOOD PRESSURE: 140 MMHG

## 2022-01-10 DIAGNOSIS — I48.91 ATRIAL FIBRILLATION, UNSPECIFIED TYPE (HCC): ICD-10-CM

## 2022-01-10 DIAGNOSIS — Z79.01 ANTICOAGULATED ON COUMADIN: ICD-10-CM

## 2022-01-10 DIAGNOSIS — I72.4 POPLITEAL ARTERY ANEURYSM, BILATERAL (HCC): ICD-10-CM

## 2022-01-10 DIAGNOSIS — E11.40 TYPE 2 DIABETES MELLITUS WITH DIABETIC NEUROPATHY, WITHOUT LONG-TERM CURRENT USE OF INSULIN (HCC): ICD-10-CM

## 2022-01-10 DIAGNOSIS — Z86.718 HISTORY OF DVT (DEEP VEIN THROMBOSIS): ICD-10-CM

## 2022-01-10 DIAGNOSIS — I70.213 ATHEROSCLEROSIS OF NATIVE ARTERY OF BOTH LOWER EXTREMITIES WITH INTERMITTENT CLAUDICATION (HCC): Primary | ICD-10-CM

## 2022-01-10 DIAGNOSIS — E78.2 MIXED HYPERLIPIDEMIA: ICD-10-CM

## 2022-01-10 PROBLEM — I70.219 ATHEROSCLER NATIVE ARTERIES THE EXTREMITIES W/INTERMIT CLAUDICATION (HCC): Status: ACTIVE | Noted: 2019-03-21

## 2022-01-10 PROCEDURE — 99214 OFFICE O/P EST MOD 30 MIN: CPT | Performed by: SURGERY

## 2022-01-10 NOTE — PROGRESS NOTES
Assessment/Plan:    Pt is a 81 yo M w/ Warthin's tumor s/p resection, GERD, DM, COPD, HTN, afib (coumadin), PAD w/ BLE claudicaiton, B popliteal ectasia, pipe smoking, HLD, hx of LLE DVT, presents for RFM      Atherosclerosis of native artery of both lower extremities with intermittent claudication (HCC)  -     VAS lower limb arterial duplex, complete bilateral; Future  -reviewed LEADs which show R: 0 75/135/105 and L: 0 86/143/101 w/ B prox SFA moderate stenoses  -BLE claudication at 1/2 block, not currently life-limiting  -discussed pathophysiology of PAD and options for treatment; as he continues to smoke and symptoms are not life-limiting, I do not recommend intervention at this time  -will continue to monitor w/ yearly DU  -advised to f/u in 1 year or sooner if sxs worsen or sounds occur    Muscle cramping  -also reports various muscle cramping without activity (legs, fingers, face) which is not blood flow related; advised to discuss further with PCP, possibly electrolyte related/diuretic given hyponatremia and hypocloremia    Type 2 diabetes mellitus with diabetic neuropathy, without long-term current use of insulin ([facility])  -most recent A1C: 7 1, previously better controlled; reports he was sick during this time  -recheck per PCP in 3 [facility]    Popliteal artery ectasia bilateral ([facility] [address] )  -reviewed LEADs which show popliteal 11/9mm  -will continue surveillance on a yearly basis  -prior testing reported aortic ectasia, however, review of CT w/o contrast from 9/21 shows that his aorta remains normal in caliber w/ largest diameter of 2 1 in the infrarenal segment; advised rescreening again in 5 years    History of DVT (deep vein thrombosis)  -hx of LLE DVT '15  -BLE mild stasis changes  -advised compression/elevation if needed for mild swelling (not present on exam today)    Medications  Mixed hyperlipidemia  Anticoagulated on Coumadin  Atrial fibrillation, unspecified type ([address] )  -cont ASA/statin for PAD  -cont coumadin for afib  -tried pletal last year w/ side effect of tachycardia (sounds like afib w/ RVR, unclear if related)        Subjective:      Patient ID: Alyssa Arevalo is a 80 y o  male  Pt is here to review the result of NABEEL done on 12/03/21  Pt denies fever, pain and chills  Pt c/o tingling, numbness and cramps in bilateral lower extremities  Pt is currently smoking and is taking aspirin, warfarin and simvastatin  HPI:    Patient presents for RFM  Patient complains of cramping of the legs  He also notes cramping of the face and fingers  The leg cramping occurs when walking and also out of the blue when watching TV or sleeping  He can walk for 1/2 block before stopping to rest   Stopping relieves his symptoms  He gets this when walking up stairs as well  His right leg is worse than the left  These symptoms have been present for about 2 years  He also has symptoms which are more positional, like putting on his shoes when he gets a locking sensation  Has hx of LLE DVT  He had a "flu" at the time  But he was also shoveling snow  Takes ASA, statin, coumadin  Smokes a pipe, 4/day  The following portions of the patient's history were reviewed and updated as appropriate: allergies, current medications, past family history, past medical history, past social history, past surgical history and problem list     Review of Systems   Constitutional: Negative  HENT: Negative  Eyes: Negative  Respiratory: Negative  Negative for shortness of breath  Cardiovascular: Negative  Negative for chest pain and leg swelling  Gastrointestinal: Negative  Endocrine: Negative  Genitourinary: Negative  Musculoskeletal: Negative  Negative for gait problem  Skin: Positive for color change  Negative for wound  Allergic/Immunologic: Negative  Neurological: Negative  Hematological: Negative  Psychiatric/Behavioral: Negative            Objective:      /80 (BP Location: Left arm, Patient Position: Sitting, Cuff Size: Standard)   Pulse 91   Temp 98 °F (36 7 °C) (Tympanic)   Ht 5' 11" (1 803 m)   Wt 96 6 kg (213 lb)   BMI 29 71 kg/m²          Physical Exam  Vitals and nursing note reviewed  Constitutional:       Appearance: He is well-developed  HENT:      Head: Normocephalic and atraumatic  Eyes:      Conjunctiva/sclera: Conjunctivae normal    Cardiovascular:      Rate and Rhythm: Normal rate and regular rhythm  Pulses:           Radial pulses are 2+ on the right side and 2+ on the left side  Popliteal pulses are 0 on the right side and 0 on the left side  Dorsalis pedis pulses are 1+ on the right side and 0 on the left side  Posterior tibial pulses are 0 on the right side and 0 on the left side  Heart sounds: Normal heart sounds  No murmur heard  Comments: No carotid bruits B  Pulmonary:      Effort: Pulmonary effort is normal  No respiratory distress  Breath sounds: Normal breath sounds  No wheezing or rales  Abdominal:      General: There is no distension  Palpations: Abdomen is soft  Tenderness: There is no abdominal tenderness  There is no rebound  Musculoskeletal:         General: Normal range of motion  Cervical back: Normal range of motion and neck supple  Right lower leg: No edema  Left lower leg: No edema  Skin:     General: Skin is warm and dry  Comments: Mild darkening of the skin of the mid lower legs without wounds; slightly more proximal than usually seen    Well healed incisions at the B mid neck (parotid cyst resection)   Neurological:      Mental Status: He is alert and oriented to person, place, and time  Psychiatric:         Behavior: Behavior normal            I have reviewed and made appropriate changes to the review of systems input by the medical assistant      Vitals:    01/10/22 1302   BP: 140/80   BP Location: Left arm   Patient Position: Sitting   Cuff Size: Standard Pulse: 91   Temp: 98 °F (36 7 °C)   TempSrc: Tympanic   Weight: 96 6 kg (213 lb)   Height: 5' 11" (1 803 m)       Patient Active Problem List   Diagnosis    Essential hypertension    Mixed hyperlipidemia    Type 2 diabetes mellitus with neurologic complication, without long-term current use of insulin (HCC)    Fatty liver    History of DVT (deep vein thrombosis)    Anticoagulated on Coumadin    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)    Atrial fibrillation (HCC)    History of colon polyps    Gastroesophageal reflux disease without esophagitis    Warthin's tumor    Hiatal hernia    Smoking    Chronic obstructive pulmonary disease (HCC)    Prostatic disorder    Trigger ring finger of right hand    High blood monocyte count    Intermittent claudication (HCC)    Popliteal artery ectasia bilateral (HCC)    LISY (acute kidney injury) ([facility] [address] )    Hematuria    Hypocalcemia       Past Surgical History:   Procedure Laterality Date    COLONOSCOPY  05/16/2019    completed by Dr Julien Moreland, Repeat 3 years    [facility] RETROGRADE PYELOGRAM  5/17/2019    PAROTIDECTOMY Left     IL CYSTO/URETERO W/LITHOTRIPSY &INDWELL STENT INSRT Right 5/17/2019    Procedure: CYSTOSCOPY URETEROSCOPY WITH LITHOTRIPSY HOLMIUM LASER, RETROGRADE PYELOGRAM AND INSERTION STENT URETERAL--possible stent only;  Surgeon: Juan Ochoa MD;  [facility];  Service: Urology    IL EXC PAROTD,LAT LOBE,DISSECT 5TH NERV Right 10/18/2017    Procedure: SUPERFICIAL PAROTIDECTOMY WITH NERVE DISSECTION AND PRESERVATION;  Surgeon: Sanjuanita Wylie MD;  [facility];  Service: ENT    IL INCISE FINGER TENDON SHEATH Right 11/3/2020    Procedure: RING TRIGGER FINGER RELEASE;  Surgeon: Molly Ross MD;  [facility];  Service: Orthopedics    ROTATOR CUFF REPAIR Bilateral     TONSILLECTOMY         Family History   Problem Relation Age of Onset    Lupus Mother     Rheum arthritis Mother    Gualberto Rocha Cirrhosis Father     Alcohol abuse Father     Substance Abuse Brother        Social History     Socioeconomic History    Marital status: /Civil Union     Spouse name: Not on file    Number of children: Not on file    Years of education: Not on file    Highest education level: Not on file   Occupational History    Not on file   Tobacco Use    Smoking status: Current Every Day Smoker     Types: Pipe    Smokeless tobacco: Never Used    Tobacco comment: smokes 2 pipes daily   Vaping Use    Vaping Use: Never used   Substance and Sexual Activity    Alcohol use: Yes     Comment: socially    Drug use: No    Sexual activity: Not on file   Other Topics Concern    Not on file   Social History Narrative        [place]     Social Determinants of Health     Financial Resource Strain: Not on file   Food Insecurity: Not on file   Transportation Needs: Not on file   Physical Activity: Not on file   Stress: Not on file   Social Connections: Not on file   Intimate Partner Violence: Not on file   Housing Stability: Not on file       Allergies   Allergen Reactions    Pletal [Cilostazol] Tachycardia         Current Outpatient Medications:     acetaminophen (TYLENOL) 325 mg tablet, 2, by mouth, every 6 hours as needed for mild to moderate pain , Disp: 30 tablet, Rfl: 0    aspirin (ECOTRIN LOW STRENGTH) 81 mg EC tablet, Take 81 mg by mouth daily, Disp: , Rfl:     Cholecalciferol (VITAMIN D3 PO), Take 50 mcg by mouth daily, Disp: , Rfl:     Cyanocobalamin (VITAMIN B 12 PO), Take 1,000 mcg by mouth daily, Disp: , Rfl:     fenofibrate micronized (LOFIBRA) 134 MG capsule, TAKE 1 CAPSULE (134 MG TOTAL) BY MOUTH DAILY WITH BREAKFAST, Disp: 90 capsule, Rfl: 3    gabapentin (NEURONTIN) 100 mg capsule, Take 2 capsules (200 mg total) by mouth daily at bedtime, Disp: 180 capsule, Rfl: 1    Januvia 100 MG tablet, TAKE 1 TABLET BY MOUTH EVERY DAY, Disp: 90 tablet, Rfl: 3    lisinopril (ZESTRIL) 20 mg tablet, TAKE 1 TABLET BY MOUTH EVERY DAY, Disp: 90 tablet, Rfl: 4    metFORMIN (GLUCOPHAGE) 1000 MG tablet, TAKE 1 TABLET BY MOUTH TWICE A DAY WITH MEALS, Disp: 180 tablet, Rfl: 1    omeprazole (PriLOSEC) 20 mg delayed release capsule, TAKE 1 CAPSULE BY MOUTH EVERY DAY, Disp: 90 capsule, Rfl: 1    ProAir  (90 Base) MCG/ACT inhaler, INHALE 2 PUFFS EVERY 4 HOURS AS NEEDED FOR WHEEZE OR FOR SHORTNESS OF BREATH, Disp: 18 g, Rfl: 1    simvastatin (ZOCOR) 40 mg tablet, TAKE 1 TABLET BY MOUTH EVERY DAY, Disp: 90 tablet, Rfl: 4    tamsulosin (FLOMAX) 0 4 mg, TAKE 1 CAPSULE BY MOUTH EVERY DAY WITH DINNER, Disp: 90 capsule, Rfl: 3    triamterene-hydrochlorothiazide (DYAZIDE) 37 5-25 mg per capsule, TAKE 1 CAPSULE BY MOUTH EVERY DAY, Disp: 90 capsule, Rfl: 3    warfarin (COUMADIN) 2 5 mg tablet, Take 1 tablet by mouth daily T-Th-S-S takes 2 5mg    M-W-F takes 5 0 mg, Disp: , Rfl:     warfarin (COUMADIN) 5 mg tablet, TAKE 1 TABLET EVERY DAY OR AS DIRECTED BY MD, Disp: 90 tablet, Rfl: 3    calcium carbonate (OYSTER SHELL,OSCAL) 500 mg, Take 1 tablet by mouth 3 (three) times a day with meals for 7 days (Patient not taking: Reported on 12/21/2021 ), Disp: 21 tablet, Rfl: 0

## 2022-01-10 NOTE — PATIENT INSTRUCTIONS
1) PAD  -your ultrasound test shows mild artery blockages in both legs  -you also have mild enlargement of the popliteal arteries behind the knees  -we are going to monitor this with yearly ultrasounds  -if your symptoms worsen or you get a wound on the legs/feet that won't heal, please come in sooner  -I do not know what is causing your generalized body cramping; please continue to discuss this with you PCP    2) Medication  -please continue taking your aspirin and statin as well as the rest of your prescribed medications    3) Cigar use  -this is a risk factor for artery blockages and for aneurysm and it is very important that you stop smoking

## 2022-01-18 DIAGNOSIS — E11.42 TYPE 2 DIABETES MELLITUS WITH DIABETIC POLYNEUROPATHY, WITHOUT LONG-TERM CURRENT USE OF INSULIN (HCC): ICD-10-CM

## 2022-01-25 ENCOUNTER — APPOINTMENT (OUTPATIENT)
Dept: LAB | Facility: AMBULARY SURGERY CENTER | Age: 72
End: 2022-01-25
Payer: MEDICARE

## 2022-01-25 ENCOUNTER — ANTICOAG VISIT (OUTPATIENT)
Dept: CARDIOLOGY CLINIC | Facility: CLINIC | Age: 72
End: 2022-01-25

## 2022-01-25 DIAGNOSIS — Z86.718 HISTORY OF DVT (DEEP VEIN THROMBOSIS): Primary | ICD-10-CM

## 2022-01-27 ENCOUNTER — ANTICOAG VISIT (OUTPATIENT)
Dept: CARDIOLOGY CLINIC | Facility: CLINIC | Age: 72
End: 2022-01-27

## 2022-01-27 DIAGNOSIS — Z86.718 HISTORY OF DVT (DEEP VEIN THROMBOSIS): Primary | ICD-10-CM

## 2022-01-27 LAB — INR PPP: 2.69 (ref 0.84–1.19)

## 2022-02-05 DIAGNOSIS — J30.9 ALLERGIC RHINITIS, UNSPECIFIED SEASONALITY, UNSPECIFIED TRIGGER: ICD-10-CM

## 2022-02-21 ENCOUNTER — ANTICOAG VISIT (OUTPATIENT)
Dept: CARDIOLOGY CLINIC | Facility: CLINIC | Age: 72
End: 2022-02-21

## 2022-02-21 ENCOUNTER — OFFICE VISIT (OUTPATIENT)
Dept: FAMILY MEDICINE CLINIC | Facility: CLINIC | Age: 72
End: 2022-02-21
Payer: MEDICARE

## 2022-02-21 ENCOUNTER — APPOINTMENT (OUTPATIENT)
Dept: LAB | Facility: CLINIC | Age: 72
End: 2022-02-21
Payer: MEDICARE

## 2022-02-21 VITALS
OXYGEN SATURATION: 97 % | SYSTOLIC BLOOD PRESSURE: 120 MMHG | WEIGHT: 214 LBS | RESPIRATION RATE: 18 BRPM | DIASTOLIC BLOOD PRESSURE: 70 MMHG | BODY MASS INDEX: 29.96 KG/M2 | HEIGHT: 71 IN | TEMPERATURE: 98.5 F | HEART RATE: 86 BPM

## 2022-02-21 DIAGNOSIS — E11.40 TYPE 2 DIABETES MELLITUS WITH DIABETIC NEUROPATHY, WITHOUT LONG-TERM CURRENT USE OF INSULIN (HCC): Primary | ICD-10-CM

## 2022-02-21 DIAGNOSIS — K21.9 GASTROESOPHAGEAL REFLUX DISEASE WITHOUT ESOPHAGITIS: ICD-10-CM

## 2022-02-21 DIAGNOSIS — E87.1 HYPONATREMIA: Primary | ICD-10-CM

## 2022-02-21 DIAGNOSIS — I10 ESSENTIAL HYPERTENSION: ICD-10-CM

## 2022-02-21 DIAGNOSIS — E11.40 TYPE 2 DIABETES MELLITUS WITH DIABETIC NEUROPATHY, WITHOUT LONG-TERM CURRENT USE OF INSULIN (HCC): ICD-10-CM

## 2022-02-21 DIAGNOSIS — R25.2 MUSCLE CRAMPING: ICD-10-CM

## 2022-02-21 DIAGNOSIS — Z86.718 HISTORY OF DVT (DEEP VEIN THROMBOSIS): Primary | ICD-10-CM

## 2022-02-21 DIAGNOSIS — E78.2 MIXED HYPERLIPIDEMIA: ICD-10-CM

## 2022-02-21 DIAGNOSIS — I70.213 ATHEROSCLEROSIS OF NATIVE ARTERY OF BOTH LOWER EXTREMITIES WITH INTERMITTENT CLAUDICATION (HCC): ICD-10-CM

## 2022-02-21 LAB — SL AMB POCT HEMOGLOBIN AIC: 8 (ref ?–6.5)

## 2022-02-21 PROCEDURE — 99214 OFFICE O/P EST MOD 30 MIN: CPT | Performed by: NURSE PRACTITIONER

## 2022-02-21 PROCEDURE — 83036 HEMOGLOBIN GLYCOSYLATED A1C: CPT | Performed by: NURSE PRACTITIONER

## 2022-02-21 RX ORDER — FAMOTIDINE 20 MG/1
20 TABLET, FILM COATED ORAL 2 TIMES DAILY
Qty: 90 TABLET | Refills: 3 | Status: SHIPPED | OUTPATIENT
Start: 2022-02-21

## 2022-02-21 NOTE — PROGRESS NOTES
FAMILY PRACTICE OFFICE VISIT       NAME: Stacy Hammond  AGE: 80 y o  SEX: male       : 1950        MRN: [id number]    Assessment and Plan   1  Hyponatremia  Assessment & Plan:  Sodium persistently running 133-134, current sodium 132  Experiencing increased muscle cramping last few weeks, possibly from hyponatremia  Suspect low-sodium is medication induced  Will try to wean off omeprazole, as PPIs can contribute to hyponatremia  I will also contact Dr Dena Purcell, who prescribes Dyazide, ? Trial of discontinuing Dyazide and monitoring for improved electrolytes  Orders:  -     Comprehensive metabolic panel; Future    2  Muscle cramping  Assessment & Plan:  Muscle cramping initially started in his feet several years ago, over the past 2-3 weeks it has increased and now occurs in other muscles  Unclear etiology, questionably from hyponatremia  Will try to wean off omeprazole  Reach out to Dr Dena Purcell, regarding trial for discontinuation of Dyazide  We did trial discontinuation of simvastatin for 2 weeks, which did not improve symptoms  For now will continue simvastatin and start coenzyme Q10 100 mg twice daily  Will have him schedule Neurology consultation  Orders:  -     Ambulatory Referral to Neurology; Future  -     Co-Enzyme Q-10 100 MG CAPS; Take 1 capsule by mouth 2 (two) times a day    3  Type 2 diabetes mellitus with diabetic neuropathy, without long-term current use of insulin [facility])  Assessment & Plan:    Lab Results   Component Value Date    HGBA1C 8 0 (A) 2022     Patient expresses concerns that home blood sugars are higher  Hemoglobin A1c checked at the end of the office visit up to 8 0%  Will recommend continuing metformin Januvia, and adding Jardiance 10 mg daily  Orders:  -     POCT hemoglobin A1c    4  Atherosclerosis of native artery of both lower extremities with intermittent claudication (HCC)  Assessment & Plan:  Continue simvastatin and aspirin    Continue follow-up with vascular surgery, Dr Skylar Orlando Doctor  5  Essential hypertension  Assessment & Plan:  Blood pressure is well controlled on current medications lisinopril and Dyazide  Orders:  -     CBC; Future  -     Comprehensive metabolic panel; Future  -     Lipid panel; Future    6  Mixed hyperlipidemia  Assessment & Plan:  Stable on simvastatin  Orders:  -     Lipid panel; Future  -     Co-Enzyme Q-10 100 MG CAPS; Take 1 capsule by mouth 2 (two) times a day    7  Gastroesophageal reflux disease without esophagitis  Assessment & Plan:  Given hyponatremia and being on omeprazole for many years, will try to wean off omeprazole  For now start Pepcid 20 mg daily at bedtime  Reduce omeprazole dosing from 20 mg every day to 20 mg every other day  Update me in 2 weeks on how this is going via [facility]  Orders:  -     famotidine (PEPCID) 20 mg tablet; Take 1 tablet (20 mg total) by mouth 2 (two) times a day     Blood work end of March  Follow up in 3 months  Chief Complaint     Chief Complaint   Patient presents with    Follow-up     Pt is here for 6 mos f/u BP and DM2  dm foot        History of Present Illness     Everton Natarajan is a 80year old male presenting today for follow up  Sugars 140's-170s' sometimes up to 200  Has not changed diet, trying to eat less  Januvia and metformin  Increased muscle cramping  Started with food cramping few years ago  Getting worse over the past 2-3 weeks  Cramping face, neck, abdomen, ankle, hands, all over  Painful cramping--lasts about 1 5 minutes  Almost looks like stroke, when this happens on his face  Facial cramp occurs on his right lower lip, occurs only when he moves his facial muscles in a certain way  No eye drooping, vision changes  No swallowing difficulty  No speech difficulty, except when muscle cramp occurs on right lower lip  No weakness  No lower extremity swelling  Sometimes drops things when hands cramp  Tried to stop Simvastatin for 2 weeks in the past, with no change in symptoms  Review of Systems   Review of Systems   Constitutional: Negative  HENT: Negative  Respiratory: Negative  Cardiovascular: Negative  Gastrointestinal: Negative  Genitourinary: Negative  Musculoskeletal: Positive for myalgias  Skin: Negative  Neurological: Negative  Hematological: Negative  Psychiatric/Behavioral: Negative  I have reviewed the patient's medical history in detail; there are no changes to the history as noted in the electronic medical record  Objective     Vitals:    02/21/22 0928   BP: 120/70   Pulse: 86   Resp: 18   Temp: 98 5 °F (36 9 °C)   TempSrc: Temporal   SpO2: 97%   Weight: 97 1 kg (214 lb)   Height: 5' 11" (1 803 m)     Wt Readings from Last 3 Encounters:   02/21/22 97 1 kg (214 lb)   01/10/22 96 6 kg (213 lb)   12/21/21 97 5 kg (215 lb)     Physical Exam  Vitals and nursing note reviewed  Constitutional:       General: He is not in acute distress  Appearance: Normal appearance  He is not ill-appearing  HENT:      Head: Atraumatic  Neck:      Thyroid: No thyromegaly  Vascular: No carotid bruit  Cardiovascular:      Rate and Rhythm: Normal rate and regular rhythm  Pulses: Pulses are weak  Dorsalis pedis pulses are 1+ on the right side and 1+ on the left side  Posterior tibial pulses are 1+ on the right side and 1+ on the left side  Heart sounds: No murmur heard  Pulmonary:      Effort: Pulmonary effort is normal  No respiratory distress  Breath sounds: Normal breath sounds  No wheezing or rales  Musculoskeletal:      Cervical back: Normal range of motion and neck supple  Right lower leg: No edema  Left lower leg: No edema  Comments: He is able to illicit muscle cramping in right lower lip, which causes corner of right lower lip to curve downward in a U shape  Causes asymmetry of smile   Speech is slightly slurred due to lack of control of this portion of lip  Symptoms resolved within 1 minute, completely  No eye drooping  Feet:      Right foot:      Skin integrity: Callus (great toe) present  No ulcer, skin breakdown, erythema, warmth or dry skin  Left foot:      Skin integrity: Callus (great toe) present  No ulcer, skin breakdown, erythema, warmth or dry skin  Lymphadenopathy:      Cervical: No cervical adenopathy  Neurological:      Mental Status: He is alert  Comments: Ambulates with cane   Psychiatric:         Mood and Affect: Mood normal        BMI Counseling: Body mass index is 29 85 kg/m²  The BMI is above normal  Nutrition recommendations include decreasing portion sizes, encouraging healthy choices of fruits and vegetables, moderation in carbohydrate intake and increasing intake of lean protein  Rationale for BMI follow-up plan is due to patient being overweight or obese  Diabetic Foot Exam    Patient's shoes and socks removed  Right Foot/Ankle   Right Foot Inspection  Skin Exam: skin normal, skin intact, callus (great toe) and callus (great toe)  No dry skin, no warmth, no erythema, no maceration, no abnormal color, no pre-ulcer and no ulcer  Toe Exam: ROM and strength within normal limits and right toe deformity (bunion)  Sensory   Proprioception: intact  Monofilament testing: diminished    Vascular  Capillary refills: < 3 seconds  The right DP pulse is 1+  The right PT pulse is 1+  Left Foot/Ankle  Left Foot Inspection  Skin Exam: skin normal, skin intact and callus (great toe)  No dry skin, no warmth, no erythema, no maceration, normal color, no pre-ulcer and no ulcer  Toe Exam: ROM and strength within normal limits and left toe deformity (bunion)  Sensory   Proprioception: intact  Monofilament testing: diminished    Vascular  Capillary refills: < 3 seconds  The left DP pulse is 1+  The left PT pulse is 1+       Assign Risk Category  Deformity present  Loss of protective sensation  Weak pulses  Risk: 2    ALLERGIES:  Allergies   Allergen Reactions    Pletal [Cilostazol] Tachycardia       Current Medications     Current Outpatient Medications   Medication Sig Dispense Refill    acetaminophen (TYLENOL) 325 mg tablet 2, by mouth, every 6 hours as needed for mild to moderate pain   30 tablet 0    aspirin (ECOTRIN LOW STRENGTH) 81 mg EC tablet Take 81 mg by mouth daily      Cholecalciferol (VITAMIN D3 PO) Take 50 mcg by mouth daily      Cyanocobalamin (VITAMIN B 12 PO) Take 1,000 mcg by mouth daily      fenofibrate micronized (LOFIBRA) 134 MG capsule TAKE 1 CAPSULE (134 MG TOTAL) BY MOUTH DAILY WITH BREAKFAST 90 capsule 3    gabapentin (NEURONTIN) 100 mg capsule Take 2 capsules (200 mg total) by mouth daily at bedtime 180 capsule 1    Januvia 100 MG tablet TAKE 1 TABLET BY MOUTH EVERY DAY 90 tablet 3    lisinopril (ZESTRIL) 20 mg tablet TAKE 1 TABLET BY MOUTH EVERY DAY 90 tablet 4    metFORMIN (GLUCOPHAGE) 1000 MG tablet TAKE 1 TABLET BY MOUTH TWICE A DAY WITH MEALS 180 tablet 1    omeprazole (PriLOSEC) 20 mg delayed release capsule TAKE 1 CAPSULE BY MOUTH EVERY DAY 90 capsule 1    ProAir  (90 Base) MCG/ACT inhaler INHALE 2 PUFFS EVERY 4 HOURS AS NEEDED FOR WHEEZE OR FOR SHORTNESS OF BREATH 18 g 1    simvastatin (ZOCOR) 40 mg tablet TAKE 1 TABLET BY MOUTH EVERY DAY 90 tablet 4    tamsulosin (FLOMAX) 0 4 mg TAKE 1 CAPSULE BY MOUTH EVERY DAY WITH DINNER 90 capsule 3    triamterene-hydrochlorothiazide (DYAZIDE) 37 5-25 mg per capsule TAKE 1 CAPSULE BY MOUTH EVERY DAY 90 capsule 3    warfarin (COUMADIN) 2 5 mg tablet Take 1 tablet by mouth daily T-Th-S-S takes 2 5mg     M-W-F takes 5 0 mg      warfarin (COUMADIN) 5 mg tablet TAKE 1 TABLET EVERY DAY OR AS DIRECTED BY MD 90 tablet 3    calcium carbonate (OYSTER SHELL,OSCAL) 500 mg Take 1 tablet by mouth 3 (three) times a day with meals for 7 days (Patient not taking: Reported on 12/21/2021 ) 21 tablet 0    Co-Enzyme Q-10 100 MG CAPS Take 1 capsule by mouth 2 (two) times a day 60 capsule 11    famotidine (PEPCID) 20 mg tablet Take 1 tablet (20 mg total) by mouth 2 (two) times a day 90 tablet 3     No current facility-administered medications for this visit           Health Maintenance     Health Maintenance   Topic Date Due    DTaP,Tdap,and Td Vaccines (1 - Tdap) Never done    OT PLAN OF CARE  08/18/2019    Diabetic Foot Exam  03/29/2022    Fall Risk  04/19/2022    Depression Screening  04/19/2022    Medicare Annual Wellness Visit (AWV)  04/19/2022    BMI: Followup Plan  04/19/2022    HEMOGLOBIN A1C  08/21/2022    Colorectal Cancer Screening  11/21/2022    BMI: Adult  02/21/2023    DM Eye Exam  04/12/2023    Hepatitis C Screening  Completed    Pneumococcal Vaccine: 65+ Years  Completed    Influenza Vaccine  Completed    COVID-19 Vaccine  Completed    HIB Vaccine  Aged Out    Hepatitis B Vaccine  Aged Out    IPV Vaccine  Aged Out    Hepatitis A Vaccine  Aged Out    Meningococcal ACWY Vaccine  Aged Out    HPV Vaccine  Aged Out     Immunization History   Administered Date(s) Administered    COVID-19 PFIZER VACCINE 0 3 ML IM 03/05/2021, 03/26/2021, 09/25/2021    Hep B, adult 03/19/2019, 04/23/2019, 09/19/2019    INFLUENZA 09/13/2018, 09/17/2018, 09/20/2021    Influenza, high dose seasonal 0 7 mL 09/19/2019, 08/24/2020    Pneumococcal Conjugate 13-Valent 03/19/2019    Pneumococcal Polysaccharide PPV23 08/17/2020    Zoster Vaccine Recombinant 07/30/2018, 09/29/2018       DAYAMI Youngblood

## 2022-02-22 ENCOUNTER — TELEPHONE (OUTPATIENT)
Dept: FAMILY MEDICINE CLINIC | Facility: CLINIC | Age: 72
End: 2022-02-22

## 2022-02-22 NOTE — ASSESSMENT & PLAN NOTE
Lab Results   Component Value Date    HGBA1C 8 0 (A) 02/21/2022     Patient expresses concerns that home blood sugars are higher  Hemoglobin A1c checked at the end of the office visit up to 8 0%  Will recommend continuing metformin Januvia, and adding Jardiance 10 mg daily

## 2022-02-22 NOTE — ASSESSMENT & PLAN NOTE
Muscle cramping initially started in his feet several years ago, over the past 2-3 weeks it has increased and now occurs in other muscles  Unclear etiology, questionably from hyponatremia  Will try to wean off omeprazole  Reach out to Dr Lubna Schulz, regarding trial for discontinuation of Dyazide  We did trial discontinuation of simvastatin for 2 weeks, which did not improve symptoms  For now will continue simvastatin and start coenzyme Q10 100 mg twice daily  Will have him schedule Neurology consultation

## 2022-02-22 NOTE — ASSESSMENT & PLAN NOTE
Sodium persistently running 133-134, current sodium 132  Experiencing increased muscle cramping last few weeks, possibly from hyponatremia  Suspect low-sodium is medication induced  Will try to wean off omeprazole, as PPIs can contribute to hyponatremia  I will also contact Dr Nelida Watson, who prescribes Dyazide, ? Trial of discontinuing Dyazide and monitoring for improved electrolytes

## 2022-02-22 NOTE — ASSESSMENT & PLAN NOTE
Given hyponatremia and being on omeprazole for many years, will try to wean off omeprazole  For now start Pepcid 20 mg daily at bedtime  Reduce omeprazole dosing from 20 mg every day to 20 mg every other day  Update me in 2 weeks on how this is going via [email or web address]

## 2022-02-22 NOTE — TELEPHONE ENCOUNTER
Spoke with Conchis Mason via telephone  I have reached out to Dr Gurwinder Hollingsworth, who is agreeable to trial of discontinuing dyazide, as we spoke about at office visit yesterday  Will hold off on starting replacement antihypertensive right now, as will be starting Jardiance  He will monitor home BP and call me with BP's in 2 weeks  Also advised if BP consistently >140/90, call

## 2022-03-03 ENCOUNTER — TELEPHONE (OUTPATIENT)
Dept: FAMILY MEDICINE CLINIC | Facility: CLINIC | Age: 72
End: 2022-03-03

## 2022-03-04 NOTE — TELEPHONE ENCOUNTER
Spoke with patient's wife, Ana Leiva  Blood pressure is good, has not been increasing since holding dyazide  Fasting blood sugars 120s, with addition of Jardiance  Tolerating medication without difficulty  Thus far doing well with omeprazole wean and pepcid  Will update me again in 2 weeks

## 2022-03-17 ENCOUNTER — ANTICOAG VISIT (OUTPATIENT)
Dept: CARDIOLOGY CLINIC | Facility: CLINIC | Age: 72
End: 2022-03-17

## 2022-03-17 ENCOUNTER — OFFICE VISIT (OUTPATIENT)
Dept: OBGYN CLINIC | Facility: CLINIC | Age: 72
End: 2022-03-17
Payer: MEDICARE

## 2022-03-17 ENCOUNTER — APPOINTMENT (OUTPATIENT)
Dept: LAB | Facility: AMBULARY SURGERY CENTER | Age: 72
End: 2022-03-17
Payer: MEDICARE

## 2022-03-17 VITALS
WEIGHT: 207 LBS | HEIGHT: 71 IN | DIASTOLIC BLOOD PRESSURE: 80 MMHG | BODY MASS INDEX: 28.98 KG/M2 | HEART RATE: 79 BPM | SYSTOLIC BLOOD PRESSURE: 138 MMHG

## 2022-03-17 DIAGNOSIS — M67.432 GANGLION CYST OF DORSUM OF LEFT WRIST: Primary | ICD-10-CM

## 2022-03-17 DIAGNOSIS — I82.402 ACUTE DEEP VEIN THROMBOSIS (DVT) OF LEFT LOWER EXTREMITY, UNSPECIFIED VEIN (HCC): ICD-10-CM

## 2022-03-17 DIAGNOSIS — M65.9 EXTENSOR CARPI RADIALIS BREVIS TENOSYNOVITIS: ICD-10-CM

## 2022-03-17 DIAGNOSIS — M65.839 EXTENSOR CARPI RADIALIS LONGUS TENOSYNOVITIS: ICD-10-CM

## 2022-03-17 DIAGNOSIS — Z86.718 HISTORY OF DVT (DEEP VEIN THROMBOSIS): Primary | ICD-10-CM

## 2022-03-17 PROBLEM — M65.939: Status: ACTIVE | Noted: 2022-03-17

## 2022-03-17 LAB
INR PPP: 2.39 (ref 0.84–1.19)
INR PPP: 2.39 (ref 0.84–1.19)
PROTHROMBIN TIME: 24.9 SECONDS (ref 11.6–14.5)

## 2022-03-17 PROCEDURE — 36415 COLL VENOUS BLD VENIPUNCTURE: CPT

## 2022-03-17 PROCEDURE — 99214 OFFICE O/P EST MOD 30 MIN: CPT | Performed by: ORTHOPAEDIC SURGERY

## 2022-03-17 PROCEDURE — 85610 PROTHROMBIN TIME: CPT

## 2022-03-17 RX ORDER — LIDOCAINE HYDROCHLORIDE 20 MG/ML
JELLY TOPICAL AS NEEDED
Qty: 30 ML | Refills: 1 | Status: SHIPPED | OUTPATIENT
Start: 2022-03-17 | End: 2022-05-17 | Stop reason: ALTCHOICE

## 2022-03-17 RX ORDER — AMLODIPINE BESYLATE 5 MG/1
5 TABLET ORAL DAILY
COMMUNITY
End: 2022-05-17 | Stop reason: ALTCHOICE

## 2022-03-17 NOTE — PROGRESS NOTES
Spoke with patient, advised INR good, will continue same dose, 5 mg Mon Wed Fri, 2 5 mg other days     Will recheck in 4 weeks 4/14/22

## 2022-03-17 NOTE — PROGRESS NOTES
Chief Complaint: Left wrist mass     HPI:    Rafael Wu is a 80year old Male who presents today for new evaluation and treatment of left wrist mass  Description of symptoms: Today he reports mild discomfort and swelling pointing over his second extensor compartment  Onset several years  States cyst has been more persistent over the past six months  Discomfort described as mild burning sensation  Denies aggravating and or reliving factors  Denies acute trauma  No numbness no tingling  Denies weakness  Summary of treatment to-date: none    I have personally reviewed pertinent films in PACS and my interpretation is limited   diagnostic ultrasound completed today demonstrating left dorsal ganglion cyst less then one cm in size as well as swelling of the second extensor compartment  Patient Active Problem List   Diagnosis    Essential hypertension    Mixed hyperlipidemia    Type 2 diabetes mellitus with neurologic complication, without long-term current use of insulin (HCC)    Fatty liver    History of DVT (deep vein thrombosis)    Anticoagulated on Coumadin    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)    Atrial fibrillation (HCC)    History of colon polyps    Gastroesophageal reflux disease without esophagitis    Warthin's tumor    Hiatal hernia    Smoking    Chronic obstructive pulmonary disease (HCC)    Prostatic disorder    Trigger ring finger of right hand    Intermittent claudication ([address] )    Popliteal artery ectasia bilateral (HCC)    LISY (acute kidney injury) ([address] )    Hyponatremia    Muscle cramping    Extensor carpi radialis brevis tenosynovitis        Current Outpatient Medications on File Prior to Visit   Medication Sig Dispense Refill    acetaminophen (TYLENOL) 325 mg tablet 2, by mouth, every 6 hours as needed for mild to moderate pain   30 tablet 0    aspirin (ECOTRIN LOW STRENGTH) 81 mg EC tablet Take 81 mg by mouth daily        Cholecalciferol (VITAMIN D3 PO) Take 50 mcg by mouth daily      Co-Enzyme Q-10 100 MG CAPS Take 1 capsule by mouth 2 (two) times a day 60 capsule 11    Cyanocobalamin (VITAMIN B 12 PO) Take 1,000 mcg by mouth daily      Empagliflozin 10 MG TABS Take 1 tablet (10 mg total) by mouth every morning 30 tablet 2    famotidine (PEPCID) 20 mg tablet Take 1 tablet (20 mg total) by mouth 2 (two) times a day 90 tablet 3    fenofibrate micronized (LOFIBRA) 134 MG capsule TAKE 1 CAPSULE (134 MG TOTAL) BY MOUTH DAILY WITH BREAKFAST 90 capsule 3    gabapentin (NEURONTIN) 100 mg capsule Take 2 capsules (200 mg total) by mouth daily at bedtime 180 capsule 1    Januvia 100 MG tablet TAKE 1 TABLET BY MOUTH EVERY DAY 90 tablet 3    lisinopril (ZESTRIL) 20 mg tablet TAKE 1 TABLET BY MOUTH EVERY DAY 90 tablet 4    metFORMIN (GLUCOPHAGE) 1000 MG tablet TAKE 1 TABLET BY MOUTH TWICE A DAY WITH MEALS 180 tablet 1    omeprazole (PriLOSEC) 20 mg delayed release capsule TAKE 1 CAPSULE BY MOUTH EVERY DAY 90 capsule 1    ProAir  (90 Base) MCG/ACT inhaler INHALE 2 PUFFS EVERY 4 HOURS AS NEEDED FOR WHEEZE OR FOR SHORTNESS OF BREATH 18 g 1    simvastatin (ZOCOR) 40 mg tablet TAKE 1 TABLET BY MOUTH EVERY DAY 90 tablet 4    tamsulosin (FLOMAX) 0 4 mg TAKE 1 CAPSULE BY MOUTH EVERY DAY WITH DINNER 90 capsule 3    triamterene-hydrochlorothiazide (DYAZIDE) 37 5-25 mg per capsule TAKE 1 CAPSULE BY MOUTH EVERY DAY 90 capsule 3    warfarin (COUMADIN) 2 5 mg tablet Take 1 tablet by mouth daily T-Th-S-S takes 2 5mg     M-W-F takes 5 0 mg      warfarin (COUMADIN) 5 mg tablet TAKE 1 TABLET EVERY DAY OR AS DIRECTED BY MD 90 tablet 3    amLODIPine (NORVASC) 5 mg tablet Take 5 mg by mouth daily (Patient not taking: Reported on 3/17/2022 )      calcium carbonate (OYSTER SHELL,OSCAL) 500 mg Take 1 tablet by mouth 3 (three) times a day with meals for 7 days (Patient not taking: Reported on 12/21/2021 ) 21 tablet 0     No current facility-administered medications on file prior to visit  Allergies   Allergen Reactions    Pletal [Cilostazol] Tachycardia        Tobacco Use: High Risk    Smoking Tobacco Use: Current Every Day Smoker    Smokeless Tobacco Use: Never Used        Social Determinants of Health     Tobacco Use: High Risk    Smoking Tobacco Use: Current Every Day Smoker    Smokeless Tobacco Use: Never Used   Alcohol Use: Unknown    Frequency of Alcohol Consumption: 2-4 times a month    Average Number of Drinks: 1 or 2    Frequency of Binge Drinking: Not on file   Financial Resource Strain: Not on file   Food Insecurity: Not on file   Transportation Needs: Not on file   Physical Activity: Not on file   Stress: Not on file   Social Connections: Not on file   Intimate Partner Violence: Not on file   Depression: Not at risk    PHQ-2 Score: 0   Housing Stability: Not on file          Review of Systems   Constitutional: Negative for chills and fever  HENT: Negative for ear pain and sore throat  Eyes: Negative for pain and visual disturbance  Respiratory: Negative for cough and shortness of breath  Cardiovascular: Negative for chest pain and palpitations  Gastrointestinal: Negative for abdominal pain and vomiting  Genitourinary: Negative for dysuria and hematuria  Musculoskeletal: Negative for arthralgias and back pain  Skin: Negative for color change and rash  Neurological: Negative for seizures and syncope  All other systems reviewed and are negative  Body mass index is 28 87 kg/m²  Physical Exam  Vitals and nursing note reviewed  Constitutional:       Appearance: He is well-developed  HENT:      Head: Normocephalic and atraumatic  Eyes:      Conjunctiva/sclera: Conjunctivae normal    Cardiovascular:      Rate and Rhythm: Normal rate and regular rhythm  Heart sounds: No murmur heard  Pulmonary:      Effort: Pulmonary effort is normal  No respiratory distress        Breath sounds: Normal breath sounds  Abdominal:      Palpations: Abdomen is soft  Tenderness: There is no abdominal tenderness  Musculoskeletal:      Cervical back: Neck supple  Skin:     General: Skin is warm and dry  Neurological:      Mental Status: He is alert  Ortho Exam:    Body part: left wrist     Inspection: No gross deformity   Minimal swelling over second compartment   Less subcentimeter cyst noted   Not warm to touch   No erythema  No bruising    Palpation: None tender     Range of motion:   Wrist dorsiflexion and extension intact no pain with movement  rom fingers mcp, pip, dip intact without pain  No digital scissoring or deviation of fingers  no extensor lag  no rotation of fingers  no joint laxity  strenght flexion and extension mcp, pip, dip 5/5  sensation intact  capillary refill intact   sensation intact  capillary refill intact   Froment sign:  normal  OK sign:  Normal  Thumb extension:  5/5    Special Tests:   Tinels negative     Distal Neurovascular Status: Intact, Yes    Procedures           Assessment:     Diagnosis ICD-10-CM Associated Orders   1  Ganglion cyst of dorsum of left wrist  M67 432 Brace     lidocaine (XYLOCAINE) 2 % topical gel   2  Extensor carpi radialis longus tenosynovitis  M65 839    3  Extensor carpi radialis brevis tenosynovitis  M65 9         Plan:    I reviewed my radiological findings with Axel Dominguez and his wife  Limited diagnostic ultrasound completed today demonstrating left dorsal ganglion cyst less then one cm in size as well as swelling of the second extensor compartment  Various treatment options reviewed  Comfort cool brace provided today and lidocaine gel prescribed for second compartment tenosynovitis  In regards to his cyst given size is smaller then a pea and minimally uncomfortable aspiration was differed today  Will follow up on an as needed bases  Patient agreeable with plan  Questions and concerns addressed       Follow-Up:      PRN Portions of the record may have been created with voice recognition software  Occasional wrong word or "sound alike" substitutions may have occurred due to the inherent limitations of voice recognition software  Please review the chart carefully and recognize, using context, where substitutions/typographical errors may have occurred

## 2022-03-23 DIAGNOSIS — E11.40 TYPE 2 DIABETES MELLITUS WITH DIABETIC NEUROPATHY, WITHOUT LONG-TERM CURRENT USE OF INSULIN (HCC): Primary | ICD-10-CM

## 2022-03-23 RX ORDER — GLIMEPIRIDE 1 MG/1
1 TABLET ORAL
Qty: 90 TABLET | Refills: 0 | Status: SHIPPED | OUTPATIENT
Start: 2022-03-23 | End: 2022-06-10

## 2022-04-01 ENCOUNTER — APPOINTMENT (OUTPATIENT)
Dept: LAB | Facility: AMBULARY SURGERY CENTER | Age: 72
End: 2022-04-01
Payer: MEDICARE

## 2022-04-01 DIAGNOSIS — E78.2 MIXED HYPERLIPIDEMIA: ICD-10-CM

## 2022-04-01 DIAGNOSIS — E87.1 HYPONATREMIA: ICD-10-CM

## 2022-04-01 DIAGNOSIS — I10 ESSENTIAL HYPERTENSION: ICD-10-CM

## 2022-04-01 LAB
ALBUMIN SERPL BCP-MCNC: 3.9 G/DL (ref 3.5–5)
ALP SERPL-CCNC: 65 U/L (ref 46–116)
ALT SERPL W P-5'-P-CCNC: 60 U/L (ref 12–78)
ANION GAP SERPL CALCULATED.3IONS-SCNC: 4 MMOL/L (ref 4–13)
AST SERPL W P-5'-P-CCNC: 47 U/L (ref 5–45)
BILIRUB SERPL-MCNC: 0.59 MG/DL (ref 0.2–1)
BUN SERPL-MCNC: 9 MG/DL (ref 5–25)
CALCIUM SERPL-MCNC: 9.2 MG/DL (ref 8.3–10.1)
CHLORIDE SERPL-SCNC: 107 MMOL/L (ref 100–108)
CHOLEST SERPL-MCNC: 146 MG/DL
CO2 SERPL-SCNC: 26 MMOL/L (ref 21–32)
CREAT SERPL-MCNC: 1.1 MG/DL (ref 0.6–1.3)
ERYTHROCYTE [DISTWIDTH] IN BLOOD BY AUTOMATED COUNT: 14.2 % (ref 11.6–15.1)
GFR SERPL CREATININE-BSD FRML MDRD: 67 ML/MIN/1.73SQ M
GLUCOSE P FAST SERPL-MCNC: 136 MG/DL (ref 65–99)
HCT VFR BLD AUTO: 46.8 % (ref 36.5–49.3)
HDLC SERPL-MCNC: 28 MG/DL
HGB BLD-MCNC: 15.9 G/DL (ref 12–17)
LDLC SERPL CALC-MCNC: 47 MG/DL (ref 0–100)
MCH RBC QN AUTO: 31.5 PG (ref 26.8–34.3)
MCHC RBC AUTO-ENTMCNC: 34 G/DL (ref 31.4–37.4)
MCV RBC AUTO: 93 FL (ref 82–98)
NONHDLC SERPL-MCNC: 118 MG/DL
PLATELET # BLD AUTO: 267 THOUSANDS/UL (ref 149–390)
PMV BLD AUTO: 10.3 FL (ref 8.9–12.7)
POTASSIUM SERPL-SCNC: 4.3 MMOL/L (ref 3.5–5.3)
PROT SERPL-MCNC: 7.5 G/DL (ref 6.4–8.2)
RBC # BLD AUTO: 5.04 MILLION/UL (ref 3.88–5.62)
SODIUM SERPL-SCNC: 137 MMOL/L (ref 136–145)
TRIGL SERPL-MCNC: 356 MG/DL
WBC # BLD AUTO: 7.8 THOUSAND/UL (ref 4.31–10.16)

## 2022-04-01 PROCEDURE — 80061 LIPID PANEL: CPT

## 2022-04-01 PROCEDURE — 80053 COMPREHEN METABOLIC PANEL: CPT

## 2022-04-01 PROCEDURE — 36415 COLL VENOUS BLD VENIPUNCTURE: CPT

## 2022-04-01 PROCEDURE — 85027 COMPLETE CBC AUTOMATED: CPT

## 2022-04-02 DIAGNOSIS — G62.9 PERIPHERAL POLYNEUROPATHY: ICD-10-CM

## 2022-04-03 RX ORDER — GABAPENTIN 100 MG/1
200 CAPSULE ORAL
Qty: 180 CAPSULE | Refills: 1 | Status: SHIPPED | OUTPATIENT
Start: 2022-04-03

## 2022-04-04 ENCOUNTER — TELEPHONE (OUTPATIENT)
Dept: FAMILY MEDICINE CLINIC | Facility: CLINIC | Age: 72
End: 2022-04-04

## 2022-04-04 NOTE — TELEPHONE ENCOUNTER
----- Message from [address] [facility] sent at 4/3/2022  9:21 PM EDT -----  Blood work is stable  Sodium is back in normal range with medication changes  How are blood pressures?

## 2022-04-08 ENCOUNTER — APPOINTMENT (OUTPATIENT)
Dept: LAB | Facility: AMBULARY SURGERY CENTER | Age: 72
End: 2022-04-08
Payer: MEDICARE

## 2022-04-08 ENCOUNTER — ANTICOAG VISIT (OUTPATIENT)
Dept: CARDIOLOGY CLINIC | Facility: CLINIC | Age: 72
End: 2022-04-08

## 2022-04-08 DIAGNOSIS — Z86.718 HISTORY OF DVT (DEEP VEIN THROMBOSIS): Primary | ICD-10-CM

## 2022-04-08 NOTE — PROGRESS NOTES
Spoke with patient, advised INR slightly elevated, thinks it is due to his diet, will have him take 2 5 mg tonight instead of 5 mg, then go back to 5 mg Mon Wed Fri, 2 5 mg all other days     Will recheck in 3 weeks 4/29/22

## 2022-04-14 ENCOUNTER — ESTABLISHED COMPREHENSIVE EXAM (OUTPATIENT)
Dept: URBAN - METROPOLITAN AREA CLINIC 6 | Facility: CLINIC | Age: 72
End: 2022-04-14

## 2022-04-14 DIAGNOSIS — E11.9: ICD-10-CM

## 2022-04-14 DIAGNOSIS — H04.123: ICD-10-CM

## 2022-04-14 DIAGNOSIS — H52.03: ICD-10-CM

## 2022-04-14 DIAGNOSIS — H25.813: ICD-10-CM

## 2022-04-14 LAB
LEFT EYE DIABETIC RETINOPATHY: NORMAL
RIGHT EYE DIABETIC RETINOPATHY: NORMAL

## 2022-04-14 PROCEDURE — 92015 DETERMINE REFRACTIVE STATE: CPT

## 2022-04-14 PROCEDURE — 92014 COMPRE OPH EXAM EST PT 1/>: CPT

## 2022-04-14 ASSESSMENT — TONOMETRY
OD_IOP_MMHG: 22
OS_IOP_MMHG: 22

## 2022-04-14 ASSESSMENT — VISUAL ACUITY
OS_CC: 20/25-2
OD_CC: 20/20
OU_CC: J2

## 2022-04-19 DIAGNOSIS — E78.2 MIXED HYPERLIPIDEMIA: ICD-10-CM

## 2022-04-19 DIAGNOSIS — K21.9 GASTROESOPHAGEAL REFLUX DISEASE WITHOUT ESOPHAGITIS: ICD-10-CM

## 2022-04-19 RX ORDER — FENOFIBRATE 134 MG/1
134 CAPSULE ORAL
Qty: 90 CAPSULE | Refills: 3 | Status: SHIPPED | OUTPATIENT
Start: 2022-04-19

## 2022-04-19 RX ORDER — OMEPRAZOLE 20 MG/1
CAPSULE, DELAYED RELEASE ORAL
Qty: 90 CAPSULE | Refills: 1 | Status: SHIPPED | OUTPATIENT
Start: 2022-04-19

## 2022-04-30 DIAGNOSIS — I10 ESSENTIAL HYPERTENSION: ICD-10-CM

## 2022-05-01 RX ORDER — LISINOPRIL 20 MG/1
TABLET ORAL
Qty: 90 TABLET | Refills: 4 | Status: SHIPPED | OUTPATIENT
Start: 2022-05-01

## 2022-05-03 ENCOUNTER — ANTICOAG VISIT (OUTPATIENT)
Dept: CARDIOLOGY CLINIC | Facility: CLINIC | Age: 72
End: 2022-05-03

## 2022-05-03 ENCOUNTER — APPOINTMENT (OUTPATIENT)
Dept: LAB | Facility: AMBULARY SURGERY CENTER | Age: 72
End: 2022-05-03
Payer: MEDICARE

## 2022-05-03 DIAGNOSIS — Z86.718 HISTORY OF DVT (DEEP VEIN THROMBOSIS): Primary | ICD-10-CM

## 2022-05-03 NOTE — PROGRESS NOTES
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

## 2022-05-17 ENCOUNTER — ANTICOAG VISIT (OUTPATIENT)
Dept: CARDIOLOGY CLINIC | Facility: CLINIC | Age: 72
End: 2022-05-17

## 2022-05-17 ENCOUNTER — APPOINTMENT (OUTPATIENT)
Dept: LAB | Facility: CLINIC | Age: 72
End: 2022-05-17
Payer: MEDICARE

## 2022-05-17 ENCOUNTER — OFFICE VISIT (OUTPATIENT)
Dept: FAMILY MEDICINE CLINIC | Facility: CLINIC | Age: 72
End: 2022-05-17
Payer: MEDICARE

## 2022-05-17 VITALS
HEIGHT: 71 IN | DIASTOLIC BLOOD PRESSURE: 60 MMHG | BODY MASS INDEX: 30.38 KG/M2 | WEIGHT: 217 LBS | OXYGEN SATURATION: 97 % | TEMPERATURE: 98 F | RESPIRATION RATE: 18 BRPM | SYSTOLIC BLOOD PRESSURE: 130 MMHG | HEART RATE: 79 BPM

## 2022-05-17 DIAGNOSIS — Z86.718 HISTORY OF DVT (DEEP VEIN THROMBOSIS): Primary | ICD-10-CM

## 2022-05-17 DIAGNOSIS — K21.9 GASTROESOPHAGEAL REFLUX DISEASE WITHOUT ESOPHAGITIS: ICD-10-CM

## 2022-05-17 DIAGNOSIS — Z00.00 MEDICARE ANNUAL WELLNESS VISIT, SUBSEQUENT: ICD-10-CM

## 2022-05-17 DIAGNOSIS — Z12.11 SCREENING FOR COLORECTAL CANCER: ICD-10-CM

## 2022-05-17 DIAGNOSIS — Z12.12 SCREENING FOR COLORECTAL CANCER: ICD-10-CM

## 2022-05-17 DIAGNOSIS — R25.2 MUSCLE CRAMPS: ICD-10-CM

## 2022-05-17 DIAGNOSIS — R14.0 ABDOMINAL BLOATING: ICD-10-CM

## 2022-05-17 DIAGNOSIS — E11.40 TYPE 2 DIABETES MELLITUS WITH DIABETIC NEUROPATHY, WITHOUT LONG-TERM CURRENT USE OF INSULIN (HCC): Primary | ICD-10-CM

## 2022-05-17 DIAGNOSIS — I10 ESSENTIAL HYPERTENSION: ICD-10-CM

## 2022-05-17 DIAGNOSIS — J44.9 CHRONIC OBSTRUCTIVE PULMONARY DISEASE, UNSPECIFIED COPD TYPE (HCC): ICD-10-CM

## 2022-05-17 DIAGNOSIS — E87.1 HYPONATREMIA: ICD-10-CM

## 2022-05-17 DIAGNOSIS — E78.2 MIXED HYPERLIPIDEMIA: ICD-10-CM

## 2022-05-17 DIAGNOSIS — I48.91 ATRIAL FIBRILLATION, UNSPECIFIED TYPE (HCC): ICD-10-CM

## 2022-05-17 LAB — SL AMB POCT HEMOGLOBIN AIC: 6.7 (ref ?–6.5)

## 2022-05-17 PROCEDURE — 83036 HEMOGLOBIN GLYCOSYLATED A1C: CPT | Performed by: NURSE PRACTITIONER

## 2022-05-17 PROCEDURE — 99214 OFFICE O/P EST MOD 30 MIN: CPT | Performed by: NURSE PRACTITIONER

## 2022-05-17 PROCEDURE — G0439 PPPS, SUBSEQ VISIT: HCPCS | Performed by: NURSE PRACTITIONER

## 2022-05-17 RX ORDER — METFORMIN HYDROCHLORIDE 500 MG/1
500 TABLET, EXTENDED RELEASE ORAL 2 TIMES DAILY WITH MEALS
Qty: 180 TABLET | Refills: 1 | Status: SHIPPED | OUTPATIENT
Start: 2022-05-17

## 2022-05-17 NOTE — PATIENT INSTRUCTIONS
Medicare Preventive Visit Patient Instructions  Thank you for completing your Welcome to Medicare Visit or Medicare Annual Wellness Visit today  Your next wellness visit will be due in one year (5/18/2023)  The screening/preventive services that you may require over the next 5-10 years are detailed below  Some tests may not apply to you based off risk factors and/or age  Screening tests ordered at today's visit but not completed yet may show as past due  Also, please note that scanned in results may not display below  Preventive Screenings:  Service Recommendations Previous Testing/Comments   Colorectal Cancer Screening  · Colonoscopy    · Fecal Occult Blood Test (FOBT)/Fecal Immunochemical Test (FIT)  · Fecal DNA/Cologuard Test  · Flexible Sigmoidoscopy Age: 54-65 years old   Colonoscopy: every 10 years (May be performed more frequently if at higher risk)  OR  FOBT/FIT: every 1 year  OR  Cologuard: every 3 years  OR  Sigmoidoscopy: every 5 years  Screening may be recommended earlier than age 48 if at higher risk for colorectal cancer  Also, an individualized decision between you and your healthcare provider will decide whether screening between the ages of 74-80 would be appropriate   Colonoscopy: 11/21/2019  FOBT/FIT: Not on file  Cologuard: Not on file  Sigmoidoscopy: Not on file    Screening Current     Prostate Cancer Screening Individualized decision between patient and health care provider in men between ages of 53-78   Medicare will cover every 12 months beginning on the day after your 50th birthday PSA: 1 2 ng/mL           Hepatitis C Screening Once for adults born between 1945 and 1965  More frequently in patients at high risk for Hepatitis C Hep C Antibody: 03/05/2019    Screening Current   Diabetes Screening 1-2 times per year if you're at risk for diabetes or have pre-diabetes Fasting glucose: 136 mg/dL   A1C: 8 0    Screening Not Indicated  History Diabetes   Cholesterol Screening Once every 5 years if you don't have a lipid disorder  May order more often based on risk factors  Lipid panel: 04/01/2022    Screening Not Indicated  History Lipid Disorder      Other Preventive Screenings Covered by Medicare:  1  Abdominal Aortic Aneurysm (AAA) Screening: covered once if your at risk  You're considered to be at risk if you have a family history of AAA or a male between the age of 73-68 who smoking at least 100 cigarettes in your lifetime  2  Lung Cancer Screening: covers low dose CT scan once per year if you meet all of the following conditions: (1) Age 50-69; (2) No signs or symptoms of lung cancer; (3) Current smoker or have quit smoking within the last 15 years; (4) You have a tobacco smoking history of at least 30 pack years (packs per day x number of years you smoked); (5) You get a written order from a healthcare provider  3  Glaucoma Screening: covered annually if you're considered high risk: (1) You have diabetes OR (2) Family history of glaucoma OR (3)  aged 48 and older OR (3)  American aged 72 and older  3  Osteoporosis Screening: covered every 2 years if you meet one of the following conditions: (1) Have a vertebral abnormality; (2) On glucocorticoid therapy for more than 3 months; (3) Have primary hyperparathyroidism; (4) On osteoporosis medications and need to assess response to drug therapy  5  HIV Screening: covered annually if you're between the age of 12-76  Also covered annually if you are younger than 13 and older than 72 with risk factors for HIV infection  For pregnant patients, it is covered up to 3 times per pregnancy      Immunizations:  Immunization Recommendations   Influenza Vaccine Annual influenza vaccination during flu season is recommended for all persons aged >= 6 months who do not have contraindications   Pneumococcal Vaccine (Prevnar and Pneumovax)  * Prevnar = PCV13  * Pneumovax = PPSV23 Adults 25-60 years old: 1-3 doses may be recommended based on certain risk factors  Adults 72 years old: Prevnar (PCV13) vaccine recommended followed by Pneumovax (PPSV23) vaccine  If already received PPSV23 since turning 65, then PCV13 recommended at least one year after PPSV23 dose  Hepatitis B Vaccine 3 dose series if at intermediate or high risk (ex: diabetes, end stage renal disease, liver disease)   Tetanus (Td) Vaccine - COST NOT COVERED BY MEDICARE PART B Following completion of primary series, a booster dose should be given every 10 years to maintain immunity against tetanus  Td may also be given as tetanus wound prophylaxis  Tdap Vaccine - COST NOT COVERED BY MEDICARE PART B Recommended at least once for all adults  For pregnant patients, recommended with each pregnancy  Shingles Vaccine (Shingrix) - COST NOT COVERED BY MEDICARE PART B  2 shot series recommended in those aged 48 and above     Health Maintenance Due:      Topic Date Due    Colorectal Cancer Screening  11/21/2022    Hepatitis C Screening  Completed     Immunizations Due:      Topic Date Due    DTaP,Tdap,and Td Vaccines (1 - Tdap) Never done     Advance Directives   What are advance directives? Advance directives are legal documents that state your wishes and plans for medical care  These plans are made ahead of time in case you lose your ability to make decisions for yourself  Advance directives can apply to any medical decision, such as the treatments you want, and if you want to donate organs  What are the types of advance directives? There are many types of advance directives, and each state has rules about how to use them  You may choose a combination of any of the following:  · Living will: This is a written record of the treatment you want  You can also choose which treatments you do not want, which to limit, and which to stop at a certain time  This includes surgery, medicine, IV fluid, and tube feedings  · Durable power of  for healthcare [facility]):   This is a written record that states who you want to make healthcare choices for you when you are unable to make them for yourself  This person, called a proxy, is usually a family member or a friend  You may choose more than 1 proxy  · Do not resuscitate (DNR) order:  A DNR order is used in case your heart stops beating or you stop breathing  It is a request not to have certain forms of treatment, such as CPR  A DNR order may be included in other types of advance directives  · Medical directive: This covers the care that you want if you are in a coma, near death, or unable to make decisions for yourself  You can list the treatments you want for each condition  Treatment may include pain medicine, surgery, blood transfusions, dialysis, IV or tube feedings, and a ventilator (breathing machine)  · Values history: This document has questions about your views, beliefs, and how you feel and think about life  This information can help others choose the care that you would choose  Why are advance directives important? An advance directive helps you control your care  Although spoken wishes may be used, it is better to have your wishes written down  Spoken wishes can be misunderstood, or not followed  Treatments may be given even if you do not want them  An advance directive may make it easier for your family to make difficult choices about your care  Fall Prevention    Fall prevention  includes ways to make your home and other areas safer  It also includes ways you can move more carefully to prevent a fall  Health conditions that cause changes in your blood pressure, vision, or muscle strength and coordination may increase your risk for falls  Medicines may also increase your risk for falls if they make you dizzy, weak, or sleepy  Fall prevention tips:   · Stand or sit up slowly  · Use assistive devices as directed  · Wear shoes that fit well and have soles that   · Wear a personal alarm  · Stay active  · Manage your medical conditions  Home Safety Tips:  · Add items to prevent falls in the bathroom  · Keep paths clear  · Install bright lights in your home  · Keep items you use often on shelves within reach  · Paint or place reflective tape on the edges of your stairs  Cigarette Smoking and Your Health   Risks to your health if you smoke:  Nicotine and other chemicals found in tobacco damage every cell in your body  Even if you are a light smoker, you have an increased risk for cancer, heart disease, and lung disease  If you are pregnant or have diabetes, smoking increases your risk for complications  Benefits to your health if you stop smoking:   · You decrease respiratory symptoms such as coughing, wheezing, and shortness of breath  · You reduce your risk for cancers of the lung, mouth, throat, kidney, bladder, pancreas, stomach, and cervix  If you already have cancer, you increase the benefits of chemotherapy  You also reduce your risk for cancer returning or a second cancer from developing  · You reduce your risk for heart disease, blood clots, heart attack, and stroke  · You reduce your risk for lung infections, and diseases such as pneumonia, asthma, chronic bronchitis, and emphysema  · Your circulation improves  More oxygen can be delivered to your body  If you have diabetes, you lower your risk for complications, such as kidney, artery, and eye diseases  You also lower your risk for nerve damage  Nerve damage can lead to amputations, poor vision, and blindness  · You improve your body's ability to heal and to fight infections  For more information and support to stop smoking:   · [employer]  Phone: 1- 790 - 560-4407  Web Address: www [employer]  Weight Management   Why it is important to manage your weight:  Being overweight increases your risk of health conditions such as heart disease, high blood pressure, type 2 diabetes, and certain types of cancer   It can also increase your risk for osteoarthritis, sleep apnea, and other respiratory problems  Aim for a slow, steady weight loss  Even a small amount of weight loss can lower your risk of health problems  How to lose weight safely:  A safe and healthy way to lose weight is to eat fewer calories and get regular exercise  You can lose up about 1 pound a week by decreasing the number of calories you eat by 500 calories each day  Healthy meal plan for weight management:  A healthy meal plan includes a variety of foods, contains fewer calories, and helps you stay healthy  A healthy meal plan includes the following:  · Eat whole-grain foods more often  A healthy meal plan should contain fiber  Fiber is the part of grains, fruits, and vegetables that is not broken down by your body  Whole-grain foods are healthy and provide extra fiber in your diet  Some examples of whole-grain foods are whole-wheat breads and pastas, oatmeal, brown rice, and bulgur  · Eat a variety of vegetables every day  Include dark, leafy greens such as spinach, kale, carito greens, and mustard greens  Eat yellow and orange vegetables such as carrots, sweet potatoes, and winter squash  · Eat a variety of fruits every day  Choose fresh or canned fruit (canned in its own juice or light syrup) instead of juice  Fruit juice has very little or no fiber  · Eat low-fat dairy foods  Drink fat-free (skim) milk or 1% milk  Eat fat-free yogurt and low-fat cottage cheese  Try low-fat cheeses such as mozzarella and other reduced-fat cheeses  · Choose meat and other protein foods that are low in fat  Choose beans or other legumes such as split peas or lentils  Choose fish, skinless poultry (chicken or turkey), or lean cuts of red meat (beef or pork)  Before you cook meat or poultry, cut off any visible fat  · Use less fat and oil  Try baking foods instead of frying them   Add less fat, such as margarine, sour cream, regular salad dressing and mayonnaise to foods  Eat fewer high-fat foods  Some examples of high-fat foods include french fries, doughnuts, ice cream, and cakes  · Eat fewer sweets  Limit foods and drinks that are high in sugar  This includes candy, cookies, regular soda, and sweetened drinks  Exercise:  Exercise at least 30 minutes per day on most days of the week  Some examples of exercise include walking, biking, dancing, and swimming  You can also fit in more physical activity by taking the stairs instead of the elevator or parking farther away from stores  Ask your healthcare provider about the best exercise plan for you  © Copyright [employer] 2018 Information is for End User's use only and may not be sold, redistributed or otherwise used for commercial purposes   All illustrations and images included in CareNotes® are the copyrighted property of A [employer] A M , [employer]  or [address] [place], [place] [address]pa[facility]

## 2022-05-17 NOTE — PROGRESS NOTES
Spoke with patient, advised INR good, but on low end of goal  States he has not been using Voltaren gel for the last 2 weeks  Advised to go back to 5 mg Mon Wed Fri, 2 5 mg all other days   Will recheck in 3 weeks 6/7/22

## 2022-05-17 NOTE — PROGRESS NOTES
FAMILY PRACTICE OFFICE VISIT       NAME: Priscilla Ortiz  AGE: 80 y o  SEX: male       : 1950        MRN: [id number]    Assessment and Plan   1  Type 2 diabetes mellitus with diabetic neuropathy, without long-term current use of insulin ([facility])  Assessment & Plan:    Lab Results   Component Value Date    HGBA1C 6 7 (A) 2022     Hemoglobin A1c is at goal less than 7%  Continue glimepiride, metformin, and Januvia  Was not able to tolerate Jardiance  Recently experiencing bloating, will change from immediate release metformin to extended release metformin, and will reduce dose from 1000 mg twice daily to 500 mg twice daily  He will update me in 2-3 weeks regarding if bloating resolves with medication change  Orders:  -     Hemoglobin A1C; Future  -     metFORMIN (GLUCOPHAGE-XR) 500 mg 24 hr tablet; Take 1 tablet (500 mg total) by mouth in the morning and 1 tablet (500 mg total) in the evening  Take with meals   -     POCT hemoglobin A1c    2  Essential hypertension  Assessment & Plan:  Blood pressure is stable on current medication, lisinopril  Orders:  -     Comprehensive metabolic panel; Future  -     TSH, 3rd generation; Future  -     Magnesium; Future  -     CBC and differential; Future    3  Chronic obstructive pulmonary disease, unspecified COPD type [facility])  Assessment & Plan:  Emphysema changes on CT chest 2019  No dyspnea  Occasional albuterol use  4  Atrial fibrillation, unspecified type [facility])  Assessment & Plan:  Stable  Regular rate and rhythm today  Continue Coumadin and aspirin as per cardiology, Dr Mana Hart  Orders:  -     CBC and differential; Future    5  Mixed hyperlipidemia  Assessment & Plan:  Current lipid panel with total cholesterol 146, triglycerides stable 356, HDL 28, LDL 47  Continue simvastatin 40 mg daily and fenofibrate 134 mg daily  Orders:  -     Lipid panel; Future  -     Magnesium; Future    6   Muscle cramps  Comments:  Less frequent and less severe with normalized sodium  Orders:  -     Magnesium; Future    7  Hyponatremia  Assessment & Plan:  Resolved with discontinuing Dyazide and using omeprazole prn        8  Abdominal bloating  Comments:  Recent abdominal bloating, generalized abdominal tenderness with no nausea, vomiting, diarrhea  May be related to metformin  Change to extended release metformin and reduced dose from a 1000 mg twice daily to 500 mg twice daily  He will update me in 2-3 weeks regarding if bloating resolves with medication change  If bleeding does not resolve medication change, will seek imaging  9  Gastroesophageal reflux disease without esophagitis  Assessment & Plan: Will continue Pepcid 20 mg twice daily with as needed omeprazole  10  Screening for colorectal cancer     Follow up in 6 months or sooner if needed  Chief Complaint     Chief Complaint   Patient presents with    Medicare Wellness Visit    Follow-up     3 Month       History of Present Illness     Liborio Rose is a 80year old male presenting today for 6 month follow up on hypertension, diabetes  At last office visit was having significant muscle cramping  Had noted hyponatremia, which may have contributed to cramping  Discontinued dyazide, weaning off omeprazole  Sodium has normalized  Still has some muscle cramping, but less frequent and more tolerable than previously  Pepcid 20 mg BID  Omeprazole  Every 3rd day  Occasionally has to take an extra omeprazole  Watching Food triggers  Using voltaren gel right knee pain  Abdominal bloating recently, no pain  No weight loss, no nausea, vomiting, or diarrhea  Some tenderness generalized to touch  Review of Systems   Review of Systems   Constitutional: Negative  HENT: Negative  Respiratory: Negative  Cardiovascular: Negative  Gastrointestinal: Positive for abdominal distention  Genitourinary: Negative      Musculoskeletal:        Muscle cramping as noted in HPI   Skin: Negative  Neurological: Negative  Hematological: Negative  Psychiatric/Behavioral: Negative  I have reviewed the patient's medical history in detail; there are no changes to the history as noted in the electronic medical record  Objective     Vitals:    05/17/22 0913   BP: 130/60   BP Location: Left arm   Patient Position: Sitting   Cuff Size: Standard   Pulse: 79   Resp: 18   Temp: 98 °F (36 7 °C)   TempSrc: Temporal   SpO2: 97%   Weight: 98 4 kg (217 lb)   Height: 5' 11" (1 803 m)     Wt Readings from Last 3 Encounters:   05/17/22 98 4 kg (217 lb)   03/17/22 93 9 kg (207 lb)   02/21/22 97 1 kg (214 lb)     Physical Exam  Vitals and nursing note reviewed  Constitutional:       General: He is not in acute distress  Appearance: Normal appearance  He is well-developed  He is not ill-appearing  HENT:      Head: Normocephalic and atraumatic  Right Ear: Tympanic membrane normal       Left Ear: Tympanic membrane normal    Eyes:      Conjunctiva/sclera: Conjunctivae normal       Pupils: Pupils are equal, round, and reactive to light  Neck:      Thyroid: No thyromegaly  Vascular: No carotid bruit  Cardiovascular:      Rate and Rhythm: Normal rate and regular rhythm  Heart sounds: No murmur heard  Pulmonary:      Effort: Pulmonary effort is normal  No respiratory distress  Breath sounds: Normal breath sounds  No wheezing or rales  Abdominal:      General: Bowel sounds are normal       Palpations: Abdomen is soft  Tenderness: There is no abdominal tenderness  Musculoskeletal:      Cervical back: Normal range of motion and neck supple  Right lower leg: No edema  Left lower leg: No edema  Lymphadenopathy:      Cervical: No cervical adenopathy  Skin:     Findings: No rash  Neurological:      Mental Status: He is alert and oriented to person, place, and time     Psychiatric:         Mood and Affect: Mood normal  Depression Screening and Follow-up Plan: Patient was screened for depression during today's encounter  They screened negative with a PHQ-2 score of 0  ALLERGIES:  Allergies   Allergen Reactions    Pletal [Cilostazol] Tachycardia    Dyazide [Hydrochlorothiazide W-Triamterene] Other (See Comments)     Hyponatremia    Jardiance [Empagliflozin] Other (See Comments)     Constipation, increased urinary frequency--not tolerable       Current Medications     Current Outpatient Medications   Medication Sig Dispense Refill    acetaminophen (TYLENOL) 325 mg tablet 2, by mouth, every 6 hours as needed for mild to moderate pain  30 tablet 0    aspirin (ECOTRIN LOW STRENGTH) 81 mg EC tablet Take 81 mg by mouth daily        Cyanocobalamin (VITAMIN B 12 PO) Take 1,000 mcg by mouth daily      famotidine (PEPCID) 20 mg tablet Take 1 tablet (20 mg total) by mouth 2 (two) times a day 90 tablet 3    fenofibrate micronized (LOFIBRA) 134 MG capsule TAKE 1 CAPSULE (134 MG TOTAL) BY MOUTH DAILY WITH BREAKFAST 90 capsule 3    gabapentin (NEURONTIN) 100 mg capsule TAKE 2 CAPSULES (200 MG TOTAL) BY MOUTH DAILY AT BEDTIME 180 capsule 1    glimepiride (AMARYL) 1 mg tablet Take 1 tablet (1 mg total) by mouth daily with breakfast 90 tablet 0    Januvia 100 MG tablet TAKE 1 TABLET BY MOUTH EVERY DAY 90 tablet 3    lisinopril (ZESTRIL) 20 mg tablet TAKE 1 TABLET BY MOUTH EVERY DAY 90 tablet 4    metFORMIN (GLUCOPHAGE-XR) 500 mg 24 hr tablet Take 1 tablet (500 mg total) by mouth in the morning and 1 tablet (500 mg total) in the evening  Take with meals   180 tablet 1    omeprazole (PriLOSEC) 20 mg delayed release capsule TAKE 1 CAPSULE BY MOUTH EVERY DAY 90 capsule 1    ProAir  (90 Base) MCG/ACT inhaler INHALE 2 PUFFS EVERY 4 HOURS AS NEEDED FOR WHEEZE OR FOR SHORTNESS OF BREATH 18 g 1    simvastatin (ZOCOR) 40 mg tablet TAKE 1 TABLET BY MOUTH EVERY DAY 90 tablet 4    tamsulosin (FLOMAX) 0 4 mg TAKE 1 CAPSULE BY MOUTH EVERY DAY WITH DINNER 90 capsule 3    warfarin (COUMADIN) 2 5 mg tablet Take 1 tablet by mouth daily T-Th-S-S takes 2 5mg     M-W-F takes 5 0 mg      warfarin (COUMADIN) 5 mg tablet TAKE 1 TABLET EVERY DAY OR AS DIRECTED BY MD 90 tablet 3     No current facility-administered medications for this visit           Health Maintenance     Health Maintenance   Topic Date Due    DTaP,Tdap,and Td Vaccines (1 - Tdap) Never done    OT PLAN OF CARE  08/18/2019    Medicare Annual Wellness Visit (AWV)  04/19/2022    HEMOGLOBIN A1C  11/17/2022    Colorectal Cancer Screening  11/21/2022    BMI: Followup Plan  02/21/2023    Diabetic Foot Exam  02/21/2023    DM Eye Exam  04/12/2023    Fall Risk  05/17/2023    Depression Screening  05/17/2023    BMI: Adult  05/17/2023    Hepatitis C Screening  Completed    Pneumococcal Vaccine: 65+ Years  Completed    Influenza Vaccine  Completed    COVID-19 Vaccine  Completed    HIB Vaccine  Aged Out    Hepatitis B Vaccine  Aged Out    IPV Vaccine  Aged Out    Hepatitis A Vaccine  Aged Out    Meningococcal ACWY Vaccine  Aged Out    HPV Vaccine  Aged Out     Immunization History   Administered Date(s) Administered    COVID-19 PFIZER VACCINE 0 3 ML IM 03/05/2021, 03/26/2021, 09/25/2021    COVID-19 Pfizer vac (Christopher-sucrose, gray cap) 12 yr+ IM 04/01/2022    COVID-19, unspecified 04/01/2022    Hep B, adult 03/19/2019, 04/23/2019, 09/19/2019    INFLUENZA 09/13/2018, 09/17/2018, 09/20/2021    Influenza, high dose seasonal 0 7 mL 09/19/2019, 08/24/2020    Pneumococcal Conjugate 13-Valent 03/19/2019    Pneumococcal Polysaccharide PPV23 08/17/2020    Zoster Vaccine Recombinant 07/30/2018, 09/29/2018       DAYAMI Wayne

## 2022-05-17 NOTE — PROGRESS NOTES
Assessment and Plan:     Problem List Items Addressed This Visit        Digestive    Gastroesophageal reflux disease without esophagitis     Will continue Pepcid 20 mg twice daily with as needed omeprazole  Endocrine    Type 2 diabetes mellitus with neurologic complication, without long-term current use of insulin ([facility] ) - Primary       Lab Results   Component Value Date    HGBA1C 6 7 (A) 05/17/2022     Hemoglobin A1c is at goal less than 7%  Continue glimepiride, metformin, and Januvia  Was not able to tolerate Jardiance  Recently experiencing bloating, will change from immediate release metformin to extended release metformin, and will reduce dose from 1000 mg twice daily to 500 mg twice daily  He will update me in 2-3 weeks regarding if bloating resolves with medication change  Relevant Medications    metFORMIN (GLUCOPHAGE-XR) 500 mg 24 hr tablet    Other Relevant Orders    Hemoglobin A1C    POCT hemoglobin A1c (Completed)       Respiratory    Chronic obstructive pulmonary disease (HCC)     Emphysema changes on CT chest 2019  No dyspnea  Occasional albuterol use  Cardiovascular and Mediastinum    Essential hypertension     Blood pressure is stable on current medication, lisinopril  Relevant Orders    Comprehensive metabolic panel    TSH, 3rd generation    Magnesium    CBC and differential    Atrial fibrillation (HCC)     Stable  Regular rate and rhythm today  Continue Coumadin and aspirin as per cardiology, Dr Juanito Seals  Relevant Orders    CBC and differential       Other    Mixed hyperlipidemia     Current lipid panel with total cholesterol 146, triglycerides stable 356, HDL 28, LDL 47  Continue simvastatin 40 mg daily and fenofibrate 134 mg daily  Relevant Orders    Lipid panel    Magnesium    Hyponatremia     Resolved with discontinuing Dyazide and using omeprazole prn                Other Visit Diagnoses     Muscle cramps        Less frequent and less severe with normalized sodium  Relevant Orders    Magnesium    Abdominal bloating        Recent abdominal bloating, generalized abdominal tenderness with no nausea, vomiting, diarrhea  May be related to metformin  Change to extended release    Screening for colorectal cancer        Medicare annual wellness visit, subsequent              Depression Screening and Follow-up Plan: Patient was screened for depression during today's encounter  They screened negative with a PHQ-2 score of 0  Falls Plan of Care: balance, strength, and gait training instructions were provided  Preventive health issues were discussed with patient, and age appropriate screening tests were ordered as noted in patient's After Visit Summary  Personalized health advice and appropriate referrals for health education or preventive services given if needed, as noted in patient's After Visit Summary       History of Present Illness:     Patient presents for Medicare Annual Wellness visit    Patient Care Team:  DAYAMI Omalley as PCP - General (Family Medicine)  Gita Calderon MD (Ophthalmology)  Ac Monique MD (Otolaryngology)  Mychal Casarez DDS  Viridiana Pastor DDS (Oral Surgery)  Flaquita France DPM (Podiatry)  Danielle Duarte DO (Ophthalmology)     Problem List:     Patient Active Problem List   Diagnosis    Essential hypertension    Mixed hyperlipidemia    Type 2 diabetes mellitus with neurologic complication, without long-term current use of insulin ([address] )    Fatty liver    History of DVT (deep vein thrombosis)    Anticoagulated on Coumadin    Atheroscler native arteries the extremities w/intermit claudication ([address] )    Atrial fibrillation ([address] )    History of colon polyps    Gastroesophageal reflux disease without esophagitis    Warthin's tumor    Hiatal hernia    Smoking    Chronic obstructive pulmonary disease ([address] )    Prostatic disorder    Trigger ring finger of right hand    [facility]termittent claudication [facility]    Popliteal artery ectasia bilateral (HCC)    Hyponatremia    Muscle cramping    Extensor carpi radialis brevis tenosynovitis      Past Medical and Surgical History:     Past Medical History:   Diagnosis Date    LISY (acute kidney injury) ([facility] ) 9/11/2021    Arthritis     Atherosclerosis of native arteries of the extremities with ulceration ([facility] ) 3/21/2019    Claudication, intermittent (HCC)     Colon polyp     6 polyps 3 years ago    Diabetes mellitus ([facility] )     type 2    Diabetic neuropathic arthropathy (HCC)     causes weakness in LE and uses a cane as assist to walk    Diabetic neuropathy ([facility] )     Fatty liver     GERD (gastroesophageal reflux disease)     History of DVT (deep vein thrombosis) 2015    left LE    Hyperlipidemia     Hypertension     Hyponatremia 9/11/2021    Kidney stone     Parotid tumor     Seasonal allergies     Spinal stenosis     Ureteral stone with hydronephrosis 5/17/2019    Added automatically from request for surgery [id number]     Past Surgical History:   Procedure Laterality Date    COLONOSCOPY  05/16/2019    completed by Dr George Curtis, Repeat 3 years    [facility] RETROGRADE PYELOGRAM  5/17/2019    PAROTIDECTOMY Left     KS CYSTO/URETERO W/LITHOTRIPSY &INDWELL STENT INSRT Right 5/17/2019    Procedure: CYSTOSCOPY URETEROSCOPY WITH LITHOTRIPSY HOLMIUM LASER, RETROGRADE PYELOGRAM AND INSERTION STENT URETERAL--possible stent only;  Surgeon: Rob Starks MD;  [facility];  Service: Urology    KS EXC PAROTD,LAT LOBE,DISSECT 5TH NERV Right 10/18/2017    Procedure: SUPERFICIAL PAROTIDECTOMY WITH NERVE DISSECTION AND PRESERVATION;  Surgeon: Velia Sagastume MD;  [facility];  Service: ENT    KS INCISE FINGER TENDON SHEATH Right 11/3/2020    Procedure: RING TRIGGER FINGER RELEASE;  Surgeon: Soham Blake MD;  [facility];  Service: Orthopedics    ROTATOR CUFF REPAIR Bilateral     TONSILLECTOMY        Family History:     Family History   Problem Relation Age of Onset    Lupus Mother     Rheum arthritis Mother     Cirrhosis Father     Alcohol abuse Father     Substance Abuse Brother       Social History:     Social History     Socioeconomic History    Marital status: /Civil Union     Spouse name: None    Number of children: None    Years of education: None    Highest education level: None   Occupational History    None   Tobacco Use    Smoking status: Current Every Day Smoker     Types: Pipe    Smokeless tobacco: Never Used    Tobacco comment: smokes 2 pipes daily   Vaping Use    Vaping Use: Never used   Substance and Sexual Activity    Alcohol use: Yes     Comment: socially    Drug use: No    Sexual activity: None   Other Topics Concern    None   Social History Narrative             Social Determinants of Health     Financial Resource Strain: Not on file   Food Insecurity: Not on file   Transportation Needs: Not on file   Physical Activity: Not on file   Stress: Not on file   Social Connections: Not on file   Intimate Partner Violence: Not on file   Housing Stability: Not on file      Medications and Allergies:     Current Outpatient Medications   Medication Sig Dispense Refill    acetaminophen (TYLENOL) 325 mg tablet 2, by mouth, every 6 hours as needed for mild to moderate pain   30 tablet 0    aspirin (ECOTRIN LOW STRENGTH) 81 mg EC tablet Take 81 mg by mouth daily        Cyanocobalamin (VITAMIN B 12 PO) Take 1,000 mcg by mouth daily      famotidine (PEPCID) 20 mg tablet Take 1 tablet (20 mg total) by mouth 2 (two) times a day 90 tablet 3    fenofibrate micronized (LOFIBRA) 134 MG capsule TAKE 1 CAPSULE (134 MG TOTAL) BY MOUTH DAILY WITH BREAKFAST 90 capsule 3    gabapentin (NEURONTIN) 100 mg capsule TAKE 2 CAPSULES (200 MG TOTAL) BY MOUTH DAILY AT BEDTIME 180 capsule 1    glimepiride (AMARYL) 1 mg tablet Take 1 tablet (1 mg total) by mouth daily with breakfast 90 tablet 0    Januvia 100 MG tablet TAKE 1 TABLET BY MOUTH EVERY DAY 90 tablet 3    lisinopril (ZESTRIL) 20 mg tablet TAKE 1 TABLET BY MOUTH EVERY DAY 90 tablet 4    metFORMIN (GLUCOPHAGE-XR) 500 mg 24 hr tablet Take 1 tablet (500 mg total) by mouth in the morning and 1 tablet (500 mg total) in the evening  Take with meals  180 tablet 1    omeprazole (PriLOSEC) 20 mg delayed release capsule TAKE 1 CAPSULE BY MOUTH EVERY DAY 90 capsule 1    ProAir  (90 Base) MCG/ACT inhaler INHALE 2 PUFFS EVERY 4 HOURS AS NEEDED FOR WHEEZE OR FOR SHORTNESS OF BREATH 18 g 1    simvastatin (ZOCOR) 40 mg tablet TAKE 1 TABLET BY MOUTH EVERY DAY 90 tablet 4    tamsulosin (FLOMAX) 0 4 mg TAKE 1 CAPSULE BY MOUTH EVERY DAY WITH DINNER 90 capsule 3    warfarin (COUMADIN) 2 5 mg tablet Take 1 tablet by mouth daily T-Th-S-S takes 2 5mg     M-W-F takes 5 0 mg      warfarin (COUMADIN) 5 mg tablet TAKE 1 TABLET EVERY DAY OR AS DIRECTED BY MD 90 tablet 3     No current facility-administered medications for this visit       Allergies   Allergen Reactions    Pletal [Cilostazol] Tachycardia    Dyazide [Hydrochlorothiazide W-Triamterene] Other (See Comments)     Hyponatremia    Jardiance [Empagliflozin] Other (See Comments)     Constipation, increased urinary frequency--not tolerable      Immunizations:     Immunization History   Administered Date(s) Administered    COVID-19 PFIZER VACCINE 0 3 ML IM 03/05/2021, 03/26/2021, 09/25/2021    COVID-19 Pfizer vac (Christopher-sucrose, gray cap) 12 yr+ IM 04/01/2022    COVID-19, unspecified 04/01/2022    Hep B, adult 03/19/2019, 04/23/2019, 09/19/2019    INFLUENZA 09/13/2018, 09/17/2018, 09/20/2021    Influenza, high dose seasonal 0 7 mL 09/19/2019, 08/24/2020    Pneumococcal Conjugate 13-Valent 03/19/2019    Pneumococcal Polysaccharide PPV23 08/17/2020    Zoster Vaccine Recombinant 07/30/2018, 09/29/2018      Health Maintenance:         Topic Date Due    Colorectal Cancer Screening  11/21/2022    Hepatitis C Screening  Completed         Topic Date Due    DTaP,Tdap,and Td Vaccines (1 - Tdap) Never done      Medicare Health Risk Assessment:     /60 (BP Location: Left arm, Patient Position: Sitting, Cuff Size: Standard)   Pulse 79   Temp 98 °F (36 7 °C) (Temporal)   Resp 18   Ht 5' 11" (1 803 m)   Wt 98 4 kg (217 lb)   SpO2 97%   BMI 30 27 kg/m²      Ramses Ghosh is here for his Subsequent Wellness visit  Last Medicare Wellness visit information reviewed, patient interviewed and updates made to the record today  Health Risk Assessment:   Patient rates overall health as good  Patient feels that their physical health rating is same  Patient is satisfied with their life  Eyesight was rated as same  Hearing was rated as same  Patient feels that their emotional and mental health rating is same  Patient states they are often unusually tired/fatigued  Pain experienced in the last 7 days has been some  Patient's pain rating has been 6/10  Patient states that he has experienced no weight loss or gain in last 6 months  Depression Screening:   PHQ-2 Score: 0      Fall Risk Screening: In the past year, patient has experienced: history of falling in past year    Number of falls: 2 or more  Injured during fall?: Yes    Feels unsteady when standing or walking?: Yes    Worried about falling?: Yes      Home Safety:  Patient has trouble with stairs inside or outside of their home  Patient has working smoke alarms and has working carbon monoxide detector  Home safety hazards include: none  Nutrition:   Current diet is Regular  Medications:   Patient is currently taking over-the-counter supplements  OTC medications include: see medication list  Patient is able to manage medications  Activities of Daily Living (ADLs)/Instrumental Activities of Daily Living (IADLs):   Walk and transfer into and out of bed and chair?: Yes  Dress and groom yourself?: Yes    Bathe or shower yourself?: Yes    Feed yourself?  Yes  Do your laundry/housekeeping?: Yes  Manage your money, pay your bills and track your expenses?: Yes  Make your own meals?: Yes    Do your own shopping?: Yes    Previous Hospitalizations:   Any hospitalizations or ED visits within the last 12 months?: Yes    How many hospitalizations have you had in the last year?: 1-2    Advance Care Planning:   Living will: Yes    Durable POA for healthcare: Yes    Advanced directive: Yes      Cognitive Screening:   Provider or family/friend/caregiver concerned regarding cognition?: No    PREVENTIVE SCREENINGS      Cardiovascular Screening:    General: Screening Not Indicated and History Lipid Disorder      Diabetes Screening:     General: Screening Not Indicated and History Diabetes      Colorectal Cancer Screening:     General: Screening Current      Prostate Cancer Screening:    General: Screening Not Indicated      Osteoporosis Screening:    General: Screening Not Indicated      Abdominal Aortic Aneurysm (AAA) Screening:    Risk factors include: age between 73-67 yo and tobacco use        General: Screening Current      Lung Cancer Screening:     General: Screening Not Indicated      Hepatitis C Screening:    General: Screening Current    Screening, Brief Intervention, and Referral to Treatment (SBIRT)    Screening    Typical number of drinks in a week: 5    Single Item Drug Screening:  How often have you used an illegal drug (including marijuana) or a prescription medication for non-medical reasons in the past year? never    Single Item Drug Screen Score: 0  Interpretation: Negative screen for possible drug use disorder    Brief Intervention  Alcohol & drug use screenings were reviewed  No concerns regarding substance use disorder identified         Hedy [place]

## 2022-05-21 PROBLEM — N17.9 AKI (ACUTE KIDNEY INJURY) (HCC): Status: RESOLVED | Noted: 2021-09-11 | Resolved: 2022-05-21

## 2022-05-21 PROBLEM — D47.1 CHRONIC MYELOPROLIFERATIVE DISEASE (HCC): Status: ACTIVE | Noted: 2022-05-21

## 2022-05-22 NOTE — ASSESSMENT & PLAN NOTE
Stable  Regular rate and rhythm today  Continue Coumadin and aspirin as per cardiology, Dr Osman Hector

## 2022-05-22 NOTE — ASSESSMENT & PLAN NOTE
Lab Results   Component Value Date    HGBA1C 6 7 (A) 05/17/2022     Hemoglobin A1c is at goal less than 7%  Continue glimepiride, metformin, and Kalebuvia  Was not able to tolerate Jardiance  Recently experiencing bloating, will change from immediate release metformin to extended release metformin, and will reduce dose from 1000 mg twice daily to 500 mg twice daily  He will update me in 2-3 weeks regarding if bloating resolves with medication change

## 2022-05-22 NOTE — ASSESSMENT & PLAN NOTE
Current lipid panel with total cholesterol 146, triglycerides stable 356, HDL 28, LDL 47  Continue simvastatin 40 mg daily and fenofibrate 134 mg daily

## 2022-06-07 ENCOUNTER — ANTICOAG VISIT (OUTPATIENT)
Dept: CARDIOLOGY CLINIC | Facility: CLINIC | Age: 72
End: 2022-06-07

## 2022-06-07 ENCOUNTER — APPOINTMENT (OUTPATIENT)
Dept: LAB | Facility: AMBULARY SURGERY CENTER | Age: 72
End: 2022-06-07
Payer: MEDICARE

## 2022-06-07 DIAGNOSIS — Z86.718 HISTORY OF DVT (DEEP VEIN THROMBOSIS): Primary | ICD-10-CM

## 2022-06-10 DIAGNOSIS — E11.40 TYPE 2 DIABETES MELLITUS WITH DIABETIC NEUROPATHY, WITHOUT LONG-TERM CURRENT USE OF INSULIN (HCC): ICD-10-CM

## 2022-06-10 RX ORDER — GLIMEPIRIDE 1 MG/1
1 TABLET ORAL
Qty: 90 TABLET | Refills: 1 | Status: SHIPPED | OUTPATIENT
Start: 2022-06-10

## 2022-06-10 RX ORDER — GLIMEPIRIDE 1 MG/1
TABLET ORAL
Qty: 90 TABLET | Refills: 0 | Status: SHIPPED | OUTPATIENT
Start: 2022-06-10 | End: 2022-06-10 | Stop reason: SDUPTHER

## 2022-06-21 ENCOUNTER — OFFICE VISIT (OUTPATIENT)
Dept: CARDIOLOGY CLINIC | Facility: CLINIC | Age: 72
End: 2022-06-21
Payer: MEDICARE

## 2022-06-21 VITALS
HEART RATE: 76 BPM | WEIGHT: 219 LBS | HEIGHT: 71 IN | OXYGEN SATURATION: 97 % | SYSTOLIC BLOOD PRESSURE: 124 MMHG | BODY MASS INDEX: 30.66 KG/M2 | DIASTOLIC BLOOD PRESSURE: 62 MMHG

## 2022-06-21 DIAGNOSIS — I10 ESSENTIAL HYPERTENSION: Primary | ICD-10-CM

## 2022-06-21 DIAGNOSIS — I70.213 ATHEROSCLEROSIS OF NATIVE ARTERY OF BOTH LOWER EXTREMITIES WITH INTERMITTENT CLAUDICATION (HCC): ICD-10-CM

## 2022-06-21 DIAGNOSIS — I48.91 ATRIAL FIBRILLATION, UNSPECIFIED TYPE (HCC): ICD-10-CM

## 2022-06-21 DIAGNOSIS — E78.2 MIXED HYPERLIPIDEMIA: ICD-10-CM

## 2022-06-21 PROCEDURE — 99214 OFFICE O/P EST MOD 30 MIN: CPT | Performed by: INTERNAL MEDICINE

## 2022-06-21 NOTE — LETTER
June 21, 2022     DAYAMI Albert  [address] [facility] [address]    Patient: Irene Roland   YOB: 1950   Date of Visit: 6/21/2022       Dear Dr Ari Dawson: Thank you for referring Nicholas Daily to me for evaluation  Below are my notes for this consultation  If you have questions, please do not hesitate to call me  I look forward to following your patient along with you  Sincerely,        Estelita Franklin MD        CC: No Recipients  Estelita Franklin MD  6/21/2022 10:19 AM  Sign when Signing Visit  Assessment/Plan:    Essential hypertension    Hypertension, stable and adequately controlled  We will continue present regimen  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)    Peripheral arterial disease with symptoms leg claudications  Atrial fibrillation (HCC)    History of paroxysmal atrial fibrillation, stable  Mixed hyperlipidemia    Hyperlipidemia, stable  The patient will continue fenofibrate 134 mg daily and simvastatin at 40 mg daily  Diagnoses and all orders for this visit:    Essential hypertension    Atrial fibrillation, unspecified type ([address] )    Mixed hyperlipidemia    Atherosclerosis of native artery of both lower extremities with intermittent claudication (HCC)          Subjective:   Leg pain upon walking  Patient ID: Irene Roland is a 71 y o  male  The patient presented to this office for the purpose of cardiac follow-up  He is known to have history of hypertension hyperlipidemia as well as peripheral vascular disease and paroxysmal atrial fibrillation  Patient also had a history of DVT  At this point the patient is feeling well from the cardiac standpoint denying any symptoms of chest pain or shortness of breath  Denies any symptoms of palpitation, dizziness or syncope  He has no significant leg edema  He does however experience leg cramps walking  This typically improves as he continues to walk        The following portions of the patient's history were reviewed and updated as appropriate: allergies, current medications, past family history, past medical history, past social history, past surgical history and problem list     Review of Systems   Respiratory: Negative for apnea, cough, chest tightness, shortness of breath and wheezing  Cardiovascular: Negative for chest pain, palpitations and leg swelling  Gastrointestinal: Negative for abdominal pain  Neurological: Negative for dizziness and light-headedness  Psychiatric/Behavioral: Negative  Objective:  Stable cardiac-wise  /62 (BP Location: Left arm, Patient Position: Sitting, Cuff Size: Large)   Pulse 76   Ht 5' 11" (1 803 m)   Wt 99 3 kg (219 lb)   SpO2 97%   BMI 30 54 kg/m²          Physical Exam  Vitals reviewed  Constitutional:       General: He is not in acute distress  Appearance: He is well-developed  He is not diaphoretic  HENT:      Head: Normocephalic  Eyes:      Pupils: Pupils are equal, round, and reactive to light  Neck:      Thyroid: No thyromegaly  Vascular: No JVD  Cardiovascular:      Rate and Rhythm: Normal rate and regular rhythm  Heart sounds: S1 normal and S2 normal  No murmur heard  No friction rub  No gallop  Pulmonary:      Effort: Pulmonary effort is normal  No respiratory distress  Breath sounds: Normal breath sounds  No wheezing or rales  Chest:      Chest wall: No tenderness  Abdominal:      Palpations: Abdomen is soft  Musculoskeletal:         General: No tenderness or deformity  Normal range of motion  Cervical back: Normal range of motion  Right lower leg: No edema  Left lower leg: No edema  Skin:     General: Skin is warm and dry  Neurological:      Mental Status: He is alert and oriented to person, place, and time     Psychiatric:         Mood and Affect: Mood normal          Behavior: Behavior normal

## 2022-06-21 NOTE — ASSESSMENT & PLAN NOTE
Hyperlipidemia, stable  The patient will continue fenofibrate 134 mg daily and simvastatin at 40 mg daily

## 2022-06-21 NOTE — PROGRESS NOTES
Assessment/Plan:    Essential hypertension    Hypertension, stable and adequately controlled  We will continue present regimen  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)    Peripheral arterial disease with symptoms leg claudications  Atrial fibrillation (HCC)    History of paroxysmal atrial fibrillation, stable  Mixed hyperlipidemia    Hyperlipidemia, stable  The patient will continue fenofibrate 134 mg daily and simvastatin at 40 mg daily  Diagnoses and all orders for this visit:    Essential hypertension    Atrial fibrillation, unspecified type ([address] )    Mixed hyperlipidemia    Atherosclerosis of native artery of both lower extremities with intermittent claudication (HCC)          Subjective:   Leg pain upon walking  Patient ID: Nga Beck is a 71 y o  male  The patient presented to this office for the purpose of cardiac follow-up  He is known to have history of hypertension hyperlipidemia as well as peripheral vascular disease and paroxysmal atrial fibrillation  Patient also had a history of DVT  At this point the patient is feeling well from the cardiac standpoint denying any symptoms of chest pain or shortness of breath  Denies any symptoms of palpitation, dizziness or syncope  He has no significant leg edema  He does however experience leg cramps walking  This typically improves as he continues to walk  The following portions of the patient's history were reviewed and updated as appropriate: allergies, current medications, past family history, past medical history, past social history, past surgical history and problem list     Review of Systems   Respiratory: Negative for apnea, cough, chest tightness, shortness of breath and wheezing  Cardiovascular: Negative for chest pain, palpitations and leg swelling  Gastrointestinal: Negative for abdominal pain  Neurological: Negative for dizziness and light-headedness  Psychiatric/Behavioral: Negative  Objective:  Stable cardiac-wise  /62 (BP Location: Left arm, Patient Position: Sitting, Cuff Size: Large)   Pulse 76   Ht 5' 11" (1 803 m)   Wt 99 3 kg (219 lb)   SpO2 97%   BMI 30 54 kg/m²          Physical Exam  Vitals reviewed  Constitutional:       General: He is not in acute distress  Appearance: He is well-developed  He is not diaphoretic  HENT:      Head: Normocephalic  Eyes:      Pupils: Pupils are equal, round, and reactive to light  Neck:      Thyroid: No thyromegaly  Vascular: No JVD  Cardiovascular:      Rate and Rhythm: Normal rate and regular rhythm  Heart sounds: S1 normal and S2 normal  No murmur heard  No friction rub  No gallop  Pulmonary:      Effort: Pulmonary effort is normal  No respiratory distress  Breath sounds: Normal breath sounds  No wheezing or rales  Chest:      Chest wall: No tenderness  Abdominal:      Palpations: Abdomen is soft  Musculoskeletal:         General: No tenderness or deformity  Normal range of motion  Cervical back: Normal range of motion  Right lower leg: No edema  Left lower leg: No edema  Skin:     General: Skin is warm and dry  Neurological:      Mental Status: He is alert and oriented to person, place, and time     Psychiatric:         Mood and Affect: Mood normal          Behavior: Behavior normal

## 2022-06-28 ENCOUNTER — ANTICOAG VISIT (OUTPATIENT)
Dept: CARDIOLOGY CLINIC | Facility: CLINIC | Age: 72
End: 2022-06-28

## 2022-06-28 ENCOUNTER — APPOINTMENT (OUTPATIENT)
Dept: LAB | Facility: AMBULARY SURGERY CENTER | Age: 72
End: 2022-06-28
Payer: MEDICARE

## 2022-06-28 DIAGNOSIS — Z86.718 HISTORY OF DVT (DEEP VEIN THROMBOSIS): Primary | ICD-10-CM

## 2022-06-28 NOTE — PROGRESS NOTES
Spoke with patient, advised INR good, will continue 5 mg Mon Fri, 2 5 mg all other days   Will recheck in 3 weeks 7/19/22

## 2022-07-04 DIAGNOSIS — N23 RENAL COLIC ON RIGHT SIDE: ICD-10-CM

## 2022-07-05 RX ORDER — TAMSULOSIN HYDROCHLORIDE 0.4 MG/1
CAPSULE ORAL
Qty: 90 CAPSULE | Refills: 3 | Status: SHIPPED | OUTPATIENT
Start: 2022-07-05

## 2022-07-08 ENCOUNTER — HOSPITAL ENCOUNTER (EMERGENCY)
Facility: HOSPITAL | Age: 72
Discharge: HOME | End: 2022-07-08
Attending: EMERGENCY MEDICINE
Payer: MEDICARE

## 2022-07-08 ENCOUNTER — HOSPITAL ENCOUNTER (EMERGENCY)
Dept: VASCULAR ULTRASOUND | Facility: HOSPITAL | Age: 72
Discharge: HOME | End: 2022-07-08
Payer: MEDICARE

## 2022-07-08 VITALS
OXYGEN SATURATION: 97 % | WEIGHT: 219 LBS | RESPIRATION RATE: 16 BRPM | BODY MASS INDEX: 30.54 KG/M2 | TEMPERATURE: 98.2 F | DIASTOLIC BLOOD PRESSURE: 71 MMHG | HEART RATE: 83 BPM | SYSTOLIC BLOOD PRESSURE: 152 MMHG

## 2022-07-08 DIAGNOSIS — M79.662 PAIN OF LEFT CALF: Primary | ICD-10-CM

## 2022-07-08 LAB
ALBUMIN SERPL BCP-MCNC: 3.9 G/DL (ref 3.5–5)
ALP SERPL-CCNC: 50 U/L (ref 34–104)
ALT SERPL W P-5'-P-CCNC: 32 U/L (ref 7–52)
ANION GAP SERPL CALCULATED.3IONS-SCNC: 6 MMOL/L (ref 4–13)
AST SERPL W P-5'-P-CCNC: 30 U/L (ref 13–39)
BASOPHILS # BLD AUTO: 0.11 THOUSANDS/ΜL (ref 0–0.1)
BASOPHILS NFR BLD AUTO: 2 % (ref 0–1)
BILIRUB SERPL-MCNC: 0.64 MG/DL (ref 0.2–1)
BUN SERPL-MCNC: 12 MG/DL (ref 5–25)
CALCIUM SERPL-MCNC: 9 MG/DL (ref 8.4–10.2)
CHLORIDE SERPL-SCNC: 105 MMOL/L (ref 96–108)
CO2 SERPL-SCNC: 25 MMOL/L (ref 21–32)
CREAT SERPL-MCNC: 1.06 MG/DL (ref 0.6–1.3)
EOSINOPHIL # BLD AUTO: 0.35 THOUSAND/ΜL (ref 0–0.61)
EOSINOPHIL NFR BLD AUTO: 5 % (ref 0–6)
ERYTHROCYTE [DISTWIDTH] IN BLOOD BY AUTOMATED COUNT: 13.6 % (ref 11.6–15.1)
GFR SERPL CREATININE-BSD FRML MDRD: 70 ML/MIN/1.73SQ M
GLUCOSE SERPL-MCNC: 209 MG/DL (ref 65–140)
HCT VFR BLD AUTO: 46 % (ref 36.5–49.3)
HGB BLD-MCNC: 15.5 G/DL (ref 12–17)
IMM GRANULOCYTES # BLD AUTO: 0.05 THOUSAND/UL (ref 0–0.2)
IMM GRANULOCYTES NFR BLD AUTO: 1 % (ref 0–2)
INR PPP: 2.32 (ref 0.84–1.19)
LYMPHOCYTES # BLD AUTO: 1.35 THOUSANDS/ΜL (ref 0.6–4.47)
LYMPHOCYTES NFR BLD AUTO: 18 % (ref 14–44)
MCH RBC QN AUTO: 31.8 PG (ref 26.8–34.3)
MCHC RBC AUTO-ENTMCNC: 33.7 G/DL (ref 31.4–37.4)
MCV RBC AUTO: 95 FL (ref 82–98)
MONOCYTES # BLD AUTO: 1 THOUSAND/ΜL (ref 0.17–1.22)
MONOCYTES NFR BLD AUTO: 13 % (ref 4–12)
NEUTROPHILS # BLD AUTO: 4.59 THOUSANDS/ΜL (ref 1.85–7.62)
NEUTS SEG NFR BLD AUTO: 61 % (ref 43–75)
NRBC BLD AUTO-RTO: 0 /100 WBCS
PLATELET # BLD AUTO: 281 THOUSANDS/UL (ref 149–390)
PMV BLD AUTO: 9.8 FL (ref 8.9–12.7)
POTASSIUM SERPL-SCNC: 4.3 MMOL/L (ref 3.5–5.3)
PROT SERPL-MCNC: 7.1 G/DL (ref 6.4–8.4)
PROTHROMBIN TIME: 25.1 SECONDS (ref 11.6–14.5)
RBC # BLD AUTO: 4.87 MILLION/UL (ref 3.88–5.62)
SODIUM SERPL-SCNC: 136 MMOL/L (ref 135–147)
WBC # BLD AUTO: 7.45 THOUSAND/UL (ref 4.31–10.16)

## 2022-07-08 PROCEDURE — 80053 COMPREHEN METABOLIC PANEL: CPT | Performed by: EMERGENCY MEDICINE

## 2022-07-08 PROCEDURE — 99284 EMERGENCY DEPT VISIT MOD MDM: CPT

## 2022-07-08 PROCEDURE — 93971 EXTREMITY STUDY: CPT

## 2022-07-08 PROCEDURE — 99284 EMERGENCY DEPT VISIT MOD MDM: CPT | Performed by: EMERGENCY MEDICINE

## 2022-07-08 PROCEDURE — 85610 PROTHROMBIN TIME: CPT | Performed by: EMERGENCY MEDICINE

## 2022-07-08 PROCEDURE — 36415 COLL VENOUS BLD VENIPUNCTURE: CPT

## 2022-07-08 PROCEDURE — 85025 COMPLETE CBC W/AUTO DIFF WBC: CPT | Performed by: EMERGENCY MEDICINE

## 2022-07-08 NOTE — ED ATTENDING ATTESTATION
7/8/2022  IMillicent MD, saw and evaluated the patient  I have discussed the patient with the resident/non-physician practitioner and agree with the resident's/non-physician practitioner's findings, Plan of Care, and MDM as documented in the resident's/non-physician practitioner's note, except where noted  All available labs and Radiology studies were reviewed  I was present for key portions of any procedure(s) performed by the resident/non-physician practitioner and I was immediately available to provide assistance  At this point I agree with the current assessment done in the Emergency Department  I have conducted an independent evaluation of this patient a history and physical is as follows:  Left lower extremity pain, patient with history of DVT  DVT study with chronic DVT, no acute DVT, additional labs, INR within appropriate limits  Patient was well-perfused lower extremity  No laceration  No swelling or redness to suggest septic joint  No falls or trauma  Suspect musculoskeletal pain  Symptomatic relief recommended, patient stable for discharge home  Review of Systems - Negative except left outer lower leg pain  Physical Exam  Vitals and nursing note reviewed  Constitutional:       General: He is not in acute distress  Appearance: He is well-developed  He is not diaphoretic  HENT:      Head: Normocephalic and atraumatic  Right Ear: External ear normal       Left Ear: External ear normal    Eyes:      General:         Right eye: No discharge  Left eye: No discharge  Pupils: Pupils are equal, round, and reactive to light  Neck:      Thyroid: No thyromegaly  Trachea: No tracheal deviation  Cardiovascular:      Rate and Rhythm: Normal rate and regular rhythm  Heart sounds: No murmur heard  Pulmonary:      Effort: Pulmonary effort is normal       Breath sounds: Normal breath sounds     Abdominal:      General: Bowel sounds are normal  There is no distension  Palpations: Abdomen is soft  Tenderness: There is no abdominal tenderness  Musculoskeletal:         General: No deformity  Normal range of motion  Cervical back: Normal range of motion and neck supple  Skin:     General: Skin is warm  Capillary Refill: Capillary refill takes less than 2 seconds  Neurological:      Mental Status: He is alert and oriented to person, place, and time  Cranial Nerves: No cranial nerve deficit  Motor: No abnormal muscle tone     Psychiatric:         Behavior: Behavior normal          Results Reviewed     Procedure Component Value Units Date/Time    Protime-INR [[id number]]  (Abnormal) Collected: 07/08/22 1153    Lab Status: Final result Specimen: Blood from Arm, Right Updated: 07/08/22 1415     Protime 25 1 seconds      INR 2 32    Comprehensive metabolic panel [[id number]]  (Abnormal) Collected: 07/08/22 1153    Lab Status: Final result Specimen: Blood from Arm, Right Updated: 07/08/22 1236     Sodium 136 mmol/L      Potassium 4 3 mmol/L      Chloride 105 mmol/L      CO2 25 mmol/L      ANION GAP 6 mmol/L      BUN 12 mg/dL      Creatinine 1 06 mg/dL      Glucose 209 mg/dL      Calcium 9 0 mg/dL      AST 30 U/L      ALT 32 U/L      Alkaline Phosphatase 50 U/L      Total Protein 7 1 g/dL      Albumin 3 9 g/dL      Total Bilirubin 0 64 mg/dL      eGFR 70 ml/min/1 73sq m     Narrative:      [place] guidelines for Chronic Kidney Disease (CKD):     Stage 1 with normal or high GFR (GFR > 90 mL/min/1 73 square meters)    Stage 2 Mild CKD (GFR = 60-89 mL/min/1 73 square meters)    Stage 3A Moderate CKD (GFR = 45-59 mL/min/1 73 square meters)    Stage 3B Moderate CKD (GFR = 30-44 mL/min/1 73 square meters)    Stage 4 Severe CKD (GFR = 15-29 mL/min/1 73 square meters)    Stage 5 End Stage CKD (GFR <15 mL/min/1 73 square meters)  Note: GFR calculation is accurate only with a steady state creatinine    CBC and differential [[id number]]  (Abnormal) Collected: 07/08/22 1153    Lab Status: Final result Specimen: Blood from Arm, Right Updated: 07/08/22 1208     WBC 7 45 Thousand/uL      RBC 4 87 Million/uL      Hemoglobin 15 5 g/dL      Hematocrit 46 0 %      MCV 95 fL      MCH 31 8 pg      MCHC 33 7 g/dL      RDW 13 6 %      MPV 9 8 fL      Platelets 382 Thousands/uL      nRBC 0 /100 WBCs      Neutrophils Relative 61 %      Immat GRANS % 1 %      Lymphocytes Relative 18 %      Monocytes Relative 13 %      Eosinophils Relative 5 %      Basophils Relative 2 %      Neutrophils Absolute 4 59 Thousands/µL      Immature Grans Absolute 0 05 Thousand/uL      Lymphocytes Absolute 1 35 Thousands/µL      Monocytes Absolute 1 00 Thousand/µL      Eosinophils Absolute 0 35 Thousand/µL      Basophils Absolute 0 11 Thousands/µL         VAS lower limb venous duplex study, unilateral/limited    (Results Pending)         ED Course         Critical Care Time  Procedures

## 2022-07-08 NOTE — ED PROVIDER NOTES
History  Chief Complaint   Patient presents with    Leg Pain     Pt c/o swelling and pain to left leg since yesterday  Hx dvt  HPI: Everton Natarajan is a 70yoM with h/o DVT in  on warfarin (last INR 2 3 on ) PAD with leg claudication, hypertension, type 2 diabetes, Afib, smoker, who presents to the ED with acute onset left calf pain that started yesterday, 10/10 pain, made better with resting and elevation, worse with walking  Associated with swelling, no calf redness, denies shortness of breath, chest pain, hemoptysis  No recent immobilizations, no recent surgery, no history of malignancy  Denies any missed doses of warfarin or other medications  Prior to Admission Medications   Prescriptions Last Dose Informant Patient Reported? Taking? Cyanocobalamin (VITAMIN B 12 PO)  Self Yes No   Sig: Take 1,000 mcg by mouth daily   Januvia 100 MG tablet  Self No No   Sig: TAKE 1 TABLET BY MOUTH EVERY DAY   ProAir  (90 Base) MCG/ACT inhaler  Self No No   Sig: INHALE 2 PUFFS EVERY 4 HOURS AS NEEDED FOR WHEEZE OR FOR SHORTNESS OF BREATH   acetaminophen (TYLENOL) 325 mg tablet  Self No No   Si, by mouth, every 6 hours as needed for mild to moderate pain     aspirin (ECOTRIN LOW STRENGTH) 81 mg EC tablet  Self Yes No   Sig: Take 81 mg by mouth daily     famotidine (PEPCID) 20 mg tablet  Self No No   Sig: Take 1 tablet (20 mg total) by mouth 2 (two) times a day   fenofibrate micronized (LOFIBRA) 134 MG capsule  Self No No   Sig: TAKE 1 CAPSULE (134 MG TOTAL) BY MOUTH DAILY WITH BREAKFAST   gabapentin (NEURONTIN) 100 mg capsule  Self No No   Sig: TAKE 2 CAPSULES (200 MG TOTAL) BY MOUTH DAILY AT BEDTIME   glimepiride (AMARYL) 1 mg tablet  Self No No   Sig: Take 1 tablet (1 mg total) by mouth daily with breakfast   lisinopril (ZESTRIL) 20 mg tablet  Self No No   Sig: TAKE 1 TABLET BY MOUTH EVERY DAY   metFORMIN (GLUCOPHAGE-XR) 500 mg 24 hr tablet  Self No No   Sig: Take 1 tablet (500 mg total) by mouth in the morning and 1 tablet (500 mg total) in the evening  Take with meals  omeprazole (PriLOSEC) 20 mg delayed release capsule  Self No No   Sig: TAKE 1 CAPSULE BY MOUTH EVERY DAY   Patient taking differently: Takes every 3 days  simvastatin (ZOCOR) 40 mg tablet  Self No No   Sig: TAKE 1 TABLET BY MOUTH EVERY DAY   tamsulosin (FLOMAX) 0 4 mg   No No   Sig: TAKE 1 CAPSULE BY MOUTH EVERY DAY WITH DINNER   warfarin (COUMADIN) 2 5 mg tablet  Self No No   Sig: Take 1 tablet by mouth daily T-Th-S-S takes 2 5mg     M-W-F takes 5 0 mg   warfarin (COUMADIN) 5 mg tablet  Self No No   Sig: TAKE 1 TABLET EVERY DAY OR AS DIRECTED BY MD   Patient taking differently: Taking Monday and Friday        Facility-Administered Medications: None       Past Medical History:   Diagnosis Date    LISY (acute kidney injury) ([facility] ) 9/11/2021    Arthritis     Atherosclerosis of native arteries of the extremities with ulceration ([facility] ) 3/21/2019    Claudication, intermittent (HCC)     Colon polyp     6 polyps 3 years ago    Diabetes mellitus ([facility] [address] )     type 2    Diabetic neuropathic arthropathy (HCC)     causes weakness in LE and uses a cane as assist to walk    Diabetic neuropathy ([facility] [address] )     Fatty liver     GERD (gastroesophageal reflux disease)     History of DVT (deep vein thrombosis) 2015    left LE    Hyperlipidemia     Hypertension     Hyponatremia 9/11/2021    Kidney stone     Parotid tumor     Seasonal allergies     Spinal stenosis     Ureteral stone with hydronephrosis 5/17/2019    Added automatically from request for surgery [id number]       Past Surgical History:   Procedure Laterality Date    COLONOSCOPY  05/16/2019    completed by Dr Abdirizak Meade, Repeat 3 years    [facility] RETROGRADE PYELOGRAM  5/17/2019    PAROTIDECTOMY Left     HI CYSTO/URETERO W/LITHOTRIPSY &INDWELL STENT INSRT Right 5/17/2019    Procedure: CYSTOSCOPY URETEROSCOPY WITH LITHOTRIPSY HOLMIUM LASER, RETROGRADE PYELOGRAM AND INSERTION STENT URETERAL--possible stent only;  Surgeon: Estee Marino MD;  [facility];  Service: Urology    [place] EXC PAROTD,LAT LOBE,DISSECT 5TH NERV Right 10/18/2017    Procedure: SUPERFICIAL PAROTIDECTOMY WITH NERVE DISSECTION AND PRESERVATION;  Surgeon: Janet Lara MD;  [facility];  Service: ENT    W[place] INCISE FINGER TENDON SHEATH Right 11/3/2020    Procedure: Ashwin Fierro;  Surgeon: Elen Tello MD;  [facility];  Service: Orthopedics    ROTATOR CUFF REPAIR Bilateral     TONSILLECTOMY         Family History   Problem Relation Age of Onset    Lupus Mother     Rheum arthritis Mother     Cirrhosis Father     Alcohol abuse Father     Substance Abuse Brother      I have reviewed and agree with the history as documented  E-Cigarette/Vaping    E-Cigarette Use Never User      E-Cigarette/Vaping Substances    Nicotine No     THC No     CBD No     Flavoring No     Other No     Unknown No      Social History     Tobacco Use    Smoking status: Current Every Day Smoker     Types: Pipe    Smokeless tobacco: Never Used    Tobacco comment: smokes 2 pipes daily   Vaping Use    Vaping Use: Never used   Substance Use Topics    Alcohol use: Yes     Comment: socially    Drug use: No        Review of Systems   Constitutional: Negative for chills, diaphoresis and fever  HENT: Positive for sinus pressure  Negative for ear pain and sore throat  Eyes: Negative for visual disturbance  Respiratory: Negative for chest tightness and shortness of breath  Gastrointestinal: Negative for abdominal pain, blood in stool, constipation, diarrhea, nausea and vomiting  Genitourinary: Positive for urgency  Negative for dysuria  Musculoskeletal: Positive for back pain  Neurological: Negative for dizziness and light-headedness  All other systems reviewed and are negative        Physical Exam  ED Triage Vitals [07/08/22 1150]   Temperature Pulse Respirations Blood Pressure SpO2   98 2 °F (36 8 °C) 83 16 152/71 97 %      Temp Source Heart Rate Source Patient Position - Orthostatic VS BP Location FiO2 (%)   Oral -- Sitting Left arm --      Pain Score       4             Orthostatic Vital Signs  Vitals:    07/08/22 1150   BP: 152/71   Pulse: 83   Patient Position - Orthostatic VS: Sitting       Physical Exam  Vitals and nursing note reviewed  Constitutional:       General: He is not in acute distress  Appearance: Normal appearance  He is normal weight  He is not ill-appearing, toxic-appearing or diaphoretic  HENT:      Head: Normocephalic and atraumatic  Nose: Nose normal    Eyes:      Extraocular Movements: Extraocular movements intact  Cardiovascular:      Rate and Rhythm: Normal rate and regular rhythm  Pulses: Normal pulses  Heart sounds: Normal heart sounds  Comments: Popliteal and dorsalis pedis pulses present -- assessed with doppler at bedside   Pulmonary:      Effort: Pulmonary effort is normal       Breath sounds: Normal breath sounds  Abdominal:      General: There is no distension  Palpations: Abdomen is soft  Tenderness: There is no abdominal tenderness  There is no guarding  Musculoskeletal:      Right lower leg: No edema  Left lower leg: No edema  Skin:     General: Skin is warm and dry  Findings: No bruising or erythema  Neurological:      Mental Status: He is alert and oriented to person, place, and time           ED Medications  Medications - No data to display    Diagnostic Studies  Results Reviewed     Procedure Component Value Units Date/Time    Protime-INR [[id number]]  (Abnormal) Collected: 07/08/22 1153    Lab Status: Final result Specimen: Blood from Arm, Right Updated: 07/08/22 1415     Protime 25 1 seconds      INR 2 32    Comprehensive metabolic panel [[id number]]  (Abnormal) Collected: 07/08/22 1153    Lab Status: Final result Specimen: Blood from Arm, Right Updated: 07/08/22 1236     Sodium 136 mmol/L      Potassium 4 3 mmol/L Chloride 105 mmol/L      CO2 25 mmol/L      ANION GAP 6 mmol/L      BUN 12 mg/dL      Creatinine 1 06 mg/dL      Glucose 209 mg/dL      Calcium 9 0 mg/dL      AST 30 U/L      ALT 32 U/L      Alkaline Phosphatase 50 U/L      Total Protein 7 1 g/dL      Albumin 3 9 g/dL      Total Bilirubin 0 64 mg/dL      eGFR 70 ml/min/1 73sq m     Narrative:      National Kidney Disease Foundation guidelines for Chronic Kidney Disease (CKD):     Stage 1 with normal or high GFR (GFR > 90 mL/min/1 73 square meters)    Stage 2 Mild CKD (GFR = 60-89 mL/min/1 73 square meters)    Stage 3A Moderate CKD (GFR = 45-59 mL/min/1 73 square meters)    Stage 3B Moderate CKD (GFR = 30-44 mL/min/1 73 square meters)    Stage 4 Severe CKD (GFR = 15-29 mL/min/1 73 square meters)    Stage 5 End Stage CKD (GFR <15 mL/min/1 73 square meters)  Note: GFR calculation is accurate only with a steady state creatinine    CBC and differential [[id number]]  (Abnormal) Collected: 07/08/22 1153    Lab Status: Final result Specimen: Blood from Arm, Right Updated: 07/08/22 1208     WBC 7 45 Thousand/uL      RBC 4 87 Million/uL      Hemoglobin 15 5 g/dL      Hematocrit 46 0 %      MCV 95 fL      MCH 31 8 pg      MCHC 33 7 g/dL      RDW 13 6 %      MPV 9 8 fL      Platelets 244 Thousands/uL      nRBC 0 /100 WBCs      Neutrophils Relative 61 %      Immat GRANS % 1 %      Lymphocytes Relative 18 %      Monocytes Relative 13 %      Eosinophils Relative 5 %      Basophils Relative 2 %      Neutrophils Absolute 4 59 Thousands/µL      Immature Grans Absolute 0 05 Thousand/uL      Lymphocytes Absolute 1 35 Thousands/µL      Monocytes Absolute 1 00 Thousand/µL      Eosinophils Absolute 0 35 Thousand/µL      Basophils Absolute 0 11 Thousands/µL                  VAS lower limb venous duplex study, unilateral/limited    (Results Pending)         Procedures  Procedures      ED Course  ED Course as of 07/08/22 1523   Fri Jul 08, 2022   1510 DVU demonstrates chronic non-occlusive DVT in the left popliteal  Negative for acute DVT  Debbi Baeza' Criteria for DVT    Flowsheet Row Most Recent Value   Kike' Criteria for DVT    Active cancer Treatment or palliation within 6 months 0 Filed at: 07/08/2022 1355   Bedridden recently >3 days or major surgery within 12 weeks 0 Filed at: 07/08/2022 1355   Calf swelling >3 cm compared to the other leg 0  [2 5] Filed at: 07/08/2022 1355   Entire leg swollen 0 Filed at: 07/08/2022 1355   Collateral (nonvaricose) superficial veins present 1 Filed at: 07/08/2022 1355   Localized tenderness along the deep venous system 1 Filed at: 07/08/2022 1355   Pitting edema, confined to symptomatic leg 0 Filed at: 07/08/2022 1355   Paralysis, paresis, or recent plaster immobilization of the lower extremity 0 Filed at: 07/08/2022 1355   Previously documented DVT 1 Filed at: 07/08/2022 1355   Alternative diagnosis to DVT as likely or more likely 0 Filed at: 07/08/2022 1355   Kike DVT Critera Score 3 Filed at: 07/08/2022 1355          MDM  Number of Diagnoses or Management Options  Pain of left calf: established and improving  Diagnosis management comments: Axel Dominguez is a 76yM with a history of DVT in 2015 on warfarin (no missed doses) who comes in with Left calf pain and swelling  DVU of the left calf shows chronic, non-occlusive DVT of the left popliteal  Negative for acute DVT  INR is within goal range       Patient discharge to home and encouraged to follow up with PCP/cardiology is symptoms persist      3:23 PM         Amount and/or Complexity of Data Reviewed  Clinical lab tests: ordered and reviewed  Tests in the radiology section of CPT®: ordered and reviewed  Review and summarize past medical records: yes        Disposition  Final diagnoses:   Pain of left calf     Time reflects when diagnosis was documented in both MDM as applicable and the Disposition within this note     Time User Action Codes Description Comment 7/8/2022  3:12 PM Sharron Ochoa Add [[id number] [id number]] Pain of left calf       ED Disposition     ED Disposition   Discharge    Condition   Stable    Date/Time   Fri Jul 8, 2022  3:12 PM    Comment   Shyam Saunders discharge to home/self care  Follow-up Information     Follow up With Specialties Details Why [address], [address] [place], Nurse Practitioner In 1 week If symptoms worsen [id number]  [phone]            Patient's Medications   Discharge Prescriptions    No medications on file     No discharge procedures on file  PDMP Review       Value Time User    PDMP Reviewed  Yes 11/3/2020  9:40 AM Sussy Adams PA-C           ED Provider  Attending physically available and evaluated Shyam Saunders I managed the patient along with the ED Attending      Electronically Signed by         Dolores Engel MD  07/08/22 3190

## 2022-07-09 PROCEDURE — 93971 EXTREMITY STUDY: CPT | Performed by: SURGERY

## 2022-07-12 DIAGNOSIS — J30.9 ALLERGIC RHINITIS, UNSPECIFIED SEASONALITY, UNSPECIFIED TRIGGER: ICD-10-CM

## 2022-07-19 ENCOUNTER — ANTICOAG VISIT (OUTPATIENT)
Dept: CARDIOLOGY CLINIC | Facility: CLINIC | Age: 72
End: 2022-07-19

## 2022-07-19 ENCOUNTER — APPOINTMENT (OUTPATIENT)
Dept: LAB | Facility: AMBULARY SURGERY CENTER | Age: 72
End: 2022-07-19
Payer: MEDICARE

## 2022-07-19 DIAGNOSIS — Z86.718 HISTORY OF DVT (DEEP VEIN THROMBOSIS): Primary | ICD-10-CM

## 2022-07-19 NOTE — PROGRESS NOTES
Spoke with patient, advised INR good, continue 5 mg Mon Fri, 2 5 mg all other days, will recheck in 3 weeks 8/9/22

## 2022-07-25 DIAGNOSIS — E78.2 MIXED HYPERLIPIDEMIA: ICD-10-CM

## 2022-07-25 RX ORDER — SIMVASTATIN 40 MG
TABLET ORAL
Qty: 90 TABLET | Refills: 4 | Status: SHIPPED | OUTPATIENT
Start: 2022-07-25

## 2022-08-09 ENCOUNTER — ANTICOAG VISIT (OUTPATIENT)
Dept: CARDIOLOGY CLINIC | Facility: CLINIC | Age: 72
End: 2022-08-09

## 2022-08-09 ENCOUNTER — TELEPHONE (OUTPATIENT)
Dept: OBGYN CLINIC | Facility: HOSPITAL | Age: 72
End: 2022-08-09

## 2022-08-09 ENCOUNTER — APPOINTMENT (OUTPATIENT)
Dept: LAB | Facility: AMBULARY SURGERY CENTER | Age: 72
End: 2022-08-09
Payer: MEDICARE

## 2022-08-09 DIAGNOSIS — Z86.718 HISTORY OF DVT (DEEP VEIN THROMBOSIS): Primary | ICD-10-CM

## 2022-08-09 DIAGNOSIS — I48.91 ATRIAL FIBRILLATION, UNSPECIFIED TYPE (HCC): Primary | ICD-10-CM

## 2022-08-09 LAB
INR PPP: 2.41 (ref 0.84–1.19)
PROTHROMBIN TIME: 26.5 SECONDS (ref 11.6–14.5)

## 2022-08-09 PROCEDURE — 36415 COLL VENOUS BLD VENIPUNCTURE: CPT | Performed by: INTERNAL MEDICINE

## 2022-08-09 PROCEDURE — 85610 PROTHROMBIN TIME: CPT | Performed by: INTERNAL MEDICINE

## 2022-08-09 NOTE — TELEPHONE ENCOUNTER
Patient calling to request a sooner appt with Dr Marcus Summers if possible as his wrist is causing a lot of pain     # [phone]

## 2022-08-15 DIAGNOSIS — M25.531 RIGHT WRIST PAIN: Primary | ICD-10-CM

## 2022-08-16 ENCOUNTER — HOSPITAL ENCOUNTER (OUTPATIENT)
Dept: RADIOLOGY | Facility: HOSPITAL | Age: 72
Discharge: HOME | End: 2022-08-16
Payer: MEDICARE

## 2022-08-16 DIAGNOSIS — M25.531 RIGHT WRIST PAIN: ICD-10-CM

## 2022-08-16 PROCEDURE — 73110 X-RAY EXAM OF WRIST: CPT

## 2022-08-30 ENCOUNTER — TELEPHONE (OUTPATIENT)
Dept: GASTROENTEROLOGY | Facility: CLINIC | Age: 72
End: 2022-08-30

## 2022-08-30 NOTE — TELEPHONE ENCOUNTER
Patients GI provider:  Dr Aleyda Wilkins    Number to return call: [phone]    Reason for call: Pt calling to schedule 3 year recall colonoscopy    Scheduled procedure/appointment date if applicable: Apt/procedure 11/21/2019

## 2022-09-01 ENCOUNTER — ANTICOAG VISIT (OUTPATIENT)
Dept: CARDIOLOGY CLINIC | Facility: CLINIC | Age: 72
End: 2022-09-01

## 2022-09-01 ENCOUNTER — APPOINTMENT (OUTPATIENT)
Dept: LAB | Facility: AMBULARY SURGERY CENTER | Age: 72
End: 2022-09-01
Payer: MEDICARE

## 2022-09-01 ENCOUNTER — TELEPHONE (OUTPATIENT)
Dept: GASTROENTEROLOGY | Facility: AMBULARY SURGERY CENTER | Age: 72
End: 2022-09-01

## 2022-09-01 DIAGNOSIS — I48.91 ATRIAL FIBRILLATION, UNSPECIFIED TYPE (HCC): ICD-10-CM

## 2022-09-01 DIAGNOSIS — Z86.718 HISTORY OF DVT (DEEP VEIN THROMBOSIS): Primary | ICD-10-CM

## 2022-09-01 LAB
INR PPP: 2.47 (ref 0.84–1.19)
PROTHROMBIN TIME: 27 SECONDS (ref 11.6–14.5)

## 2022-09-01 PROCEDURE — 85610 PROTHROMBIN TIME: CPT

## 2022-09-01 PROCEDURE — 36415 COLL VENOUS BLD VENIPUNCTURE: CPT

## 2022-09-01 NOTE — TELEPHONE ENCOUNTER
Our mutual patient is scheduled for procedure: COLONOSCOPY    On: _12___/_1    _/_  2022  _      With:   __JENELLE_______    He/She is taking the following blood thinner:   COUMADIN       Can this be stopped ___5___ days prior to the procedure?       Physician Approving clearance: ________________________ Pt remains unresponsive and combative, security at bedside. Decision to intubate. Dr. Seun Preciado RRT at bedside.      Mac Donato RN  06/22/20 1111

## 2022-09-01 NOTE — TELEPHONE ENCOUNTER
Spoke w/ pt   Patient is scheduled for colonoscopy on December 1 , 2022 at [facility] with Merritt Castro MD  Patient is aware of pre-procedure prep of Miralax/Dulcolax and they will be called the day prior between 2 and 6 pm for time to report for procedure  Pre-procedure prep has been given to the patient  via [address] mail on September 1 , 2022

## 2022-09-22 ENCOUNTER — OFFICE VISIT (OUTPATIENT)
Dept: OBGYN CLINIC | Facility: CLINIC | Age: 72
End: 2022-09-22
Payer: MEDICARE

## 2022-09-22 VITALS
DIASTOLIC BLOOD PRESSURE: 77 MMHG | HEIGHT: 71 IN | BODY MASS INDEX: 31.33 KG/M2 | SYSTOLIC BLOOD PRESSURE: 137 MMHG | WEIGHT: 223.8 LBS | HEART RATE: 77 BPM

## 2022-09-22 DIAGNOSIS — M77.8 TENDINITIS OF THUMB: ICD-10-CM

## 2022-09-22 DIAGNOSIS — M65.4 DE QUERVAIN'S TENOSYNOVITIS, RIGHT: Primary | ICD-10-CM

## 2022-09-22 DIAGNOSIS — M77.8 TENDINITIS OF RIGHT WRIST: ICD-10-CM

## 2022-09-22 PROCEDURE — 20550 NJX 1 TENDON SHEATH/LIGAMENT: CPT | Performed by: SURGERY

## 2022-09-22 PROCEDURE — 99214 OFFICE O/P EST MOD 30 MIN: CPT | Performed by: SURGERY

## 2022-09-22 RX ORDER — DEXAMETHASONE SODIUM PHOSPHATE 100 MG/10ML
40 INJECTION INTRAMUSCULAR; INTRAVENOUS
Status: COMPLETED | OUTPATIENT
Start: 2022-09-22 | End: 2022-09-22

## 2022-09-22 RX ORDER — LIDOCAINE HYDROCHLORIDE 10 MG/ML
1 INJECTION, SOLUTION INFILTRATION; PERINEURAL
Status: COMPLETED | OUTPATIENT
Start: 2022-09-22 | End: 2022-09-22

## 2022-09-22 RX ADMIN — LIDOCAINE HYDROCHLORIDE 1 ML: 10 INJECTION, SOLUTION INFILTRATION; PERINEURAL at 09:54

## 2022-09-22 RX ADMIN — DEXAMETHASONE SODIUM PHOSPHATE 40 MG: 100 INJECTION INTRAMUSCULAR; INTRAVENOUS at 09:54

## 2022-09-22 NOTE — PROGRESS NOTES
Venkat OSORIO  Attending, Orthopaedic Surgery  Hand, Wrist, and Elbow Surgery  [facility]      ORTHOPAEDIC HAND, WRIST, AND ELBOW OFFICE  VISIT       ASSESSMENT/PLAN:      72 yo male with right deQuervain's tenosynovitis and right EPL tendonitis   Anatomy and physiology of the above diagnoses were discussed along with treatment options including steroid injections, bracing, and occupational therapy  We discussed with the patient that we try and avoid injecting EPL as much as possible however a 1st dorsal compartment injections completely reasonable  Risks benefits alternatives to injections were discussed and initial de Quervain injection was performed today without issue  Patient was referred occupational therapy for these issues and recommended to wear a wrist brace at night if he can tolerate it  Plan for repeat evaluation in about 6 weeks, can consider 2nd de Quervain injection at that time if warranted    The patient verbalized understanding of exam findings and treatment plan  We engaged in the shared decision-making process and treatment options were discussed at length with the patient  Surgical and conservative management discussed today along with risks and benefits  Diagnoses and all orders for this visit:    De Quervain's tenosynovitis, right  -     Ambulatory Referral to PT/OT Hand Therapy; Future  -     Hand/upper extremity injection    Tendinitis of right wrist  -     Ambulatory Referral to PT/OT Hand Therapy; Future    Tendinitis of thumb  -     Ambulatory Referral to PT/OT Hand Therapy; Future        Follow Up:  Return in about 6 weeks (around 11/3/2022)  To Do Next Visit:  Re-evaluation of current issue      General Discussions:  Rubén Yi Tenosynovitis: The anatomy and physiology of de Quervain's tenosynovitis was discussed with the patient today in the office    Edema and increased contact pressure within the first dorsal extensor compartment at the radial styloid can cause pain, crepitation, and limitation of function  Treatment options include resting thumb spica splints to decrease edema, oral anti-inflammatory medications, home or formal therapy exercises, up to 2 steroid injections within the first dorsal extensor compartment, or surgical release  While majority of patients do respond to conservative treatment, up to 20% may require surgical release        ____________________________________________________________________________________________________________________________________________      CHIEF COMPLAINT:  Chief Complaint   Patient presents with    Right Wrist - Pain       SUBJECTIVE:  Karen Hanks is a 71y o  year old right HD male who presents for right hand radial sided pain  He states that he sustained no direct trauma but developed radial sided wrist pain on the right  Worse with activity    Particularly lifting       Pain/symptom timing:  Worse during the day when active  Pain/symptom context:  Worse with activites and work  Pain/symptom modifying factors:  Rest makes better, activities make worse  Pain/symptom associated signs/symptoms: none    Prior treatment   · NSAIDsNo   · Injections No   · Bracing/Orthotics Yes    Physical Therapy No     I have personally reviewed all the relevant PMH, PSH, SH, FH, Medications and allergies      PAST MEDICAL HISTORY:  Past Medical History:   Diagnosis Date    [facility] 9/11/2021    Arthritis     Atherosclerosis of native arteries of the extremities with ulceration ([facility] ) 3/21/2019    Claudication, intermittent (HCC)     Colon polyp     6 polyps 3 years ago    Diabetes mellitus ([facility] )     type 2    Diabetic neuropathic arthropathy (HCC)     causes weakness in LE and uses a cane as assist to walk    Diabetic neuropathy ([facility] )     Fatty liver     GERD (gastroesophageal reflux disease)     History of DVT (deep vein thrombosis) 2015    left LE    Hyperlipidemia     Hypertension     Hyponatremia 9/11/2021    Kidney stone     Parotid tumor     Seasonal allergies     Spinal stenosis     Ureteral stone with hydronephrosis 5/17/2019    Added automatically from request for surgery [id number]       PAST SURGICAL HISTORY:  Past Surgical History:   Procedure Laterality Date    COLONOSCOPY  05/16/2019    completed by Dr Mauricio Tabares, Repeat 3 years    [facility] RETROGRADE PYELOGRAM  5/17/2019    PAROTIDECTOMY Left     NH CYSTO/URETERO W/LITHOTRIPSY &INDWELL STENT INSRT Right 5/17/2019    Procedure: CYSTOSCOPY URETEROSCOPY WITH LITHOTRIPSY HOLMIUM LASER, RETROGRADE PYELOGRAM AND INSERTION STENT URETERAL--possible stent only;  Surgeon: Erica Franks MD;  [facility];  Service: Urology    NH EXC PAROTD,LAT LOBE,DISSECT 5TH NERV Right 10/18/2017    Procedure: SUPERFICIAL PAROTIDECTOMY WITH NERVE DISSECTION AND PRESERVATION;  Surgeon: Kael Silva MD;  [facility];  Service: ENT    NH INCISE FINGER TENDON SHEATH Right 11/3/2020    Procedure: [address];  Surgeon: Noemi Jansen MD;  [facility];  Service: Orthopedics    ROTATOR CUFF REPAIR Bilateral     TONSILLECTOMY         FAMILY HISTORY:  Family History   Problem Relation Age of Onset    Lupus Mother     Rheum arthritis Mother     Cirrhosis Father     Alcohol abuse Father     Substance Abuse Brother        SOCIAL HISTORY:  Social History     Tobacco Use    Smoking status: Current Every Day Smoker     Types: Pipe    Smokeless tobacco: Never Used    Tobacco comment: smokes 2 pipes daily   Vaping Use    Vaping Use: Never used   Substance Use Topics    Alcohol use: Yes     Comment: socially    Drug use: No       MEDICATIONS:    Current Outpatient Medications:     acetaminophen (TYLENOL) 325 mg tablet, 2, by mouth, every 6 hours as needed for mild to moderate pain , Disp: 30 tablet, Rfl: 0    aspirin (ECOTRIN LOW STRENGTH) 81 mg EC tablet, Take 81 mg by mouth daily  , Disp: , Rfl:    Cyanocobalamin (VITAMIN B 12 PO), Take 1,000 mcg by mouth daily, Disp: , Rfl:     famotidine (PEPCID) 20 mg tablet, Take 1 tablet (20 mg total) by mouth 2 (two) times a day, Disp: 90 tablet, Rfl: 3    fenofibrate micronized (LOFIBRA) 134 MG capsule, TAKE 1 CAPSULE (134 MG TOTAL) BY MOUTH DAILY WITH BREAKFAST, Disp: 90 capsule, Rfl: 3    gabapentin (NEURONTIN) 100 mg capsule, TAKE 2 CAPSULES (200 MG TOTAL) BY MOUTH DAILY AT BEDTIME, Disp: 180 capsule, Rfl: 1    glimepiride (AMARYL) 1 mg tablet, Take 1 tablet (1 mg total) by mouth daily with breakfast, Disp: 90 tablet, Rfl: 1    Januvia 100 MG tablet, TAKE 1 TABLET BY MOUTH EVERY DAY, Disp: 90 tablet, Rfl: 3    lisinopril (ZESTRIL) 20 mg tablet, TAKE 1 TABLET BY MOUTH EVERY DAY, Disp: 90 tablet, Rfl: 4    metFORMIN (GLUCOPHAGE-XR) 500 mg 24 hr tablet, Take 1 tablet (500 mg total) by mouth in the morning and 1 tablet (500 mg total) in the evening  Take with meals  , Disp: 180 tablet, Rfl: 1    omeprazole (PriLOSEC) 20 mg delayed release capsule, TAKE 1 CAPSULE BY MOUTH EVERY DAY (Patient taking differently: Takes every 3 days ), Disp: 90 capsule, Rfl: 1    ProAir  (90 Base) MCG/ACT inhaler, INHALE 2 PUFFS INTO THE LUNGS EVERY 4 HOURS AS NEEDED FOR SHORTNESS OF BREATH OR FOR WHEEZE, Disp: 17 g, Rfl: 1    simvastatin (ZOCOR) 40 mg tablet, TAKE 1 TABLET BY MOUTH EVERY DAY, Disp: 90 tablet, Rfl: 4    tamsulosin (FLOMAX) 0 4 mg, TAKE 1 CAPSULE BY MOUTH EVERY DAY WITH DINNER, Disp: 90 capsule, Rfl: 3    warfarin (COUMADIN) 2 5 mg tablet, Take 1 tablet by mouth daily T-Th-S-S takes 2 5mg    M-W-F takes 5 0 mg, Disp: , Rfl:     warfarin (COUMADIN) 5 mg tablet, TAKE 1 TABLET EVERY DAY OR AS DIRECTED BY MD (Patient taking differently: Taking Monday and Friday ), Disp: 90 tablet, Rfl: 3    ALLERGIES:  Allergies   Allergen Reactions    Pletal [Cilostazol] Tachycardia    Dyazide [Hydrochlorothiazide W-Triamterene] Other (See Comments)     Hyponatremia    Jardiance [Empagliflozin] Other (See Comments)     Constipation, increased urinary frequency--not tolerable           REVIEW OF SYSTEMS:  Review of Systems   Constitutional: Negative for chills, fatigue, fever and unexpected weight change  HENT: Negative for hearing loss, nosebleeds and sore throat  Eyes: Negative for pain, redness and visual disturbance  Respiratory: Negative for cough, shortness of breath and wheezing  Cardiovascular: Negative for chest pain, palpitations and leg swelling  Gastrointestinal: Negative for abdominal pain, nausea and vomiting  Endocrine: Negative for polydipsia and polyuria  Genitourinary: Negative for difficulty urinating and hematuria  Musculoskeletal: Positive for myalgias  Negative for arthralgias and joint swelling  Skin: Negative for rash and wound  Neurological: Negative for dizziness, numbness and headaches  Psychiatric/Behavioral: Negative for decreased concentration, dysphoric mood and suicidal ideas  The patient is not nervous/anxious          VITALS:  Vitals:    09/22/22 0914   BP: 137/77   Pulse: 77       LABS:  HgA1c:   Lab Results   Component Value Date    HGBA1C 6 7 (A) 05/17/2022     BMP:   Lab Results   Component Value Date    GLUCOSE 159 (H) 12/14/2015    CALCIUM 9 0 07/08/2022     (L) 12/14/2015    K 4 3 07/08/2022    CO2 25 07/08/2022     07/08/2022    BUN 12 07/08/2022    CREATININE 1 06 07/08/2022       _____________________________________________________  PHYSICAL EXAMINATION:  General: well developed and well nourished, alert, oriented times 3 and appears comfortable  Psychiatric: Normal  HEENT: Normocephalic, Atraumatic Trachea Midline, No torticollis  Pulmonary: No audible wheezing or respiratory distress   Abdomen/GI: Non tender, non distended   Cardiovascular: No pitting edema, 2+ radial pulse   Skin: No masses, erythema, lacerations, fluctation, ulcerations  Neurovascular: Sensation Intact to the Median, Ulnar, Radial Nerve, Motor Intact to the Median, Ulnar, Radial Nerve and Pulses Intact  Musculoskeletal: Normal, except as noted in detailed exam and in HPI  MUSCULOSKELETAL EXAMINATION:  Right wrist  1st Dorsal Compartment positive tender to palpation and EPL positive tenderness   Range of motion of the right wrist  is 50 degrees flexion, 40 degrees extension, 85 degrees pronation,85 degrees supination  There is no swelling present  There is no ecchymosis noted  Pulses are present and capillary fill is normal    Finkelstein's positive      ___________________________________________________  STUDIES REVIEWED:  I have personally reviewed AP lateral and oblique radiographs of right wrist which demonstrate mild arthritis in the wrist and CMC, some cystic changes in the scaphoid and lunate        PROCEDURES PERFORMED:  Hand/upper extremity injection: R extensor compartment 1  Universal Protocol:  Consent: Verbal consent obtained  Risks and benefits: risks, benefits and alternatives were discussed  Consent given by: patient  Time out: Immediately prior to procedure a "time out" was called to verify the correct patient, procedure, equipment, support staff and site/side marked as required    Patient understanding: patient states understanding of the procedure being performed  Site marked: the operative site was marked  Required items: required blood products, implants, devices, and special equipment available  Patient identity confirmed: verbally with patient    Supporting Documentation  Indications: pain and therapeutic   Procedure Details  Condition:de Quervain's tenosynovitis Site: R extensor compartment 1   Preparation: Patient was prepped and draped in the usual sterile fashion  Needle size: 25 G  Ultrasound guidance: no  Approach: radial  Medications administered: 1 mL lidocaine 1 %; 40 mg dexamethasone 100 mg/10 mL    Patient tolerance: patient tolerated the procedure well with no immediate complications  Dressing: Sterile dressing applied              _____________________________________________________      Scribe Attestation    I,:  Triston Koehler PA-C am acting as a scribe while in the presence of the attending physician :       I,:  Yoselin Mckenzie MD personally performed the services described in this documentation    as scribed in my presence :

## 2022-09-23 DIAGNOSIS — G62.9 PERIPHERAL POLYNEUROPATHY: ICD-10-CM

## 2022-09-23 RX ORDER — GABAPENTIN 100 MG/1
200 CAPSULE ORAL
Qty: 180 CAPSULE | Refills: 1 | Status: SHIPPED | OUTPATIENT
Start: 2022-09-23

## 2022-10-04 ENCOUNTER — APPOINTMENT (OUTPATIENT)
Dept: LAB | Facility: AMBULARY SURGERY CENTER | Age: 72
End: 2022-10-04
Payer: MEDICARE

## 2022-10-04 ENCOUNTER — ANTICOAG VISIT (OUTPATIENT)
Dept: CARDIOLOGY CLINIC | Facility: CLINIC | Age: 72
End: 2022-10-04

## 2022-10-04 DIAGNOSIS — I48.91 ATRIAL FIBRILLATION, UNSPECIFIED TYPE (HCC): ICD-10-CM

## 2022-10-04 DIAGNOSIS — Z86.718 HISTORY OF DVT (DEEP VEIN THROMBOSIS): Primary | ICD-10-CM

## 2022-10-04 LAB
INR PPP: 2.65 (ref 0.84–1.19)
PROTHROMBIN TIME: 28.5 SECONDS (ref 11.6–14.5)

## 2022-10-04 PROCEDURE — 85610 PROTHROMBIN TIME: CPT

## 2022-10-04 PROCEDURE — 36415 COLL VENOUS BLD VENIPUNCTURE: CPT

## 2022-10-04 NOTE — PROGRESS NOTES
Spoke with patient, advised INR good, continue 5 mg Mon Fri, 2 5 mg all other days, will recheck in 4 weeks 11/1/22

## 2022-10-05 NOTE — PROGRESS NOTES
OT Evaluation     Today's date: 10/6/2022  Patient name: Rafael Wu  : 1950  MRN: [id number]  Referring provider: Aurelia Mccann MD  Dx:   Encounter Diagnosis     ICD-10-CM    1  Extensor carpi radialis brevis tenosynovitis  M65 9                   Assessment  Assessment details: Xochitl Osorio is referred to therapy with a formal diagnosis of right 1st and 3rd extensor compartment tendonitis  He presents with findings consistent to the referred diagnosis including painful and reduced motion, positive provocative testing, and tenderness  At this time he is electing to pursue a home exercise program only for his impairments  He was provided with a gradually upgraded HEP that included stretching and eccentrics  He was also educated on applying heat, massage, and topical pain management  He was encouraged to return to formal therapy if he does not experience improvement  See below for a detailed assessment  Impairments: abnormal or restricted ROM, activity intolerance, impaired physical strength, lacks appropriate home exercise program and pain with function    Goals  STG: Patient will be compliant with home exercise program in 1 week  STG: Reports of pain will be reduced to 3/10 in the wrist     LTG: Performance in daily activity of yard work is improved in 4 weeks  Plan  Plan details: Treatment to include modalities, manual therapy, PRE's, HEP, and orthotics as appropriate  Patient would benefit from: skilled OT and OT eval  Planned modality interventions: thermotherapy: hydrocollator packs  Planned therapy interventions: home exercise program, therapeutic exercise, patient education, therapeutic activities, stretching, strengthening and manual therapy  Frequency: Return as needed    Duration in visits: 10  Plan of Care beginning date: 10/6/2022  Plan of Care expiration date: 2022  Treatment plan discussed with: patient        Subjective Evaluation    History of Present Illness  Mechanism of injury: Lawrence Galvan reports that around  he began to feel symptoms of pain in his right wrist  He attributes it to over-use, and likely weed wacking  He received a cortisone injection which has helped his pain  He has also been wearing a wrist brace for activity and also at nighttime  Pain  Current pain ratin  At worst pain ratin  Location: radial wrist     Hand dominance: right    Treatments  Current treatment: occupational therapy  Patient Goals  Patient goals for therapy: decreased pain, increased motion, increased strength and independence with ADLs/IADLs          Objective     Tenderness     Right Wrist/Hand   Tenderness in the first dorsal compartment and third dorsal compartment  No tenderness in the scaphoid  Additional Tenderness Details  +radial styloid  -tamiko's tubercle      Active Range of Motion     Left Wrist   Wrist flexion: 55 degrees   Wrist extension: 46 degrees   Radial deviation: 15 degrees   Ulnar deviation: 30 degrees     Right Wrist   Wrist flexion: 45 degrees   Wrist extension: 35 degrees with pain  Radial deviation: 6 degrees   Ulnar deviation: 15 degrees     Left Thumb   Palmar Abduction     CMC: 60 degrees  Radial abduction    CMC: 65 degrees    Right Thumb   Palmar Abduction    CMC: 60  Radial Abduction    CMC: 55  Opposition: Opposition to the tips of all fingers  Additional Active Range of Motion Details  Full digit flexion and extension  Strength/Myotome Testing     Left Wrist/Hand      (2nd hand position)     Trial 1: 82    Thumb Strength  Key/Lateral Pinch     Trial 1: 18 5  Palmar/Three-Point Pinch     Trial 1: 15 7    Right Wrist/Hand      (2nd hand position)     Trial 1: 94 7    Thumb Strength   Key/Lateral Pinch     Trial 1: 20 6  Palmar/Three-Point Pinch     Trial 1: 17 4    Tests     Right Wrist/Hand   Positive Finkelstein's       Additional Tests Details  +RROM EPL  -RROM EPB  +RROM APL  +Eichoff's  +WHAT Precautions: Universal      Manuals 10/6            IASTM 5'                                                   Neuro Re-Ed                                                                                                        Ther Ex             HEP extrinsic stretches, 1st dorsal stretch, eccentrics                                                                                                       Ther Activity                                       Gait Training                                       Modalities             [facility]

## 2022-10-06 ENCOUNTER — EVALUATION (OUTPATIENT)
Dept: OCCUPATIONAL THERAPY | Facility: CLINIC | Age: 72
End: 2022-10-06
Payer: MEDICARE

## 2022-10-06 DIAGNOSIS — M65.9 EXTENSOR CARPI RADIALIS BREVIS TENOSYNOVITIS: Primary | ICD-10-CM

## 2022-10-06 PROCEDURE — 97110 THERAPEUTIC EXERCISES: CPT | Performed by: OCCUPATIONAL THERAPIST

## 2022-10-06 PROCEDURE — 97165 OT EVAL LOW COMPLEX 30 MIN: CPT | Performed by: OCCUPATIONAL THERAPIST

## 2022-10-11 PROBLEM — Z90.49 HISTORY OF PAROTIDECTOMY: Status: ACTIVE | Noted: 2022-10-11

## 2022-10-17 DIAGNOSIS — K21.9 GASTROESOPHAGEAL REFLUX DISEASE WITHOUT ESOPHAGITIS: ICD-10-CM

## 2022-10-17 RX ORDER — OMEPRAZOLE 20 MG/1
CAPSULE, DELAYED RELEASE ORAL
Qty: 90 CAPSULE | Refills: 1 | Status: SHIPPED | OUTPATIENT
Start: 2022-10-17

## 2022-10-22 DIAGNOSIS — I82.409 DEEP VEIN THROMBOSIS (DVT) OF LOWER EXTREMITY, UNSPECIFIED CHRONICITY, UNSPECIFIED LATERALITY, UNSPECIFIED VEIN (HCC): ICD-10-CM

## 2022-10-22 DIAGNOSIS — E11.42 TYPE 2 DIABETES MELLITUS WITH DIABETIC POLYNEUROPATHY, WITHOUT LONG-TERM CURRENT USE OF INSULIN (HCC): ICD-10-CM

## 2022-10-22 RX ORDER — SITAGLIPTIN 100 MG/1
TABLET, FILM COATED ORAL
Qty: 90 TABLET | Refills: 3 | Status: SHIPPED | OUTPATIENT
Start: 2022-10-22

## 2022-10-23 RX ORDER — WARFARIN SODIUM 5 MG/1
TABLET ORAL
Qty: 90 TABLET | Refills: 3 | Status: SHIPPED | OUTPATIENT
Start: 2022-10-23

## 2022-10-25 DIAGNOSIS — E11.40 TYPE 2 DIABETES MELLITUS WITH DIABETIC NEUROPATHY, WITHOUT LONG-TERM CURRENT USE OF INSULIN (HCC): ICD-10-CM

## 2022-10-25 RX ORDER — METFORMIN HYDROCHLORIDE 500 MG/1
TABLET, EXTENDED RELEASE ORAL
Qty: 180 TABLET | Refills: 1 | Status: SHIPPED | OUTPATIENT
Start: 2022-10-25

## 2022-10-31 ENCOUNTER — ANTICOAG VISIT (OUTPATIENT)
Dept: CARDIOLOGY CLINIC | Facility: CLINIC | Age: 72
End: 2022-10-31

## 2022-10-31 ENCOUNTER — APPOINTMENT (OUTPATIENT)
Dept: LAB | Facility: AMBULARY SURGERY CENTER | Age: 72
End: 2022-10-31

## 2022-10-31 DIAGNOSIS — I48.91 ATRIAL FIBRILLATION, UNSPECIFIED TYPE (HCC): ICD-10-CM

## 2022-10-31 DIAGNOSIS — Z86.718 HISTORY OF DVT (DEEP VEIN THROMBOSIS): Primary | ICD-10-CM

## 2022-10-31 DIAGNOSIS — E11.40 TYPE 2 DIABETES MELLITUS WITH DIABETIC NEUROPATHY, WITHOUT LONG-TERM CURRENT USE OF INSULIN (HCC): ICD-10-CM

## 2022-10-31 DIAGNOSIS — E78.2 MIXED HYPERLIPIDEMIA: ICD-10-CM

## 2022-10-31 DIAGNOSIS — R25.2 MUSCLE CRAMPS: ICD-10-CM

## 2022-10-31 DIAGNOSIS — I10 ESSENTIAL HYPERTENSION: ICD-10-CM

## 2022-10-31 LAB
ALBUMIN SERPL BCP-MCNC: 3.5 G/DL (ref 3.5–5)
ALP SERPL-CCNC: 63 U/L (ref 46–116)
ALT SERPL W P-5'-P-CCNC: 60 U/L (ref 12–78)
ANION GAP SERPL CALCULATED.3IONS-SCNC: 5 MMOL/L (ref 4–13)
AST SERPL W P-5'-P-CCNC: 41 U/L (ref 5–45)
BASOPHILS # BLD AUTO: 0.1 THOUSANDS/ÂΜL (ref 0–0.1)
BASOPHILS NFR BLD AUTO: 1 % (ref 0–1)
BILIRUB SERPL-MCNC: 0.7 MG/DL (ref 0.2–1)
BUN SERPL-MCNC: 8 MG/DL (ref 5–25)
CALCIUM SERPL-MCNC: 9 MG/DL (ref 8.3–10.1)
CHLORIDE SERPL-SCNC: 108 MMOL/L (ref 96–108)
CHOLEST SERPL-MCNC: 167 MG/DL
CO2 SERPL-SCNC: 23 MMOL/L (ref 21–32)
CREAT SERPL-MCNC: 1.15 MG/DL (ref 0.6–1.3)
EOSINOPHIL # BLD AUTO: 0.35 THOUSAND/ÂΜL (ref 0–0.61)
EOSINOPHIL NFR BLD AUTO: 4 % (ref 0–6)
ERYTHROCYTE [DISTWIDTH] IN BLOOD BY AUTOMATED COUNT: 14.1 % (ref 11.6–15.1)
EST. AVERAGE GLUCOSE BLD GHB EST-MCNC: 157 MG/DL
GFR SERPL CREATININE-BSD FRML MDRD: 63 ML/MIN/1.73SQ M
GLUCOSE P FAST SERPL-MCNC: 149 MG/DL (ref 65–99)
HBA1C MFR BLD: 7.1 %
HCT VFR BLD AUTO: 48.7 % (ref 36.5–49.3)
HDLC SERPL-MCNC: 29 MG/DL
HGB BLD-MCNC: 16 G/DL (ref 12–17)
IMM GRANULOCYTES # BLD AUTO: 0.05 THOUSAND/UL (ref 0–0.2)
IMM GRANULOCYTES NFR BLD AUTO: 1 % (ref 0–2)
INR PPP: 2.58 (ref 0.84–1.19)
LDLC SERPL CALC-MCNC: 83 MG/DL (ref 0–100)
LYMPHOCYTES # BLD AUTO: 1.57 THOUSANDS/ÂΜL (ref 0.6–4.47)
LYMPHOCYTES NFR BLD AUTO: 20 % (ref 14–44)
MAGNESIUM SERPL-MCNC: 2 MG/DL (ref 1.6–2.6)
MCH RBC QN AUTO: 31.6 PG (ref 26.8–34.3)
MCHC RBC AUTO-ENTMCNC: 32.9 G/DL (ref 31.4–37.4)
MCV RBC AUTO: 96 FL (ref 82–98)
MONOCYTES # BLD AUTO: 1.25 THOUSAND/ÂΜL (ref 0.17–1.22)
MONOCYTES NFR BLD AUTO: 16 % (ref 4–12)
NEUTROPHILS # BLD AUTO: 4.66 THOUSANDS/ÂΜL (ref 1.85–7.62)
NEUTS SEG NFR BLD AUTO: 58 % (ref 43–75)
NONHDLC SERPL-MCNC: 138 MG/DL
NRBC BLD AUTO-RTO: 0 /100 WBCS
PLATELET # BLD AUTO: 271 THOUSANDS/UL (ref 149–390)
PMV BLD AUTO: 10.7 FL (ref 8.9–12.7)
POTASSIUM SERPL-SCNC: 4.3 MMOL/L (ref 3.5–5.3)
PROT SERPL-MCNC: 7.7 G/DL (ref 6.4–8.4)
PROTHROMBIN TIME: 27.9 SECONDS (ref 11.6–14.5)
RBC # BLD AUTO: 5.06 MILLION/UL (ref 3.88–5.62)
SODIUM SERPL-SCNC: 136 MMOL/L (ref 135–147)
TRIGL SERPL-MCNC: 273 MG/DL
TSH SERPL DL<=0.05 MIU/L-ACNC: 1.96 UIU/ML (ref 0.45–4.5)
WBC # BLD AUTO: 7.98 THOUSAND/UL (ref 4.31–10.16)

## 2022-10-31 NOTE — PROGRESS NOTES
Spoke with patient, advised INR good, will continue 5 mg Mon Fri, 2 5 mg all other days   Patient having colonoscopy 12/1/22, will be holding for 5 days, will recheck INR 11/23/22

## 2022-11-03 ENCOUNTER — OFFICE VISIT (OUTPATIENT)
Dept: OBGYN CLINIC | Facility: CLINIC | Age: 72
End: 2022-11-03

## 2022-11-03 VITALS
DIASTOLIC BLOOD PRESSURE: 71 MMHG | HEART RATE: 74 BPM | WEIGHT: 218.8 LBS | SYSTOLIC BLOOD PRESSURE: 133 MMHG | HEIGHT: 71 IN | BODY MASS INDEX: 30.63 KG/M2

## 2022-11-03 DIAGNOSIS — M65.4 DE QUERVAIN'S TENOSYNOVITIS, RIGHT: Primary | ICD-10-CM

## 2022-11-03 RX ORDER — LIDOCAINE HYDROCHLORIDE 10 MG/ML
1 INJECTION, SOLUTION INFILTRATION; PERINEURAL
Status: COMPLETED | OUTPATIENT
Start: 2022-11-03 | End: 2022-11-03

## 2022-11-03 RX ORDER — DEXAMETHASONE SODIUM PHOSPHATE 100 MG/10ML
40 INJECTION INTRAMUSCULAR; INTRAVENOUS
Status: COMPLETED | OUTPATIENT
Start: 2022-11-03 | End: 2022-11-03

## 2022-11-03 RX ADMIN — DEXAMETHASONE SODIUM PHOSPHATE 40 MG: 100 INJECTION INTRAMUSCULAR; INTRAVENOUS at 10:54

## 2022-11-03 RX ADMIN — LIDOCAINE HYDROCHLORIDE 1 ML: 10 INJECTION, SOLUTION INFILTRATION; PERINEURAL at 10:54

## 2022-11-03 NOTE — PROGRESS NOTES
ASSESSMENT/PLAN:      71-year-old male here for his right de Quervain tenosynovitis  At last visit the 1st injection did help with the intensity of his pain, but wished to proceed with the 2nd injection today  Patient will continue his home exercise program with the stretches  We will follow up with the patient in the New Year    The patient verbalized understanding of exam findings and treatment plan  We engaged in the shared decision-making process and treatment options were discussed at length with the patient  Surgical and conservative management discussed today along with risks and benefits  Diagnoses and all orders for this visit:    De Quervain's tenosynovitis, right    Other orders  -     Hand/upper extremity injection        Follow Up:  Return in about 8 weeks (around 12/29/2022) for New year  To Do Next Visit:  Re-evaluation of current issue    ____________________________________________________________________________________________________________________________________________      CHIEF COMPLAINT:  Chief Complaint   Patient presents with   • Right Wrist - Follow-up     deQuervain's tenosynovitis and right EPL tendonitis        SUBJECTIVE:  Pily Ceballos is a 71y o  year old RHD male who presents today for a 6 week follow up for his Right Dequervain' tenosynovitis and EPL tenonitis  At his last visit on 9/22/2022, he had his 2st injection to the first dorsal compartment  He states that it still aches, but there isn't sharp pain anymore  He has been doing his HEP  I have personally reviewed all the relevant PMH, PSH, SH, FH, Medications and allergies       PAST MEDICAL HISTORY:  Past Medical History:   Diagnosis Date   • LISY (acute kidney injury) ([facility] ) 9/11/2021   • Arthritis    • Atherosclerosis of native arteries of the extremities with ulceration ([facility] ) 3/21/2019   • Claudication, intermittent (HCC)    • Colon polyp     6 polyps 3 years ago   • Diabetes mellitus ([facility] )     type 2 • Diabetic neuropathic arthropathy (HCC)     causes weakness in LE and uses a cane as assist to walk   • Diabetic neuropathy (HCC)    • Fatty liver    • GERD (gastroesophageal reflux disease)    • History of DVT (deep vein thrombosis) 2015    left LE   • Hyperlipidemia    • Hypertension    • Hyponatremia 9/11/2021   • Kidney stone    • Parotid tumor    • Seasonal allergies    • Spinal stenosis    • Ureteral stone with hydronephrosis 5/17/2019    Added automatically from request for surgery [id number]       PAST SURGICAL HISTORY:  Past Surgical History:   Procedure Laterality Date   • COLONOSCOPY  05/16/2019    completed by Dr Alfonso Butcher, Repeat 3 years   • FL RETROGRADE PYELOGRAM  5/17/2019   • PAROTIDECTOMY Left    • AR CYSTO/URETERO W/LITHOTRIPSY &INDWELL STENT INSRT Right 5/17/2019    Procedure: CYSTOSCOPY URETEROSCOPY WITH LITHOTRIPSY HOLMIUM LASER, RETROGRADE PYELOGRAM AND INSERTION STENT URETERAL--possible stent only;  Surgeon: Gt Fernandes MD;  [facility];  Service: Urology   • AR EXC PAROTD,LAT LOBE,DISSECT 5TH NERV Right 10/18/2017    Procedure: SUPERFICIAL PAROTIDECTOMY WITH NERVE DISSECTION AND PRESERVATION;  Surgeon: Mavis Silva MD;  [facility];  Service: ENT   • AR INCISE FINGER TENDON SHEATH Right 11/3/2020    Procedure: Ashwin Fierro;  Surgeon: Agusto Stephens MD;  Location: [facility];  Service: Orthopedics   • ROTATOR CUFF REPAIR Bilateral    • TONSILLECTOMY         FAMILY HISTORY:  Family History   Problem Relation Age of Onset   • Lupus Mother    • Rheum arthritis Mother    • Cirrhosis Father    • Alcohol abuse Father    • Substance Abuse Brother        SOCIAL HISTORY:  Social History     Tobacco Use   • Smoking status: Current Every Day Smoker     Types: Pipe   • Smokeless tobacco: Never Used   • Tobacco comment: smokes 2 pipes daily   Vaping Use   • Vaping Use: Never used   Substance Use Topics   • Alcohol use: Yes     Comment: socially   • Drug use:  No MEDICATIONS:    Current Outpatient Medications:   •  acetaminophen (TYLENOL) 325 mg tablet, 2, by mouth, every 6 hours as needed for mild to moderate pain , Disp: 30 tablet, Rfl: 0  •  aspirin (ECOTRIN LOW STRENGTH) 81 mg EC tablet, Take 81 mg by mouth daily  , Disp: , Rfl:   •  Cyanocobalamin (VITAMIN B 12 PO), Take 1,000 mcg by mouth daily, Disp: , Rfl:   •  famotidine (PEPCID) 20 mg tablet, Take 1 tablet (20 mg total) by mouth 2 (two) times a day, Disp: 90 tablet, Rfl: 3  •  fenofibrate micronized (LOFIBRA) 134 MG capsule, TAKE 1 CAPSULE (134 MG TOTAL) BY MOUTH DAILY WITH BREAKFAST, Disp: 90 capsule, Rfl: 3  •  gabapentin (NEURONTIN) 100 mg capsule, TAKE 2 CAPSULES (200 MG TOTAL) BY MOUTH DAILY AT BEDTIME, Disp: 180 capsule, Rfl: 1  •  glimepiride (AMARYL) 1 mg tablet, Take 1 tablet (1 mg total) by mouth daily with breakfast, Disp: 90 tablet, Rfl: 1  •  Januvia 100 MG tablet, TAKE 1 TABLET BY MOUTH EVERY DAY, Disp: 90 tablet, Rfl: 3  •  lisinopril (ZESTRIL) 20 mg tablet, TAKE 1 TABLET BY MOUTH EVERY DAY, Disp: 90 tablet, Rfl: 4  •  metFORMIN (GLUCOPHAGE-XR) 500 mg 24 hr tablet, TAKE 1 TABLET IN THE MORNING AND 1 TABLET (500 MG TOTAL) IN THE EVENING   TAKE WITH MEALS , Disp: 180 tablet, Rfl: 1  •  omeprazole (PriLOSEC) 20 mg delayed release capsule, TAKE 1 CAPSULE BY MOUTH EVERY DAY, Disp: 90 capsule, Rfl: 1  •  ProAir  (90 Base) MCG/ACT inhaler, INHALE 2 PUFFS INTO THE LUNGS EVERY 4 HOURS AS NEEDED FOR SHORTNESS OF BREATH OR FOR WHEEZE, Disp: 17 g, Rfl: 1  •  simvastatin (ZOCOR) 40 mg tablet, TAKE 1 TABLET BY MOUTH EVERY DAY, Disp: 90 tablet, Rfl: 4  •  tamsulosin (FLOMAX) 0 4 mg, TAKE 1 CAPSULE BY MOUTH EVERY DAY WITH DINNER, Disp: 90 capsule, Rfl: 3  •  warfarin (COUMADIN) 2 5 mg tablet, Take 1 tablet by mouth daily T-Th-S-S takes 2 5mg    M-W-F takes 5 0 mg, Disp: , Rfl:   •  warfarin (COUMADIN) 5 mg tablet, TAKE 1 TABLET EVERY DAY OR AS DIRECTED BY MD, Disp: 90 tablet, Rfl: 3    ALLERGIES:  Allergies   Allergen Reactions   • Pletal [Cilostazol] Tachycardia   • Dyazide [Hydrochlorothiazide W-Triamterene] Other (See Comments)     Hyponatremia   • Jardiance [Empagliflozin] Other (See Comments)     Constipation, increased urinary frequency--not tolerable       REVIEW OF SYSTEMS:  Review of Systems   Constitutional: Negative for chills, fever and unexpected weight change  HENT: Negative for hearing loss, nosebleeds and sore throat  Eyes: Negative for pain, redness and visual disturbance  Respiratory: Negative for cough, shortness of breath and wheezing  Cardiovascular: Negative for chest pain, palpitations and leg swelling  Gastrointestinal: Negative for abdominal pain, nausea and vomiting  Endocrine: Negative for polydipsia and polyuria  Genitourinary: Negative for dysuria and hematuria  Skin: Negative for rash and wound  Neurological: Negative for dizziness, light-headedness and headaches  Psychiatric/Behavioral: Negative for decreased concentration, dysphoric mood and suicidal ideas  The patient is not nervous/anxious          VITALS:  Vitals:    11/03/22 1043   BP: 133/71   Pulse: 74       LABS:  HgA1c:   Lab Results   Component Value Date    HGBA1C 7 1 (H) 10/31/2022     BMP:   Lab Results   Component Value Date    GLUCOSE 159 (H) 12/14/2015    CALCIUM 9 0 10/31/2022     (L) 12/14/2015    K 4 3 10/31/2022    CO2 23 10/31/2022     10/31/2022    BUN 8 10/31/2022    CREATININE 1 15 10/31/2022       _____________________________________________________  PHYSICAL EXAMINATION:  General: well developed and well nourished, alert, oriented times 3 and appears comfortable  Psychiatric: Normal  HEENT: Normocephalic, Atraumatic Trachea Midline, No torticollis  Pulmonary: No audible wheezing or respiratory distress   Cardiovascular: No pitting edema, 2+ radial pulse   Abdominal/GI: abdomen non tender, non distended   Skin: No masses, erythema, lacerations, fluctation, ulcerations  Neurovascular: Sensation Intact to the Median, Ulnar, Radial Nerve, Motor Intact to the Median, Ulnar, Radial Nerve and Pulses Intact  Musculoskeletal: Normal, except as noted in detailed exam and in HPI  MUSCULOSKELETAL EXAMINATION:    Terence Muñoz Tenosynovitis Exam:  right side    Positive tender to palpation over 1st dorsal extensor compartment   Negative palpable nodule  Negative crepitus over 1st dorsal extensor compartment   Positive Finkelstein's test    Negative pain with resisted abduction of the thumb    ___________________________________________________  STUDIES REVIEWED:  No images reviewed at today's visit      PROCEDURES PERFORMED:  Hand/upper extremity injection: R extensor compartment 1  Universal Protocol:  Consent: Verbal consent obtained  Risks and benefits: risks, benefits and alternatives were discussed  Consent given by: patient  Time out: Immediately prior to procedure a "time out" was called to verify the correct patient, procedure, equipment, support staff and site/side marked as required    Timeout called at: 11/3/2022 10:53 AM   Patient understanding: patient states understanding of the procedure being performed  Site marked: the operative site was marked  Patient identity confirmed: verbally with patient    Supporting Documentation  Indications: tendon swelling and pain   Procedure Details  Condition:de Quervain's tenosynovitis Site: R extensor compartment 1   Preparation: Patient was prepped and draped in the usual sterile fashion  Needle size: 25 G  Ultrasound guidance: no  Approach: volar  Medications administered: 1 mL lidocaine 1 %; 40 mg dexamethasone 100 mg/10 mL    Patient tolerance: patient tolerated the procedure well with no immediate complications  Dressing:  Sterile dressing applied         _____________________________________________________      Scribe Attestation    I,:  Katheren Councilman am acting as a scribe while in the presence of the attending physician :       I,:  Natalee Arceo MD personally performed the services described in this documentation    as scribed in my presence :

## 2022-11-10 ENCOUNTER — RA CDI HCC (OUTPATIENT)
Dept: OTHER | Facility: HOSPITAL | Age: 72
End: 2022-11-10

## 2022-11-10 ENCOUNTER — NURSE TRIAGE (OUTPATIENT)
Dept: OTHER | Facility: OTHER | Age: 72
End: 2022-11-10

## 2022-11-10 NOTE — PROGRESS NOTES
[facility]  coding opportunities          Chart Reviewed number of suggestions sent to Provider: 1   E11 [id number]    Patients Insurance     Medicare Insurance: Medicare

## 2022-11-10 NOTE — TELEPHONE ENCOUNTER
Patient was not used to the new bowel prep protocol and wanted what he used in the past for his colonoscopies  After reviewing the instructions and explaining that a lot of insurance companies no longer cover the other preps, he was willing to stick with the prep as ordered  Patient encouraged to call back with any other questions or concerns  Reason for Disposition  • Bowel prep for colonoscopy, questions about    Answer Assessment - Initial Assessment Questions  1  DATE/TIME: "When do you have your colonoscopy?"       12/1/22  2   MAIN CONCERN: "What is your main concern right now?" "What questions do you have?"      Patient wanted a different prep    Protocols used: COLONOSCOPY SYMPTOMS AND QUESTIONS-ADULT-

## 2022-11-10 NOTE — TELEPHONE ENCOUNTER
Regarding: colonoscopy prep  ----- Message from Milagros Singh sent at 11/10/2022 12:15 PM EST -----  " have a colonoscopy scheduled for  and I got miralax and dulcolax to use for prep   Can I have the liquid instead?"

## 2022-11-17 ENCOUNTER — OFFICE VISIT (OUTPATIENT)
Dept: FAMILY MEDICINE CLINIC | Facility: CLINIC | Age: 72
End: 2022-11-17

## 2022-11-17 VITALS
TEMPERATURE: 98.4 F | RESPIRATION RATE: 16 BRPM | DIASTOLIC BLOOD PRESSURE: 70 MMHG | HEART RATE: 77 BPM | OXYGEN SATURATION: 94 % | HEIGHT: 71 IN | SYSTOLIC BLOOD PRESSURE: 130 MMHG | BODY MASS INDEX: 30.66 KG/M2 | WEIGHT: 219 LBS

## 2022-11-17 DIAGNOSIS — I48.91 ATRIAL FIBRILLATION, UNSPECIFIED TYPE (HCC): ICD-10-CM

## 2022-11-17 DIAGNOSIS — I10 ESSENTIAL HYPERTENSION: ICD-10-CM

## 2022-11-17 DIAGNOSIS — E11.40 TYPE 2 DIABETES MELLITUS WITH DIABETIC NEUROPATHY, WITHOUT LONG-TERM CURRENT USE OF INSULIN (HCC): Primary | ICD-10-CM

## 2022-11-17 DIAGNOSIS — E78.2 MIXED HYPERLIPIDEMIA: ICD-10-CM

## 2022-11-17 DIAGNOSIS — K21.9 GASTROESOPHAGEAL REFLUX DISEASE WITHOUT ESOPHAGITIS: ICD-10-CM

## 2022-11-17 DIAGNOSIS — Z86.718 HISTORY OF DVT (DEEP VEIN THROMBOSIS): ICD-10-CM

## 2022-11-17 DIAGNOSIS — I70.213 ATHEROSCLEROSIS OF NATIVE ARTERY OF BOTH LOWER EXTREMITIES WITH INTERMITTENT CLAUDICATION (HCC): ICD-10-CM

## 2022-11-17 NOTE — PROGRESS NOTES
FAMILY PRACTICE OFFICE VISIT       NAME: Irene Roland  AGE: 71 y o  SEX: male       : 1950        MRN: [id number]    Assessment and Plan   1  Type 2 diabetes mellitus with diabetic neuropathy, without long-term current use of insulin (HCC)  Assessment & Plan:    Lab Results   Component Value Date    HGBA1C 7 1 (H) 10/31/2022     A1c increased from 6 7% to 7 1%  Will continue same medications and he will work on diet changes, and being more active  Eye exam, foot exam up to date  Orders:  -     Hemoglobin A1C; Future; Expected date: 2023    2  Essential hypertension  Assessment & Plan:  Blood pressure is well controlled on current medications  Orders:  -     CBC; Future; Expected date: 2023  -     Comprehensive metabolic panel; Future    3  Mixed hyperlipidemia  Assessment & Plan:  Lipid panel total cholesterol 167, Triglycerides 273, HDL 29, LDL 83  LDL usually runs 47-70  Continue simvastatin and fenofibrate  Night time snacking is hardest part of the day from diet perspective  Discussed, choosing better snack options  Increase activity level  Orders:  -     Lipid panel; Future; Expected date: 2023    4  Gastroesophageal reflux disease without esophagitis  Assessment & Plan: Will continue with Pepcid OTC 20 mg twice daily  Currently taking omeprazole every 4th day, will change to prn  Continue to watch for dietary triggers  5  History of DVT (deep vein thrombosis)  Assessment & Plan:  History of left lower extremity DVT  Continue coumadin  6  Atherosclerosis of native artery of both lower extremities with intermittent claudication (HCC)  Assessment & Plan:  Right lower extremity:50-75% stenosis in the proximal  superficial femoral artery  Left lower extremity:50-75% stenosis in the proximal  superficial femoral artery  Continue follow up with vascular         7  Atrial fibrillation, unspecified type [facility])  Assessment & Plan:  Paroxysmal a  Fib  Continue anticoagulation with coumadin and aspirin  Continue cardiology follow up  Reviewed all recent blood work completed 10/31/22  Repeat blood work and return to the office in 6 months or sooner if needed  Chief Complaint     Chief Complaint   Patient presents with   • Follow-up     Pt is here for 6 mos f/u       History of Present Illness       Jean Paul Little is a 71year old male presenting today for follow up on chronic conditions  Eats well and watches portions all day  Evening is worst time, likes to snack, and tends to eat candy, slim jims  Right wrist pain: De Quervain's tenosynovitis  Had 2 cortsione injections  Not significantly helping  Does not want to do surgery  Following with hand specialist, Dr Frantz Alonso  Needs colonoscopy, scheduled December 1  Down to taking Omeprazole every 4th day  In addition to Pepcid OTC BID  Sometimes gets Heartburm based on what he is eating  Weight was down to 208 last year, was more active last year  Review of Systems   Review of Systems   Constitutional: Negative  HENT: Negative  Respiratory: Negative  Cardiovascular: Negative  Gastrointestinal: Negative  Genitourinary: Negative  Musculoskeletal: Positive for arthralgias (right wrist pain)  Skin: Negative  Neurological: Negative  Hematological: Negative  Psychiatric/Behavioral: Negative  I have reviewed the patient's medical history in detail; there are no changes to the history as noted in the electronic medical record  Objective     Vitals:    11/17/22 0920   BP: 130/70   Pulse: 77   Resp: 16   Temp: 98 4 °F (36 9 °C)   TempSrc: Temporal   SpO2: 94%   Weight: 99 3 kg (219 lb)   Height: 5' 11" (1 803 m)     Wt Readings from Last 3 Encounters:   11/17/22 99 3 kg (219 lb)   11/03/22 99 2 kg (218 lb 12 8 oz)   10/11/22 101 kg (223 lb)     Physical Exam  Vitals and nursing note reviewed     Constitutional: General: He is not in acute distress  Appearance: Normal appearance  He is well-developed and well-nourished  He is not ill-appearing  HENT:      Head: Normocephalic and atraumatic  Right Ear: Tympanic membrane normal       Left Ear: Tympanic membrane normal       Mouth/Throat:      Mouth: Oropharynx is clear and moist  Mucous membranes are moist       Pharynx: Oropharynx is clear  Eyes:      Extraocular Movements: EOM normal       Conjunctiva/sclera: Conjunctivae normal       Pupils: Pupils are equal, round, and reactive to light  Neck:      Thyroid: No thyromegaly  Cardiovascular:      Rate and Rhythm: Normal rate and regular rhythm  Heart sounds: No murmur heard  Pulmonary:      Effort: Pulmonary effort is normal       Breath sounds: Normal breath sounds  Musculoskeletal:      Cervical back: Normal range of motion and neck supple  Right lower leg: No edema  Left lower leg: No edema  Lymphadenopathy:      Cervical: No cervical adenopathy  Skin:     Findings: No rash  Neurological:      Mental Status: He is alert and oriented to person, place, and time  Psychiatric:         Mood and Affect: Mood and affect and mood normal          Tobacco Cessation Counseling: Tobacco cessation counseling was provided  The patient is sincerely urged to quit consumption of tobacco  He is not ready to quit tobacco          ALLERGIES:  Allergies   Allergen Reactions   • Pletal [Cilostazol] Tachycardia   • Dyazide [Hydrochlorothiazide W-Triamterene] Other (See Comments)     Hyponatremia   • Jardiance [Empagliflozin] Other (See Comments)     Constipation, increased urinary frequency--not tolerable       Current Medications     Current Outpatient Medications   Medication Sig Dispense Refill   • acetaminophen (TYLENOL) 325 mg tablet 2, by mouth, every 6 hours as needed for mild to moderate pain   30 tablet 0   • aspirin (ECOTRIN LOW STRENGTH) 81 mg EC tablet Take 81 mg by mouth daily       • Cyanocobalamin (VITAMIN B 12 PO) Take 1,000 mcg by mouth daily     • famotidine (PEPCID) 20 mg tablet Take 1 tablet (20 mg total) by mouth 2 (two) times a day 90 tablet 3   • fenofibrate micronized (LOFIBRA) 134 MG capsule TAKE 1 CAPSULE (134 MG TOTAL) BY MOUTH DAILY WITH BREAKFAST 90 capsule 3   • gabapentin (NEURONTIN) 100 mg capsule TAKE 2 CAPSULES (200 MG TOTAL) BY MOUTH DAILY AT BEDTIME 180 capsule 1   • Januvia 100 MG tablet TAKE 1 TABLET BY MOUTH EVERY DAY 90 tablet 3   • lisinopril (ZESTRIL) 20 mg tablet TAKE 1 TABLET BY MOUTH EVERY DAY 90 tablet 4   • metFORMIN (GLUCOPHAGE-XR) 500 mg 24 hr tablet TAKE 1 TABLET IN THE MORNING AND 1 TABLET (500 MG TOTAL) IN THE EVENING  TAKE WITH MEALS  180 tablet 1   • omeprazole (PriLOSEC) 20 mg delayed release capsule TAKE 1 CAPSULE BY MOUTH EVERY DAY 90 capsule 1   • ProAir  (90 Base) MCG/ACT inhaler INHALE 2 PUFFS INTO THE LUNGS EVERY 4 HOURS AS NEEDED FOR SHORTNESS OF BREATH OR FOR WHEEZE 17 g 1   • simvastatin (ZOCOR) 40 mg tablet TAKE 1 TABLET BY MOUTH EVERY DAY 90 tablet 4   • tamsulosin (FLOMAX) 0 4 mg TAKE 1 CAPSULE BY MOUTH EVERY DAY WITH DINNER 90 capsule 3   • warfarin (COUMADIN) 2 5 mg tablet Take 1 tablet by mouth daily T-Th-S-S takes 2 5mg     M-W-F takes 5 0 mg     • warfarin (COUMADIN) 5 mg tablet TAKE 1 TABLET EVERY DAY OR AS DIRECTED BY MD 90 tablet 3   • glimepiride (AMARYL) 1 mg tablet TAKE 1 TABLET BY MOUTH DAILY WITH BREAKFAST  90 tablet 1     No current facility-administered medications for this visit           Health Maintenance     Health Maintenance   Topic Date Due   • Colorectal Cancer Screening  11/21/2022   • BMI: Followup Plan  02/21/2023   • Diabetic Foot Exam  02/21/2023   • Depression Screening  05/17/2023   • DM Eye Exam  04/12/2023   • HEMOGLOBIN A1C  04/30/2023   • Medicare Annual Wellness Visit (AWV)  05/17/2023   • Falls: Plan of Care  05/21/2023   • Fall Risk  10/06/2023   • BMI: Adult  11/17/2023   • Hepatitis C Screening Completed   • Pneumococcal Vaccine: 65+ Years  Completed   • Hepatitis B Vaccine  Completed   • Influenza Vaccine  Completed   • COVID-19 Vaccine  Completed   • HIB Vaccine  Aged Out   • IPV Vaccine  Aged Out   • Hepatitis A Vaccine  Aged Out   • Meningococcal ACWY Vaccine  Aged Out   • HPV Vaccine  Aged Out     Immunization History   Administered Date(s) Administered   • COVID-19 PFIZER VACCINE 0 3 ML IM 03/05/2021, 03/26/2021, 09/25/2021   • COVID-19 Pfizer Vac BIVALENT Christopher-sucrose 12 Yr+ IM (BOOSTER ONLY) 09/09/2022   • COVID-19 Pfizer vac (Christopher-sucrose, gray cap) 12 yr+ IM 04/01/2022   • COVID-19, unspecified 04/01/2022   • Hep B, adult 03/19/2019, 04/23/2019, 09/19/2019   • INFLUENZA 09/13/2018, 09/17/2018, 09/20/2021, 09/09/2022   • Influenza, high dose seasonal 0 7 mL 09/19/2019, 08/24/2020   • Pneumococcal Conjugate 13-Valent 03/19/2019   • Pneumococcal Polysaccharide PPV23 08/17/2020   • Zoster Vaccine Recombinant 07/30/2018, 09/29/2018       DAYAMI Back

## 2022-11-19 DIAGNOSIS — E11.40 TYPE 2 DIABETES MELLITUS WITH DIABETIC NEUROPATHY, WITHOUT LONG-TERM CURRENT USE OF INSULIN (HCC): ICD-10-CM

## 2022-11-19 RX ORDER — GLIMEPIRIDE 1 MG/1
TABLET ORAL
Qty: 90 TABLET | Refills: 1 | Status: SHIPPED | OUTPATIENT
Start: 2022-11-19

## 2022-11-22 ENCOUNTER — ANTICOAG VISIT (OUTPATIENT)
Dept: CARDIOLOGY CLINIC | Facility: CLINIC | Age: 72
End: 2022-11-22

## 2022-11-22 ENCOUNTER — APPOINTMENT (OUTPATIENT)
Dept: LAB | Facility: AMBULARY SURGERY CENTER | Age: 72
End: 2022-11-22

## 2022-11-22 DIAGNOSIS — Z86.718 HISTORY OF DVT (DEEP VEIN THROMBOSIS): Primary | ICD-10-CM

## 2022-11-22 DIAGNOSIS — I48.91 ATRIAL FIBRILLATION, UNSPECIFIED TYPE (HCC): ICD-10-CM

## 2022-11-22 LAB
INR PPP: 2.24 (ref 0.84–1.19)
PROTHROMBIN TIME: 25 SECONDS (ref 11.6–14.5)

## 2022-11-22 NOTE — PROGRESS NOTES
Spoke with patient, advised INR good, continue 5 mg Mon Fri, 2 5 mg all other days, will start holding 11/26/22 for colonoscopy 12/1/22  Will recheck INR 12/8/22 or 12/9/22

## 2022-11-26 PROBLEM — E87.1 HYPONATREMIA: Status: RESOLVED | Noted: 2021-09-11 | Resolved: 2022-11-26

## 2022-11-26 NOTE — ASSESSMENT & PLAN NOTE
Right lower extremity:50-75% stenosis in the proximal  superficial femoral artery  Left lower extremity:50-75% stenosis in the proximal  superficial femoral artery  Continue follow up with vascular

## 2022-11-26 NOTE — ASSESSMENT & PLAN NOTE
Will continue with Pepcid OTC 20 mg twice daily  Currently taking omeprazole every 4th day, will change to prn  Continue to watch for dietary triggers

## 2022-11-26 NOTE — ASSESSMENT & PLAN NOTE
Paroxysmal a  Fib  Continue anticoagulation with coumadin and aspirin  Continue cardiology follow up

## 2022-11-26 NOTE — ASSESSMENT & PLAN NOTE
Lipid panel total cholesterol 167, Triglycerides 273, HDL 29, LDL 83  LDL usually runs 47-70  Continue simvastatin and fenofibrate  Night time snacking is hardest part of the day from diet perspective  Discussed, choosing better snack options  Increase activity level

## 2022-11-26 NOTE — ASSESSMENT & PLAN NOTE
Lab Results   Component Value Date    HGBA1C 7 1 (H) 10/31/2022     A1c increased from 6 7% to 7 1%  Will continue same medications and he will work on diet changes, and being more active  Eye exam, foot exam up to date

## 2022-11-28 ENCOUNTER — TELEPHONE (OUTPATIENT)
Dept: ADMINISTRATIVE | Facility: OTHER | Age: 72
End: 2022-11-28

## 2022-11-28 NOTE — TELEPHONE ENCOUNTER
----- Message from [id number] sent at 11/26/2022 10:12 AM EST -----  Regarding: eye exam  11/26/22 10:13 AM    Hello, our patient Yaquelin Hinson has had Diabetic Eye Exam completed/performed  Please assist in updating the patient chart by making an External outreach to [facility], Dr Kala Catherine  The date of service is around April/May 2022      Thank you,  Hedy Montague, DAYAMI  [facility]

## 2022-11-28 NOTE — TELEPHONE ENCOUNTER
Upon review of the In Basket request we were able to locate, review, and update the patient chart as requested for Diabetic Eye Exam     Any additional questions or concerns should be emailed to the Practice Liaisons via the appropriate education email address, please do not reply via In Basket      Thank you  Mira Licona

## 2022-11-29 ENCOUNTER — OFFICE VISIT (OUTPATIENT)
Dept: CARDIOLOGY CLINIC | Facility: CLINIC | Age: 72
End: 2022-11-29

## 2022-11-29 VITALS
DIASTOLIC BLOOD PRESSURE: 82 MMHG | BODY MASS INDEX: 30.52 KG/M2 | HEIGHT: 71 IN | OXYGEN SATURATION: 97 % | HEART RATE: 76 BPM | WEIGHT: 218 LBS | SYSTOLIC BLOOD PRESSURE: 140 MMHG

## 2022-11-29 DIAGNOSIS — I10 ESSENTIAL HYPERTENSION: Primary | ICD-10-CM

## 2022-11-29 DIAGNOSIS — E78.2 MIXED HYPERLIPIDEMIA: ICD-10-CM

## 2022-11-29 DIAGNOSIS — I48.91 ATRIAL FIBRILLATION, UNSPECIFIED TYPE (HCC): ICD-10-CM

## 2022-11-29 DIAGNOSIS — I70.213 ATHEROSCLEROSIS OF NATIVE ARTERY OF BOTH LOWER EXTREMITIES WITH INTERMITTENT CLAUDICATION (HCC): ICD-10-CM

## 2022-11-29 NOTE — LETTER
November 29, 2022     DAYAMI Omalley  [address] [facility] [address]    Patient: Rubén Jensen   YOB: 1950   Date of Visit: 11/29/2022       Dear Dr Anastasiia Zimmer: Thank you for referring Julien Cast to me for evaluation  Below are my notes for this consultation  If you have questions, please do not hesitate to call me  I look forward to following your patient along with you  Sincerely,        January Veliz MD        CC: No Recipients  January Veliz MD  11/29/2022  9:41 AM  Sign when Signing Visit  Assessment/Plan:    Mixed hyperlipidemia    Hyperlipidemia, stable  Atrial fibrillation (HCC)    A history of paroxysmal atrial fibrillation, stable  The patient is asymptomatic and is currently in regular rhythm  We will continue present regimen  Atheroscler native arteries the extremities w/intermit claudication ([address] )    Peripheral arterial disease with leg claudication  The patient is scheduled for arterial Doppler of the lower extremities  Essential hypertension    Hypertension, stable  Diagnoses and all orders for this visit:    Essential hypertension    Atrial fibrillation, unspecified type ([address] )    Atherosclerosis of native artery of both lower extremities with intermittent claudication (HCC)    Mixed hyperlipidemia         Subjective:   Some dyspnea on exertion with leg claudication  Patient ID: Rubén Jensen is a 71 y o  male  The patient presented to this office for the purpose of cardiac follow-up  He is known to have a history of hypertension, diabetes mellitus and hyperlipidemia  In addition he has history of peripheral vascular disease  He has symptom of some shortness of breath on exertion specially in cold weather  This is not associated with any chest pain the patient denies any symptoms of palpitation, dizziness or syncope  He has no leg edema however he experiences leg claudication        The following portions of the patient's history were reviewed and updated as appropriate: allergies, current medications, past family history, past medical history, past social history, past surgical history and problem list     Review of Systems   Respiratory: Positive for shortness of breath  Negative for apnea, cough, chest tightness and wheezing  Cardiovascular: Negative for chest pain, palpitations and leg swelling  Gastrointestinal: Negative for abdominal pain  Neurological: Negative for dizziness and light-headedness  Psychiatric/Behavioral: Negative  Objective:  Stable cardiac-wise  /82 (BP Location: Right arm, Patient Position: Sitting, Cuff Size: Standard)   Pulse 76   Ht 5' 11" (1 803 m)   Wt 98 9 kg (218 lb)   SpO2 97%   BMI 30 40 kg/m²         Physical Exam  Vitals reviewed  Constitutional:       General: He is not in acute distress  Appearance: He is well-developed and well-nourished  He is not diaphoretic  HENT:      Head: Normocephalic  Eyes:      Pupils: Pupils are equal, round, and reactive to light  Neck:      Thyroid: No thyromegaly  Vascular: No JVD  Cardiovascular:      Rate and Rhythm: Normal rate and regular rhythm  Heart sounds: S1 normal and S2 normal  No murmur heard  No friction rub  No gallop  Pulmonary:      Effort: Pulmonary effort is normal  No respiratory distress  Breath sounds: Normal breath sounds  No wheezing or rales  Chest:      Chest wall: No tenderness  Abdominal:      Palpations: Abdomen is soft  Musculoskeletal:         General: No tenderness, deformity or edema  Normal range of motion  Cervical back: Normal range of motion  Right lower leg: No edema  Left lower leg: No edema  Skin:     General: Skin is warm and dry  Neurological:      Mental Status: He is alert and oriented to person, place, and time     Psychiatric:         Mood and Affect: Mood and affect and mood normal          Behavior: Behavior normal

## 2022-11-29 NOTE — PROGRESS NOTES
Assessment/Plan:    Mixed hyperlipidemia    Hyperlipidemia, stable  Atrial fibrillation (HCC)    A history of paroxysmal atrial fibrillation, stable  The patient is asymptomatic and is currently in regular rhythm  We will continue present regimen  Atheroscler native arteries the extremities w/intermit claudication ([facility] [address] )    Peripheral arterial disease with leg claudication  The patient is scheduled for arterial Doppler of the lower extremities  Essential hypertension    Hypertension, stable  Diagnoses and all orders for this visit:    Essential hypertension    Atrial fibrillation, unspecified type ([facility] [address] )    Atherosclerosis of native artery of both lower extremities with intermittent claudication (HCC)    Mixed hyperlipidemia          Subjective:   Some dyspnea on exertion with leg claudication  Patient ID: Sulaiman Baldwin is a 71 y o  male  The patient presented to this office for the purpose of cardiac follow-up  He is known to have a history of hypertension, diabetes mellitus and hyperlipidemia  In addition he has history of peripheral vascular disease  He has symptom of some shortness of breath on exertion specially in cold weather  This is not associated with any chest pain the patient denies any symptoms of palpitation, dizziness or syncope  He has no leg edema however he experiences leg claudication  The following portions of the patient's history were reviewed and updated as appropriate: allergies, current medications, past family history, past medical history, past social history, past surgical history and problem list     Review of Systems   Respiratory: Positive for shortness of breath  Negative for apnea, cough, chest tightness and wheezing  Cardiovascular: Negative for chest pain, palpitations and leg swelling  Gastrointestinal: Negative for abdominal pain  Neurological: Negative for dizziness and light-headedness  Psychiatric/Behavioral: Negative  Objective:  Stable cardiac-wise  /82 (BP Location: Right arm, Patient Position: Sitting, Cuff Size: Standard)   Pulse 76   Ht 5' 11" (1 803 m)   Wt 98 9 kg (218 lb)   SpO2 97%   BMI 30 40 kg/m²          Physical Exam  Vitals reviewed  Constitutional:       General: He is not in acute distress  Appearance: He is well-developed and well-nourished  He is not diaphoretic  HENT:      Head: Normocephalic  Eyes:      Pupils: Pupils are equal, round, and reactive to light  Neck:      Thyroid: No thyromegaly  Vascular: No JVD  Cardiovascular:      Rate and Rhythm: Normal rate and regular rhythm  Heart sounds: S1 normal and S2 normal  No murmur heard  No friction rub  No gallop  Pulmonary:      Effort: Pulmonary effort is normal  No respiratory distress  Breath sounds: Normal breath sounds  No wheezing or rales  Chest:      Chest wall: No tenderness  Abdominal:      Palpations: Abdomen is soft  Musculoskeletal:         General: No tenderness, deformity or edema  Normal range of motion  Cervical back: Normal range of motion  Right lower leg: No edema  Left lower leg: No edema  Skin:     General: Skin is warm and dry  Neurological:      Mental Status: He is alert and oriented to person, place, and time     Psychiatric:         Mood and Affect: Mood and affect and mood normal          Behavior: Behavior normal

## 2022-11-29 NOTE — ASSESSMENT & PLAN NOTE
A history of paroxysmal atrial fibrillation, stable  The patient is asymptomatic and is currently in regular rhythm  We will continue present regimen

## 2022-12-01 ENCOUNTER — ANESTHESIA EVENT (OUTPATIENT)
Dept: GASTROENTEROLOGY | Facility: HOSPITAL | Age: 72
End: 2022-12-01

## 2022-12-01 ENCOUNTER — HOSPITAL ENCOUNTER (OUTPATIENT)
Dept: GASTROENTEROLOGY | Facility: HOSPITAL | Age: 72
Setting detail: OUTPATIENT SURGERY
End: 2022-12-01
Attending: INTERNAL MEDICINE

## 2022-12-01 ENCOUNTER — ANESTHESIA (OUTPATIENT)
Dept: GASTROENTEROLOGY | Facility: HOSPITAL | Age: 72
End: 2022-12-01

## 2022-12-01 VITALS
DIASTOLIC BLOOD PRESSURE: 65 MMHG | WEIGHT: 218 LBS | SYSTOLIC BLOOD PRESSURE: 131 MMHG | HEIGHT: 71 IN | HEART RATE: 68 BPM | OXYGEN SATURATION: 99 % | TEMPERATURE: 96.9 F | BODY MASS INDEX: 30.52 KG/M2 | RESPIRATION RATE: 16 BRPM

## 2022-12-01 DIAGNOSIS — Z86.010 HX OF COLONIC POLYPS: ICD-10-CM

## 2022-12-01 LAB — GLUCOSE SERPL-MCNC: 164 MG/DL (ref 65–140)

## 2022-12-01 RX ORDER — PROPOFOL 10 MG/ML
INJECTION, EMULSION INTRAVENOUS AS NEEDED
Status: DISCONTINUED | OUTPATIENT
Start: 2022-12-01 | End: 2022-12-01

## 2022-12-01 RX ORDER — SODIUM CHLORIDE, SODIUM LACTATE, POTASSIUM CHLORIDE, CALCIUM CHLORIDE 600; 310; 30; 20 MG/100ML; MG/100ML; MG/100ML; MG/100ML
INJECTION, SOLUTION INTRAVENOUS CONTINUOUS PRN
Status: DISCONTINUED | OUTPATIENT
Start: 2022-12-01 | End: 2022-12-01

## 2022-12-01 RX ADMIN — PROPOFOL 30 MG: 10 INJECTION, EMULSION INTRAVENOUS at 09:04

## 2022-12-01 RX ADMIN — PROPOFOL 150 MG: 10 INJECTION, EMULSION INTRAVENOUS at 08:58

## 2022-12-01 RX ADMIN — SODIUM CHLORIDE, SODIUM LACTATE, POTASSIUM CHLORIDE, AND CALCIUM CHLORIDE: .6; .31; .03; .02 INJECTION, SOLUTION INTRAVENOUS at 08:51

## 2022-12-01 RX ADMIN — PROPOFOL 20 MG: 10 INJECTION, EMULSION INTRAVENOUS at 09:07

## 2022-12-01 RX ADMIN — PROPOFOL 50 MG: 10 INJECTION, EMULSION INTRAVENOUS at 09:00

## 2022-12-01 NOTE — ANESTHESIA POSTPROCEDURE EVALUATION
Post-Op Assessment Note    CV Status:  Stable    Pain management: adequate     Mental Status:  Alert and awake   Hydration Status:  Stable   PONV Controlled:  None   Airway Patency:  Patent and adequate      Post Op Vitals Reviewed: Yes      Staff: Anesthesiologist, CRNA         No notable events documented      BP   119/58   Temp 96 9   Pulse 76   Resp 16   SpO2 96% on RA

## 2022-12-01 NOTE — H&P
History and Physical - [facility]  Shaq Davila 71 y o  male MRN: [id number]        HPI:  80-year-old male with history of diabetes mellitus and colon polyps  Regular bowel movements      Historical Information   Past Medical History:   Diagnosis Date   • LISY (acute kidney injury) ([facility] [address] ) 09/11/2021   • Arthritis    • Atherosclerosis of native arteries of the extremities with ulceration ([facility] [address] ) 03/21/2019   • Colon polyp     6 polyps 3 years ago   • Diabetes mellitus ([facility] [address] )    • Diabetic neuropathic arthropathy ([facility] [address] )     causes weakness in LE and uses a cane as assist to walk   • Diabetic neuropathy ([facility] [address] )    • Fatty liver    • Hyponatremia 09/11/2021   • Kidney stone    • Parotid tumor    • Seasonal allergies    • Spinal stenosis    • Ureteral stone with hydronephrosis 05/17/2019    Added automatically from request for surgery [id number]     Past Surgical History:   Procedure Laterality Date   • COLONOSCOPY  05/16/2019    completed by Dr Lex Coffey, Repeat 3 years   • FL RETROGRADE PYELOGRAM  5/17/2019   • PAROTIDECTOMY Left    • OK CYSTO/URETERO W/LITHOTRIPSY &INDWELL STENT INSRT Right 5/17/2019    Procedure: CYSTOSCOPY URETEROSCOPY WITH LITHOTRIPSY HOLMIUM LASER, RETROGRADE PYELOGRAM AND INSERTION STENT URETERAL--possible stent only;  Surgeon: Byron Stanford MD;  Location: [facility];  Service: Urology   • OK EXC PAROTD,LAT LOBE,DISSECT 5TH NERV Right 10/18/2017    Procedure: SUPERFICIAL PAROTIDECTOMY WITH NERVE DISSECTION AND PRESERVATION;  Surgeon: Farzad Jensen MD;  Location: [facility];  Service: ENT   • OK INCISE FINGER TENDON SHEATH Right 11/3/2020    Procedure: Ashwin Fierro;  Surgeon: Rocky Olvera MD;  [facility]: [facility];  Service: Orthopedics   • ROTATOR CUFF REPAIR Bilateral    • TONSILLECTOMY       Social History   Social History     Substance and Sexual Activity   Alcohol Use Yes   • Alcohol/week: 1 0 standard drink   • Types: 1 Shots of liquor per week    Comment: socially     Social History     Substance and Sexual Activity   Drug Use No     Social History     Tobacco Use   Smoking Status Every Day   • Types: Pipe   Smokeless Tobacco Never   Tobacco Comments    smokes 2 pipes daily, 1 cigar daily      Family History   Problem Relation Age of Onset   • Lupus Mother    • Rheum arthritis Mother    • Cirrhosis Father    • Alcohol abuse Father    • Substance Abuse Brother        Meds/Allergies     (Not in a hospital admission)      Allergies   Allergen Reactions   • Pletal [Cilostazol] Tachycardia   • Dyazide [Hydrochlorothiazide W-Triamterene] Other (See Comments)     Hyponatremia   • Jardiance [Empagliflozin] Other (See Comments)     Constipation, increased urinary frequency--not tolerable       Objective     Blood pressure 158/74, pulse 77, temperature (!) 96 9 °F (36 1 °C), temperature source Temporal, resp  rate 18, height 5' 11" (1 803 m), weight 98 9 kg (218 lb), SpO2 97 %      PHYSICAL EXAM:    Gen: NAD  CV: S1 & S2 normal, RRR  CHEST: Clear to auscultate  ABD: soft, NT/ND, good bowel sounds  EXT: no edema    ASSESSMENT:     History of colon polyps    PLAN:    Colonoscopy

## 2022-12-01 NOTE — ANESTHESIA PREPROCEDURE EVALUATION
Procedure:  COLONOSCOPY    Relevant Problems   CARDIO   (+) Atrial fibrillation (HCC)   (+) Essential hypertension   (+) Mixed hyperlipidemia      ENDO   (+) Type 2 diabetes mellitus with neurologic complication, without long-term current use of insulin (HCC)      GI/HEPATIC   (+) Fatty liver   (+) Gastroesophageal reflux disease without esophagitis   (+) Hiatal hernia      /RENAL   (+) Prostatic disorder      NEURO/PSYCH   (+) 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)   (+) History of DVT (deep vein thrombosis)   (+) History of colon polyps   (+) Intermittent claudication (HCC)      PULMONARY   (+) Chronic obstructive pulmonary disease (HCC)   (+) Smoking        Physical Exam    Airway    Mallampati score: II  TM Distance: >3 FB  Neck ROM: full     Dental   No notable dental hx     Cardiovascular      Pulmonary      Other Findings        Anesthesia Plan  ASA Score- 3     Anesthesia Type- IV sedation with anesthesia with ASA Monitors  Additional Monitors:   Airway Plan:           Plan Factors-Exercise tolerance (METS): >4 METS  Chart reviewed  Patient summary reviewed  Patient is not a current smoker  Obstructive sleep apnea risk education given perioperatively  Induction- intravenous  Postoperative Plan-     Informed Consent- Anesthetic plan and risks discussed with patient  I personally reviewed this patient with the CRNA  Discussed and agreed on the Anesthesia Plan with the CRNA  Azael Rush

## 2022-12-05 ENCOUNTER — HOSPITAL ENCOUNTER (OUTPATIENT)
Dept: NON INVASIVE DIAGNOSTICS | Facility: CLINIC | Age: 72
Discharge: HOME | End: 2022-12-05

## 2022-12-05 DIAGNOSIS — I70.213 ATHEROSCLEROSIS OF NATIVE ARTERY OF BOTH LOWER EXTREMITIES WITH INTERMITTENT CLAUDICATION (HCC): ICD-10-CM

## 2022-12-08 ENCOUNTER — ANTICOAG VISIT (OUTPATIENT)
Dept: CARDIOLOGY CLINIC | Facility: CLINIC | Age: 72
End: 2022-12-08

## 2022-12-08 ENCOUNTER — APPOINTMENT (OUTPATIENT)
Dept: LAB | Facility: AMBULARY SURGERY CENTER | Age: 72
End: 2022-12-08

## 2022-12-08 DIAGNOSIS — Z86.718 HISTORY OF DVT (DEEP VEIN THROMBOSIS): Primary | ICD-10-CM

## 2022-12-08 DIAGNOSIS — I48.91 ATRIAL FIBRILLATION, UNSPECIFIED TYPE (HCC): ICD-10-CM

## 2022-12-08 LAB
INR PPP: 1.79 (ref 0.84–1.19)
PROTHROMBIN TIME: 21.1 SECONDS (ref 11.6–14.5)

## 2022-12-12 ENCOUNTER — OFFICE VISIT (OUTPATIENT)
Dept: VASCULAR SURGERY | Facility: CLINIC | Age: 72
End: 2022-12-12

## 2022-12-12 VITALS — DIASTOLIC BLOOD PRESSURE: 68 MMHG | HEART RATE: 84 BPM | SYSTOLIC BLOOD PRESSURE: 112 MMHG

## 2022-12-12 DIAGNOSIS — I10 ESSENTIAL HYPERTENSION: ICD-10-CM

## 2022-12-12 DIAGNOSIS — I70.213 ATHEROSCLEROSIS OF NATIVE ARTERY OF BOTH LOWER EXTREMITIES WITH INTERMITTENT CLAUDICATION (HCC): Primary | ICD-10-CM

## 2022-12-12 DIAGNOSIS — Z86.718 HISTORY OF DVT (DEEP VEIN THROMBOSIS): ICD-10-CM

## 2022-12-12 DIAGNOSIS — I48.91 ATRIAL FIBRILLATION, UNSPECIFIED TYPE (HCC): ICD-10-CM

## 2022-12-12 DIAGNOSIS — Z79.01 ANTICOAGULATED ON COUMADIN: ICD-10-CM

## 2022-12-12 NOTE — PATIENT INSTRUCTIONS
Atherosclerosis to lower extremities with intermittent claudication    -NABEEL 12/5/22    R 0 95/115/89; 50-75% prox SFA and tibioperoneal disease; popliteal 1cm    L 1 05/113/76; 50-75% prox SFA; popliteal artery 0 9 cm    Plan:  Stable peripheral arterial disease      -Regular walking 30 to 45 minutes at least 4 days a week  -Continue with aspirin, warfarin and simvastatin 40  -Optimize blood pressure, cholesterol and diabetes control  -Heathy, cardiac diet  -follow up NABEEL in 1 year with office visit or sooner, if needed      Bilateral popliteal ectasias  -continue to monitor with duplex surveillance with PAD    Pipe smoking  -smoking cessation is recommended      Hx LLE DVT  -LEV 7/8/22: LLE venous duplex shows chronic, non-occlusive DVT in the popliteal and prox peroneal veins  -Compressive stockings, leg elevation, skin care and monitoring  -chronic on A/C for AF  -15-20mm Hg compression

## 2022-12-12 NOTE — PROGRESS NOTES
Assessment/Plan:    Atherosclerosis of native artery of both lower extremities with intermittent claudication (HCC)  -     VAS lower limb arterial duplex, complete bilateral; Future  -No lifestyle limiting claudication at 1/2 block  -No worsening or accelerating claudication; no ischemic rest pain or wounds    -NABEEL 12/5/22    R 0 95/115/89; 50-75% prox SFA, tibioperoneal disease, distal peroneal occlusion; popliteal 1cm    L 1 05/113/76; 50-75% prox SFA; popliteal artery 0 9 cm    Plan:     Patient with known claudication at about 1/2 block  He stops for a minute and then can walk again  Claudication is not limiting his activity or accelerating  NABEEL shows bilateral SFA stenoses  The study  Is essentially unchanged  He agrees to increase walking but not interested in supervised walking at this time    -Stable claudication    -Regular walking 30 to 45 minutes at least 4 days a week  -Continue with aspirin 81, warfarin and simvastatin 40  -Optimize blood pressure, cholesterol and diabetes control  -Heathy, cardiac diet  -Smoking cessation  -follow up NABEEL in 1 year with office visit or sooner, if needed      Bilateral popliteal ectasias  -continue to monitor with duplex surveillance with PAD      Pipe and cigar smoking  -smoking 1 cigar and a pipe  -smoking cessation is recommended      Hx LLE DVT  -occasional leg swelling (summer)  -L>R mild to moderate venous changes, no wounds  -LEV 7/8/22: LLE venous duplex shows chronic, non-occlusive DVT in the popliteal and prox peroneal veins  -Compressive stockings, leg elevation, skin care and monitoring  -chronic on A/C for AF        Additional diagnoses:  Diabetes  Hyperlipidemia  Hypertension  Atrial fibrillation on chronic anticoagulation      Subjective:      Patient ID: Stacy Wagner is a 71 y o  male  Patient present to review NABEEL done on 12/5/22  Patient c/o pain when walking about the 100ft  Patient states he also get cramping   Patient is currently taking ASA, Simvastatin and Warfarin  Patient smokes one cigar and a pipe  HPI    Leila Joel 71 yoM Warthin's tumor s/p resection, GERD, pipe smoker, COPD, DM, HTN, HLD, afib (warfarin), PAD w/ BLE claudicaiton, B popliteal ectasia, Hx of LLE DVT who presents for [facility] and to review recent vascular testing  Patient returns to the office for vascular follow-up  He has had no major hospitalizations or medical changes since he was last seen  He continues to walk with intermittent claudication but has had no increase in symptoms since last year  Although he is not doing any regular walking, he does complete activities of daily living including going out and shopping  He can walk around the store for about 3/4 to 1 mile he may have to stop occasionally for about a minute or so and he can continue to walk  He feels his bigger limitation is unsteadiness in his gait and possibly some problems with the right ankle  He uses a cane to walk  He is doing some cycling on a stationary bike which does not bother his legs  Due to continued claudication, he was offered a supervised exercise program but he is not interested in that at this time  He is willing to increase activity  We discussed the importance of smoking cessation  We reviewed his recent labs and testing  A1c 7 1    LDL 83 HDL 29           NABEEL 12/5/22  CLINICAL:  Indications: Atherosclerosis of native arteries of extremities with  intermittent claudication, bilateral legs [I70 [id number]]  Pt c/o BL leg pain after  walking half a block and feels better after resting couple minutes  Operative History:  No prior cardiovascular procedures  Risk Factors  The patient has history of HTN, Diabetes (NIDDM (oral meds)) and  Hyperlipidemia  He has no history of DVT  Clinical  Right Pressure:  130/ mm Hg, Left Pressure:  126/ mm Hg       FINDINGS:     Right                  Impression  PSV (cm/s)  EDV  AP (cm)    Common Femoral Artery                     110    7 Prox Profunda                             103   12             Prox SFA               50-75%             322   34             Mid SFA                                   105   11             Dist SFA                                   59   19             Proximal Pop           Ectatic             43    8      1 0    Distal Pop                                 69   13             Tibioperoneal                              73    7             Dist Post Tibial                           31    7             Dist  Ant  Tibial                          76    9             Dist Peroneal          Occluded                                   Left                   Impression  PSV (cm/s)  EDV  AP (cm)    Common Femoral Artery                     112   21             Prox Profunda                              76   18             Prox SFA               50-75%             223   42             Mid SFA                                    95   26             Dist SFA                                   94   13             Proximal Pop           Ectatic             39   10      1 0    Distal Pop                                 47    5             Dist Post Tibial                           22    6             Dist  Ant  Tibial                         122   13             Dist Peroneal                              28    4                  CONCLUSION:  Impression: This resting evaluation demonstrates a 50-75% stenosis in the proximal  superficial femoral artery  Evidence of tibioperoneal artery occlusive disease  The popliteal artery is ectatic, measuring 1 0 cm in the proximal segment  Prior 1 cm  Ankle/Brachial index: 0 95 by RAD, 0 76 by PTA (Prior 0 75)  PVR/ PPG tracings are dampened  Metatarsal pressure of 115 mmHg  Prior 135 mmHg  Great toe pressure of 89 mmHg, within the healing range (Prior 105 mmHg)  LEFT LOWER LIMB:  This resting evaluation demonstrates a 50-75% stenosis in the proximal  superficial femoral artery    The popliteal artery is ectatic, measuring 0 9 cm in the proximal segment  Prior 0 9 cm  Ankle/Brachial index: 1 05 by RAD, 0 88 by PTA (Prior 0 86)  PVR/ PPG tracings are dampened  Metatarsal pressure of 113 mmHg  Prior 143 mmHg  Great toe pressure of 76 mmHg, within the healing range (Prior 101 mmHg)  In comparison to the study on 7/8/22, BL ABIs are normal           LEV 7/8/22  FINDINGS:     Left       Impression                             Peroneal   E1  Non Occlusive Thrombus (Chronic)    Popliteal  E1  Non Occlusive Thrombus (Chronic)             CONCLUSION:     Impression:  RIGHT LOWER LIMB LIMITED:  Evaluation shows no evidence of thrombus in the common femoral vein  Doppler evaluation shows a normal response to augmentation maneuvers  LEFT LOWER LIMB:  There is chronic non-occlusive deep vein thrombosis in the popliteal and  proximal peroneal veins  No evidence of acute deep vein thrombosis  No evidence of superficial thrombophlebitis noted  Doppler evaluation shows a normal response to augmentation maneuvers  Popliteal, peroneal and anterior tibial arterial Doppler waveforms are  triphasic/biphasic  Posterior tibial arterial Doppler waveforms are  biphasic/monophasic at the mid calf with flow reversal noted distally  The following portions of the patient's history were reviewed and updated as appropriate: allergies, current medications, past family history, past medical history, past social history, past surgical history and problem list     Review of Systems   Constitutional: Negative  HENT: Negative  Eyes: Negative  Respiratory: Negative  Cardiovascular: Negative  Gastrointestinal: Negative  Endocrine: Negative  Genitourinary: Negative  Musculoskeletal: Negative  Skin: Negative  Allergic/Immunologic: Negative  Neurological: Negative  Hematological: Negative  Psychiatric/Behavioral: Negative            Objective:  /68 (BP Location: Left arm, Patient Position: Sitting, Cuff Size: Adult)   Pulse 84      Physical Exam  Vitals and nursing note reviewed  Constitutional:       Appearance: He is well-developed  HENT:      Head: Normocephalic and atraumatic  Eyes:      Pupils: Pupils are equal, round, and reactive to light  Neck:      Thyroid: No thyromegaly  Vascular: No carotid bruit or JVD  Trachea: Trachea normal    Cardiovascular:      Rate and Rhythm: Normal rate and regular rhythm  Pulses: Decreased pulses  Carotid pulses are 2+ on the right side and 2+ on the left side  Radial pulses are 2+ on the right side and 2+ on the left side  Dorsalis pedis pulses are 1+ on the right side and 0 on the left side  Heart sounds: Normal heart sounds, S1 normal and S2 normal  No murmur heard  No friction rub  No gallop  Comments:     Trace to mild edema  Mild chronic stasis changes at the R ankle    Moderate chronic stasis changes over the LLE to the knee    No foot wounds  Cap refill about 2 seconds  Pulmonary:      Effort: Pulmonary effort is normal  No accessory muscle usage or respiratory distress  Breath sounds: Normal breath sounds  No wheezing or rales  Abdominal:      General: Bowel sounds are normal  There is no distension  Palpations: Abdomen is soft  Tenderness: There is no abdominal tenderness  Musculoskeletal:         General: No deformity  Normal range of motion  Cervical back: Neck supple  Right lower leg: Edema present  Left lower leg: Edema present  Skin:     General: Skin is warm and dry  Findings: No lesion or rash  Nails: There is no clubbing  Neurological:      Mental Status: He is alert and oriented to person, place, and time  Comments: Grossly normal    Psychiatric:         Behavior: Behavior is cooperative               I have reviewed and made appropriate changes to the review of systems input by the medical assistant      Vitals:    12/12/22 0850   BP: 112/68   BP Location: Left arm   Patient Position: Sitting   Cuff Size: Adult   Pulse: 84       Patient Active Problem List   Diagnosis   • Essential hypertension   • Mixed hyperlipidemia   • Type 2 diabetes mellitus with neurologic complication, without long-term current use of insulin (HCC)   • Fatty liver   • History of DVT (deep vein thrombosis)   • Anticoagulated on Coumadin   • 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)   • Atrial fibrillation (HCC)   • History of colon polyps   • Gastroesophageal reflux disease without esophagitis   • Warthin's tumor   • Hiatal hernia   • Smoking   • Chronic obstructive pulmonary disease (HCC)   • Prostatic disorder   • Trigger ring finger of right hand   • Intermittent claudication (HCC)   • Popliteal artery ectasia bilateral (HCC)   • Muscle cramping   • Extensor carpi radialis brevis tenosynovitis   • History of parotidectomy       Past Surgical History:   Procedure Laterality Date   • COLONOSCOPY  05/16/2019    completed by Dr Jazmyn Sarah, Repeat 3 years   • FL RETROGRADE PYELOGRAM  5/17/2019   • PAROTIDECTOMY Left    • MA CYSTO/URETERO W/LITHOTRIPSY &INDWELL STENT INSRT Right 5/17/2019    Procedure: CYSTOSCOPY URETEROSCOPY WITH LITHOTRIPSY HOLMIUM LASER, RETROGRADE PYELOGRAM AND INSERTION STENT URETERAL--possible stent only;  Surgeon: Josselyn Rosenthal MD;  Location[facility];  Service: Urology   • MA EXC PRTD ALEXA/PRTD GLND LAT [id number]&[id number] FACIAL NR Right 10/18/2017    Procedure: SUPERFICIAL PAROTIDECTOMY WITH NERVE DISSECTION AND PRESERVATION;  Surgeon: Sam Balbuena MD;  Location: [facility];  Service: ENT   • MA TENDON SHEATH INCISION Right 11/3/2020    Procedure: RING TRIGGER FINGER RELEASE;  Surgeon: Jeimy Linares MD;  Location: [facility];  Service: Orthopedics   • ROTATOR CUFF REPAIR Bilateral    • TONSILLECTOMY         Family History   Problem Relation Age of Onset   • Lupus Mother    • Rheum arthritis Mother    • Cirrhosis Father    • Alcohol abuse Father    • Substance Abuse Brother        Social History     Socioeconomic History   • Marital status: /Civil Union     Spouse name: Not on file   • Number of children: Not on file   • Years of education: Not on file   • Highest education level: Not on file   Occupational History   • Not on file   Tobacco Use   • Smoking status: Every Day     Types: Pipe   • Smokeless tobacco: Never   • Tobacco comments:     smokes 2 pipes daily, 1 cigar daily    Vaping Use   • Vaping Use: Never used   Substance and Sexual Activity   • Alcohol use:  Yes     Alcohol/week: 1 0 standard drink     Types: 1 Shots of liquor per week     Comment: socially   • Drug use: No   • Sexual activity: Yes   Other Topics Concern   • Not on file   Social History Narrative        Tram     Social Determinants of Health     Financial Resource Strain: Not on file   Food Insecurity: Not on file   Transportation Needs: Not on file   Physical Activity: Not on file   Stress: Not on file   Social Connections: Not on file   Intimate Partner Violence: Not on file   Housing Stability: Not on file       Allergies   Allergen Reactions   • Pletal [Cilostazol] Tachycardia   • Dyazide [Hydrochlorothiazide W-Triamterene] Other (See Comments)     Hyponatremia   • Jardiance [Empagliflozin] Other (See Comments)     Constipation, increased urinary frequency--not tolerable         Current Outpatient Medications:   •  acetaminophen (TYLENOL) 325 mg tablet, 2, by mouth, every 6 hours as needed for mild to moderate pain , Disp: 30 tablet, Rfl: 0  •  aspirin (ECOTRIN LOW STRENGTH) 81 mg EC tablet, Take 81 mg by mouth daily  , Disp: , Rfl:   •  Cyanocobalamin (VITAMIN B 12 PO), Take 1,000 mcg by mouth daily, Disp: , Rfl:   •  famotidine (PEPCID) 20 mg tablet, Take 1 tablet (20 mg total) by mouth 2 (two) times a day, Disp: 90 tablet, Rfl: 3  •  fenofibrate micronized (LOFIBRA) 134 MG capsule, TAKE 1 CAPSULE (134 MG TOTAL) BY MOUTH DAILY WITH BREAKFAST, Disp: 90 capsule, Rfl: 3  •  gabapentin (NEURONTIN) 100 mg capsule, TAKE 2 CAPSULES (200 MG TOTAL) BY MOUTH DAILY AT BEDTIME, Disp: 180 capsule, Rfl: 1  •  glimepiride (AMARYL) 1 mg tablet, TAKE 1 TABLET BY MOUTH DAILY WITH BREAKFAST , Disp: 90 tablet, Rfl: 1  •  Januvia 100 MG tablet, TAKE 1 TABLET BY MOUTH EVERY DAY, Disp: 90 tablet, Rfl: 3  •  lisinopril (ZESTRIL) 20 mg tablet, TAKE 1 TABLET BY MOUTH EVERY DAY, Disp: 90 tablet, Rfl: 4  •  metFORMIN (GLUCOPHAGE-XR) 500 mg 24 hr tablet, TAKE 1 TABLET IN THE MORNING AND 1 TABLET (500 MG TOTAL) IN THE EVENING   TAKE WITH MEALS , Disp: 180 tablet, Rfl: 1  •  omeprazole (PriLOSEC) 20 mg delayed release capsule, TAKE 1 CAPSULE BY MOUTH EVERY DAY (Patient taking differently: Slowly weaning off takes every 4 days to be replaced by Pepcid ), Disp: 90 capsule, Rfl: 1  •  ProAir  (90 Base) MCG/ACT inhaler, INHALE 2 PUFFS INTO THE LUNGS EVERY 4 HOURS AS NEEDED FOR SHORTNESS OF BREATH OR FOR WHEEZE, Disp: 17 g, Rfl: 1  •  simvastatin (ZOCOR) 40 mg tablet, TAKE 1 TABLET BY MOUTH EVERY DAY, Disp: 90 tablet, Rfl: 4  •  tamsulosin (FLOMAX) 0 4 mg, TAKE 1 CAPSULE BY MOUTH EVERY DAY WITH DINNER, Disp: 90 capsule, Rfl: 3  •  warfarin (COUMADIN) 2 5 mg tablet, Take 1 tablet by mouth daily T-Th-S-S takes 2 5mg    M-W-F takes 5 0 mg (Patient taking differently: Take 2 5 mg by mouth daily T-WED-Th-S-S takes 2 5mg    M-F takes 5 0 mg), Disp: , Rfl:   •  warfarin (COUMADIN) 5 mg tablet, TAKE 1 TABLET EVERY DAY OR AS DIRECTED BY MD, Disp: 90 tablet, Rfl: 3

## 2022-12-30 ENCOUNTER — APPOINTMENT (OUTPATIENT)
Dept: LAB | Facility: AMBULARY SURGERY CENTER | Age: 72
End: 2022-12-30

## 2022-12-30 ENCOUNTER — ANTICOAG VISIT (OUTPATIENT)
Dept: CARDIOLOGY CLINIC | Facility: CLINIC | Age: 72
End: 2022-12-30

## 2022-12-30 DIAGNOSIS — Z86.718 HISTORY OF DVT (DEEP VEIN THROMBOSIS): Primary | ICD-10-CM

## 2022-12-30 NOTE — PROGRESS NOTES
Spoke with patient, advised INR a little low, states he did miss one dose, advised to take 7 5 mg tonight only instead of 5 mg, then go back to 5 mg Mon Fri, 2 5 mg all other days, will recheck 1/16/23

## 2023-01-05 ENCOUNTER — APPOINTMENT (OUTPATIENT)
Dept: RADIOLOGY | Facility: AMBULARY SURGERY CENTER | Age: 73
End: 2023-01-05
Attending: SURGERY

## 2023-01-05 ENCOUNTER — OFFICE VISIT (OUTPATIENT)
Dept: OBGYN CLINIC | Facility: CLINIC | Age: 73
End: 2023-01-05

## 2023-01-05 VITALS
BODY MASS INDEX: 31.14 KG/M2 | DIASTOLIC BLOOD PRESSURE: 71 MMHG | SYSTOLIC BLOOD PRESSURE: 128 MMHG | HEART RATE: 78 BPM | WEIGHT: 222.4 LBS | HEIGHT: 71 IN

## 2023-01-05 DIAGNOSIS — M79.641 RIGHT HAND PAIN: ICD-10-CM

## 2023-01-05 DIAGNOSIS — M19.042 PRIMARY OSTEOARTHRITIS OF BOTH HANDS: ICD-10-CM

## 2023-01-05 DIAGNOSIS — M19.041 PRIMARY OSTEOARTHRITIS OF BOTH HANDS: ICD-10-CM

## 2023-01-05 DIAGNOSIS — M65.4 DE QUERVAIN'S TENOSYNOVITIS, RIGHT: ICD-10-CM

## 2023-01-05 DIAGNOSIS — M79.642 LEFT HAND PAIN: ICD-10-CM

## 2023-01-05 DIAGNOSIS — M79.641 RIGHT HAND PAIN: Primary | ICD-10-CM

## 2023-01-05 NOTE — PROGRESS NOTES
ASSESSMENT/PLAN:      71-year-old male here for his right de Quervain's tenosynovitis and bilateral hand osteoarthritis  At this time patient feels that the Delona Sink is definitely bothering him less and does not need any further treatment  In regards to the bilateral hand osteoarthritis, nonoperative treatments were discussed with the patient that included heat in the morning and in the evening, topical Voltaren gel since patient is on Coumadin and cannot take oral anti-inflammatories, extra strength Tylenol, localized cortisone injections in particular joints that are bothersome  Patient does not have any particular joints that are bothersome today, but he will start to use the heat twice daily along with the topical Voltaren gel  We will follow-up with the patient as needed    The patient verbalized understanding of exam findings and treatment plan  We engaged in the shared decision-making process and treatment options were discussed at length with the patient  Surgical and conservative management discussed today along with risks and benefits  Diagnoses and all orders for this visit:    Right hand pain  -     XR hand 3+ vw right; Future    Left hand pain  -     XR hand 3+ vw left; Future    De Quervain's tenosynovitis, right    Primary osteoarthritis of both hands        Follow Up:  Return if symptoms worsen or fail to improve, for bilateral hands  To Do Next Visit:  Re-evaluation of current issue    ____________________________________________________________________________________________________________________________________________      CHIEF COMPLAINT:  Chief Complaint   Patient presents with   • Right Wrist - Follow-up       SUBJECTIVE:  Gretchen Messer is a 71y o  year old RHD male who presents today for an 8-week follow-up for his right de Quervain's tenosynovitis  Patient had an injection on 11/3/2022 that has given him significant pain relief, which was his third injection   He states that it didn't help with all the pain bur has a dull ache  He is also complaining of pain in multiple joints of the hands  He takes Coumadin and can't take NSAIDs  Last hemoglobin A1c was 7 1 on 10/31/2022  I have personally reviewed all the relevant PMH, PSH, SH, FH, Medications and allergies       PAST MEDICAL HISTORY:  Past Medical History:   Diagnosis Date   • LISY (acute kidney injury) ([facility] [address] ) 09/11/2021   • Arthritis    • Atherosclerosis of native arteries of the extremities with ulceration ([facility] [address] ) 03/21/2019   • Colon polyp     6 polyps 3 years ago   • Diabetes mellitus ([facility] [address] )    • Diabetic neuropathic arthropathy ([facility] [address] )     causes weakness in LE and uses a cane as assist to walk   • Diabetic neuropathy ([facility] [address] )    • Fatty liver    • Hyponatremia 09/11/2021   • Kidney stone    • Parotid tumor    • Seasonal allergies    • Spinal stenosis    • Ureteral stone with hydronephrosis 05/17/2019    Added automatically from request for surgery [id number]       PAST SURGICAL HISTORY:  Past Surgical History:   Procedure Laterality Date   • COLONOSCOPY  05/16/2019    completed by Dr Sushila Sandoval, Repeat 3 years   • FL RETROGRADE PYELOGRAM  5/17/2019   • PAROTIDECTOMY Left    • NJ CYSTO/URETERO W/LITHOTRIPSY &INDWELL STENT INSRT Right 5/17/2019    Procedure: CYSTOSCOPY URETEROSCOPY WITH LITHOTRIPSY HOLMIUM LASER, RETROGRADE PYELOGRAM AND INSERTION STENT URETERAL--possible stent only;  Surgeon: Estee Marino MD;  [facility];  Service: Urology   • NJ EXC PRTD ALEXA/PRTD GLND LAT DSJ&PRSRV FACIAL NR Right 10/18/2017    Procedure: SUPERFICIAL PAROTIDECTOMY WITH NERVE DISSECTION AND PRESERVATION;  Surgeon: Janet Lara MD;  [facility];  Service: ENT   • NJ TENDON SHEATH INCISION Right 11/3/2020    Procedure: Ashwin Fierro;  Surgeon: Elen Tello MD;  [facility]: [facility];  Service: Orthopedics   • ROTATOR CUFF REPAIR Bilateral    • TONSILLECTOMY         FAMILY HISTORY:  Family History   Problem Relation Age of Onset   • Lupus Mother    • Rheum arthritis Mother    • Cirrhosis Father    • Alcohol abuse Father    • Substance Abuse Brother        SOCIAL HISTORY:  Social History     Tobacco Use   • Smoking status: Every Day     Types: Pipe   • Smokeless tobacco: Never   • Tobacco comments:     smokes 2 pipes daily, 1 cigar daily    Vaping Use   • Vaping Use: Never used   Substance Use Topics   • Alcohol use: Yes     Alcohol/week: 1 0 standard drink     Types: 1 Shots of liquor per week     Comment: socially   • Drug use: No       MEDICATIONS:    Current Outpatient Medications:   •  acetaminophen (TYLENOL) 325 mg tablet, 2, by mouth, every 6 hours as needed for mild to moderate pain , Disp: 30 tablet, Rfl: 0  •  aspirin (ECOTRIN LOW STRENGTH) 81 mg EC tablet, Take 81 mg by mouth daily  , Disp: , Rfl:   •  Cyanocobalamin (VITAMIN B 12 PO), Take 1,000 mcg by mouth daily, Disp: , Rfl:   •  famotidine (PEPCID) 20 mg tablet, Take 1 tablet (20 mg total) by mouth 2 (two) times a day, Disp: 90 tablet, Rfl: 3  •  fenofibrate micronized (LOFIBRA) 134 MG capsule, TAKE 1 CAPSULE (134 MG TOTAL) BY MOUTH DAILY WITH BREAKFAST, Disp: 90 capsule, Rfl: 3  •  gabapentin (NEURONTIN) 100 mg capsule, TAKE 2 CAPSULES (200 MG TOTAL) BY MOUTH DAILY AT BEDTIME, Disp: 180 capsule, Rfl: 1  •  glimepiride (AMARYL) 1 mg tablet, TAKE 1 TABLET BY MOUTH DAILY WITH BREAKFAST , Disp: 90 tablet, Rfl: 1  •  Januvia 100 MG tablet, TAKE 1 TABLET BY MOUTH EVERY DAY, Disp: 90 tablet, Rfl: 3  •  lisinopril (ZESTRIL) 20 mg tablet, TAKE 1 TABLET BY MOUTH EVERY DAY, Disp: 90 tablet, Rfl: 4  •  metFORMIN (GLUCOPHAGE-XR) 500 mg 24 hr tablet, TAKE 1 TABLET IN THE MORNING AND 1 TABLET (500 MG TOTAL) IN THE EVENING   TAKE WITH MEALS , Disp: 180 tablet, Rfl: 1  •  omeprazole (PriLOSEC) 20 mg delayed release capsule, TAKE 1 CAPSULE BY MOUTH EVERY DAY (Patient taking differently: Slowly weaning off takes every 4 days to be replaced by Pepcid ), Disp: 90 capsule, Rfl: 1  •  ProAir  (90 Base) MCG/ACT inhaler, INHALE 2 PUFFS INTO THE LUNGS EVERY 4 HOURS AS NEEDED FOR SHORTNESS OF BREATH OR FOR WHEEZE, Disp: 17 g, Rfl: 1  •  simvastatin (ZOCOR) 40 mg tablet, TAKE 1 TABLET BY MOUTH EVERY DAY, Disp: 90 tablet, Rfl: 4  •  tamsulosin (FLOMAX) 0 4 mg, TAKE 1 CAPSULE BY MOUTH EVERY DAY WITH DINNER, Disp: 90 capsule, Rfl: 3  •  warfarin (COUMADIN) 2 5 mg tablet, Take 1 tablet by mouth daily T-Th-S-S takes 2 5mg    M-W-F takes 5 0 mg (Patient taking differently: Take 2 5 mg by mouth daily T-WED-Th-S-S takes 2 5mg    M-F takes 5 0 mg), Disp: , Rfl:   •  warfarin (COUMADIN) 5 mg tablet, TAKE 1 TABLET EVERY DAY OR AS DIRECTED BY MD, Disp: 90 tablet, Rfl: 3    ALLERGIES:  Allergies   Allergen Reactions   • Pletal [Cilostazol] Tachycardia   • Dyazide [Hydrochlorothiazide W-Triamterene] Other (See Comments)     Hyponatremia   • Jardiance [Empagliflozin] Other (See Comments)     Constipation, increased urinary frequency--not tolerable       REVIEW OF SYSTEMS:  Review of Systems   Constitutional: Negative for chills, fever and unexpected weight change  HENT: Negative for hearing loss, nosebleeds and sore throat  Eyes: Negative for pain, redness and visual disturbance  Respiratory: Negative for cough, shortness of breath and wheezing  Cardiovascular: Negative for chest pain, palpitations and leg swelling  Gastrointestinal: Negative for abdominal pain, nausea and vomiting  Endocrine: Negative for polydipsia and polyuria  Genitourinary: Negative for dysuria and hematuria  Musculoskeletal: Positive for arthralgias  Skin: Negative for rash and wound  Neurological: Negative for dizziness, light-headedness and headaches  Psychiatric/Behavioral: Negative for decreased concentration, dysphoric mood and suicidal ideas  The patient is not nervous/anxious          VITALS:  Vitals:    01/05/23 0907   BP: 128/71   Pulse: 78       LABS:  HgA1c:   Lab Results   Component Value Date    HGBA1C 7 1 (H) 10/31/2022     BMP:   Lab Results   Component Value Date    GLUCOSE 159 (H) 12/14/2015    CALCIUM 9 0 10/31/2022     (L) 12/14/2015    K 4 3 10/31/2022    CO2 23 10/31/2022     10/31/2022    BUN 8 10/31/2022    CREATININE 1 15 10/31/2022       _____________________________________________________  PHYSICAL EXAMINATION:  General: well developed and well nourished, alert, oriented times 3 and appears comfortable  Psychiatric: Normal  HEENT: Normocephalic, Atraumatic Trachea Midline, No torticollis  Pulmonary: No audible wheezing or respiratory distress   Cardiovascular: No pitting edema, 2+ radial pulse   Abdominal/GI: abdomen non tender, non distended   Skin: No masses, erythema, lacerations, fluctation, ulcerations  Neurovascular: Sensation Intact to the Median, Ulnar, Radial Nerve, Motor Intact to the Median, Ulnar, Radial Nerve and Pulses Intact  Musculoskeletal: Normal, except as noted in detailed exam and in HPI        MUSCULOSKELETAL EXAMINATION:    Right side:     Mild tenderness at the first dorsal compartment   Multiple arthritic joints worse in the PIP joint of the small finger, index and long fingers  Full composite fist   Opposition intact  Sensation intact to light touch   Brisk capillary refill     Left hand:   Long finger tenderness at the MP, PIP and DIP joints  Full composite fist   Opposition intact  Sensation intact to light touch  Brisk capillary refill   ___________________________________________________  STUDIES REVIEWED:  I have personally reviewed AP lateral and oblique radiographs of right hand 3 views which demonstrate mild degenerative changes throughout, more pronounced in the MP joints second and third digits  Left hand 3 views: Mild scattered degenerative changes throughout, can more pronounced MP joint second and third digits      PROCEDURES PERFORMED:  Procedures  No Procedures performed today    _____________________________________________________      Janis Shen    I,:  Deyanira German am acting as a scribe while in the presence of the attending physician :       I,:  Deborrah Hodgkins, MD personally performed the services described in this documentation    as scribed in my presence :

## 2023-01-15 DIAGNOSIS — E78.2 MIXED HYPERLIPIDEMIA: ICD-10-CM

## 2023-01-16 RX ORDER — SIMVASTATIN 40 MG
TABLET ORAL
Qty: 90 TABLET | Refills: 4 | Status: SHIPPED | OUTPATIENT
Start: 2023-01-16

## 2023-01-17 ENCOUNTER — APPOINTMENT (OUTPATIENT)
Dept: LAB | Facility: AMBULARY SURGERY CENTER | Age: 73
End: 2023-01-17

## 2023-01-17 DIAGNOSIS — Z86.718 HISTORY OF DVT (DEEP VEIN THROMBOSIS): Primary | ICD-10-CM

## 2023-01-20 ENCOUNTER — ANTICOAG VISIT (OUTPATIENT)
Dept: CARDIOLOGY CLINIC | Facility: CLINIC | Age: 73
End: 2023-01-20

## 2023-01-20 ENCOUNTER — APPOINTMENT (OUTPATIENT)
Dept: LAB | Facility: AMBULARY SURGERY CENTER | Age: 73
End: 2023-01-20

## 2023-01-20 DIAGNOSIS — Z86.718 HISTORY OF DVT (DEEP VEIN THROMBOSIS): ICD-10-CM

## 2023-01-20 DIAGNOSIS — Z86.718 HISTORY OF DVT (DEEP VEIN THROMBOSIS): Primary | ICD-10-CM

## 2023-01-20 NOTE — PROGRESS NOTES
Spoke with patient, advised INR good, continue 5 mg Mon Fri, 2 5 mg all other days, will recheck in 4 weeks 2/17/23

## 2023-01-24 DIAGNOSIS — E11.40 TYPE 2 DIABETES MELLITUS WITH DIABETIC NEUROPATHY, WITHOUT LONG-TERM CURRENT USE OF INSULIN (HCC): ICD-10-CM

## 2023-01-24 RX ORDER — METFORMIN HYDROCHLORIDE 500 MG/1
1000 TABLET, EXTENDED RELEASE ORAL 2 TIMES DAILY WITH MEALS
Qty: 360 TABLET | Refills: 3 | Status: SHIPPED | OUTPATIENT
Start: 2023-01-24

## 2023-02-13 ENCOUNTER — ANTICOAG VISIT (OUTPATIENT)
Dept: CARDIOLOGY CLINIC | Facility: CLINIC | Age: 73
End: 2023-02-13

## 2023-02-13 ENCOUNTER — APPOINTMENT (OUTPATIENT)
Dept: LAB | Facility: AMBULARY SURGERY CENTER | Age: 73
End: 2023-02-13

## 2023-02-13 DIAGNOSIS — Z86.718 HISTORY OF DVT (DEEP VEIN THROMBOSIS): Primary | ICD-10-CM

## 2023-02-13 NOTE — PROGRESS NOTES
Spoke with patient, advised INR good, continue 5 mg Mon Fri, 2 5 mg all other days, will recheck in 4 weeks 3/13/23

## 2023-02-27 ENCOUNTER — OFFICE VISIT (OUTPATIENT)
Dept: FAMILY MEDICINE CLINIC | Facility: CLINIC | Age: 73
End: 2023-02-27

## 2023-02-27 VITALS
SYSTOLIC BLOOD PRESSURE: 120 MMHG | WEIGHT: 220 LBS | HEART RATE: 80 BPM | RESPIRATION RATE: 16 BRPM | TEMPERATURE: 98.2 F | OXYGEN SATURATION: 96 % | DIASTOLIC BLOOD PRESSURE: 70 MMHG | BODY MASS INDEX: 30.8 KG/M2 | HEIGHT: 71 IN

## 2023-02-27 DIAGNOSIS — I70.213 ATHEROSCLEROSIS OF NATIVE ARTERY OF BOTH LOWER EXTREMITIES WITH INTERMITTENT CLAUDICATION (HCC): ICD-10-CM

## 2023-02-27 DIAGNOSIS — E11.40 TYPE 2 DIABETES MELLITUS WITH DIABETIC NEUROPATHY, WITHOUT LONG-TERM CURRENT USE OF INSULIN (HCC): ICD-10-CM

## 2023-02-27 DIAGNOSIS — J44.9 CHRONIC OBSTRUCTIVE PULMONARY DISEASE, UNSPECIFIED COPD TYPE (HCC): ICD-10-CM

## 2023-02-27 DIAGNOSIS — I48.91 ATRIAL FIBRILLATION, UNSPECIFIED TYPE (HCC): ICD-10-CM

## 2023-02-27 DIAGNOSIS — J01.00 ACUTE MAXILLARY SINUSITIS, RECURRENCE NOT SPECIFIED: Primary | ICD-10-CM

## 2023-02-27 LAB
CREAT UR-MCNC: 37.9 MG/DL
MICROALBUMIN UR-MCNC: 9.8 MG/L (ref 0–20)
MICROALBUMIN/CREAT 24H UR: 26 MG/G CREATININE (ref 0–30)
SL AMB POCT HEMOGLOBIN AIC: 8.1 (ref ?–6.5)

## 2023-02-27 RX ORDER — GLIMEPIRIDE 2 MG/1
2 TABLET ORAL
Qty: 90 TABLET | Refills: 3 | Status: SHIPPED | OUTPATIENT
Start: 2023-02-27

## 2023-02-27 RX ORDER — METFORMIN HYDROCHLORIDE 500 MG/1
1000 TABLET, EXTENDED RELEASE ORAL 2 TIMES DAILY WITH MEALS
Qty: 360 TABLET | Refills: 3 | Status: SHIPPED | OUTPATIENT
Start: 2023-02-27

## 2023-02-27 RX ORDER — AMOXICILLIN AND CLAVULANATE POTASSIUM 875; 125 MG/1; MG/1
1 TABLET, FILM COATED ORAL EVERY 12 HOURS SCHEDULED
Qty: 14 TABLET | Refills: 0 | Status: SHIPPED | OUTPATIENT
Start: 2023-02-27 | End: 2023-03-06

## 2023-02-27 NOTE — PROGRESS NOTES
FAMILY PRACTICE OFFICE VISIT       NAME: Felix Nicholson  AGE: 71 y o  SEX: male       : 1950        MRN: [id number]    Assessment and Plan   1  Acute maxillary sinusitis, recurrence not specified  Call if sinus symptoms are not improving with Augmentin  -     amoxicillin-clavulanate (AUGMENTIN) 875-125 mg per tablet; Take 1 tablet by mouth every 12 (twelve) hours for 7 days    2  Type 2 diabetes mellitus with diabetic neuropathy, without long-term current use of insulin ([facility])  Assessment & Plan:    Lab Results   Component Value Date    HGBA1C 8 1 (A) 2023     A1c increased to 8 1%  Continue metformin XR 1000 mg twice daily, Januvia 100 mg daily, and increase Glimepiride from 1 mg to 2 mg daily  Was unable to tolerate Jardiance in the past    Eat less candy  Exercise more as able  Orders:  -     POCT hemoglobin A1c  -     Microalbumin / creatinine urine ratio  -     glimepiride (AMARYL) 2 mg tablet; Take 1 tablet (2 mg total) by mouth daily with breakfast  -     metFORMIN (GLUCOPHAGE-XR) 500 mg 24 hr tablet; Take 2 tablets (1,000 mg total) by mouth 2 (two) times a day with meals    3  Chronic obstructive pulmonary disease, unspecified COPD type [facility])  Assessment & Plan:  Emphysema changes on CT chest   Continue to recommend smoking cessation  Overall asymptomatic  Rare albuterol use  4  Atherosclerosis of native artery of both lower extremities with intermittent claudication ([facility])  Assessment & Plan:  LEADS shows bilateral 50-75% stenosis in the proximal  superficial femoral artery  Continue regular LEADS and follow up with vascular  5  Atrial fibrillation, unspecified type [facility])  Assessment & Plan:  Paroxysmal a  Fib  Regular rate and rhythm today  Anticoagulated on Coumadin  Continue cardiology follow up  Keep routine check up as scheduled in May           Chief Complaint     Chief Complaint   Patient presents with   • Cold Like Symptoms     Sinus, sore throat, rt ear 1 + wk, DM foot, A1C, micro       History of Present Illness     Luiz Castro is a 71year old male presenting today for possible sinus infection  Right maxillary sinus pressure and pain  Using Mucinex for 1 week now, it does help, but symptoms continue to return  No fevers  Chills, or cough  Smokes pipe  A1c elevated to 8 1%  Admits to eating brownies at [employer], potato chips, reeses cups  Likes candy, and pickles  Tries to limit portions, but has not eliminated sweets  Sore Throat   This is a new problem  The current episode started in the past 7 days  The problem has been unchanged  The pain is worse on the right side  There has been no fever  The pain is at a severity of 4/10  Associated symptoms include congestion, drooling, ear pain, a plugged ear sensation and swollen glands  Pertinent negatives include no abdominal pain, coughing, diarrhea, ear discharge, headaches, hoarse voice, neck pain, shortness of breath, trouble swallowing or vomiting  He has had no exposure to strep or mono  Review of Systems   Review of Systems   Constitutional: Negative  HENT: Positive for congestion, drooling, ear pain, sinus pressure, sinus pain and sore throat  Negative for ear discharge, hoarse voice and trouble swallowing  Respiratory: Negative for cough and shortness of breath  Gastrointestinal: Negative for abdominal pain, diarrhea and vomiting  Musculoskeletal: Negative for neck pain  Neurological: Negative for headaches  I have reviewed the patient's medical history in detail; there are no changes to the history as noted in the electronic medical record      Objective     Vitals:    02/27/23 0910   BP: 120/70   Pulse: 80   Resp: 16   Temp: 98 2 °F (36 8 °C)   TempSrc: Temporal   SpO2: 96%   Weight: 99 8 kg (220 lb)   Height: 5' 11" (1 803 m)     Wt Readings from Last 3 Encounters:   02/27/23 99 8 kg (220 lb)   01/05/23 101 kg (222 lb 6 4 oz) 12/01/22 98 9 kg (218 lb)     Physical Exam  Vitals and nursing note reviewed  Constitutional:       General: He is not in acute distress  Appearance: Normal appearance  He is not ill-appearing  HENT:      Head: Atraumatic  Right Ear: Tympanic membrane normal       Left Ear: Tympanic membrane normal       Nose: Congestion present  No rhinorrhea  Mouth/Throat:      Mouth: Mucous membranes are moist       Pharynx: Oropharynx is clear  Eyes:      Conjunctiva/sclera: Conjunctivae normal    Cardiovascular:      Rate and Rhythm: Normal rate and regular rhythm  Pulses: no weak pulses          Dorsalis pedis pulses are 1+ on the right side and 1+ on the left side  Posterior tibial pulses are 1+ on the right side and 1+ on the left side  Heart sounds: No murmur heard  Pulmonary:      Effort: Pulmonary effort is normal       Breath sounds: Normal breath sounds  Musculoskeletal:      Cervical back: Normal range of motion and neck supple  Feet:      Right foot:      Skin integrity: Callus (great toe) present  No ulcer, skin breakdown, erythema, warmth or dry skin  Left foot:      Skin integrity: Callus (great toea and plantar first MTP) present  No ulcer, skin breakdown, erythema, warmth or dry skin  Lymphadenopathy:      Cervical: No cervical adenopathy  Neurological:      Mental Status: He is alert  Psychiatric:         Mood and Affect: Mood normal        BMI Counseling: Body mass index is 30 68 kg/m²  The BMI is above normal  Nutrition recommendations include decreasing portion sizes and consuming healthier snacks  Exercise recommendations include exercising 3-5 times per week  Rationale for BMI follow-up plan is due to patient being overweight or obese  Depression Screening and Follow-up Plan: Patient was screened for depression during today's encounter  They screened negative with a PHQ-2 score of 0        Diabetic Foot Exam    Patient's shoes and socks removed  Right Foot/Ankle   Right Foot Inspection  Skin Exam: skin normal, skin intact, callus (great toe) and callus (great toe)  No dry skin, no warmth, no erythema, no maceration, no abnormal color, no pre-ulcer and no ulcer  Toe Exam: ROM and strength within normal limits  Sensory   Proprioception: intact  Monofilament testing: diminished    Vascular  Capillary refills: < 3 seconds  The right DP pulse is 1+  The right PT pulse is 1+  Left Foot/Ankle  Left Foot Inspection  Skin Exam: skin normal, skin intact and callus (great toea and plantar first MTP)  No dry skin, no warmth, no erythema, no maceration, normal color, no pre-ulcer and no ulcer  Toe Exam: ROM and strength within normal limits  Sensory   Proprioception: intact  Monofilament testing: diminished    Vascular  Capillary refills: < 3 seconds  The left DP pulse is 1+  The left PT pulse is 1+  Assign Risk Category  No deformity present  No loss of protective sensation  No weak pulses  Risk: 1    ALLERGIES:  Allergies   Allergen Reactions   • Pletal [Cilostazol] Tachycardia   • Dyazide [Hydrochlorothiazide W-Triamterene] Other (See Comments)     Hyponatremia   • Jardiance [Empagliflozin] Other (See Comments)     Constipation, increased urinary frequency--not tolerable       Current Medications     Current Outpatient Medications   Medication Sig Dispense Refill   • acetaminophen (TYLENOL) 325 mg tablet 2, by mouth, every 6 hours as needed for mild to moderate pain   30 tablet 0   • amoxicillin-clavulanate (AUGMENTIN) 875-125 mg per tablet Take 1 tablet by mouth every 12 (twelve) hours for 7 days 14 tablet 0   • aspirin (ECOTRIN LOW STRENGTH) 81 mg EC tablet Take 81 mg by mouth daily       • Cyanocobalamin (VITAMIN B 12 PO) Take 1,000 mcg by mouth daily     • famotidine (PEPCID) 20 mg tablet Take 1 tablet (20 mg total) by mouth 2 (two) times a day 90 tablet 3   • fenofibrate micronized (LOFIBRA) 134 MG capsule TAKE 1 CAPSULE (134 MG TOTAL) BY MOUTH DAILY WITH BREAKFAST 90 capsule 3   • gabapentin (NEURONTIN) 100 mg capsule TAKE 2 CAPSULES (200 MG TOTAL) BY MOUTH DAILY AT BEDTIME 180 capsule 1   • glimepiride (AMARYL) 2 mg tablet Take 1 tablet (2 mg total) by mouth daily with breakfast 90 tablet 3   • Januvia 100 MG tablet TAKE 1 TABLET BY MOUTH EVERY DAY 90 tablet 3   • lisinopril (ZESTRIL) 20 mg tablet TAKE 1 TABLET BY MOUTH EVERY DAY 90 tablet 4   • metFORMIN (GLUCOPHAGE-XR) 500 mg 24 hr tablet Take 2 tablets (1,000 mg total) by mouth 2 (two) times a day with meals 360 tablet 3   • omeprazole (PriLOSEC) 20 mg delayed release capsule TAKE 1 CAPSULE BY MOUTH EVERY DAY (Patient taking differently: Slowly weaning off takes every 4 days to be replaced by Pepcid ) 90 capsule 1   • ProAir  (90 Base) MCG/ACT inhaler INHALE 2 PUFFS INTO THE LUNGS EVERY 4 HOURS AS NEEDED FOR SHORTNESS OF BREATH OR FOR WHEEZE 17 g 1   • simvastatin (ZOCOR) 40 mg tablet TAKE 1 TABLET BY MOUTH EVERY DAY 90 tablet 4   • tamsulosin (FLOMAX) 0 4 mg TAKE 1 CAPSULE BY MOUTH EVERY DAY WITH DINNER 90 capsule 3   • warfarin (COUMADIN) 2 5 mg tablet Take 1 tablet by mouth daily T-Th-S-S takes 2 5mg     M-W-F takes 5 0 mg (Patient taking differently: Take 2 5 mg by mouth daily T-WED-Th-S-S takes 2 5mg     M-F takes 5 0 mg)     • warfarin (COUMADIN) 5 mg tablet TAKE 1 TABLET EVERY DAY OR AS DIRECTED BY MD 90 tablet 3     No current facility-administered medications for this visit           Health Maintenance     Health Maintenance   Topic Date Due   • Medicare Annual Wellness Visit (AWV)  05/17/2023   • Falls: Plan of Care  05/21/2023   • HEMOGLOBIN A1C  08/27/2023   • Fall Risk  10/06/2023   • Kidney Health Evaluation: GFR  10/31/2023   • Depression Screening  02/27/2024   • BMI: Adult  02/27/2024   • Kidney Health Evaluation: Microalbumin/Creatinine Ratio  02/27/2024   • Diabetic Foot Exam  02/27/2024   • BMI: Followup Plan  03/05/2024   • DM Eye Exam  04/14/2024   • Colorectal Cancer Screening  11/30/2027   • Hepatitis C Screening  Completed   • Pneumococcal Vaccine: 65+ Years  Completed   • Influenza Vaccine  Completed   • COVID-19 Vaccine  Completed   • HIB Vaccine  Aged Out   • IPV Vaccine  Aged Out   • Hepatitis A Vaccine  Aged Out   • Meningococcal ACWY Vaccine  Aged Out   • HPV Vaccine  Aged Out     Immunization History   Administered Date(s) Administered   • COVID-19 PFIZER VACCINE 0 3 ML IM 03/05/2021, 03/26/2021, 09/25/2021   • COVID-19 Pfizer Vac BIVALENT Christopher-sucrose 12 Yr+ IM (BOOSTER ONLY) 09/09/2022   • COVID-19 Pfizer vac (Christopher-sucrose, gray cap) 12 yr+ IM 04/01/2022   • COVID-19, unspecified 04/01/2022   • Hep B, adult 03/19/2019, 04/23/2019, 09/19/2019   • INFLUENZA 09/13/2018, 09/17/2018, 09/20/2021, 09/09/2022   • Influenza, high dose seasonal 0 7 mL 09/19/2019, 08/24/2020   • Pneumococcal Conjugate 13-Valent 03/19/2019   • Pneumococcal Polysaccharide PPV23 08/17/2020   • Zoster Vaccine Recombinant 07/30/2018, 09/29/2018       DAYAMI Rubio

## 2023-03-05 NOTE — ASSESSMENT & PLAN NOTE
Lab Results   Component Value Date    HGBA1C 8 1 (A) 02/27/2023     A1c increased to 8 1%  Continue metformin XR 1000 mg twice daily, Januvia 100 mg daily, and increase Glimepiride from 1 mg to 2 mg daily     Was unable to tolerate Jardiance in the past

## 2023-03-05 NOTE — ASSESSMENT & PLAN NOTE
LEADS shows bilateral 50-75% stenosis in the proximal  superficial femoral artery  Continue regular LEADS and follow up with vascular

## 2023-03-05 NOTE — ASSESSMENT & PLAN NOTE
Paroxysmal a  Fib  Regular rate and rhythm today  Anticoagulated on Coumadin  Continue cardiology follow up

## 2023-03-05 NOTE — ASSESSMENT & PLAN NOTE
Emphysema changes on CT chest 2019  Continue to recommend smoking cessation  Overall asymptomatic  Rare albuterol use

## 2023-03-09 ENCOUNTER — APPOINTMENT (OUTPATIENT)
Dept: LAB | Facility: CLINIC | Age: 73
End: 2023-03-09

## 2023-03-09 ENCOUNTER — ANTICOAG VISIT (OUTPATIENT)
Dept: CARDIOLOGY CLINIC | Facility: CLINIC | Age: 73
End: 2023-03-09

## 2023-03-09 DIAGNOSIS — Z86.718 HISTORY OF DVT (DEEP VEIN THROMBOSIS): Primary | ICD-10-CM

## 2023-03-09 NOTE — PROGRESS NOTES
Spoke with patient, advised INR good, continue 5 mg Mon Fri, 2 5 mg all other days, will recheck in 4 weeks 4/6/23

## 2023-03-16 DIAGNOSIS — G62.9 PERIPHERAL POLYNEUROPATHY: ICD-10-CM

## 2023-03-16 RX ORDER — GABAPENTIN 100 MG/1
200 CAPSULE ORAL
Qty: 180 CAPSULE | Refills: 1 | Status: SHIPPED | OUTPATIENT
Start: 2023-03-16

## 2023-04-04 DIAGNOSIS — K21.9 GASTROESOPHAGEAL REFLUX DISEASE WITHOUT ESOPHAGITIS: ICD-10-CM

## 2023-04-04 RX ORDER — OMEPRAZOLE 20 MG/1
CAPSULE, DELAYED RELEASE ORAL
Qty: 90 CAPSULE | Refills: 1 | Status: SHIPPED | OUTPATIENT
Start: 2023-04-04

## 2023-04-20 ENCOUNTER — ESTABLISHED COMPREHENSIVE EXAM (OUTPATIENT)
Dept: URBAN - METROPOLITAN AREA CLINIC 6 | Facility: CLINIC | Age: 73
End: 2023-04-20

## 2023-04-20 DIAGNOSIS — H04.123: ICD-10-CM

## 2023-04-20 DIAGNOSIS — H25.813: ICD-10-CM

## 2023-04-20 DIAGNOSIS — E11.9: ICD-10-CM

## 2023-04-20 LAB
LEFT EYE DIABETIC RETINOPATHY: NORMAL
RIGHT EYE DIABETIC RETINOPATHY: NORMAL

## 2023-04-20 PROCEDURE — 92014 COMPRE OPH EXAM EST PT 1/>: CPT

## 2023-04-20 ASSESSMENT — VISUAL ACUITY
OD_CC: 20/25
OU_CC: J1
OS_CC: 20/25

## 2023-04-20 ASSESSMENT — TONOMETRY
OD_IOP_MMHG: 17
OS_IOP_MMHG: 18

## 2023-05-01 ENCOUNTER — ANTICOAG VISIT (OUTPATIENT)
Dept: CARDIOLOGY CLINIC | Facility: CLINIC | Age: 73
End: 2023-05-01

## 2023-05-01 ENCOUNTER — APPOINTMENT (OUTPATIENT)
Dept: LAB | Facility: AMBULARY SURGERY CENTER | Age: 73
End: 2023-05-01

## 2023-05-01 DIAGNOSIS — I10 ESSENTIAL HYPERTENSION: ICD-10-CM

## 2023-05-01 DIAGNOSIS — E78.2 MIXED HYPERLIPIDEMIA: ICD-10-CM

## 2023-05-01 DIAGNOSIS — E11.40 TYPE 2 DIABETES MELLITUS WITH DIABETIC NEUROPATHY, WITHOUT LONG-TERM CURRENT USE OF INSULIN (HCC): ICD-10-CM

## 2023-05-01 DIAGNOSIS — Z86.718 HISTORY OF DVT (DEEP VEIN THROMBOSIS): Primary | ICD-10-CM

## 2023-05-01 LAB
ALBUMIN SERPL BCP-MCNC: 3.7 G/DL (ref 3.5–5)
ALP SERPL-CCNC: 68 U/L (ref 46–116)
ALT SERPL W P-5'-P-CCNC: 93 U/L (ref 12–78)
ANION GAP SERPL CALCULATED.3IONS-SCNC: 3 MMOL/L (ref 4–13)
AST SERPL W P-5'-P-CCNC: 73 U/L (ref 5–45)
BILIRUB SERPL-MCNC: 0.59 MG/DL (ref 0.2–1)
BUN SERPL-MCNC: 10 MG/DL (ref 5–25)
CALCIUM SERPL-MCNC: 9.2 MG/DL (ref 8.3–10.1)
CHLORIDE SERPL-SCNC: 106 MMOL/L (ref 96–108)
CHOLEST SERPL-MCNC: 133 MG/DL
CO2 SERPL-SCNC: 24 MMOL/L (ref 21–32)
CREAT SERPL-MCNC: 1.07 MG/DL (ref 0.6–1.3)
ERYTHROCYTE [DISTWIDTH] IN BLOOD BY AUTOMATED COUNT: 14 % (ref 11.6–15.1)
GFR SERPL CREATININE-BSD FRML MDRD: 69 ML/MIN/1.73SQ M
GLUCOSE P FAST SERPL-MCNC: 143 MG/DL (ref 65–99)
HCT VFR BLD AUTO: 47.9 % (ref 36.5–49.3)
HDLC SERPL-MCNC: 34 MG/DL
HGB BLD-MCNC: 15.9 G/DL (ref 12–17)
LDLC SERPL CALC-MCNC: 42 MG/DL (ref 0–100)
MCH RBC QN AUTO: 32.2 PG (ref 26.8–34.3)
MCHC RBC AUTO-ENTMCNC: 33.2 G/DL (ref 31.4–37.4)
MCV RBC AUTO: 97 FL (ref 82–98)
NONHDLC SERPL-MCNC: 99 MG/DL
PLATELET # BLD AUTO: 282 THOUSANDS/UL (ref 149–390)
PMV BLD AUTO: 10.5 FL (ref 8.9–12.7)
POTASSIUM SERPL-SCNC: 4.6 MMOL/L (ref 3.5–5.3)
PROT SERPL-MCNC: 7.4 G/DL (ref 6.4–8.4)
RBC # BLD AUTO: 4.94 MILLION/UL (ref 3.88–5.62)
SODIUM SERPL-SCNC: 133 MMOL/L (ref 135–147)
TRIGL SERPL-MCNC: 287 MG/DL
WBC # BLD AUTO: 8.45 THOUSAND/UL (ref 4.31–10.16)

## 2023-05-01 NOTE — PROGRESS NOTES
Spoke with patient, advised INR good, current dose verified, continue 5 mg Mon Fri, 2 5 mg all other days, will recheck in 3 weeks 5/22/23

## 2023-05-02 ENCOUNTER — OFFICE VISIT (OUTPATIENT)
Dept: CARDIOLOGY CLINIC | Facility: CLINIC | Age: 73
End: 2023-05-02

## 2023-05-02 VITALS
HEART RATE: 80 BPM | OXYGEN SATURATION: 96 % | BODY MASS INDEX: 30.55 KG/M2 | WEIGHT: 218.2 LBS | SYSTOLIC BLOOD PRESSURE: 128 MMHG | DIASTOLIC BLOOD PRESSURE: 72 MMHG | HEIGHT: 71 IN

## 2023-05-02 DIAGNOSIS — I48.0 PAROXYSMAL ATRIAL FIBRILLATION (HCC): ICD-10-CM

## 2023-05-02 DIAGNOSIS — I72.4 POPLITEAL ARTERY ANEURYSM, BILATERAL (HCC): ICD-10-CM

## 2023-05-02 DIAGNOSIS — I10 ESSENTIAL HYPERTENSION: Primary | ICD-10-CM

## 2023-05-02 DIAGNOSIS — E78.2 MIXED HYPERLIPIDEMIA: ICD-10-CM

## 2023-05-02 LAB
EST. AVERAGE GLUCOSE BLD GHB EST-MCNC: 154 MG/DL
HBA1C MFR BLD: 7 %

## 2023-05-02 NOTE — ASSESSMENT & PLAN NOTE
Peripheral arterial disease with leg claudication  The patient will continue to follow with vascular surgery

## 2023-05-02 NOTE — PROGRESS NOTES
"Assessment/Plan:    Essential hypertension  Hypertension, stable and adequately controlled  Atrial fibrillation (HCC)  History of paroxysmal atrial fibrillation, stable  The patient is asymptomatic  He is in regular rhythm  Popliteal artery ectasia bilateral (HCC)  Peripheral arterial disease with leg claudication  The patient will continue to follow with vascular surgery  Mixed hyperlipidemia  Hyperlipidemia, stable  Diagnoses and all orders for this visit:    Essential hypertension    Paroxysmal atrial fibrillation (HCC)    Popliteal artery ectasia bilateral (HCC)    Mixed hyperlipidemia          Subjective: Feels well from the cardiac standpoint  Patient ID: Usman Fine is a 71 y o  male  The patient presented to this office for the purpose of cardiac follow-up  He is known to have a history of hypertension and hyperlipidemia as well as diabetes mellitus  He is also known to have a history of paroxysmal atrial fibrillation and DVT  He has known peripheral vascular disease with leg claudication  He denies any symptoms of chest pain  He denies any shortness of breath, palpitation, dizziness or lightheadedness  He has no leg edema  The following portions of the patient's history were reviewed and updated as appropriate: allergies, current medications, past family history, past medical history, past social history, past surgical history and problem list     Review of Systems   Respiratory: Negative for apnea, cough, chest tightness, shortness of breath and wheezing  Cardiovascular: Negative for chest pain, palpitations and leg swelling  Gastrointestinal: Negative for abdominal pain  Neurological: Negative for dizziness and light-headedness  Psychiatric/Behavioral: Negative  Objective: Stable cardiac wise      /72 (BP Location: Left arm, Patient Position: Sitting, Cuff Size: Large)   Pulse 80   Ht 5' 11\" (1 803 m)   Wt 99 kg (218 lb 3 2 oz)   SpO2 96%   " BMI 30 43 kg/m²          Physical Exam  Vitals reviewed  Constitutional:       General: He is not in acute distress  Appearance: He is well-developed  He is not diaphoretic  HENT:      Head: Normocephalic  Eyes:      Pupils: Pupils are equal, round, and reactive to light  Neck:      Thyroid: No thyromegaly  Vascular: No JVD  Cardiovascular:      Rate and Rhythm: Normal rate and regular rhythm  Heart sounds: S1 normal and S2 normal  Murmur heard  Crescendo systolic murmur is present with a grade of 1/6  No friction rub  No gallop  Pulmonary:      Effort: Pulmonary effort is normal  No respiratory distress  Breath sounds: Normal breath sounds  No wheezing or rales  Chest:      Chest wall: No tenderness  Abdominal:      Palpations: Abdomen is soft  Musculoskeletal:         General: No tenderness or deformity  Normal range of motion  Cervical back: Normal range of motion  Right lower leg: No edema  Left lower leg: No edema  Skin:     General: Skin is warm and dry  Neurological:      Mental Status: He is alert and oriented to person, place, and time     Psychiatric:         Mood and Affect: Mood normal          Behavior: Behavior normal

## 2023-05-02 NOTE — ASSESSMENT & PLAN NOTE
History of paroxysmal atrial fibrillation, stable  The patient is asymptomatic  He is in regular rhythm

## 2023-05-02 NOTE — LETTER
May 2, 2023     DAYAMI Moore  [address] [facility] [address]    Patient: Annetta Darden   YOB: 1950   Date of Visit: 5/2/2023       Dear Dr Yeni Mansfield: Thank you for referring Victorino Narvaez to me for evaluation  Below are my notes for this consultation  If you have questions, please do not hesitate to call me  I look forward to following your patient along with you  Sincerely,        Felix Bone MD        CC: No Recipients  Felix Bone MD  5/2/2023  9:17 AM  Sign when Signing Visit  Assessment/Plan:    Essential hypertension  Hypertension, stable and adequately controlled  Atrial fibrillation (HCC)  History of paroxysmal atrial fibrillation, stable  The patient is asymptomatic  He is in regular rhythm  Popliteal artery ectasia bilateral (HCC)  Peripheral arterial disease with leg claudication  The patient will continue to follow with vascular surgery  Mixed hyperlipidemia  Hyperlipidemia, stable  Diagnoses and all orders for this visit:    Essential hypertension    Paroxysmal atrial fibrillation (HCC)    Popliteal artery ectasia bilateral (HCC)    Mixed hyperlipidemia         Subjective: Feels well from the cardiac standpoint  Patient ID: Annetta Darden is a 71 y o  male  The patient presented to this office for the purpose of cardiac follow-up  He is known to have a history of hypertension and hyperlipidemia as well as diabetes mellitus  He is also known to have a history of paroxysmal atrial fibrillation and DVT  He has known peripheral vascular disease with leg claudication  He denies any symptoms of chest pain  He denies any shortness of breath, palpitation, dizziness or lightheadedness  He has no leg edema        The following portions of the patient's history were reviewed and updated as appropriate: allergies, current medications, past family history, past medical history, past social history, past surgical history and problem "list     Review of Systems   Respiratory: Negative for apnea, cough, chest tightness, shortness of breath and wheezing  Cardiovascular: Negative for chest pain, palpitations and leg swelling  Gastrointestinal: Negative for abdominal pain  Neurological: Negative for dizziness and light-headedness  Psychiatric/Behavioral: Negative  Objective: Stable cardiac wise  /72 (BP Location: Left arm, Patient Position: Sitting, Cuff Size: Large)   Pulse 80   Ht 5' 11\" (1 803 m)   Wt 99 kg (218 lb 3 2 oz)   SpO2 96%   BMI 30 43 kg/m²         Physical Exam  Vitals reviewed  Constitutional:       General: He is not in acute distress  Appearance: He is well-developed  He is not diaphoretic  HENT:      Head: Normocephalic  Eyes:      Pupils: Pupils are equal, round, and reactive to light  Neck:      Thyroid: No thyromegaly  Vascular: No JVD  Cardiovascular:      Rate and Rhythm: Normal rate and regular rhythm  Heart sounds: S1 normal and S2 normal  Murmur heard  Crescendo systolic murmur is present with a grade of 1/6  No friction rub  No gallop  Pulmonary:      Effort: Pulmonary effort is normal  No respiratory distress  Breath sounds: Normal breath sounds  No wheezing or rales  Chest:      Chest wall: No tenderness  Abdominal:      Palpations: Abdomen is soft  Musculoskeletal:         General: No tenderness or deformity  Normal range of motion  Cervical back: Normal range of motion  Right lower leg: No edema  Left lower leg: No edema  Skin:     General: Skin is warm and dry  Neurological:      Mental Status: He is alert and oriented to person, place, and time     Psychiatric:         Mood and Affect: Mood normal          Behavior: Behavior normal           "

## 2023-05-09 ENCOUNTER — OFFICE VISIT (OUTPATIENT)
Dept: FAMILY MEDICINE CLINIC | Facility: CLINIC | Age: 73
End: 2023-05-09

## 2023-05-09 VITALS
OXYGEN SATURATION: 96 % | DIASTOLIC BLOOD PRESSURE: 80 MMHG | TEMPERATURE: 98.4 F | BODY MASS INDEX: 30.8 KG/M2 | RESPIRATION RATE: 16 BRPM | SYSTOLIC BLOOD PRESSURE: 120 MMHG | HEIGHT: 71 IN | HEART RATE: 78 BPM | WEIGHT: 220 LBS

## 2023-05-09 DIAGNOSIS — S81.801A WOUND OF RIGHT LOWER EXTREMITY, INITIAL ENCOUNTER: Primary | ICD-10-CM

## 2023-05-09 DIAGNOSIS — K14.6 TONGUE PAIN: ICD-10-CM

## 2023-05-09 NOTE — PROGRESS NOTES
FAMILY PRACTICE OFFICE VISIT       NAME: Desi Blas  AGE: 71 y o  SEX: male       : 1950        MRN: [id number]    Assessment and Plan   1  Wound of right lower extremity, initial encounter  Unclear if wound opened spontaneously or if he bumped into something and doesn't recall it or was unaware of it  He does have PAD with last LEADS in [facility] lower extremity with 50-75% stenosis in proximal superficial femoral artery  Evidence of tibioperoneal artery occlusive disease  He does follow with vascular for surveillance  Anticoagulated on aspirin and coumadin  On Simvastatin  Will continue to use antibiotic ointment 1-2 times daily and cover with band aid  Return in 1 week for recheck  If there is any spread of redness, warmth, swelling, increase in pain--cellulitis signs, he will contact me  2  Tongue pain  Unclear etiology of pain on right side of tongue  Log history of smoking pipe  Recommend ENT evaluation   -     Ambulatory Referral to Otolaryngology; Future          Chief Complaint     Chief Complaint   Patient presents with   • Leg Pain     Pt is here for rt shin ulcer 3 + days   • Swollen Glands       History of Present Illness     Desi Blas is a 71year old male presenting today for right shin wound  Started about 3 days ago  Does not recall any injury  Has been washing with soap and water and applying antibiotic ointment  Keeping covered with band aid  It is sore, a constant aching sensation  Right back  of tongue is sore  Has been sore for one month  It feels like a pulled muscle  It has improved somewhat  He smokes a pipe and thought maybe it was from drawing in on the pipe  Smoking pipe is not new  No visible sore/ulceration  He was at the dentist recently and he discussed with dentist who noted if it was not improving he would refer him for further evaluation  He is established with Dr Roger Najera, ENT           Review of Systems   Review of Systems "  Constitutional: Negative  HENT:        As noted in HPI  Cardiovascular: Negative  Skin: Negative  I have reviewed the patient's medical history in detail; there are no changes to the history as noted in the electronic medical record  Objective     Vitals:    05/09/23 0858   BP: 120/80   Pulse: 78   Resp: 16   Temp: 98 4 °F (36 9 °C)   TempSrc: Temporal   SpO2: 96%   Weight: 99 8 kg (220 lb)   Height: 5' 11\" (1 803 m)     Wt Readings from Last 3 Encounters:   05/09/23 99 8 kg (220 lb)   05/02/23 99 kg (218 lb 3 2 oz)   02/27/23 99 8 kg (220 lb)     Physical Exam  Vitals and nursing note reviewed  Constitutional:       General: He is not in acute distress  Appearance: Normal appearance  HENT:      Mouth/Throat:      Mouth: Mucous membranes are moist       Pharynx: Oropharynx is clear  Comments: No mouth sores appreciated  Cardiovascular:      Rate and Rhythm: Normal rate and regular rhythm  Heart sounds: No murmur heard  Musculoskeletal:      Cervical back: Normal range of motion and neck supple  Right lower leg: No edema  Left lower leg: No edema  Lymphadenopathy:      Cervical: No cervical adenopathy  Skin:     Comments: Right anterior mid pre-tibial region approximate 1 cm long by 6-7 mm wide area of erythema, with central superficial open skin with pink granulation tissue-about 5 -7 mm long and 2-3 mm wide  No drainage  No surrounding erythema or warmth  No edema  Mild PVD discoloration of lower extremities  Neurological:      Mental Status: He is alert     Psychiatric:         Mood and Affect: Mood normal             ALLERGIES:  Allergies   Allergen Reactions   • Pletal [Cilostazol] Tachycardia   • Dyazide [Hydrochlorothiazide W-Triamterene] Other (See Comments)     Hyponatremia   • Jardiance [Empagliflozin] Other (See Comments)     Constipation, increased urinary frequency--not tolerable       Current Medications     Current Outpatient Medications " Medication Sig Dispense Refill   • acetaminophen (TYLENOL) 325 mg tablet 2, by mouth, every 6 hours as needed for mild to moderate pain  30 tablet 0   • aspirin (ECOTRIN LOW STRENGTH) 81 mg EC tablet Take 81 mg by mouth daily       • Cyanocobalamin (VITAMIN B 12 PO) Take 1,000 mcg by mouth daily     • famotidine (PEPCID) 20 mg tablet Take 1 tablet (20 mg total) by mouth 2 (two) times a day 90 tablet 3   • fenofibrate micronized (LOFIBRA) 134 MG capsule TAKE 1 CAPSULE (134 MG TOTAL) BY MOUTH DAILY WITH BREAKFAST 90 capsule 3   • gabapentin (NEURONTIN) 100 mg capsule TAKE 2 CAPSULES (200 MG TOTAL) BY MOUTH DAILY AT BEDTIME 180 capsule 1   • glimepiride (AMARYL) 2 mg tablet Take 1 tablet (2 mg total) by mouth daily with breakfast 90 tablet 3   • Januvia 100 MG tablet TAKE 1 TABLET BY MOUTH EVERY DAY 90 tablet 3   • lisinopril (ZESTRIL) 20 mg tablet TAKE 1 TABLET BY MOUTH EVERY DAY 90 tablet 4   • metFORMIN (GLUCOPHAGE-XR) 500 mg 24 hr tablet Take 2 tablets (1,000 mg total) by mouth 2 (two) times a day with meals 360 tablet 3   • omeprazole (PriLOSEC) 20 mg delayed release capsule TAKE 1 CAPSULE BY MOUTH EVERY DAY 90 capsule 1   • ProAir  (90 Base) MCG/ACT inhaler INHALE 2 PUFFS INTO THE LUNGS EVERY 4 HOURS AS NEEDED FOR SHORTNESS OF BREATH OR FOR WHEEZE 17 g 1   • simvastatin (ZOCOR) 40 mg tablet TAKE 1 TABLET BY MOUTH EVERY DAY 90 tablet 4   • tamsulosin (FLOMAX) 0 4 mg TAKE 1 CAPSULE BY MOUTH EVERY DAY WITH DINNER 90 capsule 3   • warfarin (COUMADIN) 2 5 mg tablet Take 1 tablet by mouth daily T-Th-S-S takes 2 5mg     M-W-F takes 5 0 mg (Patient taking differently: Take 2 5 mg by mouth daily T-WED-Th-S-S takes 2 5mg     M-F takes 5 0 mg)     • warfarin (COUMADIN) 5 mg tablet TAKE 1 TABLET EVERY DAY OR AS DIRECTED BY MD (Patient taking differently: Takes Monday and Friday ) 90 tablet 3     No current facility-administered medications for this visit           Health Maintenance     Health Maintenance   Topic Date Due   • Medicare Annual Wellness Visit (AWV)  05/17/2023   • Falls: Plan of Care  05/21/2023   • Fall Risk  10/06/2023   • HEMOGLOBIN A1C  11/01/2023   • Depression Screening  02/27/2024   • Kidney Health Evaluation: Microalbumin/Creatinine Ratio  02/27/2024   • Diabetic Foot Exam  02/27/2024   • BMI: Followup Plan  03/05/2024   • Kidney Health Evaluation: GFR  05/01/2024   • BMI: Adult  05/09/2024   • DM Eye Exam  04/20/2025   • Colorectal Cancer Screening  11/30/2027   • Hepatitis C Screening  Completed   • Pneumococcal Vaccine: 65+ Years  Completed   • Influenza Vaccine  Completed   • COVID-19 Vaccine  Completed   • HIB Vaccine  Aged Out   • IPV Vaccine  Aged Out   • Hepatitis A Vaccine  Aged Out   • Meningococcal ACWY Vaccine  Aged Out   • HPV Vaccine  Aged Out     Immunization History   Administered Date(s) Administered   • COVID-19 Moderna Vac BIVALENT 12 Yr+ IM (BOOSTER ONLY) 0 5 ML 05/04/2023   • COVID-19 PFIZER VACCINE 0 3 ML IM 03/05/2021, 03/26/2021, 09/25/2021   • COVID-19 Pfizer Vac BIVALENT Christopher-sucrose 12 Yr+ IM (BOOSTER ONLY) 09/09/2022, 05/04/2023   • COVID-19 Pfizer vac (Christopher-sucrose, gray cap) 12 yr+ IM 04/01/2022   • COVID-19, unspecified 04/01/2022   • Hep B, adult 03/19/2019, 04/23/2019, 09/19/2019   • INFLUENZA 09/13/2018, 09/17/2018, 09/20/2021, 09/09/2022   • Influenza, high dose seasonal 0 7 mL 09/19/2019, 08/24/2020   • Pneumococcal Conjugate 13-Valent 03/19/2019   • Pneumococcal Polysaccharide PPV23 08/17/2020   • Zoster Vaccine Recombinant 07/30/2018, 09/29/2018       DAYAMI Chavez

## 2023-05-18 ENCOUNTER — OFFICE VISIT (OUTPATIENT)
Dept: FAMILY MEDICINE CLINIC | Facility: CLINIC | Age: 73
End: 2023-05-18

## 2023-05-18 VITALS
RESPIRATION RATE: 16 BRPM | TEMPERATURE: 98.4 F | SYSTOLIC BLOOD PRESSURE: 120 MMHG | HEART RATE: 79 BPM | HEIGHT: 71 IN | WEIGHT: 217 LBS | OXYGEN SATURATION: 96 % | DIASTOLIC BLOOD PRESSURE: 70 MMHG | BODY MASS INDEX: 30.38 KG/M2

## 2023-05-18 DIAGNOSIS — Z00.00 MEDICARE ANNUAL WELLNESS VISIT, SUBSEQUENT: Primary | ICD-10-CM

## 2023-05-18 DIAGNOSIS — E78.2 MIXED HYPERLIPIDEMIA: ICD-10-CM

## 2023-05-18 DIAGNOSIS — R79.89 LFTS ABNORMAL: ICD-10-CM

## 2023-05-18 DIAGNOSIS — E11.40 TYPE 2 DIABETES MELLITUS WITH DIABETIC NEUROPATHY, WITHOUT LONG-TERM CURRENT USE OF INSULIN (HCC): ICD-10-CM

## 2023-05-18 DIAGNOSIS — F17.200 SMOKING: ICD-10-CM

## 2023-05-18 DIAGNOSIS — K76.0 FATTY LIVER: ICD-10-CM

## 2023-05-18 DIAGNOSIS — I10 ESSENTIAL HYPERTENSION: ICD-10-CM

## 2023-05-18 NOTE — PATIENT INSTRUCTIONS
Medicare Preventive Visit Patient Instructions  Thank you for completing your Welcome to Medicare Visit or Medicare Annual Wellness Visit today  Your next wellness visit will be due in one year (5/18/2024)  The screening/preventive services that you may require over the next 5-10 years are detailed below  Some tests may not apply to you based off risk factors and/or age  Screening tests ordered at today's visit but not completed yet may show as past due  Also, please note that scanned in results may not display below  Preventive Screenings:  Service Recommendations Previous Testing/Comments   Colorectal Cancer Screening  · Colonoscopy    · Fecal Occult Blood Test (FOBT)/Fecal Immunochemical Test (FIT)  · Fecal DNA/Cologuard Test  · Flexible Sigmoidoscopy Age: 39-70 years old   Colonoscopy: every 10 years (May be performed more frequently if at higher risk)  OR  FOBT/FIT: every 1 year  OR  Cologuard: every 3 years  OR  Sigmoidoscopy: every 5 years  Screening may be recommended earlier than age 39 if at higher risk for colorectal cancer  Also, an individualized decision between you and your healthcare provider will decide whether screening between the ages of 74-80 would be appropriate   Colonoscopy: 12/01/2022  FOBT/FIT: Not on file  Cologuard: Not on file  Sigmoidoscopy: Not on file    Screening Current     Prostate Cancer Screening Individualized decision between patient and health care provider in men between ages of 53-78   Medicare will cover every 12 months beginning on the day after your 50th birthday PSA: 1 2 ng/mL     Screening Not Indicated     Hepatitis C Screening Once for adults born between 1945 and 1965  More frequently in patients at high risk for Hepatitis C Hep C Antibody: 03/05/2019    Screening Current   Diabetes Screening 1-2 times per year if you're at risk for diabetes or have pre-diabetes Fasting glucose: 143 mg/dL (5/1/2023)  A1C: 7 0 % (5/1/2023)  Screening Not Indicated  History Diabetes Cholesterol Screening Once every 5 years if you don't have a lipid disorder  May order more often based on risk factors  Lipid panel: 05/01/2023  Screening Not Indicated  History Lipid Disorder      Other Preventive Screenings Covered by Medicare:  1  Abdominal Aortic Aneurysm (AAA) Screening: covered once if your at risk  You're considered to be at risk if you have a family history of AAA or a male between the age of 73-68 who smoking at least 100 cigarettes in your lifetime  2  Lung Cancer Screening: covers low dose CT scan once per year if you meet all of the following conditions: (1) Age 50-69; (2) No signs or symptoms of lung cancer; (3) Current smoker or have quit smoking within the last 15 years; (4) You have a tobacco smoking history of at least 20 pack years (packs per day x number of years you smoked); (5) You get a written order from a healthcare provider  3  Glaucoma Screening: covered annually if you're considered high risk: (1) You have diabetes OR (2) Family history of glaucoma OR (3)  aged 48 and older OR (3)  American aged 72 and older  3  Osteoporosis Screening: covered every 2 years if you meet one of the following conditions: (1) Have a vertebral abnormality; (2) On glucocorticoid therapy for more than 3 months; (3) Have primary hyperparathyroidism; (4) On osteoporosis medications and need to assess response to drug therapy  5  HIV Screening: covered annually if you're between the age of 12-76  Also covered annually if you are younger than 13 and older than 72 with risk factors for HIV infection  For pregnant patients, it is covered up to 3 times per pregnancy      Immunizations:  Immunization Recommendations   Influenza Vaccine Annual influenza vaccination during flu season is recommended for all persons aged >= 6 months who do not have contraindications   Pneumococcal Vaccine   * Pneumococcal conjugate vaccine = PCV13 (Prevnar 13), PCV15 (Vaxneuvance), PCV20 (Prevnar 20)  * Pneumococcal polysaccharide vaccine = PPSV23 (Pneumovax) Adults 2364 years old: 1-3 doses may be recommended based on certain risk factors  Adults 72 years old: 1-2 doses may be recommended based off what pneumonia vaccine you previously received   Hepatitis B Vaccine 3 dose series if at intermediate or high risk (ex: diabetes, end stage renal disease, liver disease)   Tetanus (Td) Vaccine - COST NOT COVERED BY MEDICARE PART B Following completion of primary series, a booster dose should be given every 10 years to maintain immunity against tetanus  Td may also be given as tetanus wound prophylaxis  Tdap Vaccine - COST NOT COVERED BY MEDICARE PART B Recommended at least once for all adults  For pregnant patients, recommended with each pregnancy  Shingles Vaccine (Shingrix) - COST NOT COVERED BY MEDICARE PART B  2 shot series recommended in those aged 48 and above     Health Maintenance Due:      Topic Date Due   • Colorectal Cancer Screening  11/30/2027   • Hepatitis C Screening  Completed     Immunizations Due:  There are no preventive care reminders to display for this patient  Advance Directives   What are advance directives? Advance directives are legal documents that state your wishes and plans for medical care  These plans are made ahead of time in case you lose your ability to make decisions for yourself  Advance directives can apply to any medical decision, such as the treatments you want, and if you want to donate organs  What are the types of advance directives? There are many types of advance directives, and each state has rules about how to use them  You may choose a combination of any of the following:  · Living will: This is a written record of the treatment you want  You can also choose which treatments you do not want, which to limit, and which to stop at a certain time  This includes surgery, medicine, IV fluid, and tube feedings     · Durable power of  for healthcare [facility]): This is a written record that states who you want to make healthcare choices for you when you are unable to make them for yourself  This person, called a proxy, is usually a family member or a friend  You may choose more than 1 proxy  · Do not resuscitate (DNR) order:  A DNR order is used in case your heart stops beating or you stop breathing  It is a request not to have certain forms of treatment, such as CPR  A DNR order may be included in other types of advance directives  · Medical directive: This covers the care that you want if you are in a coma, near death, or unable to make decisions for yourself  You can list the treatments you want for each condition  Treatment may include pain medicine, surgery, blood transfusions, dialysis, IV or tube feedings, and a ventilator (breathing machine)  · Values history: This document has questions about your views, beliefs, and how you feel and think about life  This information can help others choose the care that you would choose  Why are advance directives important? An advance directive helps you control your care  Although spoken wishes may be used, it is better to have your wishes written down  Spoken wishes can be misunderstood, or not followed  Treatments may be given even if you do not want them  An advance directive may make it easier for your family to make difficult choices about your care  Fall Prevention    Fall prevention  includes ways to make your home and other areas safer  It also includes ways you can move more carefully to prevent a fall  Health conditions that cause changes in your blood pressure, vision, or muscle strength and coordination may increase your risk for falls  Medicines may also increase your risk for falls if they make you dizzy, weak, or sleepy  Fall prevention tips:   · Stand or sit up slowly  · Use assistive devices as directed  · Wear shoes that fit well and have soles that   · Wear a personal alarm  · Stay active  · Manage your medical conditions  Home Safety Tips:  · Add items to prevent falls in the bathroom  · Keep paths clear  · Install bright lights in your home  · Keep items you use often on shelves within reach  · Paint or place reflective tape on the edges of your stairs  Cigarette Smoking and Your Health   Risks to your health if you smoke:  Nicotine and other chemicals found in tobacco damage every cell in your body  Even if you are a light smoker, you have an increased risk for cancer, heart disease, and lung disease  If you are pregnant or have diabetes, smoking increases your risk for complications  Benefits to your health if you stop smoking:   · You decrease respiratory symptoms such as coughing, wheezing, and shortness of breath  · You reduce your risk for cancers of the lung, mouth, throat, kidney, bladder, pancreas, stomach, and cervix  If you already have cancer, you increase the benefits of chemotherapy  You also reduce your risk for cancer returning or a second cancer from developing  · You reduce your risk for heart disease, blood clots, heart attack, and stroke  · You reduce your risk for lung infections, and diseases such as pneumonia, asthma, chronic bronchitis, and emphysema  · Your circulation improves  More oxygen can be delivered to your body  If you have diabetes, you lower your risk for complications, such as kidney, artery, and eye diseases  You also lower your risk for nerve damage  Nerve damage can lead to amputations, poor vision, and blindness  · You improve your body's ability to heal and to fight infections  For more information and support to stop smoking:   · [place]  gov  Phone: 9- 192 - 729-1147  Web Address: [employer]  Weight Management   Why it is important to manage your weight:  Being overweight increases your risk of health conditions such as heart disease, high blood pressure, type 2 diabetes, and certain types of cancer  It can also increase your risk for osteoarthritis, sleep apnea, and other respiratory problems  Aim for a slow, steady weight loss  Even a small amount of weight loss can lower your risk of health problems  How to lose weight safely:  A safe and healthy way to lose weight is to eat fewer calories and get regular exercise  You can lose up about 1 pound a week by decreasing the number of calories you eat by 500 calories each day  Healthy meal plan for weight management:  A healthy meal plan includes a variety of foods, contains fewer calories, and helps you stay healthy  A healthy meal plan includes the following:  · Eat whole-grain foods more often  A healthy meal plan should contain fiber  Fiber is the part of grains, fruits, and vegetables that is not broken down by your body  Whole-grain foods are healthy and provide extra fiber in your diet  Some examples of whole-grain foods are whole-wheat breads and pastas, oatmeal, brown rice, and bulgur  · Eat a variety of vegetables every day  Include dark, leafy greens such as spinach, kale, carito greens, and mustard greens  Eat yellow and orange vegetables such as carrots, sweet potatoes, and winter squash  · Eat a variety of fruits every day  Choose fresh or canned fruit (canned in its own juice or light syrup) instead of juice  Fruit juice has very little or no fiber  · Eat low-fat dairy foods  Drink fat-free (skim) milk or 1% milk  Eat fat-free yogurt and low-fat cottage cheese  Try low-fat cheeses such as mozzarella and other reduced-fat cheeses  · Choose meat and other protein foods that are low in fat  Choose beans or other legumes such as split peas or lentils  Choose fish, skinless poultry (chicken or turkey), or lean cuts of red meat (beef or pork)  Before you cook meat or poultry, cut off any visible fat  · Use less fat and oil  Try baking foods instead of frying them   Add less fat, such as margarine, sour cream, regular salad dressing and mayonnaise to foods  Eat fewer high-fat foods  Some examples of high-fat foods include french fries, doughnuts, ice cream, and cakes  · Eat fewer sweets  Limit foods and drinks that are high in sugar  This includes candy, cookies, regular soda, and sweetened drinks  Exercise:  Exercise at least 30 minutes per day on most days of the week  Some examples of exercise include walking, biking, dancing, and swimming  You can also fit in more physical activity by taking the stairs instead of the elevator or parking farther away from stores  Ask your healthcare provider about the best exercise plan for you  © Copyright [employer] 2018 Information is for End User's use only and may not be sold, redistributed or otherwise used for commercial purposes   All illustrations and images included in CareNotes® are the copyrighted property of [employer]  or [address] [place] [address]

## 2023-05-18 NOTE — PROGRESS NOTES
Assessment and Plan:     Problem List Items Addressed This Visit        Digestive    Fatty liver     Fatty liver  Continue to work on weight loss  Endocrine    Type 2 diabetes mellitus with neurologic complication, without long-term current use of insulin (HCC)       Lab Results   Component Value Date    HGBA1C 7 0 (H) 05/01/2023     A1c stable at 7 0% on current medications  Continue Metformin, Januvia, and glimepiride  Relevant Orders    Hemoglobin A1C       Cardiovascular and Mediastinum    Essential hypertension     Blood pressure is stable on current medications: lisinopril          Relevant Orders    CBC and differential    Comprehensive metabolic panel    TSH, 3rd generation       Other    Mixed hyperlipidemia     LDL at goal 42  Continue Simvastatin  Fenofibrate for hyperlipidemia  Relevant Orders    Lipid panel    Smoking     Smoking tobacco via pipe  Complete smoking cessation is recommended  Other Visit Diagnoses     Medicare annual wellness visit, subsequent    -  Primary    LFTs abnormal      Mild elevation of AST 73, ALT 93  Will repeat in 1 month  Relevant Orders    Comprehensive metabolic panel        Follow up in 6 months or sooner if fneeded  Preventive health issues were discussed with patient, and age appropriate screening tests were ordered as noted in patient's After Visit Summary  Personalized health advice and appropriate referrals for health education or preventive services given if needed, as noted in patient's After Visit Summary  History of Present Illness:     Patient presents for a Medicare Wellness Visit    Carloz Maldonado is a 71year old male presenting today for annual medicare wellness visit and follow up on chronic conditions  Right lower extremity wound has healed, there is now surrounding redness, from bandage adhesive  It is improving since yesterday, when adhesive removed       Right posterior tongue pain is 90% better  Has dental exam next week  He is otherwise feeling in stable health  Patient Care Team:  DAYAMI Lamas as PCP - General (Family Medicine)  Louie Eastman MD (Ophthalmology)  Susana Cardoso MD (Otolaryngology)  Mirela Gaston DDS  Pranay Quezada DDS (Oral Surgery)  Bruna Bueno DPM (Podiatry)  Mazin Garcia DO (Ophthalmology)     Review of Systems:     Review of Systems   Constitutional: Negative  HENT: Negative  Respiratory: Negative  Cardiovascular: Negative  Gastrointestinal: Negative  Genitourinary: Negative  Musculoskeletal: Negative  Skin: Positive for wound  Neurological: Negative  Hematological: Negative  Psychiatric/Behavioral: Negative           Problem List:     Patient Active Problem List   Diagnosis   • Essential hypertension   • Mixed hyperlipidemia   • Type 2 diabetes mellitus with neurologic complication, without long-term current use of insulin (HCC)   • Fatty liver   • History of DVT (deep vein thrombosis)   • Anticoagulated on Coumadin   • 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)   • Atrial fibrillation (HCC)   • History of colon polyps   • Gastroesophageal reflux disease without esophagitis   • Warthin's tumor   • Hiatal hernia   • Smoking   • Chronic obstructive pulmonary disease (HCC)   • Prostatic disorder   • Trigger ring finger of right hand   • Intermittent claudication (HCC)   • Popliteal artery ectasia bilateral (HCC)   • Muscle cramping   • Extensor carpi radialis brevis tenosynovitis   • History of parotidectomy      Past Medical and Surgical History:     Past Medical History:   Diagnosis Date   • LISY (acute kidney injury) ([facility] [address] ) 09/11/2021   • Arthritis    • Atherosclerosis of native arteries of the extremities with ulceration ([facility] [address] ) 03/21/2019   • Colon polyp     6 polyps 3 years ago   • Diabetes mellitus ([facility] [address] )    • Diabetic neuropathic arthropathy (HCC)     causes weakness in LE and uses a cane as assist to walk   • Diabetic neuropathy ([facility] [address] )    • Fatty liver    • Hyponatremia 09/11/2021   • Kidney stone    • Parotid tumor    • Seasonal allergies    • Spinal stenosis    • Ureteral stone with hydronephrosis 05/17/2019    Added automatically from request for surgery [id number]     Past Surgical History:   Procedure Laterality Date   • COLONOSCOPY  05/16/2019    completed by Dr Dominic George, Repeat 3 years   • FL RETROGRADE PYELOGRAM  5/17/2019   • PAROTIDECTOMY Left    • [place] CYSTO/URETERO W/LITHOTRIPSY &INDWELL STENT INSRT Right 5/17/2019    Procedure: CYSTOSCOPY URETEROSCOPY WITH LITHOTRIPSY HOLMIUM LASER, RETROGRADE PYELOGRAM AND INSERTION STENT URETERAL--possible stent only;  Surgeon: Nikki Abdi MD;  [facility];  Service: Urology   • [place] EXC PRTD ALEXA/PRTD GLND LAT [id number]&PRSRV FACIAL NR Right 10/18/2017    Procedure: SUPERFICIAL PAROTIDECTOMY WITH NERVE DISSECTION AND PRESERVATION;  Surgeon: Randall Travis MD;  [facility];  Service: ENT   • [place] TENDON SHEATH INCISION Right 11/3/2020    Procedure: RING TRIGGER FINGER RELEASE;  Surgeon: Israel Andrews MD;  [facility];  Service: Orthopedics   • ROTATOR CUFF REPAIR Bilateral    • TONSILLECTOMY        Family History:     Family History   Problem Relation Age of Onset   • Lupus Mother    • Rheum arthritis Mother    • Cirrhosis Father    • Alcohol abuse Father    • Substance Abuse Brother       Social History:     Social History     Socioeconomic History   • Marital status: /Civil Union     Spouse name: None   • Number of children: None   • Years of education: None   • Highest education level: None   Occupational History   • None   Tobacco Use   • Smoking status: Every Day     Types: Pipe   • Smokeless tobacco: Never   • Tobacco comments:     smokes 2 pipes daily, 1 cigar daily    Vaping Use   • Vaping Use: Never used   Substance and Sexual Activity   • Alcohol use:  Yes     Alcohol/week: 1 0 standard drink     Types: 1 Shots of liquor per week     Comment: socially   • Drug use: No   • Sexual activity: Yes   Other Topics Concern   • None   Social History Narrative             Social Determinants of Health     Financial Resource Strain: Low Risk    • Difficulty of Paying Living Expenses: Not hard at all   Food Insecurity: Not on file   Transportation Needs: No Transportation Needs   • Lack of Transportation (Medical): No   • Lack of Transportation (Non-Medical): No   Physical Activity: Not on file   Stress: Not on file   Social Connections: Not on file   Intimate Partner Violence: Not on file   Housing Stability: Not on file      Medications and Allergies:     Current Outpatient Medications   Medication Sig Dispense Refill   • acetaminophen (TYLENOL) 325 mg tablet 2, by mouth, every 6 hours as needed for mild to moderate pain   30 tablet 0   • aspirin (ECOTRIN LOW STRENGTH) 81 mg EC tablet Take 81 mg by mouth daily       • Cyanocobalamin (VITAMIN B 12 PO) Take 1,000 mcg by mouth daily     • famotidine (PEPCID) 20 mg tablet Take 1 tablet (20 mg total) by mouth 2 (two) times a day 90 tablet 3   • fenofibrate micronized (LOFIBRA) 134 MG capsule TAKE 1 CAPSULE (134 MG TOTAL) BY MOUTH DAILY WITH BREAKFAST 90 capsule 3   • gabapentin (NEURONTIN) 100 mg capsule TAKE 2 CAPSULES (200 MG TOTAL) BY MOUTH DAILY AT BEDTIME 180 capsule 1   • glimepiride (AMARYL) 2 mg tablet Take 1 tablet (2 mg total) by mouth daily with breakfast 90 tablet 3   • Januvia 100 MG tablet TAKE 1 TABLET BY MOUTH EVERY DAY 90 tablet 3   • lisinopril (ZESTRIL) 20 mg tablet TAKE 1 TABLET BY MOUTH EVERY DAY 90 tablet 4   • metFORMIN (GLUCOPHAGE-XR) 500 mg 24 hr tablet Take 2 tablets (1,000 mg total) by mouth 2 (two) times a day with meals 360 tablet 3   • omeprazole (PriLOSEC) 20 mg delayed release capsule TAKE 1 CAPSULE BY MOUTH EVERY DAY 90 capsule 1   • ProAir  (90 Base) MCG/ACT inhaler INHALE 2 PUFFS INTO THE LUNGS EVERY 4 HOURS AS NEEDED FOR SHORTNESS OF BREATH OR FOR WHEEZE 17 g 1   • simvastatin (ZOCOR) 40 mg tablet TAKE 1 TABLET BY MOUTH EVERY DAY 90 tablet 4   • tamsulosin (FLOMAX) 0 4 mg TAKE 1 CAPSULE BY MOUTH EVERY DAY WITH DINNER 90 capsule 3   • warfarin (COUMADIN) 2 5 mg tablet Take 1 tablet by mouth daily T-Th-S-S takes 2 5mg     M-W-F takes 5 0 mg (Patient taking differently: Take 2 5 mg by mouth daily T-WED-Th-S-S takes 2 5mg     M-F takes 5 0 mg)     • warfarin (COUMADIN) 5 mg tablet TAKE 1 TABLET EVERY DAY OR AS DIRECTED BY MD (Patient taking differently: Takes Monday and Friday ) 90 tablet 3     No current facility-administered medications for this visit  Allergies   Allergen Reactions   • Pletal [Cilostazol] Tachycardia   • Dyazide [Hydrochlorothiazide W-Triamterene] Other (See Comments)     Hyponatremia   • Jardiance [Empagliflozin] Other (See Comments)     Constipation, increased urinary frequency--not tolerable      Immunizations:     Immunization History   Administered Date(s) Administered   • COVID-19 Moderna Vac BIVALENT 12 Yr+ IM (BOOSTER ONLY) 0 5 ML 05/04/2023   • COVID-19 PFIZER VACCINE 0 3 ML IM 03/05/2021, 03/26/2021, 09/25/2021   • COVID-19 Pfizer Vac BIVALENT Christopher-sucrose 12 Yr+ IM (BOOSTER ONLY) 09/09/2022, 05/04/2023   • COVID-19 Pfizer vac (Christopher-sucrose, gray cap) 12 yr+ IM 04/01/2022   • COVID-19, unspecified 04/01/2022   • Hep B, adult 03/19/2019, 04/23/2019, 09/19/2019   • INFLUENZA 09/13/2018, 09/17/2018, 09/20/2021, 09/09/2022   • Influenza, high dose seasonal 0 7 mL 09/19/2019, 08/24/2020   • Pneumococcal Conjugate 13-Valent 03/19/2019   • Pneumococcal Polysaccharide PPV23 08/17/2020   • Zoster Vaccine Recombinant 07/30/2018, 09/29/2018      Health Maintenance:         Topic Date Due   • Colorectal Cancer Screening  11/30/2027   • Hepatitis C Screening  Completed     There are no preventive care reminders to display for this patient     Medicare Screening Tests and Risk Assessments:     Elisabet Steinberg is here for his Subsequent Wellness visit  Last Medicare Wellness visit information reviewed, patient interviewed and updates made to the record today  Health Risk Assessment:   Patient rates overall health as good  Patient feels that their physical health rating is same  Patient is satisfied with their life  Eyesight was rated as same  Hearing was rated as same  Patient feels that their emotional and mental health rating is same  Patients states they are never, rarely angry  Patient states they are often unusually tired/fatigued  Pain experienced in the last 7 days has been some  Patient's pain rating has been 6/10  Patient states that he has experienced no weight loss or gain in last 6 months  Fall Risk Screening: In the past year, patient has experienced: history of falling in past year    Number of falls: 2 or more  Injured during fall?: Yes    Feels unsteady when standing or walking?: No    Worried about falling?: No      Home Safety:  Patient does not have trouble with stairs inside or outside of their home  Patient has working smoke alarms and has working carbon monoxide detector  Home safety hazards include: none  Nutrition:   Current diet is Regular and Limited junk food  Medications:   Patient is currently taking over-the-counter supplements  OTC medications include: see medication list  Patient is able to manage medications  Activities of Daily Living (ADLs)/Instrumental Activities of Daily Living (IADLs):   Walk and transfer into and out of bed and chair?: Yes  Dress and groom yourself?: Yes    Bathe or shower yourself?: Yes    Feed yourself?  Yes  Do your laundry/housekeeping?: Yes  Manage your money, pay your bills and track your expenses?: Yes  Make your own meals?: Yes    Do your own shopping?: Yes    Previous Hospitalizations:   Any hospitalizations or ED visits within the last 12 months?: Yes    How many hospitalizations have you had in the last year?: 1-2    Advance Care Planning:   Living "will: Yes    Durable POA for healthcare: Yes    Advanced directive: Yes      Cognitive Screening:   Provider or family/friend/caregiver concerned regarding cognition?: No    PREVENTIVE SCREENINGS      Cardiovascular Screening:    General: Screening Not Indicated and History Lipid Disorder      Diabetes Screening:     General: Screening Not Indicated and History Diabetes      Colorectal Cancer Screening:     General: Screening Current      Prostate Cancer Screening:    General: Screening Not Indicated      Osteoporosis Screening:    General: Screening Not Indicated      Abdominal Aortic Aneurysm (AAA) Screening:    Risk factors include: age between 73-67 yo and tobacco use        General: Screening Not Indicated and History AAA      Lung Cancer Screening:     General: Screening Not Indicated      Hepatitis C Screening:    General: Screening Current    Screening, Brief Intervention, and Referral to Treatment (SBIRT)    Screening    Typical number of drinks in a week: 5    Single Item Drug Screening:  How often have you used an illegal drug (including marijuana) or a prescription medication for non-medical reasons in the past year? never    Single Item Drug Screen Score: 0  Interpretation: Negative screen for possible drug use disorder    Brief Intervention  Alcohol & drug use screenings were reviewed  No concerns regarding substance use disorder identified  No results found  Physical Exam:     /70   Pulse 79   Temp 98 4 °F (36 9 °C) (Temporal)   Resp 16   Ht 5' 11\" (1 803 m)   Wt 98 4 kg (217 lb)   SpO2 96%   BMI 30 27 kg/m²     Physical Exam  Vitals and nursing note reviewed  Constitutional:       General: He is not in acute distress  Appearance: He is well-developed  HENT:      Head: Normocephalic and atraumatic  Right Ear: Tympanic membrane normal       Left Ear: Tympanic membrane normal       Mouth/Throat:      Mouth: Mucous membranes are moist       Pharynx: Oropharynx is clear   " Eyes:      Conjunctiva/sclera: Conjunctivae normal    Cardiovascular:      Rate and Rhythm: Normal rate and regular rhythm  Heart sounds: No murmur heard  Pulmonary:      Effort: Pulmonary effort is normal  No respiratory distress  Breath sounds: Normal breath sounds  Abdominal:      Palpations: Abdomen is soft  Tenderness: There is no abdominal tenderness  Musculoskeletal:         General: No swelling  Cervical back: Neck supple  Skin:     General: Skin is warm and dry  Capillary Refill: Capillary refill takes less than 2 seconds  Neurological:      Mental Status: He is alert     Psychiatric:         Mood and Affect: Mood normal           Hedy Fernandez

## 2023-05-21 ENCOUNTER — TELEPHONE (OUTPATIENT)
Dept: OTHER | Facility: OTHER | Age: 73
End: 2023-05-21

## 2023-05-21 DIAGNOSIS — E11.40 TYPE 2 DIABETES MELLITUS WITH DIABETIC NEUROPATHY, WITHOUT LONG-TERM CURRENT USE OF INSULIN (HCC): ICD-10-CM

## 2023-05-21 DIAGNOSIS — E11.40 TYPE 2 DIABETES MELLITUS WITH DIABETIC NEUROPATHY, WITHOUT LONG-TERM CURRENT USE OF INSULIN (HCC): Primary | ICD-10-CM

## 2023-05-21 RX ORDER — GLIPIZIDE 5 MG/1
2.5 TABLET ORAL DAILY
Qty: 90 TABLET | Refills: 3 | Status: SHIPPED | OUTPATIENT
Start: 2023-05-21 | End: 2023-05-22 | Stop reason: ALTCHOICE

## 2023-05-21 RX ORDER — GLIPIZIDE 5 MG/1
TABLET ORAL
Qty: 90 TABLET | Refills: 0 | OUTPATIENT
Start: 2023-05-21

## 2023-05-21 NOTE — ASSESSMENT & PLAN NOTE
Paroxsymal a  Fib  Regular rate and rhythm today  Anticoagulated on Coumadin  Follows with cardiology, Dr Gely Duvall

## 2023-05-21 NOTE — ASSESSMENT & PLAN NOTE
Lab Results   Component Value Date    HGBA1C 7 0 (H) 05/01/2023     A1c stable at 7 0% on current medications  Continue Metformin, Januvia, and glimepiride

## 2023-05-21 NOTE — TELEPHONE ENCOUNTER
The patient called to report that he is taking the medication glimepiride (AMARYL) 2 mg tablet but that the pharmacy has glipiZIDE (GLUCOTROL) 5 mg tablet   Informs that it is a different medication and that the milligrams that he takes are different

## 2023-05-21 NOTE — TELEPHONE ENCOUNTER
As per patient request change glimepride to glipizide due to cost      Glimepiride 2 mg daily changed to Glipizide 2 5 mg daily with breakfast

## 2023-05-22 DIAGNOSIS — E11.40 TYPE 2 DIABETES MELLITUS WITH DIABETIC NEUROPATHY, WITHOUT LONG-TERM CURRENT USE OF INSULIN (HCC): Primary | ICD-10-CM

## 2023-05-22 RX ORDER — GLIMEPIRIDE 2 MG/1
2 TABLET ORAL
Qty: 90 TABLET | Refills: 3 | Status: SHIPPED | OUTPATIENT
Start: 2023-05-22 | End: 2024-05-16

## 2023-05-23 ENCOUNTER — ANTICOAG VISIT (OUTPATIENT)
Dept: CARDIOLOGY CLINIC | Facility: CLINIC | Age: 73
End: 2023-05-23

## 2023-05-23 ENCOUNTER — APPOINTMENT (OUTPATIENT)
Dept: LAB | Facility: AMBULARY SURGERY CENTER | Age: 73
End: 2023-05-23

## 2023-05-23 DIAGNOSIS — Z86.718 HISTORY OF DVT (DEEP VEIN THROMBOSIS): Primary | ICD-10-CM

## 2023-05-23 NOTE — PROGRESS NOTES
Spoke with patient, advised INR good, current dose verified, continue 5 mg Mon Fri, 2 5 mg all other days, will recheck in 4 weeks 6/20/23

## 2023-06-19 ENCOUNTER — ANTICOAG VISIT (OUTPATIENT)
Dept: CARDIOLOGY CLINIC | Facility: CLINIC | Age: 73
End: 2023-06-19

## 2023-06-19 ENCOUNTER — APPOINTMENT (OUTPATIENT)
Dept: LAB | Facility: AMBULARY SURGERY CENTER | Age: 73
End: 2023-06-19
Payer: MEDICARE

## 2023-06-19 DIAGNOSIS — Z86.718 HISTORY OF DVT (DEEP VEIN THROMBOSIS): Primary | ICD-10-CM

## 2023-06-19 DIAGNOSIS — R79.89 LFTS ABNORMAL: ICD-10-CM

## 2023-06-19 LAB
ALBUMIN SERPL BCP-MCNC: 3.8 G/DL (ref 3.5–5)
ALP SERPL-CCNC: 67 U/L (ref 46–116)
ALT SERPL W P-5'-P-CCNC: 70 U/L (ref 12–78)
ANION GAP SERPL CALCULATED.3IONS-SCNC: 2 MMOL/L (ref 4–13)
AST SERPL W P-5'-P-CCNC: 62 U/L (ref 5–45)
BILIRUB SERPL-MCNC: 0.64 MG/DL (ref 0.2–1)
BUN SERPL-MCNC: 12 MG/DL (ref 5–25)
CALCIUM SERPL-MCNC: 9.5 MG/DL (ref 8.3–10.1)
CHLORIDE SERPL-SCNC: 107 MMOL/L (ref 96–108)
CO2 SERPL-SCNC: 25 MMOL/L (ref 21–32)
CREAT SERPL-MCNC: 1.25 MG/DL (ref 0.6–1.3)
GFR SERPL CREATININE-BSD FRML MDRD: 57 ML/MIN/1.73SQ M
GLUCOSE P FAST SERPL-MCNC: 137 MG/DL (ref 65–99)
POTASSIUM SERPL-SCNC: 4.6 MMOL/L (ref 3.5–5.3)
PROT SERPL-MCNC: 7.8 G/DL (ref 6.4–8.4)
SODIUM SERPL-SCNC: 134 MMOL/L (ref 135–147)

## 2023-06-19 PROCEDURE — 80053 COMPREHEN METABOLIC PANEL: CPT

## 2023-06-19 NOTE — PROGRESS NOTES
Spoke with patient, advised INR good, current dose verified, continue 5 mg Mon Fri, 2 5 mg all other days, will recheck in 4 weeks 7/17/23

## 2023-06-20 ENCOUNTER — TELEPHONE (OUTPATIENT)
Dept: FAMILY MEDICINE CLINIC | Facility: CLINIC | Age: 73
End: 2023-06-20

## 2023-06-20 NOTE — TELEPHONE ENCOUNTER
My chart message sent  Pt is aware of message  Last read by Tiffany Delgado at  1:51 PM on 6/20/2023  SUBJECTIVE:  Edin Cano   1950   female   Allergies   Allergen Reactions    Darvocet [Propoxyphene N-Acetaminophen]     Macrobid [Nitrofurantoin Monohydrate Macrocrystals]     Nitrofurantoin Macrocrystal     Other     Propoxyphene     Sulfa Antibiotics Other (See Comments)     childhod    Nitrofurantoin Rash       Chief Complaint   Patient presents with    Hyperlipidemia    Hypertension    Diabetes        Patient Active Problem List    Diagnosis Date Noted    Hypothyroid      Priority: High    Pulmonary HTN ([address] 09/29/2020    Oropharyngeal dysphagia 09/12/2018    Allergic sinusitis 09/12/2018    Pulmonary nodule 07/24/2018    Precordial chest pain 06/20/2018    Pneumonitis, interstitial ([address] 06/20/2018    Cyst of ovary 06/11/2018    Pure hypercholesterolemia 11/18/2016    Paroxysmal supraventricular tachycardia ([address] 05/27/2016    Beat, premature ventricular 05/27/2016    Benign essential HTN 02/01/2016    Awareness of heartbeats 02/01/2016    Type 2 diabetes mellitus ([address] 02/01/2016    Glucose intolerance (impaired glucose tolerance) 08/19/2014     Stable on metformin.  Allergic rhinitis     TMJ capsulitis 07/22/2014    Corns and callosities 05/13/2014    Hypertension     Combined fat and carbohydrate induced hyperlipemia     Headache     Gastroesophageal reflux disease     Glaucoma     Osteoarthritis        HPI   Pt is here today for fu on diabetes, PAF, hyperlipidemia, GERD, HTN, hypothyroidism, and co several mo hx of L shoulder pain. Denies any specific injuries. Started noticing pain when laying on left shoulder in bed and with overhead movements several months ago. No tx. Denies weakness or tingling. No tx at home. She continues fu with cardiology for hx of atrial fib. She continues on Lipitor and Toprol. Denies CP,SOB or neurologic sx. No GI/GERD complaints. Last HgA1c 6.3 and LDL 86. No GI/GERD complaints.  Last H/H nl.  Past Medical History:   Diagnosis Date    Allergic rhinitis     Amnesia, global, transient 2010    DDD (degenerative disc disease), lumbosacral     GERD (gastroesophageal reflux disease)     Glaucoma     Headache(784.0)     Hyperlipidemia     Hypertension     Osteoarthritis      Social History     Socioeconomic History    Marital status:      Spouse name: Not on file    Number of children: Not on file    Years of education: Not on file    Highest education level: Not on file   Occupational History    Occupation: . Social Needs    Financial resource strain: Not on file    Food insecurity     Worry: Not on file     Inability: Not on file    Transportation needs     Medical: Not on file     Non-medical: Not on file   Tobacco Use    Smoking status: Never Smoker    Smokeless tobacco: Never Used   Substance and Sexual Activity    Alcohol use:  Yes     Alcohol/week: 0.0 standard drinks    Drug use: No    Sexual activity: Yes     Partners: Male   Lifestyle    Physical activity     Days per week: Not on file     Minutes per session: Not on file    Stress: Not on file   Relationships    Social connections     Talks on phone: Not on file     Gets together: Not on file     Attends Sabianist service: Not on file     Active member of club or organization: Not on file     Attends meetings of clubs or organizations: Not on file     Relationship status: Not on file    Intimate partner violence     Fear of current or ex partner: Not on file     Emotionally abused: Not on file     Physically abused: Not on file     Forced sexual activity: Not on file   Other Topics Concern    Not on file   Social History Narrative    Not on file     Family History   Problem Relation Age of Onset    Arthritis Mother         rheumatoid x 60 yrs    High Blood Pressure Mother     Other Mother         glaucoma    Cancer Mother         skin    Stroke Mother         mini stroke    Diabetes Father     Heart Disease Father     High Cholesterol Sister     High Blood Pressure Sister     Early Death Brother 6        encephalitis       Review of Systems   Constitutional: Negative for activity change, appetite change and unexpected weight change. Respiratory: Negative for cough, chest tightness and shortness of breath. Cardiovascular: Negative for chest pain, palpitations and leg swelling. Gastrointestinal: Negative for abdominal pain, blood in stool, constipation and diarrhea. Endocrine: Negative for cold intolerance and heat intolerance. Musculoskeletal: Positive for arthralgias. Negative for myalgias. Left shoulder pain   Skin: Negative for rash. Neurological: Negative for light-headedness and headaches. Hematological: Negative for adenopathy. Does not bruise/bleed easily. Psychiatric/Behavioral: Negative for dysphoric mood and sleep disturbance. The patient is not nervous/anxious. OBJECTIVE:  /70   Pulse 64   Temp 96.4 °F (35.8 °C)   Wt 152 lb (68.9 kg)   SpO2 97%   BMI 24.53 kg/m²   Physical Exam  Vitals signs and nursing note reviewed. Constitutional:       Appearance: She is well-developed. Eyes:      Pupils: Pupils are equal, round, and reactive to light. Neck:      Musculoskeletal: Normal range of motion and neck supple. Thyroid: No thyromegaly. Vascular: No JVD. Cardiovascular:      Rate and Rhythm: Normal rate and regular rhythm. Pulmonary:      Effort: Pulmonary effort is normal.      Breath sounds: Normal breath sounds. Abdominal:      General: Bowel sounds are normal.      Palpations: Abdomen is soft. Tenderness: There is no abdominal tenderness. Musculoskeletal:      Comments: Left shoulder- no joint swelling or erythema. Nl rom with pain on overhead motion. Nl strength and sensation. Skin:     Findings: No rash. Neurological:      Mental Status: She is alert and oriented to person, place, and time.    Psychiatric:         Behavior: Behavior normal. Thought Content: Thought content normal.         ASSESSMENT/PLAN:    1. Type 2 diabetes mellitus without complication, without long-term current use of insulin (HCC)  Refill meds  Reviewed labs  appr diet mgt  - POCT Glucose  - POCT microalbumin  - POCT Urinalysis no Micro    2. Chronic left shoulder pain  To PT , then re evaluate  - [facility] PT - [facility]    3. Impingement syndrome of left shoulder    - [facility] PT - [facility]    4. Paroxysmal supraventricular tachycardia ([address]  Reviewed fu with cardiology  continue fu as planned    5. Hyperlipidemia, unspecified hyperlipidemia type  Reviewed last labs  Stable on Lipitor    6. Need for vaccination    - INFLUENZA, QUADV, ADJUVANTED, 65 YRS =, IM, PF, PREFILL SYR, 0.5ML (FLUAD)  - TN IMMUNIZ ADMIN,1 SINGLE/COMB VAC/TOXOID    7. Gastroesophageal reflux disease, esophagitis presence not specified  GERD precautions  Continue Dexilant    8. Essential hypertension  Refill meds  Intermittent ambulatory BP checks    9.  Hypothyroidism, unspecified type  Refill Synthroid  Reviewed labs      Orders Placed This Encounter   Procedures    INFLUENZA, QUADV, ADJUVANTED, 65 YRS =, IM, PF, PREFILL SYR, 0.5ML (FLUAD)    [facility]  - [facility]     Referral Priority:   Routine     Referral Type:   Eval and Treat     Referral Reason:   Specialty Services Required     Requested Specialty:   Physical Therapy     Number of Visits Requested:   1    POCT Glucose    POCT microalbumin    POCT Urinalysis no Micro    TN IMMUNIZ ADMIN,1 SINGLE/COMB VAC/TOXOID     Current Outpatient Medications   Medication Sig Dispense Refill    levothyroxine (SYNTHROID) 50 MCG tablet Take 1 tablet by mouth daily 30 tablet 0    triamterene-hydroCHLOROthiazide (MAXZIDE-25) 37.5-25 MG per tablet Take 1 tablet by mouth daily 90 tablet 0    metFORMIN (GLUCOPHAGE-XR) 500 MG extended release tablet 1 po qd 90 tablet 0    dexlansoprazole (DEXILANT) 60 MG CPDR delayed release capsule Take 1 capsule by mouth daily 90 capsule 0    blood glucose test strips (ONE TOUCH ULTRA TEST) strip 1 each by In Vitro route 2 times daily As needed. 300 each 0    montelukast (SINGULAIR) 10 MG tablet Take 1 tablet by mouth nightly 15 tablet 1    ranitidine (ZANTAC) 150 MG tablet Take 150 mg by mouth once      LOTEMAX 0.5 % ophthalmic gel   0    aspirin 81 MG tablet Take 81 mg by mouth daily      vitamin D3 (CHOLECALCIFEROL) 400 units TABS tablet Take 400 Units by mouth daily      atorvastatin (LIPITOR) 20 MG tablet       Ez Smart Blood Glucose Lancets MISC Pt tests  each 0    ZIOPTAN 0.0015 % SOLN   5    metoprolol (TOPROL-XL) 25 MG XL tablet Take 25 mg by mouth daily      calcium carbonate 200 MG capsule Take 1 tablet by mouth      cetirizine (ZYRTEC) 10 MG tablet Take 10 mg by mouth daily.  Multiple Vitamin (MULTIVITAMIN PO) Take  by mouth.  brimonidine-timolol (COMBIGAN) 0.2-0.5 % ophthalmic solution Place 1 drop into both eyes every 12 hours.  cycloSPORINE (RESTASIS) 0.05 % ophthalmic emulsion 1 drop 2 times daily. No current facility-administered medications for this visit. Return in about 3 months (around 12/29/2020), or if symptoms worsen or fail to improve, for HTN, hyperlipidmeia.     Monica Gamble MD

## 2023-06-20 NOTE — TELEPHONE ENCOUNTER
----- Message from [id number] sent at 6/20/2023  1:35 PM EDT -----  Liver functions trending back down  Please complete routine blood work in November with already scheduled follow up visit

## 2023-06-21 DIAGNOSIS — I10 ESSENTIAL HYPERTENSION: ICD-10-CM

## 2023-06-21 RX ORDER — LISINOPRIL 20 MG/1
TABLET ORAL
Qty: 90 TABLET | Refills: 4 | Status: SHIPPED | OUTPATIENT
Start: 2023-06-21

## 2023-07-14 NOTE — PROGRESS NOTES
Spoke with patient, advised INR still a little low, due to holding for colonoscopy   Advised to take 5 mg tonight only instead of 2 5 mg, then go back to 5 mg Mon Wed Fri, 2 5 mg all other days, will recheck in 3 weeks 12/29/22 Ultrasound at bedside     Elizabeth Carolina RN  07/13/23 [id number]

## 2023-07-17 ENCOUNTER — APPOINTMENT (OUTPATIENT)
Dept: LAB | Facility: AMBULARY SURGERY CENTER | Age: 73
End: 2023-07-17
Payer: MEDICARE

## 2023-07-17 ENCOUNTER — ANTICOAG VISIT (OUTPATIENT)
Dept: CARDIOLOGY CLINIC | Facility: CLINIC | Age: 73
End: 2023-07-17

## 2023-07-17 DIAGNOSIS — Z86.718 HISTORY OF DVT (DEEP VEIN THROMBOSIS): Primary | ICD-10-CM

## 2023-08-01 DIAGNOSIS — J30.9 ALLERGIC RHINITIS, UNSPECIFIED SEASONALITY, UNSPECIFIED TRIGGER: ICD-10-CM

## 2023-08-15 ENCOUNTER — APPOINTMENT (OUTPATIENT)
Dept: LAB | Facility: AMBULARY SURGERY CENTER | Age: 73
End: 2023-08-15
Payer: MEDICARE

## 2023-08-15 ENCOUNTER — ANTICOAG VISIT (OUTPATIENT)
Dept: CARDIOLOGY CLINIC | Facility: CLINIC | Age: 73
End: 2023-08-15

## 2023-08-15 DIAGNOSIS — Z86.718 HISTORY OF DVT (DEEP VEIN THROMBOSIS): Primary | ICD-10-CM

## 2023-08-15 NOTE — PROGRESS NOTES
Spoke with patient, advised INR good, continue 5 mg Mon Fri, 2.5 mg all other days, will recheck in 4 weeks 9/12/23

## 2023-09-08 ENCOUNTER — APPOINTMENT (OUTPATIENT)
Dept: LAB | Facility: AMBULARY SURGERY CENTER | Age: 73
End: 2023-09-08
Payer: MEDICARE

## 2023-09-08 ENCOUNTER — ANTICOAG VISIT (OUTPATIENT)
Dept: CARDIOLOGY CLINIC | Facility: CLINIC | Age: 73
End: 2023-09-08

## 2023-09-08 DIAGNOSIS — Z86.718 HISTORY OF DVT (DEEP VEIN THROMBOSIS): Primary | ICD-10-CM

## 2023-09-08 NOTE — PROGRESS NOTES
Spoke with patient, advised INR a little low, not sure if he missed a dose, advised to take 7.5 mg tonight only instead of 5 mg, then go back to 5 mg Mon Fri, 2.5 mg all other days, will recheck in 4 weeks 10/6/23

## 2023-10-02 ENCOUNTER — FOLLOW UP (OUTPATIENT)
Dept: URBAN - METROPOLITAN AREA CLINIC 6 | Facility: CLINIC | Age: 73
End: 2023-10-02

## 2023-10-02 DIAGNOSIS — H52.4: ICD-10-CM

## 2023-10-02 DIAGNOSIS — H52.03: ICD-10-CM

## 2023-10-02 PROCEDURE — 92015 DETERMINE REFRACTIVE STATE: CPT

## 2023-10-02 ASSESSMENT — KERATOMETRY
OS_K1POWER_DIOPTERS: 42.75
OD_K2POWER_DIOPTERS: 42.00
OD_AXISANGLE_DEGREES: 169
OS_AXISANGLE_DEGREES: 105
OD_AXISANGLE2_DEGREES: 79
OD_K1POWER_DIOPTERS: 41.50
OS_K2POWER_DIOPTERS: 43.00
OS_AXISANGLE2_DEGREES: 15

## 2023-10-02 ASSESSMENT — VISUAL ACUITY
OD_CC: 20/25+1
OS_CC: 20/25
OU_CC: J1

## 2023-10-02 ASSESSMENT — TONOMETRY
OD_IOP_MMHG: 19
OS_IOP_MMHG: 22

## 2023-10-11 DIAGNOSIS — E11.42 TYPE 2 DIABETES MELLITUS WITH DIABETIC POLYNEUROPATHY, WITHOUT LONG-TERM CURRENT USE OF INSULIN (HCC): ICD-10-CM

## 2023-10-11 RX ORDER — SITAGLIPTIN 100 MG/1
TABLET, FILM COATED ORAL
Qty: 90 TABLET | Refills: 3 | Status: SHIPPED | OUTPATIENT
Start: 2023-10-11

## 2023-10-12 ENCOUNTER — ANTICOAG VISIT (OUTPATIENT)
Dept: CARDIOLOGY CLINIC | Facility: CLINIC | Age: 73
End: 2023-10-12

## 2023-10-12 ENCOUNTER — APPOINTMENT (OUTPATIENT)
Dept: LAB | Facility: AMBULARY SURGERY CENTER | Age: 73
End: 2023-10-12
Payer: MEDICARE

## 2023-10-12 DIAGNOSIS — Z86.718 HISTORY OF DVT (DEEP VEIN THROMBOSIS): Primary | ICD-10-CM

## 2023-10-12 NOTE — PROGRESS NOTES
Spoke with patient, advised INR good, continue 5 mg Mon Fri, 2.5 mg all other days, patient asking to check in 3 weeks 10/31/23, he will need to get other blood work done.

## 2023-10-14 DIAGNOSIS — G62.9 PERIPHERAL POLYNEUROPATHY: ICD-10-CM

## 2023-10-15 RX ORDER — GABAPENTIN 100 MG/1
200 CAPSULE ORAL
Qty: 180 CAPSULE | Refills: 1 | Status: SHIPPED | OUTPATIENT
Start: 2023-10-15

## 2023-10-28 DIAGNOSIS — I82.409 DEEP VEIN THROMBOSIS (DVT) OF LOWER EXTREMITY, UNSPECIFIED CHRONICITY, UNSPECIFIED LATERALITY, UNSPECIFIED VEIN (HCC): ICD-10-CM

## 2023-10-30 ENCOUNTER — TELEPHONE (OUTPATIENT)
Dept: CARDIOLOGY CLINIC | Facility: CLINIC | Age: 73
End: 2023-10-30

## 2023-10-30 ENCOUNTER — ANTICOAG VISIT (OUTPATIENT)
Dept: CARDIOLOGY CLINIC | Facility: CLINIC | Age: 73
End: 2023-10-30

## 2023-10-30 ENCOUNTER — APPOINTMENT (OUTPATIENT)
Dept: LAB | Facility: AMBULARY SURGERY CENTER | Age: 73
End: 2023-10-30
Payer: MEDICARE

## 2023-10-30 DIAGNOSIS — I10 ESSENTIAL HYPERTENSION: ICD-10-CM

## 2023-10-30 DIAGNOSIS — I48.0 PAROXYSMAL ATRIAL FIBRILLATION (HCC): ICD-10-CM

## 2023-10-30 DIAGNOSIS — E11.40 TYPE 2 DIABETES MELLITUS WITH DIABETIC NEUROPATHY, WITHOUT LONG-TERM CURRENT USE OF INSULIN (HCC): ICD-10-CM

## 2023-10-30 DIAGNOSIS — Z86.718 HISTORY OF DVT (DEEP VEIN THROMBOSIS): Primary | ICD-10-CM

## 2023-10-30 DIAGNOSIS — E78.2 MIXED HYPERLIPIDEMIA: ICD-10-CM

## 2023-10-30 DIAGNOSIS — I48.0 PAROXYSMAL ATRIAL FIBRILLATION (HCC): Primary | ICD-10-CM

## 2023-10-30 LAB
ALBUMIN SERPL BCP-MCNC: 4.1 G/DL (ref 3.5–5)
ALP SERPL-CCNC: 58 U/L (ref 34–104)
ALT SERPL W P-5'-P-CCNC: 54 U/L (ref 7–52)
ANION GAP SERPL CALCULATED.3IONS-SCNC: 8 MMOL/L
AST SERPL W P-5'-P-CCNC: 65 U/L (ref 13–39)
BASOPHILS # BLD MANUAL: 0.33 THOUSAND/UL (ref 0–0.1)
BASOPHILS NFR MAR MANUAL: 4 % (ref 0–1)
BILIRUB SERPL-MCNC: 0.72 MG/DL (ref 0.2–1)
BUN SERPL-MCNC: 9 MG/DL (ref 5–25)
CALCIUM SERPL-MCNC: 9.3 MG/DL (ref 8.4–10.2)
CHLORIDE SERPL-SCNC: 105 MMOL/L (ref 96–108)
CHOLEST SERPL-MCNC: 133 MG/DL
CO2 SERPL-SCNC: 24 MMOL/L (ref 21–32)
CREAT SERPL-MCNC: 1.08 MG/DL (ref 0.6–1.3)
EOSINOPHIL # BLD MANUAL: 0.41 THOUSAND/UL (ref 0–0.4)
EOSINOPHIL NFR BLD MANUAL: 5 % (ref 0–6)
ERYTHROCYTE [DISTWIDTH] IN BLOOD BY AUTOMATED COUNT: 13.6 % (ref 11.6–15.1)
EST. AVERAGE GLUCOSE BLD GHB EST-MCNC: 157 MG/DL
GFR SERPL CREATININE-BSD FRML MDRD: 68 ML/MIN/1.73SQ M
GLUCOSE P FAST SERPL-MCNC: 145 MG/DL (ref 65–99)
HBA1C MFR BLD: 7.1 %
HCT VFR BLD AUTO: 47.3 % (ref 36.5–49.3)
HDLC SERPL-MCNC: 34 MG/DL
HGB BLD-MCNC: 15.2 G/DL (ref 12–17)
INR PPP: 2.32 (ref 0.84–1.19)
LDLC SERPL CALC-MCNC: 47 MG/DL (ref 0–100)
LYMPHOCYTES # BLD AUTO: 1.63 THOUSAND/UL (ref 0.6–4.47)
LYMPHOCYTES # BLD AUTO: 18 % (ref 14–44)
MCH RBC QN AUTO: 31.4 PG (ref 26.8–34.3)
MCHC RBC AUTO-ENTMCNC: 32.1 G/DL (ref 31.4–37.4)
MCV RBC AUTO: 98 FL (ref 82–98)
MONOCYTES # BLD AUTO: 0.89 THOUSAND/UL (ref 0–1.22)
MONOCYTES NFR BLD: 11 % (ref 4–12)
NEUTROPHILS # BLD MANUAL: 4.88 THOUSAND/UL (ref 1.85–7.62)
NEUTS BAND NFR BLD MANUAL: 2 % (ref 0–8)
NEUTS SEG NFR BLD AUTO: 58 % (ref 43–75)
NONHDLC SERPL-MCNC: 99 MG/DL
NRBC BLD AUTO-RTO: 1 /100 WBC (ref 0–2)
PLATELET # BLD AUTO: 254 THOUSANDS/UL (ref 149–390)
PLATELET BLD QL SMEAR: ADEQUATE
PMV BLD AUTO: 10.6 FL (ref 8.9–12.7)
POLYCHROMASIA BLD QL SMEAR: PRESENT
POTASSIUM SERPL-SCNC: 4.6 MMOL/L (ref 3.5–5.3)
PROT SERPL-MCNC: 7.4 G/DL (ref 6.4–8.4)
PROTHROMBIN TIME: 25.1 SECONDS (ref 11.6–14.5)
RBC # BLD AUTO: 4.84 MILLION/UL (ref 3.88–5.62)
RBC MORPH BLD: PRESENT
SODIUM SERPL-SCNC: 137 MMOL/L (ref 135–147)
TRIGL SERPL-MCNC: 262 MG/DL
TSH SERPL DL<=0.05 MIU/L-ACNC: 2.53 UIU/ML (ref 0.45–4.5)
VARIANT LYMPHS # BLD AUTO: 2 %
WBC # BLD AUTO: 8.13 THOUSAND/UL (ref 4.31–10.16)

## 2023-10-30 PROCEDURE — 80053 COMPREHEN METABOLIC PANEL: CPT

## 2023-10-30 PROCEDURE — 84443 ASSAY THYROID STIM HORMONE: CPT

## 2023-10-30 PROCEDURE — 83036 HEMOGLOBIN GLYCOSYLATED A1C: CPT

## 2023-10-30 PROCEDURE — 85007 BL SMEAR W/DIFF WBC COUNT: CPT

## 2023-10-30 PROCEDURE — 85027 COMPLETE CBC AUTOMATED: CPT

## 2023-10-30 PROCEDURE — 80061 LIPID PANEL: CPT

## 2023-10-30 PROCEDURE — 36415 COLL VENOUS BLD VENIPUNCTURE: CPT

## 2023-10-30 PROCEDURE — 85610 PROTHROMBIN TIME: CPT

## 2023-10-30 RX ORDER — WARFARIN SODIUM 5 MG/1
TABLET ORAL
Qty: 90 TABLET | Refills: 3 | Status: SHIPPED | OUTPATIENT
Start: 2023-10-30

## 2023-10-30 NOTE — PROGRESS NOTES
Spoke with patient, advised INR good, continue 5 mg Mon Fri, 2.5 mg all other days, will recheck in 3 weeks 11/21/23

## 2023-11-07 ENCOUNTER — OFFICE VISIT (OUTPATIENT)
Dept: CARDIOLOGY CLINIC | Facility: CLINIC | Age: 73
End: 2023-11-07
Payer: MEDICARE

## 2023-11-07 VITALS
DIASTOLIC BLOOD PRESSURE: 74 MMHG | SYSTOLIC BLOOD PRESSURE: 136 MMHG | HEART RATE: 72 BPM | BODY MASS INDEX: 30.46 KG/M2 | OXYGEN SATURATION: 98 % | WEIGHT: 217.6 LBS | HEIGHT: 71 IN

## 2023-11-07 DIAGNOSIS — I48.0 PAROXYSMAL ATRIAL FIBRILLATION (HCC): ICD-10-CM

## 2023-11-07 DIAGNOSIS — E78.2 MIXED HYPERLIPIDEMIA: ICD-10-CM

## 2023-11-07 DIAGNOSIS — I10 ESSENTIAL HYPERTENSION: Primary | ICD-10-CM

## 2023-11-07 DIAGNOSIS — I70.213 ATHEROSCLEROSIS OF NATIVE ARTERY OF BOTH LOWER EXTREMITIES WITH INTERMITTENT CLAUDICATION (HCC): ICD-10-CM

## 2023-11-07 PROCEDURE — 99213 OFFICE O/P EST LOW 20 MIN: CPT | Performed by: INTERNAL MEDICINE

## 2023-11-07 NOTE — PROGRESS NOTES
Assessment/Plan:    Essential hypertension  Hypertension, stable. Atrial fibrillation (HCC)  History of paroxysmal atrial fibrillation, stable. The patient is in regular rhythm. 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)  Peripheral arterial disease, stable. The patient continues to follow with vascular surgery. Mixed hyperlipidemia  Hyperlipidemia, stable. Diagnoses and all orders for this visit:    Essential hypertension    Paroxysmal atrial fibrillation ([address])    Mixed hyperlipidemia    Atherosclerosis of native artery of both lower extremities with intermittent claudication (HCC)          Subjective: Mild dyspnea on exertion. Patient ID: Rosalia Escobar is a 68 y.o. male. The patient presented to this office for the purpose of cardiac follow-up. He is known to have a history of hypertension and hyperlipidemia as well as peripheral vascular disease. He also had a history of paroxysmal atrial fibrillation in the past.  The patient has been feeling fairly well. He has mild dyspnea on exertion but denies any chest pain. He denies any symptoms of palpitation, dizziness or syncope. He has no significant lower extremity edema. The following portions of the patient's history were reviewed and updated as appropriate: allergies, current medications, past family history, past medical history, past social history, past surgical history, and problem list.    Review of Systems   Respiratory:  Positive for shortness of breath. Negative for apnea, cough, chest tightness and wheezing. Cardiovascular:  Negative for chest pain, palpitations and leg swelling. Gastrointestinal:  Negative for abdominal pain. Neurological:  Negative for dizziness and light-headedness. Psychiatric/Behavioral: Negative. Objective: Stable cardiac wise.       /74 (BP Location: Left arm, Patient Position: Sitting, Cuff Size: Large)   Pulse 72   Ht 5' 11" (1.803 m) Wt 98.7 kg (217 lb 9.6 oz)   SpO2 98%   BMI 30.35 kg/m²          Physical Exam  Vitals reviewed. Constitutional:       General: He is not in acute distress. Appearance: He is well-developed. He is not diaphoretic. HENT:      Head: Normocephalic. Eyes:      Pupils: Pupils are equal, round, and reactive to light. Neck:      Thyroid: No thyromegaly. Vascular: No JVD. Cardiovascular:      Rate and Rhythm: Normal rate and regular rhythm. Heart sounds: S1 normal and S2 normal. No murmur heard. No friction rub. No gallop. Pulmonary:      Effort: Pulmonary effort is normal. No respiratory distress. Breath sounds: Normal breath sounds. No wheezing or rales. Chest:      Chest wall: No tenderness. Abdominal:      Palpations: Abdomen is soft. Musculoskeletal:         General: No tenderness or deformity. Normal range of motion. Cervical back: Normal range of motion. Right lower leg: No edema. Left lower leg: No edema. Skin:     General: Skin is warm and dry. Neurological:      Mental Status: He is alert and oriented to person, place, and time.    Psychiatric:         Mood and Affect: Mood normal.

## 2023-11-13 ENCOUNTER — RA CDI HCC (OUTPATIENT)
Dept: OTHER | Facility: HOSPITAL | Age: 73
End: 2023-11-13

## 2023-11-15 ENCOUNTER — OFFICE VISIT (OUTPATIENT)
Dept: FAMILY MEDICINE CLINIC | Facility: CLINIC | Age: 73
End: 2023-11-15
Payer: MEDICARE

## 2023-11-15 VITALS
SYSTOLIC BLOOD PRESSURE: 130 MMHG | TEMPERATURE: 98.4 F | RESPIRATION RATE: 16 BRPM | WEIGHT: 215 LBS | DIASTOLIC BLOOD PRESSURE: 70 MMHG | HEIGHT: 71 IN | HEART RATE: 75 BPM | BODY MASS INDEX: 30.1 KG/M2 | OXYGEN SATURATION: 96 %

## 2023-11-15 DIAGNOSIS — K21.9 GASTROESOPHAGEAL REFLUX DISEASE WITHOUT ESOPHAGITIS: ICD-10-CM

## 2023-11-15 DIAGNOSIS — I48.0 PAROXYSMAL ATRIAL FIBRILLATION (HCC): ICD-10-CM

## 2023-11-15 DIAGNOSIS — R10.11 RIGHT UPPER QUADRANT ABDOMINAL PAIN: Primary | ICD-10-CM

## 2023-11-15 DIAGNOSIS — E78.2 MIXED HYPERLIPIDEMIA: ICD-10-CM

## 2023-11-15 DIAGNOSIS — I10 ESSENTIAL HYPERTENSION: ICD-10-CM

## 2023-11-15 DIAGNOSIS — E11.40 TYPE 2 DIABETES MELLITUS WITH DIABETIC NEUROPATHY, WITHOUT LONG-TERM CURRENT USE OF INSULIN (HCC): ICD-10-CM

## 2023-11-15 PROCEDURE — 99214 OFFICE O/P EST MOD 30 MIN: CPT | Performed by: NURSE PRACTITIONER

## 2023-11-15 RX ORDER — OMEPRAZOLE 20 MG/1
20 CAPSULE, DELAYED RELEASE ORAL DAILY
Qty: 90 CAPSULE | Refills: 1 | Status: SHIPPED | OUTPATIENT
Start: 2023-11-15

## 2023-11-15 NOTE — PROGRESS NOTES
FAMILY PRACTICE OFFICE VISIT       NAME: Teresa Pardo  AGE: 68 y.o. SEX: male       : 1950        MRN: [id number]    Assessment and Plan   1. Right upper quadrant abdominal pain  -     US right upper quadrant; Future; Expected date: 11/15/2023    2. Gastroesophageal reflux disease without esophagitis  Assessment & Plan:  Right upper abdominal discomfort, epigastric abdominal discomfort, nausea after eating. Will check RUQ ultrasound. Has known GERD, will stop Pepcid, and resume Omeprazole. Normal EGD in 2019. Will send me an update in 1-2 weeks. If pain should worsen, he is aware to go to the emergency room. Orders:  -     omeprazole (PriLOSEC) 20 mg delayed release capsule; Take 1 capsule (20 mg total) by mouth daily    3. Mixed hyperlipidemia  Assessment & Plan:  Lipid panel 10/30/23: total cholesterol 133, Triglycerides 262, HDL 34, LDL 47. Continue Simvastatin and Fenofibrate. Orders:  -     Lipid panel; Future; Expected date: 05/15/2024    4. Essential hypertension  Assessment & Plan:  Blood pressure well controlled on Lisinopril. Orders:  -     CBC; Future; Expected date: 05/15/2024  -     Comprehensive metabolic panel; Future; Expected date: 05/15/2024  -     TSH, 3rd generation with Free T4 reflex; Future; Expected date: 05/15/2024    5. Type 2 diabetes mellitus with diabetic neuropathy, without long-term current use of insulin (HCC)  Assessment & Plan:    Lab Results   Component Value Date    HGBA1C 7.1 (H) 10/30/2023     Stable A1c on Metformin, Glimepiride, and Januvia. Orders:  -     Hemoglobin A1C; Future; Expected date: 05/15/2024    6. Paroxysmal atrial fibrillation (HCC)  Assessment & Plan:  History of paroxysmal a. Fib. Regular rate and rhythm today. Anticoagulated on aspirin and coumadin. Follows with cardiology, Dr. Aaron Tenorio. 6 month follow up or sooner if needed.           Chief Complaint     Chief Complaint   Patient presents with    Follow-up Patient is here for 6 mos f/u       History of Present Illness     Ashlee Arriola is a 68year old male presenting today for check up on chronic conditions. Last few weeks now,  Right abdominal discomfort and epigastric abdominal pain. Nausea after eating, or lifting. No change in bowel movements, tends more toward constipation, using metamucil for this. No vomiting. Good appetite. If cuts portion size-still has nausea. Not losing weight. No bloody stools. 3-4 cups per day of coffee  Ibuprofen or naproxen or excerin about once a week. Alcohol-- 1 shot per day. Some stress--put dog to sleep recently. Taking pepcid--Omeprazole-not using. Heart burn only with spicy food. 1-1.5 months mild cough--attributes to sinus drainage. No globus sensation. Review of Systems   Review of Systems   Constitutional: Negative. HENT: Negative. Eyes:  Negative for visual disturbance. Respiratory: Negative. Cardiovascular: Negative. Gastrointestinal:  Positive for abdominal pain, constipation and nausea. Negative for abdominal distention, anal bleeding, blood in stool, diarrhea, rectal pain and vomiting. Genitourinary: Negative. Musculoskeletal: Negative. Skin: Negative. Neurological: Negative. Hematological: Negative. Psychiatric/Behavioral: Negative. I have reviewed the patient's medical history in detail; there are no changes to the history as noted in the electronic medical record. Objective     Vitals:    11/15/23 0958   BP: 130/70   Pulse: 75   Resp: 16   Temp: 98.4 °F (36.9 °C)   TempSrc: Temporal   SpO2: 96%   Weight: 97.5 kg (215 lb)   Height: 5' 11" (1.803 m)     Wt Readings from Last 3 Encounters:   11/15/23 97.5 kg (215 lb)   11/07/23 98.7 kg (217 lb 9.6 oz)   10/17/23 98.4 kg (217 lb)     Physical Exam  Vitals and nursing note reviewed. Constitutional:       General: He is not in acute distress. Appearance: Normal appearance.  He is not ill-appearing. HENT:      Head: Atraumatic. Right Ear: Tympanic membrane normal.      Left Ear: Tympanic membrane normal.      Mouth/Throat:      Mouth: Mucous membranes are moist.      Pharynx: Oropharynx is clear. Neck:      Thyroid: No thyromegaly. Vascular: No carotid bruit. Cardiovascular:      Rate and Rhythm: Normal rate and regular rhythm. Heart sounds: No murmur heard. Pulmonary:      Effort: Pulmonary effort is normal. No respiratory distress. Breath sounds: Normal breath sounds. Abdominal:      General: Bowel sounds are normal.      Palpations: Abdomen is soft. Tenderness: There is no abdominal tenderness. There is no guarding. Musculoskeletal:      Cervical back: Normal range of motion and neck supple. Right lower leg: No edema. Left lower leg: No edema. Lymphadenopathy:      Cervical: No cervical adenopathy. Skin:     Findings: No rash. Neurological:      Mental Status: He is alert. Psychiatric:         Mood and Affect: Mood normal.         Falls Plan of Care: balance, strength, and gait training instructions were provided. Tobacco Cessation Counseling: Tobacco cessation counseling was provided. The patient is sincerely urged to quit consumption of tobacco. He is not ready to quit tobacco. cigars        ALLERGIES:  Allergies   Allergen Reactions    Pletal [Cilostazol] Tachycardia    Dyazide [Hydrochlorothiazide W-Triamterene] Other (See Comments)     Hyponatremia    Jardiance [Empagliflozin] Other (See Comments)     Constipation, increased urinary frequency--not tolerable       Current Medications     Current Outpatient Medications   Medication Sig Dispense Refill    acetaminophen (TYLENOL) 325 mg tablet 2, by mouth, every 6 hours as needed for mild to moderate pain.  30 tablet 0    aspirin (ECOTRIN LOW STRENGTH) 81 mg EC tablet Take 81 mg by mouth daily        Cyanocobalamin (VITAMIN B 12 PO) Take 1,000 mcg by mouth daily      fenofibrate micronized (LOFIBRA) 134 MG capsule TAKE 1 CAPSULE (134 MG TOTAL) BY MOUTH DAILY WITH BREAKFAST 90 capsule 3    gabapentin (NEURONTIN) 100 mg capsule TAKE 2 CAPSULES (200 MG TOTAL) BY MOUTH DAILY AT BEDTIME 180 capsule 1    glimepiride (AMARYL) 2 mg tablet Take 1 tablet (2 mg total) by mouth daily with breakfast 90 tablet 3    Januvia 100 MG tablet TAKE 1 TABLET BY MOUTH EVERY DAY 90 tablet 3    lisinopril (ZESTRIL) 20 mg tablet TAKE 1 TABLET BY MOUTH EVERY DAY 90 tablet 4    metFORMIN (GLUCOPHAGE-XR) 500 mg 24 hr tablet Take 2 tablets (1,000 mg total) by mouth 2 (two) times a day with meals 360 tablet 3    omeprazole (PriLOSEC) 20 mg delayed release capsule Take 1 capsule (20 mg total) by mouth daily 90 capsule 1    ProAir  (90 Base) MCG/ACT inhaler INHALE 2 PUFFS INTO THE LUNGS EVERY 4 HOURS AS NEEDED FOR SHORTNESS OF BREATH OR FOR WHEEZE 17 g 1    simvastatin (ZOCOR) 40 mg tablet TAKE 1 TABLET BY MOUTH EVERY DAY 90 tablet 4    tamsulosin (FLOMAX) 0.4 mg TAKE 1 CAPSULE BY MOUTH EVERY DAY WITH DINNER 90 capsule 3    warfarin (COUMADIN) 2.5 mg tablet Take 1 tablet by mouth daily T-Th-S-S takes 2.5mg     M-W-F takes 5.0 mg (Patient taking differently: Take 2.5 mg by mouth daily I-MCC-Yj-S-S takes 2.5mg     M-F takes 5.0 mg)      warfarin (COUMADIN) 5 mg tablet TAKE 1 TABLET EVERY DAY OR AS DIRECTED BY MD 90 tablet 3     No current facility-administered medications for this visit.          Health Maintenance     Health Maintenance   Topic Date Due    Falls: Plan of Care  05/21/2023    BMI: Followup Plan  03/05/2024    Depression Screening  05/18/2024    Kidney Health Evaluation: Albumin/Creatinine Ratio  02/27/2024    HEMOGLOBIN A1C  04/30/2024    Fall Risk  05/18/2024    Medicare Annual Wellness Visit (AWV)  05/18/2024    Diabetic Foot Exam  10/05/2024    Kidney Health Evaluation: GFR  10/30/2024    BMI: Adult  11/15/2024    DM Eye Exam  04/20/2025    Colorectal Cancer Screening  11/30/2027    Hepatitis C Screening  Completed    Pneumococcal Vaccine: 65+ Years  Completed    Influenza Vaccine  Completed    COVID-19 Vaccine  Completed    HIB Vaccine  Aged Out    IPV Vaccine  Aged Out    Hepatitis A Vaccine  Aged Out    Meningococcal ACWY Vaccine  Aged Out    HPV Vaccine  Aged Out     Immunization History   Administered Date(s) Administered    COVID-19 Moderna Vac BIVALENT 12 Yr+ IM 0.5 ML 05/04/2023    COVID-19 PFIZER VACCINE 0.3 ML IM 03/05/2021, 03/26/2021, 09/25/2021    COVID-19 Pfizer Vac BIVALENT Christopher-sucrose 12 Yr+ IM 09/09/2022, 05/04/2023    COVID-19 Pfizer mRNA vacc PF christopher-sucrose 12 yr and older (Comirnaty) 09/28/2023    COVID-19 Pfizer vac (Christopher-sucrose, gray cap) 12 yr+ IM 04/01/2022    COVID-19, unspecified 04/01/2022    Hep B, adult 03/19/2019, 04/23/2019, 09/19/2019    INFLUENZA 09/13/2018, 09/17/2018, 09/20/2021, 09/09/2022, 09/28/2023    Influenza, high dose seasonal 0.7 mL 09/19/2019, 08/24/2020    Pneumococcal Conjugate 13-Valent 03/19/2019    Pneumococcal Polysaccharide PPV23 08/17/2020    Respiratory Syncytial Virus Vaccine (Recombinant, Adjuvanted) 11/14/2023    Zoster Vaccine Recombinant 07/30/2018, 09/29/2018       DAYAMI Farfan

## 2023-11-19 NOTE — ASSESSMENT & PLAN NOTE
Lab Results   Component Value Date    HGBA1C 7.1 (H) 10/30/2023     Stable A1c on Metformin, Glimepiride, and Januvia.

## 2023-11-19 NOTE — ASSESSMENT & PLAN NOTE
History of paroxysmal a. Fib. Regular rate and rhythm today. Anticoagulated on aspirin and coumadin. Follows with cardiology, Dr. Christy Allred.

## 2023-11-19 NOTE — ASSESSMENT & PLAN NOTE
Right upper abdominal discomfort, epigastric abdominal discomfort, nausea after eating. Will check RUQ ultrasound. Has known GERD, will stop Pepcid, and resume Omeprazole. Normal EGD in 2019. Will send me an update in 1-2 weeks. If pain should worsen, he is aware to go to the emergency room.

## 2023-11-19 NOTE — ASSESSMENT & PLAN NOTE
Lipid panel 10/30/23: total cholesterol 133, Triglycerides 262, HDL 34, LDL 47. Continue Simvastatin and Fenofibrate.

## 2023-11-20 ENCOUNTER — APPOINTMENT (OUTPATIENT)
Dept: LAB | Facility: AMBULARY SURGERY CENTER | Age: 73
End: 2023-11-20
Payer: MEDICARE

## 2023-11-20 ENCOUNTER — ANTICOAG VISIT (OUTPATIENT)
Dept: CARDIOLOGY CLINIC | Facility: CLINIC | Age: 73
End: 2023-11-20

## 2023-11-20 DIAGNOSIS — Z86.718 HISTORY OF DVT (DEEP VEIN THROMBOSIS): Primary | ICD-10-CM

## 2023-11-20 NOTE — PROGRESS NOTES
Spoke with patient, advised INR a little low, states he may have missed a dose last week.   Advised to take 7.5 mg tonight only, then go back to 5 mg Mon Fri, 2.5 mg all other days, will recheck in 4 weeks 12/18/23

## 2023-11-30 ENCOUNTER — DOCUMENTATION (OUTPATIENT)
Dept: FAMILY MEDICINE CLINIC | Facility: CLINIC | Age: 73
End: 2023-11-30

## 2023-11-30 ENCOUNTER — TELEPHONE (OUTPATIENT)
Dept: FAMILY MEDICINE CLINIC | Facility: CLINIC | Age: 73
End: 2023-11-30

## 2023-11-30 ENCOUNTER — HOSPITAL ENCOUNTER (OUTPATIENT)
Dept: ULTRASOUND IMAGING | Facility: HOSPITAL | Age: 73
Discharge: HOME | End: 2023-11-30
Payer: MEDICARE

## 2023-11-30 DIAGNOSIS — N13.30 HYDRONEPHROSIS, UNSPECIFIED HYDRONEPHROSIS TYPE: Primary | ICD-10-CM

## 2023-11-30 DIAGNOSIS — R10.11 RIGHT UPPER QUADRANT ABDOMINAL PAIN: ICD-10-CM

## 2023-11-30 PROCEDURE — 76705 ECHO EXAM OF ABDOMEN: CPT

## 2023-11-30 NOTE — TELEPHONE ENCOUNTER
----- Message from Refugio Villa, DO sent at 11/30/2023  1:14 PM EST -----  Can you please call patient about RUQ ultrasound findings which notes hydronephrosis which indicates there may be an obstruction in his urinary tract causing a backup of urine. . he should contact urology asap if he is established there, if not I can place a stat referral or he can go to the ED. I will place a stat referral now in case he doesn’t have one. Can you please help him arrange an evaluation asap, thanks!

## 2023-11-30 NOTE — TELEPHONE ENCOUNTER
Connor Todd from [facility] called to state patient's ultrasound had significant findings. He is an [facility] patient, please advise.

## 2023-12-01 ENCOUNTER — TELEPHONE (OUTPATIENT)
Dept: UROLOGY | Facility: CLINIC | Age: 73
End: 2023-12-01

## 2023-12-01 ENCOUNTER — OFFICE VISIT (OUTPATIENT)
Dept: UROLOGY | Facility: CLINIC | Age: 73
End: 2023-12-01
Payer: MEDICARE

## 2023-12-01 VITALS
WEIGHT: 216 LBS | OXYGEN SATURATION: 98 % | SYSTOLIC BLOOD PRESSURE: 170 MMHG | DIASTOLIC BLOOD PRESSURE: 80 MMHG | HEIGHT: 71 IN | HEART RATE: 67 BPM | BODY MASS INDEX: 30.24 KG/M2

## 2023-12-01 DIAGNOSIS — Z87.442 HISTORY OF NEPHROLITHIASIS: ICD-10-CM

## 2023-12-01 DIAGNOSIS — N13.30 HYDRONEPHROSIS, UNSPECIFIED HYDRONEPHROSIS TYPE: Primary | ICD-10-CM

## 2023-12-01 DIAGNOSIS — K80.20 CALCULUS OF GALLBLADDER WITHOUT CHOLECYSTITIS WITHOUT OBSTRUCTION: ICD-10-CM

## 2023-12-01 DIAGNOSIS — N23 RENAL COLIC ON RIGHT SIDE: ICD-10-CM

## 2023-12-01 PROCEDURE — 99204 OFFICE O/P NEW MOD 45 MIN: CPT | Performed by: UROLOGY

## 2023-12-01 PROCEDURE — 82360 CALCULUS ASSAY QUANT: CPT | Performed by: UROLOGY

## 2023-12-01 NOTE — TELEPHONE ENCOUNTER
Please call patient to schedule Ap follow up after CT is scheduled. Provider note:      Return for 1)ct scan in 6 weeks and fu with AP 2) referral to general surgery.

## 2023-12-01 NOTE — ASSESSMENT & PLAN NOTE
The patient had 2 stone episodes in last 6 years. We will send his current stone out for analysis. CT scan will be obtained to better evaluate his complete stone burden. We briefly discussed the role for a metabolic stone work-up.

## 2023-12-01 NOTE — ASSESSMENT & PLAN NOTE
I do not know if his nonobstructive gallstones would likely be causing his right upper quadrant intermittent discomfort. Plan for referral to general surgery to discuss further.

## 2023-12-01 NOTE — ASSESSMENT & PLAN NOTE
The patient had mild hydronephrosis on ultrasound and subsequently passed a stone which may have been the etiology of hydronephrosis. I think it is less likely that his stone was causing his chronic intermittent anterior right upper quadrant pain. We will reassess his hydronephrosis by CT scan in 6 weeks. If hydronephrosis is resolved no further work-up needed. If still present would consider MAG3 Lasix renal scan to determine if there is obstruction present and if so would then consider retrograde pyelogram and ureteroscopy in the operating room.

## 2023-12-01 NOTE — PROGRESS NOTES
Assessment/Plan:    Hydronephrosis  The patient had mild hydronephrosis on ultrasound and subsequently passed a stone which may have been the etiology of hydronephrosis. I think it is less likely that his stone was causing his chronic intermittent anterior right upper quadrant pain. We will reassess his hydronephrosis by CT scan in 6 weeks. If hydronephrosis is resolved no further work-up needed. If still present would consider MAG3 Lasix renal scan to determine if there is obstruction present and if so would then consider retrograde pyelogram and ureteroscopy in the operating room. History of nephrolithiasis  The patient had 2 stone episodes in last 6 years. We will send his current stone out for analysis. CT scan will be obtained to better evaluate his complete stone burden. We briefly discussed the role for a metabolic stone work-up. Calculus of gallbladder without cholecystitis without obstruction  I do not know if his nonobstructive gallstones would likely be causing his right upper quadrant intermittent discomfort. Plan for referral to general surgery to discuss further. Subjective:      Patient ID: Yoel Alexander is a 68 y.o. male. HPI  60-year-old male with incidental finding of right side hydronephrosis on ultrasound for right upper quadrant pain. Patient has intermittent right upper quadrant pain. Obtained an ultrasound today which shows gallbladder stones but without Haider sign. There was also incidentally noted mild right-sided hydronephrosis. The patient subsequently passed a stone this morning but without any pain associated. He still has intermittent right upper quadrant pain. He has had 1 other stone episode in 2017 requiring URS with Dr. Jeannine Sargent.     Past Surgical History:   Procedure Laterality Date    COLONOSCOPY  05/16/2019    completed by Dr. Margarito Pryor, Repeat 3 years    FL RETROGRADE PYELOGRAM  5/17/2019    PAROTIDECTOMY Left     GA CYSTO/URETERO W/LITHOTRIPSY &INDWELL STENT INSRT Right 5/17/2019    Procedure: CYSTOSCOPY URETEROSCOPY WITH LITHOTRIPSY HOLMIUM LASER, RETROGRADE PYELOGRAM AND INSERTION STENT URETERAL--possible stent only;  Surgeon: Mert James MD;  [facility];  Service: Urology    AK EXC PRTD ALEXA/PRTD GLND LAT DSJ&PRSRV FACIAL NR Right 10/18/2017    Procedure: SUPERFICIAL PAROTIDECTOMY WITH NERVE DISSECTION AND PRESERVATION;  Surgeon: Rocky Watkins MD;  [facility];  Service: ENT    AK TENDON SHEATH INCISION Right 11/3/2020    Procedure: [place];  Surgeon: Miri Haskins MD;  [facility];  Service: Orthopedics    ROTATOR CUFF REPAIR Bilateral     TONSILLECTOMY          Past Medical History:   Diagnosis Date    LISY (acute kidney injury) ([address]) 09/11/2021    Arthritis     Atherosclerosis of native arteries of the extremities with ulceration ([address]) 03/21/2019    Colon polyp     6 polyps 3 years ago    Diabetes mellitus ([address])     Diabetic neuropathic arthropathy ([address])     causes weakness in LE and uses a cane as assist to walk    Diabetic neuropathy ([address])     Fatty liver     Hyponatremia 09/11/2021    Kidney stone     Parotid tumor     Seasonal allergies     Spinal stenosis     Ureteral stone with hydronephrosis 05/17/2019    Added automatically from request for surgery [id number]        AUA SYMPTOM SCORE      Flowsheet Row Most Recent Value   AUA SYMPTOM SCORE    How often have you had a sensation of not emptying your bladder completely after you finished urinating? 0 (P)     How often have you had to urinate again less than two hours after you finished urinating? 2 (P)     How often have you found you stopped and started again several times when you urinate? 0 (P)     How often have you found it difficult to postpone urination? 2 (P)     How often have you had a weak urinary stream? 0 (P)     How often have you had to push or strain to begin urination? 0 (P)     How many times did you most typically get up to urinate from the time you went to bed at night until the time you got up in the morning? 4 (P)     Quality of Life: If you were to spend the rest of your life with your urinary condition just the way it is now, how would you feel about that? 2 (P)     AUA SYMPTOM SCORE 8 (P)              Review of Systems   Constitutional:  Negative for chills and fever. HENT:  Negative for ear pain and sore throat. Eyes:  Negative for pain and visual disturbance. Respiratory:  Negative for cough and shortness of breath. Cardiovascular:  Negative for chest pain and palpitations. Gastrointestinal:  Positive for abdominal pain. Negative for vomiting. Genitourinary:  Negative for dysuria and hematuria. Musculoskeletal:  Negative for arthralgias and back pain. Skin:  Negative for color change and rash. Neurological:  Negative for seizures and syncope. All other systems reviewed and are negative. Objective:      /80   Pulse 67   Ht 5' 11" (1.803 m)   Wt 98 kg (216 lb)   SpO2 98%   BMI 30.13 kg/m²     Lab Results   Component Value Date    PSA 1.2 11/11/2020    PSA 1.4 03/05/2019    PSA 1.0 04/03/2017    PSA 1.0 12/14/2015          Physical Exam  Vitals reviewed. Constitutional:       Appearance: Normal appearance. He is normal weight. HENT:      Head: Normocephalic and atraumatic. Eyes:      Pupils: Pupils are equal, round, and reactive to light. Abdominal:      General: Abdomen is flat. There is no distension. Palpations: There is no mass. Tenderness: There is no abdominal tenderness. There is no right CVA tenderness, left CVA tenderness, guarding or rebound. Hernia: No hernia is present. Comments: I did not reproduce the patient's right upper quadrant pain with mild palpation. Neurological:      General: No focal deficit present. Mental Status: He is alert and oriented to person, place, and time.    Psychiatric:         Mood and Affect: Mood normal. Thought Content: Thought content normal.           I personally reviewed the patient's US images. I do not see any obvious stones. I appreciate minimal hydronephrosis. RIGHT UPPER QUADRANT ULTRASOUND     INDICATION:     R10.11: Right upper quadrant pain. COMPARISON: 9/12/2021     TECHNIQUE:   Real-time ultrasound of the right upper quadrant was performed with a curvilinear transducer with both volumetric sweeps and still imaging techniques. FINDINGS:     PANCREAS:  Visualized portions of the pancreas are within normal limits. AORTA AND IVC:  Visualized portions are normal for patient age. LIVER:  Size: Enlarged. The liver measures 21.6 cm in the midclavicular line. Contour:  Surface contour is smooth. Parenchyma: There is moderate diffuse increased echogenicity with smooth echotexture and acoustic beam attenuation. Most consistent with moderate hepatic steatosis. No liver mass identified. Limited imaging of the main portal vein shows it to be patent and hepatopetal.     BILIARY:  The gallbladder is normal in caliber. No wall thickening or pericholecystic fluid. The gallbladder is filled with gallstones. No sonographic Haider sign. No intrahepatic biliary dilatation. CBD measures 4.0 mm. No choledocholithiasis. KIDNEY:  Right kidney measures 12.8 x 6.1 x 4.4 cm. Volume 178.5 mL  1.8 cm parapelvic cyst.  There is mild right hydronephrosis. ASCITES:   None. IMPRESSION:     1. Mild right hydronephrosis. 2. Enlarged fatty liver. 3. Cholelithiasis. Negative sonographic Haider sign.     Orders  Orders Placed This Encounter   Procedures    CT abdomen pelvis wo contrast     Stone protocol CT (low dose)     Standing Status:   Future     Standing Expiration Date:   12/1/2027     Scheduling Instructions:      FOR PO CONTRAST:      The patient will need to drink barium for this test. The barium needs to be picked up in the registration area at least one day prior to the patients study. For out of network (non-[facility]) orders please bring your prescription when picking up oral contrast. Further instructions will be given at       time of pickup. FOR ALL OTHER CONTRAST SCENARIOS:      Please refer to the Patient Instructions in the Appointment Review window at scheduling. If possible wear clothing without any metal in the abdomen area. Sweat suit, sports bra or bra without underwire may eliminate the need to change. Please bring your insurance cards, a form of photo ID and a list of your medications with you. Arrive 15 minutes prior to your appointment time in       order to register. On the day of your test, please bring any prior CT or MRI studies of this area with you that were not performed at a [facility] facility. To schedule this appointment, please contact Central Scheduling at [phone]. Order Specific Question:   What is the patient's sedation requirement? If Medication for Claustrophobia is selected, order medication at this point.      Answer:   No Sedation     Order Specific Question:   Contrast Information:     Answer:   No contrast     Order Specific Question:   Release to patient through Mychart     Answer:   Immediate     Order Specific Question:   Reason for Exam (FREE TEXT)     Answer:   Evaluate for stone and hydronephrosis    Stone analysis    Ambulatory Referral to General Surgery     Standing Status:   Future     Standing Expiration Date:   12/1/2024     Referral Priority:   Routine     Referral Type:   Consult - AMB     Referral Reason:   Specialty Services Required     Requested Specialty:   General Surgery     Number of Visits Requested:   1     Expiration Date:   12/1/2024

## 2023-12-06 ENCOUNTER — HOSPITAL ENCOUNTER (OUTPATIENT)
Dept: NON INVASIVE DIAGNOSTICS | Facility: CLINIC | Age: 73
Discharge: HOME | End: 2023-12-06
Payer: MEDICARE

## 2023-12-06 ENCOUNTER — ANTICOAG VISIT (OUTPATIENT)
Dept: CARDIOLOGY CLINIC | Facility: CLINIC | Age: 73
End: 2023-12-06

## 2023-12-06 ENCOUNTER — APPOINTMENT (OUTPATIENT)
Dept: LAB | Facility: AMBULARY SURGERY CENTER | Age: 73
End: 2023-12-06
Payer: MEDICARE

## 2023-12-06 DIAGNOSIS — I70.213 ATHEROSCLEROSIS OF NATIVE ARTERY OF BOTH LOWER EXTREMITIES WITH INTERMITTENT CLAUDICATION (HCC): ICD-10-CM

## 2023-12-06 DIAGNOSIS — Z86.718 HISTORY OF DVT (DEEP VEIN THROMBOSIS): Primary | ICD-10-CM

## 2023-12-06 PROCEDURE — 93923 UPR/LXTR ART STDY 3+ LVLS: CPT

## 2023-12-06 PROCEDURE — 93922 UPR/L XTREMITY ART 2 LEVELS: CPT | Performed by: SURGERY

## 2023-12-06 PROCEDURE — 93925 LOWER EXTREMITY STUDY: CPT

## 2023-12-06 PROCEDURE — 93925 LOWER EXTREMITY STUDY: CPT | Performed by: SURGERY

## 2023-12-06 NOTE — PROGRESS NOTES
Spoke with patient, advised INR good, current dose verified.  Continue 5 mg Mon Fri, 2.5 mg all other days, will recheck in 4 weeks 1/3/24

## 2023-12-07 ENCOUNTER — TELEPHONE (OUTPATIENT)
Dept: VASCULAR SURGERY | Facility: CLINIC | Age: 73
End: 2023-12-07

## 2023-12-07 NOTE — TELEPHONE ENCOUNTER
----- Message from Benjamin Mead sent at 12/7/2023 11:13 AM EST -----  Call patient for OV with PA-C/CRNP to review study and severity of symptoms

## 2023-12-09 LAB
COLOR STONE: NORMAL
COM MFR STONE: 70 %
COMMENT-STONE3: NORMAL
COMPOSITION: NORMAL
HYDROXYAPATITE 24H ENGDIFF UR: 30 %
LABORATORY COMMENT REPORT: NORMAL
PHOTO: NORMAL
SIZE STONE: NORMAL MM
SPEC SOURCE SUBJ: NORMAL
STONE ANALYSIS-IMP: NORMAL
STONE ANALYSIS-IMP: NORMAL
WT STONE: 13 MG

## 2023-12-13 ENCOUNTER — OFFICE VISIT (OUTPATIENT)
Dept: SURGERY | Facility: CLINIC | Age: 73
End: 2023-12-13
Payer: MEDICARE

## 2023-12-13 ENCOUNTER — PREP FOR PROCEDURE (OUTPATIENT)
Dept: SURGERY | Facility: CLINIC | Age: 73
End: 2023-12-13

## 2023-12-13 VITALS
HEART RATE: 92 BPM | SYSTOLIC BLOOD PRESSURE: 149 MMHG | WEIGHT: 212 LBS | DIASTOLIC BLOOD PRESSURE: 71 MMHG | HEIGHT: 71 IN | BODY MASS INDEX: 29.68 KG/M2

## 2023-12-13 DIAGNOSIS — K80.10 CCC (CHRONIC CALCULOUS CHOLECYSTITIS): Primary | ICD-10-CM

## 2023-12-13 DIAGNOSIS — K80.10 CHRONIC CHOLECYSTITIS WITH CALCULUS: ICD-10-CM

## 2023-12-13 PROCEDURE — 99204 OFFICE O/P NEW MOD 45 MIN: CPT | Performed by: SURGERY

## 2023-12-13 NOTE — LETTER
December 13, 2023     [facility],[place] [address]    Patient: Joseph Alvarez   YOB: 1950   Date of Visit: 12/13/2023       Dear Dr. Nubia Jimenez: Thank you for referring Luke Liu to me for evaluation. Below are my notes for this consultation. If you have questions, please do not hesitate to call me. I look forward to following your patient along with you. Sincerely,        David Triplett DO        CC: No Recipients    David tay DO  12/13/2023 11:31 AM  Sign when Signing Visit  Assessment/Plan:    Diagnoses and all orders for this visit:    Chronic cholecystitis with calculus  -     Ambulatory Referral to General Surgery    Risks and benefits of a laparoscopic cholecystectomy were discussed with him including potential for bile duct injury, bowel injury, or need for open surgery and he agrees to proceed. He just saw his cardiologist, Dr. Susana Mary, in November. Will hold his Coumadin for 3 days preoperatively    Subjective:      Patient ID: Joseph Alvarez is a 68 y.o. male. Patient presents for gallbladder consult. States he has had intermittent epigastric/RUQ pain for 3 months. He has pain after eating and when his stomach is empty. Ultrasound RUQ 11/30/2023. IMPRESSION:   1. Mild right hydronephrosis. 2. Enlarged fatty liver. 3. Cholelithiasis. Negative sonographic Haider sign. Complains of intermittent right upper quadrant epigastric discomfort and fullness over the last few months. Most any type of food will cause this. Lot of belching as well as action of flatus. He does take omeprazole. Denies reflux issues. Last hemoglobin A1c was 7.1              Abdominal Pain  The current episode started more than 1 month ago. The problem occurs daily. The problem has been unchanged. The pain is located in the epigastric region and RUQ. The quality of the pain is sharp. The abdominal pain does not radiate.  Associated symptoms include belching, constipation, flatus and nausea. The pain is aggravated by eating. The pain is relieved by Nothing. He has tried nothing for the symptoms. Prior diagnostic workup includes ultrasound. His past medical history is significant for gallstones. 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 He  has a past surgical history that includes Rotator cuff repair (Bilateral); Colonoscopy (05/16/2019); Parotidectomy (Left); pr exc prtd margaret/prtd glnd lat dsj&prsrv facial nr (Right, 10/18/2017); Tonsillectomy; FL retrograde pyelogram (5/17/2019); pr cysto/uretero w/lithotripsy &indwell stent insrt (Right, 5/17/2019); and pr tendon sheath incision (Right, 11/3/2020). His family history includes Alcohol abuse in his father; Cirrhosis in his father; Lupus in his mother; Rheum arthritis in his mother; Substance Abuse in his brother. He  reports that he has been smoking pipe and cigars. He uses smokeless tobacco. He reports current alcohol use of about 5.0 standard drinks of alcohol per week. He reports that he does not use drugs. Current Outpatient Medications   Medication Sig Dispense Refill   • acetaminophen (TYLENOL) 325 mg tablet 2, by mouth, every 6 hours as needed for mild to moderate pain.  30 tablet 0   • aspirin (ECOTRIN LOW STRENGTH) 81 mg EC tablet Take 81 mg by mouth daily       • Cyanocobalamin (VITAMIN B 12 PO) Take 1,000 mcg by mouth daily     • fenofibrate micronized (LOFIBRA) 134 MG capsule TAKE 1 CAPSULE (134 MG TOTAL) BY MOUTH DAILY WITH BREAKFAST 90 capsule 3   • gabapentin (NEURONTIN) 100 mg capsule TAKE 2 CAPSULES (200 MG TOTAL) BY MOUTH DAILY AT BEDTIME 180 capsule 1   • glimepiride (AMARYL) 2 mg tablet Take 1 tablet (2 mg total) by mouth daily with breakfast 90 tablet 3   • Januvia 100 MG tablet TAKE 1 TABLET BY MOUTH EVERY DAY 90 tablet 3   • lisinopril (ZESTRIL) 20 mg tablet TAKE 1 TABLET BY MOUTH EVERY DAY 90 tablet 4   • metFORMIN (GLUCOPHAGE-XR) 500 mg 24 hr tablet Take 2 tablets (1,000 mg total) by mouth 2 (two) times a day with meals 360 tablet 3   • omeprazole (PriLOSEC) 20 mg delayed release capsule Take 1 capsule (20 mg total) by mouth daily 90 capsule 1   • ProAir  (90 Base) MCG/ACT inhaler INHALE 2 PUFFS INTO THE LUNGS EVERY 4 HOURS AS NEEDED FOR SHORTNESS OF BREATH OR FOR WHEEZE 17 g 1   • simvastatin (ZOCOR) 40 mg tablet TAKE 1 TABLET BY MOUTH EVERY DAY 90 tablet 4   • tamsulosin (FLOMAX) 0.4 mg TAKE 1 CAPSULE BY MOUTH EVERY DAY WITH DINNER 90 capsule 3   • warfarin (COUMADIN) 2.5 mg tablet Take 1 tablet by mouth daily T-Th-S-S takes 2.5mg     M-W-F takes 5.0 mg (Patient taking differently: Take 2.5 mg by mouth daily [id number] takes 2.5mg     M-F takes 5.0 mg)     • warfarin (COUMADIN) 5 mg tablet TAKE 1 TABLET EVERY DAY OR AS DIRECTED BY MD 90 tablet 3     No current facility-administered medications for this visit. He is allergic to pletal [cilostazol], dyazide [hydrochlorothiazide w-triamterene], and jardiance [empagliflozin]. .    Review of Systems   Gastrointestinal:  Positive for abdominal pain, constipation, flatus and nausea. All other systems reviewed and are negative. Objective:      /71   Pulse 92   Ht 5' 11" (1.803 m)   Wt 96.2 kg (212 lb)   BMI 29.57 kg/m²          Physical Exam  HENT:      Mouth/Throat:      Pharynx: Oropharynx is clear. Eyes:      Extraocular Movements: Extraocular movements intact.    Cardiovascular: Rate and Rhythm: Normal rate. Pulmonary:      Effort: Pulmonary effort is normal.   Abdominal:      General: Abdomen is flat. Bowel sounds are normal.      Palpations: Abdomen is soft. There is no mass. Tenderness: There is no abdominal tenderness. Musculoskeletal:      Cervical back: Normal range of motion. Skin:     General: Skin is warm and dry. Neurological:      Mental Status: He is alert.       Extremities: No edema

## 2023-12-13 NOTE — PROGRESS NOTES
Assessment/Plan:    Diagnoses and all orders for this visit:    Chronic cholecystitis with calculus  -     Ambulatory Referral to General Surgery    Risks and benefits of a laparoscopic cholecystectomy were discussed with him including potential for bile duct injury, bowel injury, or need for open surgery and he agrees to proceed. He just saw his cardiologist, Dr. Nereyda Oh, in November. Will hold his Coumadin for 3 days preoperatively    Subjective:      Patient ID: Adenike Lemon is a 68 y.o. male. Patient presents for gallbladder consult. States he has had intermittent epigastric/RUQ pain for 3 months. He has pain after eating and when his stomach is empty. Ultrasound RUQ 11/30/2023. IMPRESSION:   1. Mild right hydronephrosis. 2. Enlarged fatty liver. 3. Cholelithiasis. Negative sonographic Haider sign. Complains of intermittent right upper quadrant epigastric discomfort and fullness over the last few months. Most any type of food will cause this. Lot of belching as well as action of flatus. He does take omeprazole. Denies reflux issues. Last hemoglobin A1c was 7.1              Abdominal Pain  The current episode started more than 1 month ago. The problem occurs daily. The problem has been unchanged. The pain is located in the epigastric region and RUQ. The quality of the pain is sharp. The abdominal pain does not radiate. Associated symptoms include belching, constipation, flatus and nausea. The pain is aggravated by eating. The pain is relieved by Nothing. He has tried nothing for the symptoms. Prior diagnostic workup includes ultrasound. His past medical history is significant for gallstones.        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 He  has a past surgical history that includes Rotator cuff repair (Bilateral); Colonoscopy (05/16/2019); Parotidectomy (Left); pr exc prtd margaret/prtd glnd lat dsj&prsrv facial nr (Right, 10/18/2017); Tonsillectomy; FL retrograde pyelogram (5/17/2019); pr cysto/uretero w/lithotripsy &indwell stent insrt (Right, 5/17/2019); and pr tendon sheath incision (Right, 11/3/2020). His family history includes Alcohol abuse in his father; Cirrhosis in his father; Lupus in his mother; Rheum arthritis in his mother; Substance Abuse in his brother. He  reports that he has been smoking pipe and cigars. He uses smokeless tobacco. He reports current alcohol use of about 5.0 standard drinks of alcohol per week. He reports that he does not use drugs. Current Outpatient Medications   Medication Sig Dispense Refill    acetaminophen (TYLENOL) 325 mg tablet 2, by mouth, every 6 hours as needed for mild to moderate pain.  30 tablet 0    aspirin (ECOTRIN LOW STRENGTH) 81 mg EC tablet Take 81 mg by mouth daily        Cyanocobalamin (VITAMIN B 12 PO) Take 1,000 mcg by mouth daily      fenofibrate micronized (LOFIBRA) 134 MG capsule TAKE 1 CAPSULE (134 MG TOTAL) BY MOUTH DAILY WITH BREAKFAST 90 capsule 3    gabapentin (NEURONTIN) 100 mg capsule TAKE 2 CAPSULES (200 MG TOTAL) BY MOUTH DAILY AT BEDTIME 180 capsule 1    glimepiride (AMARYL) 2 mg tablet Take 1 tablet (2 mg total) by mouth daily with breakfast 90 tablet 3    Januvia 100 MG tablet TAKE 1 TABLET BY MOUTH EVERY DAY 90 tablet 3    lisinopril (ZESTRIL) 20 mg tablet TAKE 1 TABLET BY MOUTH EVERY DAY 90 tablet 4    metFORMIN (GLUCOPHAGE-XR) 500 mg 24 hr tablet Take 2 tablets (1,000 mg total) by mouth 2 (two) times a day with meals 360 tablet 3    omeprazole (PriLOSEC) 20 mg delayed release capsule Take 1 capsule (20 mg total) by mouth daily 90 capsule 1    ProAir  (90 Base) MCG/ACT inhaler INHALE 2 PUFFS INTO THE LUNGS EVERY 4 HOURS AS NEEDED FOR SHORTNESS OF BREATH OR FOR WHEEZE 17 g 1    simvastatin (ZOCOR) 40 mg tablet TAKE 1 TABLET BY MOUTH EVERY DAY 90 tablet 4    tamsulosin (FLOMAX) 0.4 mg TAKE 1 CAPSULE BY MOUTH EVERY DAY WITH DINNER 90 capsule 3    warfarin (COUMADIN) 2.5 mg tablet Take 1 tablet by mouth daily T-Th-S-S takes 2.5mg     M-W-F takes 5.0 mg (Patient taking differently: Take 2.5 mg by mouth daily T-WED-Th-S-S takes 2.5mg     M-F takes 5.0 mg)      warfarin (COUMADIN) 5 mg tablet TAKE 1 TABLET EVERY DAY OR AS DIRECTED BY MD 90 tablet 3     No current facility-administered medications for this visit. He is allergic to pletal [cilostazol], dyazide [hydrochlorothiazide w-triamterene], and jardiance [empagliflozin]. .    Review of Systems   Gastrointestinal:  Positive for abdominal pain, constipation, flatus and nausea. All other systems reviewed and are negative. Objective:      /71   Pulse 92   Ht 5' 11" (1.803 m)   Wt 96.2 kg (212 lb)   BMI 29.57 kg/m²          Physical Exam  HENT:      Mouth/Throat:      Pharynx: Oropharynx is clear. Eyes:      Extraocular Movements: Extraocular movements intact. Cardiovascular:      Rate and Rhythm: Normal rate. Pulmonary:      Effort: Pulmonary effort is normal.   Abdominal:      General: Abdomen is flat. Bowel sounds are normal.      Palpations: Abdomen is soft. There is no mass. Tenderness: There is no abdominal tenderness. Musculoskeletal:      Cervical back: Normal range of motion. Skin:     General: Skin is warm and dry. Neurological:      Mental Status: He is alert.       Extremities: No edema

## 2023-12-19 ENCOUNTER — TELEPHONE (OUTPATIENT)
Dept: ADMINISTRATIVE | Facility: OTHER | Age: 73
End: 2023-12-19

## 2023-12-19 NOTE — TELEPHONE ENCOUNTER
----- Message from Zuly Garner sent at 12/19/2023  9:52 AM EST -----  Regarding: Care Gap Request  12/19/23 9:52 AM    Hello, our patient attached above has had Diabetic Foot Exam completed/performed. Please assist in updating the patient chart by pulling the document from the Media Tab. The date of service is 12/14/23 at [facility].     Thank you,  Zuly AQUINO

## 2023-12-20 NOTE — TELEPHONE ENCOUNTER
Upon review of the In Basket request we were able to locate, review, and update the patient chart as requested for Diabetic Foot Exam.    Any additional questions or concerns should be emailed to the Practice Liaisons via the appropriate education email address, please do not reply via In Basket.    Thank you  Holger Estrada MA

## 2023-12-29 DIAGNOSIS — E11.40 TYPE 2 DIABETES MELLITUS WITH DIABETIC NEUROPATHY, WITHOUT LONG-TERM CURRENT USE OF INSULIN (HCC): ICD-10-CM

## 2023-12-29 RX ORDER — GLIMEPIRIDE 2 MG/1
3 TABLET ORAL
Qty: 90 TABLET | Refills: 3 | Status: SHIPPED | OUTPATIENT
Start: 2023-12-29 | End: 2024-12-23

## 2024-01-02 ENCOUNTER — APPOINTMENT (OUTPATIENT)
Dept: LAB | Facility: CLINIC | Age: 74
End: 2024-01-02
Payer: MEDICARE

## 2024-01-02 ENCOUNTER — HOSPITAL ENCOUNTER (OUTPATIENT)
Dept: CT IMAGING | Facility: HOSPITAL | Age: 74
Discharge: HOME | End: 2024-01-02
Attending: UROLOGY
Payer: MEDICARE

## 2024-01-02 ENCOUNTER — ANTICOAG VISIT (OUTPATIENT)
Dept: CARDIOLOGY CLINIC | Facility: CLINIC | Age: 74
End: 2024-01-02

## 2024-01-02 DIAGNOSIS — K80.10 CCC (CHRONIC CALCULOUS CHOLECYSTITIS): ICD-10-CM

## 2024-01-02 DIAGNOSIS — N13.30 HYDRONEPHROSIS, UNSPECIFIED HYDRONEPHROSIS TYPE: ICD-10-CM

## 2024-01-02 DIAGNOSIS — N23 RENAL COLIC ON RIGHT SIDE: ICD-10-CM

## 2024-01-02 DIAGNOSIS — Z87.442 HISTORY OF NEPHROLITHIASIS: ICD-10-CM

## 2024-01-02 DIAGNOSIS — Z86.718 HISTORY OF DVT (DEEP VEIN THROMBOSIS): Primary | ICD-10-CM

## 2024-01-02 LAB
ALBUMIN SERPL BCP-MCNC: 4.1 G/DL (ref 3.5–5)
ALP SERPL-CCNC: 82 U/L (ref 34–104)
ALT SERPL W P-5'-P-CCNC: 73 U/L (ref 7–52)
ANION GAP SERPL CALCULATED.3IONS-SCNC: 7 MMOL/L
AST SERPL W P-5'-P-CCNC: 80 U/L (ref 13–39)
BILIRUB SERPL-MCNC: 0.74 MG/DL (ref 0.2–1)
BUN SERPL-MCNC: 11 MG/DL (ref 5–25)
CALCIUM SERPL-MCNC: 9.1 MG/DL (ref 8.4–10.2)
CHLORIDE SERPL-SCNC: 102 MMOL/L (ref 96–108)
CO2 SERPL-SCNC: 26 MMOL/L (ref 21–32)
CREAT SERPL-MCNC: 1.05 MG/DL (ref 0.6–1.3)
ERYTHROCYTE [DISTWIDTH] IN BLOOD BY AUTOMATED COUNT: 13.5 % (ref 11.6–15.1)
GFR SERPL CREATININE-BSD FRML MDRD: 70 ML/MIN/1.73SQ M
GLUCOSE SERPL-MCNC: 206 MG/DL (ref 65–140)
HCT VFR BLD AUTO: 48.6 % (ref 36.5–49.3)
HGB BLD-MCNC: 16.1 G/DL (ref 12–17)
MCH RBC QN AUTO: 31.9 PG (ref 26.8–34.3)
MCHC RBC AUTO-ENTMCNC: 33.1 G/DL (ref 31.4–37.4)
MCV RBC AUTO: 96 FL (ref 82–98)
PLATELET # BLD AUTO: 262 THOUSANDS/UL (ref 149–390)
PMV BLD AUTO: 10.9 FL (ref 8.9–12.7)
POTASSIUM SERPL-SCNC: 4.4 MMOL/L (ref 3.5–5.3)
PROT SERPL-MCNC: 7.4 G/DL (ref 6.4–8.4)
RBC # BLD AUTO: 5.04 MILLION/UL (ref 3.88–5.62)
SODIUM SERPL-SCNC: 135 MMOL/L (ref 135–147)
WBC # BLD AUTO: 8.32 THOUSAND/UL (ref 4.31–10.16)

## 2024-01-02 PROCEDURE — 80053 COMPREHEN METABOLIC PANEL: CPT

## 2024-01-02 PROCEDURE — 85027 COMPLETE CBC AUTOMATED: CPT

## 2024-01-02 PROCEDURE — 74176 CT ABD & PELVIS W/O CONTRAST: CPT

## 2024-01-02 PROCEDURE — G1004 CDSM NDSC: HCPCS

## 2024-01-02 NOTE — PROGRESS NOTES
Spoke with patient, advised INR a little low, no missed doses, advised to take 5 mg tonight only instead of 2.5 mg, then go back to 5 mg Mon Fri, 2.5 mg all other days.  Patient will be holding Coumadin starting 1/9/24 for surgery 1/12/24, ok per Dr Jay.    Advised will get INR 1/19/24

## 2024-01-03 ENCOUNTER — ANESTHESIA EVENT (OUTPATIENT)
Dept: PERIOP | Facility: HOSPITAL | Age: 74
End: 2024-01-03
Payer: MEDICARE

## 2024-01-03 NOTE — PRE-PROCEDURE INSTRUCTIONS
Pre-Surgery Instructions:   Medication Instructions    aspirin (ECOTRIN LOW STRENGTH) 81 mg EC tablet Hold this medication for 7 days before surgery      fenofibrate micronized (LOFIBRA) 134 MG capsule Take this medication day of surgery if normally taken in the morning.      gabapentin (NEURONTIN) 100 mg capsule Take this medication day of surgery if normally taken in the morning.      glimepiride (AMARYL) 2 mg tablet Hold day of surgery      Januvia 100 MG tablet Hold day of surgery      lisinopril (ZESTRIL) 20 mg tablet Hold day of surgery      metFORMIN (GLUCOPHAGE-XR) 500 mg 24 hr tablet Hold day of surgery      omeprazole (PriLOSEC) 20 mg delayed release capsule Take this medication day of surgery if normally taken in the morning.      ProAir  (90 Base) MCG/ACT inhaler Use day of surgery if needed and bring with you      simvastatin (ZOCOR) 40 mg tablet Take this medication day of surgery if normally taken in the morning.      tamsulosin (FLOMAX) 0.4 mg Hold day of surgery      warfarin (COUMADIN) 2.5 mg tablet Pt has already received hold instructions    warfarin (COUMADIN) 5 mg tablet Pt has already received hold instructions    Medication instructions for day surgery reviewed. Please use only a sip of water to take your instructed medications. Avoid all over the counter vitamins, supplements and NSAIDS for one week prior to surgery per anesthesia guidelines. Tylenol is ok to take as needed.     You will receive a call one business day prior to surgery with an arrival time and hospital directions. If your surgery is scheduled on a Monday, the hospital will be calling you on the Friday prior to your surgery. If you have not heard from anyone by 8pm, please call the hospital supervisor through the hospital  at [phone]. (Zain [phone]).    Do not eat or drink anything after midnight the night before your surgery, including candy, mints, lifesavers, or chewing gum. Do not drink  alcohol 24hrs before your surgery. Try not to smoke at least 24hrs before your surgery.       Follow the pre surgery showering instructions as listed in the “My Surgical Experience Booklet” or otherwise provided by your surgeon's office. Do not use a blade to shave the surgical area 1 week before surgery. It is okay to use a clean electric clippers up to 24 hours before surgery. Do not apply any lotions, creams, including makeup, cologne, deodorant, or perfumes after showering on the day of your surgery. Do not use dry shampoo, hair spray, hair gel, or any type of hair products.     No contact lenses, eye make-up, or artificial eyelashes. Remove nail polish, including gel polish, and any artificial, gel, or acrylic nails if possible. Remove all jewelry including rings and body piercing jewelry.     Wear causal clothing that is easy to take on and off. Consider your type of surgery.    Keep any valuables, jewelry, piercings at home. Please bring any specially ordered equipment (sling, braces) if indicated.    Arrange for a responsible person to drive you to and from the hospital on the day of your surgery. Visitor Guidelines discussed.     Call the surgeon's office with any new illnesses, exposures, or additional questions prior to surgery.    Please reference your “My Surgical Experience Booklet” for additional information to prepare for your upcoming surgery.

## 2024-01-04 LAB
ATRIAL RATE: 73 BPM
P AXIS: 71 DEGREES
PR INTERVAL: 184 MS
QRS AXIS: 76 DEGREES
QRSD INTERVAL: 138 MS
QT INTERVAL: 422 MS
QTC INTERVAL: 464 MS
T WAVE AXIS: 42 DEGREES
VENTRICULAR RATE: 73 BPM

## 2024-01-10 NOTE — PROGRESS NOTES
73 y/oM former smoker with hx of Warthin's tumor s/p resection, GERD, COPD, DM, HTN, HLD, afib (warfarin), PAD w/ BLE claudicaiton, B popliteal ectasia, Hx of LLE DVT who presents for RFM and to review recent vascular testing   Assessment/Plan:    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)  LEAD 23  Rt le-75% stenosis of the proximal SFA and a  >75% stenosis of the mid SFA, with diffuse disease  throughout the remainder of the femoral-popliteal arteries.  AARTI: 0.81  (Prior: 0.95/ 114/ 68     Lt le-75% stenosis of the proximal SFA with diffuse disease throughout the remainder of the femoral-popliteal arteries.  Evidence of tibioperoneal disease without a focal stenosis noted.  AARTI: 0.95 (Prior: 1.05)/ 126/ 82    -Claudication symptoms with 1/2 block, reports occasional cramping at night no true rest pain, new wounds/ tissue loss.     -Reviewed lower extremity ultrasound in detail with pt and answered all questions. Educated pt on peripheral arterial disease and indication for vascular intervention. No indications for vascular intervention at this time as he is not experiencing any life limiting claudication factors at this time. Discussed that he likely his multifaceted etiology of pain symptoms in legs d/t experiencing cramping associated with leg repositioned rather than walking/ activity. This cramping is likely not vascular in etiology and he should f/u with pcp for further treatment and evaluation. Recommending continued surveillance with non-invasive imagining yearly with medical management at this time. Pt verbalized understanding.    Recommendations:  -Complete LEAD in one year and return to the office for review.  -Call the office with any new or worsening leg pain, discoloration, swelling, new wounds/ tissue loss.  -Continue to take aspirin 81 mg daily.  -Continue to take simvastatin daily as per PCP.  -Do daily foot checks, food protection, wear well fitted  shoes.  -Increase daily walking for 20-30 min everyday.      Popliteal artery ectasia bilateral (HCC)  LEAD 12/6/23  Rt Leg: The popliteal artery is ectatic in caliber, 1.0 cm, (Prior: 1.0 cm).  Lt Leg: The popliteal artery is ectatic in caliber, 1.0 cm, (Prior: 0.9 cm).    -B/L popliteal ectasia is stable. No indication for vascular intervention. Continue with plan as above.     Essential hypertension  -BP well controlled  -continue current medical regimen  -management per PCP      Type 2 diabetes mellitus with neurologic complication, without long-term current use of insulin (HCC)    Lab Results   Component Value Date    HGBA1C 7.1 (H) 10/30/2023   -Reviewed most recent HgA1c with pt.  -Stable/decreasing blood sugars. Reviewed the importance of maintaining an optimized blood sugar for progression of vascular disease.  -management per PCP         Diagnoses and all orders for this visit:    Atherosclerosis of native artery of both lower extremities with intermittent claudication (HCC)  -     VAS ARTERIAL DUPLEX- LOWER LIMB BILATERAL; Future    Popliteal artery ectasia bilateral (HCC)  -     VAS ARTERIAL DUPLEX- LOWER LIMB BILATERAL; Future          Subjective:      Patient ID: Fernando Roberts is a 73 y.o. male.    Patient presents today to review NABEEL done 12/6/23. Known claudication at about 1/2 block. No wounds.     73 yoM former smoker with hx of Warthin's tumor s/p resection, GERD, COPD, DM, HTN, HLD, afib (warfarin), PAD w/ BLE claudicaiton, B popliteal ectasia, Hx of LLE DVT who presents for RFM and to review recent vascular testing   Presents to the office accompanied by his wife. Reports that he has a scheduled cholecystectomy sx planned for tomorrow.   Reports that three days ago he noticed that his right distal foot started throbbing and mild swelling. Family reports that they saw him trip/ fall and that is what initiated the swelling. R foot appears to have skin intact with no obvious swelling/ wounds/  "discoloration.   Follows with podiatry next month. March 26th  Reports calf pain and cramping. His b/l feet 'turn and lock' R>L cramping. He states that if he moves it the wrong way his foot will lock up .   Claudication with 1/2 block in b/l legs that subsides with rest, cramping in b/l calves and feet at night, no wounds/ tissue loss.         The following portions of the patient's history were reviewed and updated as appropriate: allergies, current medications, past family history, past medical history, past social history, past surgical history, and problem list.    Review of Systems   Constitutional: Negative.    HENT: Negative.     Eyes: Negative.    Respiratory:  Positive for shortness of breath (With over exertion).    Cardiovascular: Negative.  Negative for chest pain.   Gastrointestinal: Negative.    Endocrine: Negative.    Genitourinary: Negative.    Musculoskeletal:  Positive for gait problem.   Skin: Negative.    Allergic/Immunologic: Negative.    Neurological:  Positive for numbness.   Hematological:  Bruises/bleeds easily.   Psychiatric/Behavioral: Negative.           Objective:      /84 (BP Location: Right arm, Patient Position: Sitting)   Pulse 76   Ht 5' 11\" (1.803 m)   Wt 98.9 kg (218 lb)   BMI 30.40 kg/m²          Physical Exam  Vitals and nursing note reviewed.   Constitutional:       Appearance: Normal appearance. He is obese.   HENT:      Head: Normocephalic and atraumatic.   Neck:      Vascular: No carotid bruit.   Cardiovascular:      Rate and Rhythm: Normal rate and regular rhythm.      Pulses:           Radial pulses are 2+ on the right side and 2+ on the left side.        Femoral pulses are 0 on the right side and 0 on the left side.       Dorsalis pedis pulses are detected w/ Doppler on the right side and detected w/ Doppler on the left side.        Posterior tibial pulses are detected w/ Doppler on the right side and detected w/ Doppler on the left side.      Heart sounds: No " "murmur heard.     Comments: Biphasic doppler signals  Pulmonary:      Effort: Pulmonary effort is normal. No respiratory distress.      Breath sounds: Normal breath sounds.   Musculoskeletal:         General: No swelling (R distal foot) or tenderness.      Right lower leg: No edema.      Left lower leg: No edema.   Skin:     General: Skin is warm and dry.      Capillary Refill: Capillary refill takes 2 to 3 seconds.   Neurological:      General: No focal deficit present.      Mental Status: He is alert and oriented to person, place, and time.      Sensory: Sensory deficit (B/L numbnes tingling in b/l feet up to the mid calf) present.   Psychiatric:         Mood and Affect: Mood normal.         Behavior: Behavior normal.         I have reviewed and made appropriate changes to the review of systems input by the medical assistant.    Vitals:    01/11/24 0923   BP: 118/84   BP Location: Right arm   Patient Position: Sitting   Pulse: 76   Weight: 98.9 kg (218 lb)   Height: 5' 11\" (1.803 m)       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)    Atrial fibrillation (HCC)    History of colon polyps    Gastroesophageal reflux disease without esophagitis    Hydronephrosis    Warthin's tumor    Hiatal hernia    Smoking    Chronic obstructive pulmonary disease (HCC)    Prostatic disorder    Trigger ring finger of right hand    Intermittent claudication (HCC)    Popliteal artery ectasia bilateral (HCC)    Muscle cramping    Extensor carpi radialis brevis tenosynovitis    History of parotidectomy    History of nephrolithiasis    Calculus of gallbladder without cholecystitis without obstruction       Past Surgical History: "   Procedure Laterality Date    COLONOSCOPY  05/16/2019    completed by Dr. Mckeon, Repeat 3 years    FL RETROGRADE PYELOGRAM  05/17/2019    PAROTIDECTOMY Bilateral     OR CYSTO/URETERO W/LITHOTRIPSY &INDWELL STENT INSRT Right 05/17/2019    Procedure: CYSTOSCOPY URETEROSCOPY WITH LITHOTRIPSY HOLMIUM LASER, RETROGRADE PYELOGRAM AND INSERTION STENT URETERAL--possible stent only;  Surgeon: Casey Snyder MD;  [facility];  Service: Urology    OR EXC PRTD ALEXA/PRTD GLND LAT DSJ&PRSRV FACIAL NR Right 10/18/2017    Procedure: SUPERFICIAL PAROTIDECTOMY WITH NERVE DISSECTION AND PRESERVATION;  Surgeon: Christopher Black MD;  [facility];  Service: ENT    OR TENDON SHEATH INCISION Right 11/03/2020    Procedure: RING TRIGGER FINGER RELEASE;  Surgeon: Marielena Calvert MD;  [facility]: [facility];  Service: Orthopedics    ROTATOR CUFF REPAIR Bilateral     TONSILLECTOMY         Family History   Problem Relation Age of Onset    Lupus Mother     Rheum arthritis Mother     Cirrhosis Father     Alcohol abuse Father     Substance Abuse Brother        Social History     Socioeconomic History    Marital status: /Civil Union     Spouse name: Not on file    Number of children: Not on file    Years of education: Not on file    Highest education level: Not on file   Occupational History    Not on file   Tobacco Use    Smoking status: Light Smoker     Types: Pipe, Cigars    Smokeless tobacco: Current    Tobacco comments:     Pipe smoker   Vaping Use    Vaping status: Never Used   Substance and Sexual Activity    Alcohol use: Yes     Alcohol/week: 1.0 standard drink of alcohol     Types: 1 Shots of liquor per week     Comment: daily    Drug use: No    Sexual activity: Not Currently     Partners: Female   Other Topics Concern    Not on file   Social History Narrative             Social Determinants of Health     Financial Resource Strain: Low Risk  (5/18/2023)    Overall Financial Resource Strain ([facility])      Difficulty of Paying Living Expenses: Not hard at all   Food Insecurity: Not on file   Transportation Needs: No Transportation Needs (5/18/2023)    PRAPARE - Transportation     Lack of Transportation (Medical): No     Lack of Transportation (Non-Medical): No   Physical Activity: Not on file   Stress: Not on file   Social Connections: Not on file   Intimate Partner Violence: Not on file   Housing Stability: Not on file       Allergies   Allergen Reactions    Pletal [Cilostazol] Tachycardia    Dyazide [Hydrochlorothiazide W-Triamterene] Other (See Comments)     Hyponatremia    Jardiance [Empagliflozin] Other (See Comments)     Constipation, increased urinary frequency--not tolerable         Current Outpatient Medications:     acetaminophen (TYLENOL) 325 mg tablet, 2, by mouth, every 6 hours as needed for mild to moderate pain., Disp: 30 tablet, Rfl: 0    aspirin (ECOTRIN LOW STRENGTH) 81 mg EC tablet, Take 81 mg by mouth daily  , Disp: , Rfl:     Cyanocobalamin (VITAMIN B 12 PO), Take 1,000 mcg by mouth daily, Disp: , Rfl:     fenofibrate micronized (LOFIBRA) 134 MG capsule, TAKE 1 CAPSULE (134 MG TOTAL) BY MOUTH DAILY WITH BREAKFAST, Disp: 90 capsule, Rfl: 3    gabapentin (NEURONTIN) 100 mg capsule, TAKE 2 CAPSULES (200 MG TOTAL) BY MOUTH DAILY AT BEDTIME, Disp: 180 capsule, Rfl: 1    glimepiride (AMARYL) 2 mg tablet, Take 1.5 tablets (3 mg total) by mouth daily with breakfast, Disp: 90 tablet, Rfl: 3    Januvia 100 MG tablet, TAKE 1 TABLET BY MOUTH EVERY DAY, Disp: 90 tablet, Rfl: 3    lisinopril (ZESTRIL) 20 mg tablet, TAKE 1 TABLET BY MOUTH EVERY DAY, Disp: 90 tablet, Rfl: 4    metFORMIN (GLUCOPHAGE-XR) 500 mg 24 hr tablet, Take 2 tablets (1,000 mg total) by mouth 2 (two) times a day with meals, Disp: 360 tablet, Rfl: 3    omeprazole (PriLOSEC) 20 mg delayed release capsule, Take 1 capsule (20 mg total) by mouth daily, Disp: 90 capsule, Rfl: 1    ProAir  (90 Base) MCG/ACT inhaler, INHALE 2 PUFFS INTO THE  LUNGS EVERY 4 HOURS AS NEEDED FOR SHORTNESS OF BREATH OR FOR WHEEZE, Disp: 17 g, Rfl: 1    simvastatin (ZOCOR) 40 mg tablet, TAKE 1 TABLET BY MOUTH EVERY DAY, Disp: 90 tablet, Rfl: 4    tamsulosin (FLOMAX) 0.4 mg, TAKE 1 CAPSULE BY MOUTH EVERY DAY WITH DINNER, Disp: 90 capsule, Rfl: 3    warfarin (COUMADIN) 2.5 mg tablet, Take 1 tablet by mouth daily T-Th-S-S takes 2.5mg    M-W-F takes 5.0 mg (Patient taking differently: Take 2.5 mg by mouth daily T-WED-Th-S-S takes 2.5mg    M-F takes 5.0 mg), Disp: , Rfl:     warfarin (COUMADIN) 5 mg tablet, TAKE 1 TABLET EVERY DAY OR AS DIRECTED BY MD, Disp: 90 tablet, Rfl: 3  I have spent a total time of 30 minutes on 01/11/24 in caring for this patient including Diagnostic results, Risks and benefits of tx options, Instructions for management, Patient and family education, Importance of tx compliance, Documenting in the medical record, Reviewing / ordering tests, medicine, procedures  , and Obtaining or reviewing history  .

## 2024-01-11 ENCOUNTER — TELEPHONE (OUTPATIENT)
Dept: UROLOGY | Facility: MEDICAL CENTER | Age: 74
End: 2024-01-11

## 2024-01-11 ENCOUNTER — OFFICE VISIT (OUTPATIENT)
Dept: VASCULAR SURGERY | Facility: CLINIC | Age: 74
End: 2024-01-11
Payer: MEDICARE

## 2024-01-11 VITALS
DIASTOLIC BLOOD PRESSURE: 84 MMHG | HEIGHT: 71 IN | BODY MASS INDEX: 30.52 KG/M2 | HEART RATE: 76 BPM | WEIGHT: 218 LBS | SYSTOLIC BLOOD PRESSURE: 118 MMHG

## 2024-01-11 DIAGNOSIS — I72.4 POPLITEAL ARTERY ANEURYSM, BILATERAL (HCC): ICD-10-CM

## 2024-01-11 DIAGNOSIS — I70.213 ATHEROSCLEROSIS OF NATIVE ARTERY OF BOTH LOWER EXTREMITIES WITH INTERMITTENT CLAUDICATION (HCC): Primary | ICD-10-CM

## 2024-01-11 PROCEDURE — 99214 OFFICE O/P EST MOD 30 MIN: CPT | Performed by: NURSE PRACTITIONER

## 2024-01-11 NOTE — PATIENT INSTRUCTIONS
-Complete lower extremity ultrasound and carotid ultrasound in one year and return to the office for review.    -Call the office with any new or worsening symptoms. Such as leg pain, swelling, discoloration or new wounds.    -Wear well fitted shoes and do daily foot checks.    -Increase walking, Try to walk every day for 30 minutes.    -Continue to take aspirin and simvastatin as per PCP.    -Go to the ED/ Call 911 with any signs or symptoms of stroke such as numbness/ tingling/ weakness on one side of the body, facial droop, slurred speech or blindness in one eye.

## 2024-01-11 NOTE — TELEPHONE ENCOUNTER
I was able to speak with patient and let him know that  went over his CT scan images and  his right kidney looks fine (no swelling).  There are a couple of tiny stones in each kidney which we do not have to do anything about.Pt is aware of everything and understands everything as well.              ----- Message from Robert Guerra MD sent at 1/11/2024  1:33 PM EST -----  Please let the patient know that I personally looked at his CT scan images and I think his right kidney looks fine (no swelling).  There are a couple of tiny stones in each kidney which we do not have to do anything about.

## 2024-01-11 NOTE — ANESTHESIA PREPROCEDURE EVALUATION
Procedure:  CHOLECYSTECTOMY LAPAROSCOPIC W/ ROBOTICS (Abdomen)    Hx of COPD    Relevant Problems   CARDIO   (+) Atrial fibrillation (HCC)   (+) Essential hypertension   (+) Mixed hyperlipidemia      ENDO   (+) Type 2 diabetes mellitus with neurologic complication, without long-term current use of insulin (HCC)      GI/HEPATIC   (+) Fatty liver   (+) Gastroesophageal reflux disease without esophagitis   (+) Hiatal hernia      /RENAL   (+) Hydronephrosis   (+) Prostatic disorder      MUSCULOSKELETAL   (+) Hiatal hernia      NEURO/PSYCH   (+) 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)   (+) Intermittent claudication (HCC)      PULMONARY   (+) Chronic obstructive pulmonary disease (HCC)   (+) Smoking        Physical Exam    Airway    Mallampati score: II  TM Distance: >3 FB  Neck ROM: full     Dental       Cardiovascular  Cardiovascular exam normal    Pulmonary  Pulmonary exam normal     Other Findings        Anesthesia Plan  ASA Score- 3     Anesthesia Type- general with ASA Monitors.         Additional Monitors:     Airway Plan: ETT.    Comment: Risks of general anesthesia discussed including likely possibility of PONV and sore throat, as well as the rare possibilities of aspiration, dental/oropharyngeal/ocular injuries, or grave/life threatening anesthetic and surgical emergencies..       Plan Factors-Exercise tolerance (METS): >4 METS.    Chart reviewed.    Patient summary reviewed.      Patient instructed to abstain from smoking on day of procedure. Patient did not smoke on day of surgery.            Induction- intravenous.    Postoperative Plan- Plan for postoperative opioid use. Planned trial extubation    Informed Consent- Anesthetic plan and risks discussed with patient.  I personally reviewed this patient with the CRNA. Discussed and agreed on the Anesthesia Plan with the CRNA..

## 2024-01-11 NOTE — ASSESSMENT & PLAN NOTE
LEAD 12/6/23  Rt Leg: The popliteal artery is ectatic in caliber, 1.0 cm, (Prior: 1.0 cm).  Lt Leg: The popliteal artery is ectatic in caliber, 1.0 cm, (Prior: 0.9 cm).    -B/L popliteal ectasia is stable. No indication for vascular intervention. Continue with plan as above.

## 2024-01-11 NOTE — ASSESSMENT & PLAN NOTE
LEAD 23  Rt le-75% stenosis of the proximal SFA and a  >75% stenosis of the mid SFA, with diffuse disease  throughout the remainder of the femoral-popliteal arteries.  AARTI: 0.81  (Prior: 0.95/ 114/ 68     Lt le-75% stenosis of the proximal SFA with diffuse disease throughout the remainder of the femoral-popliteal arteries.  Evidence of tibioperoneal disease without a focal stenosis noted.  AARTI: 0.95 (Prior: 1.05)/ 126/ 82    -Claudication symptoms with 1/2 block, reports occasional cramping at night no true rest pain, new wounds/ tissue loss.     -Reviewed lower extremity ultrasound in detail with pt and answered all questions. Educated pt on peripheral arterial disease and indication for vascular intervention. No indications for vascular intervention at this time as he is not experiencing any life limiting claudication factors at this time. Discussed that he likely his multifaceted etiology of pain symptoms in legs d/t experiencing cramping associated with leg repositioned rather than walking/ activity. This cramping is likely not vascular in etiology and he should f/u with pcp for further treatment and evaluation. Recommending continued surveillance with non-invasive imagining yearly with medical management at this time. Pt verbalized understanding.    Recommendations:  -Complete LEAD in one year and return to the office for review.  -Call the office with any new or worsening leg pain, discoloration, swelling, new wounds/ tissue loss.  -Continue to take aspirin 81 mg daily.  -Continue to take simvastatin daily as per PCP.  -Do daily foot checks, food protection, wear well fitted shoes.  -Increase daily walking for 20-30 min everyday.

## 2024-01-12 ENCOUNTER — ANESTHESIA (OUTPATIENT)
Dept: PERIOP | Facility: HOSPITAL | Age: 74
End: 2024-01-12
Payer: MEDICARE

## 2024-01-12 ENCOUNTER — HOSPITAL ENCOUNTER (OUTPATIENT)
Facility: HOSPITAL | Age: 74
Setting detail: OUTPATIENT SURGERY
Discharge: HOME | End: 2024-01-12
Attending: SURGERY | Admitting: SURGERY
Payer: MEDICARE

## 2024-01-12 VITALS
TEMPERATURE: 97.4 F | RESPIRATION RATE: 15 BRPM | HEIGHT: 71 IN | WEIGHT: 212 LBS | OXYGEN SATURATION: 95 % | DIASTOLIC BLOOD PRESSURE: 68 MMHG | HEART RATE: 82 BPM | BODY MASS INDEX: 29.68 KG/M2 | SYSTOLIC BLOOD PRESSURE: 149 MMHG

## 2024-01-12 DIAGNOSIS — K80.20 CALCULUS OF GALLBLADDER WITHOUT CHOLECYSTITIS WITHOUT OBSTRUCTION: Primary | ICD-10-CM

## 2024-01-12 DIAGNOSIS — K80.10 CCC (CHRONIC CALCULOUS CHOLECYSTITIS): ICD-10-CM

## 2024-01-12 LAB — GLUCOSE SERPL-MCNC: 163 MG/DL (ref 65–140)

## 2024-01-12 PROCEDURE — 99223 1ST HOSP IP/OBS HIGH 75: CPT | Performed by: SURGERY

## 2024-01-12 PROCEDURE — 47562 LAPAROSCOPIC CHOLECYSTECTOMY: CPT | Performed by: SURGERY

## 2024-01-12 PROCEDURE — S2900 ROBOTIC SURGICAL SYSTEM: HCPCS | Performed by: SURGERY

## 2024-01-12 PROCEDURE — 82948 REAGENT STRIP/BLOOD GLUCOSE: CPT

## 2024-01-12 PROCEDURE — 88304 TISSUE EXAM BY PATHOLOGIST: CPT | Performed by: PATHOLOGY

## 2024-01-12 RX ORDER — HYDROMORPHONE HYDROCHLORIDE 1 MG/ML
INJECTION, SOLUTION INTRAMUSCULAR; INTRAVENOUS; SUBCUTANEOUS AS NEEDED
Status: DISCONTINUED | OUTPATIENT
Start: 2024-01-12 | End: 2024-01-12

## 2024-01-12 RX ORDER — SODIUM CHLORIDE, SODIUM LACTATE, POTASSIUM CHLORIDE, CALCIUM CHLORIDE 600; 310; 30; 20 MG/100ML; MG/100ML; MG/100ML; MG/100ML
125 INJECTION, SOLUTION INTRAVENOUS CONTINUOUS
Status: DISCONTINUED | OUTPATIENT
Start: 2024-01-12 | End: 2024-01-12 | Stop reason: HOSPADM

## 2024-01-12 RX ORDER — SODIUM CHLORIDE, SODIUM LACTATE, POTASSIUM CHLORIDE, CALCIUM CHLORIDE 600; 310; 30; 20 MG/100ML; MG/100ML; MG/100ML; MG/100ML
125 INJECTION, SOLUTION INTRAVENOUS CONTINUOUS
Status: DISCONTINUED | OUTPATIENT
Start: 2024-01-12 | End: 2024-01-12

## 2024-01-12 RX ORDER — ONDANSETRON 2 MG/ML
4 INJECTION INTRAMUSCULAR; INTRAVENOUS ONCE AS NEEDED
Status: DISCONTINUED | OUTPATIENT
Start: 2024-01-12 | End: 2024-01-12 | Stop reason: HOSPADM

## 2024-01-12 RX ORDER — PROPOFOL 10 MG/ML
INJECTION, EMULSION INTRAVENOUS AS NEEDED
Status: DISCONTINUED | OUTPATIENT
Start: 2024-01-12 | End: 2024-01-12

## 2024-01-12 RX ORDER — OXYCODONE HYDROCHLORIDE 5 MG/1
5 TABLET ORAL EVERY 4 HOURS PRN
Status: DISCONTINUED | OUTPATIENT
Start: 2024-01-12 | End: 2024-01-12 | Stop reason: HOSPADM

## 2024-01-12 RX ORDER — MAGNESIUM HYDROXIDE 1200 MG/15ML
LIQUID ORAL AS NEEDED
Status: DISCONTINUED | OUTPATIENT
Start: 2024-01-12 | End: 2024-01-12 | Stop reason: HOSPADM

## 2024-01-12 RX ORDER — ONDANSETRON 2 MG/ML
4 INJECTION INTRAMUSCULAR; INTRAVENOUS EVERY 4 HOURS PRN
Status: DISCONTINUED | OUTPATIENT
Start: 2024-01-12 | End: 2024-01-12 | Stop reason: HOSPADM

## 2024-01-12 RX ORDER — FENTANYL CITRATE/PF 50 MCG/ML
50 SYRINGE (ML) INJECTION
Status: DISCONTINUED | OUTPATIENT
Start: 2024-01-12 | End: 2024-01-12 | Stop reason: HOSPADM

## 2024-01-12 RX ORDER — CEFAZOLIN SODIUM 2 G/50ML
2000 SOLUTION INTRAVENOUS ONCE
Status: COMPLETED | OUTPATIENT
Start: 2024-01-12 | End: 2024-01-12

## 2024-01-12 RX ORDER — HYDROMORPHONE HCL/PF 1 MG/ML
0.5 SYRINGE (ML) INJECTION
Status: DISCONTINUED | OUTPATIENT
Start: 2024-01-12 | End: 2024-01-12 | Stop reason: HOSPADM

## 2024-01-12 RX ORDER — METOCLOPRAMIDE HYDROCHLORIDE 5 MG/ML
5 INJECTION INTRAMUSCULAR; INTRAVENOUS ONCE AS NEEDED
Status: DISCONTINUED | OUTPATIENT
Start: 2024-01-12 | End: 2024-01-12 | Stop reason: HOSPADM

## 2024-01-12 RX ORDER — LIDOCAINE HYDROCHLORIDE 10 MG/ML
INJECTION, SOLUTION EPIDURAL; INFILTRATION; INTRACAUDAL; PERINEURAL AS NEEDED
Status: DISCONTINUED | OUTPATIENT
Start: 2024-01-12 | End: 2024-01-12

## 2024-01-12 RX ORDER — DEXAMETHASONE SODIUM PHOSPHATE 10 MG/ML
INJECTION, SOLUTION INTRAMUSCULAR; INTRAVENOUS AS NEEDED
Status: DISCONTINUED | OUTPATIENT
Start: 2024-01-12 | End: 2024-01-12

## 2024-01-12 RX ORDER — INDOCYANINE GREEN AND WATER 25 MG
5 KIT INJECTION ONCE
Status: COMPLETED | OUTPATIENT
Start: 2024-01-12 | End: 2024-01-12

## 2024-01-12 RX ORDER — LABETALOL HYDROCHLORIDE 5 MG/ML
INJECTION, SOLUTION INTRAVENOUS AS NEEDED
Status: DISCONTINUED | OUTPATIENT
Start: 2024-01-12 | End: 2024-01-12

## 2024-01-12 RX ORDER — ROCURONIUM BROMIDE 10 MG/ML
INJECTION, SOLUTION INTRAVENOUS AS NEEDED
Status: DISCONTINUED | OUTPATIENT
Start: 2024-01-12 | End: 2024-01-12

## 2024-01-12 RX ORDER — ONDANSETRON 2 MG/ML
INJECTION INTRAMUSCULAR; INTRAVENOUS AS NEEDED
Status: DISCONTINUED | OUTPATIENT
Start: 2024-01-12 | End: 2024-01-12

## 2024-01-12 RX ORDER — SODIUM CHLORIDE, SODIUM LACTATE, POTASSIUM CHLORIDE, CALCIUM CHLORIDE 600; 310; 30; 20 MG/100ML; MG/100ML; MG/100ML; MG/100ML
INJECTION, SOLUTION INTRAVENOUS CONTINUOUS PRN
Status: DISCONTINUED | OUTPATIENT
Start: 2024-01-12 | End: 2024-01-12

## 2024-01-12 RX ORDER — BUPIVACAINE HYDROCHLORIDE 2.5 MG/ML
INJECTION, SOLUTION EPIDURAL; INFILTRATION; INTRACAUDAL AS NEEDED
Status: DISCONTINUED | OUTPATIENT
Start: 2024-01-12 | End: 2024-01-12 | Stop reason: HOSPADM

## 2024-01-12 RX ORDER — OXYCODONE HYDROCHLORIDE 5 MG/1
5 TABLET ORAL EVERY 4 HOURS PRN
Qty: 10 TABLET | Refills: 0 | Status: SHIPPED | OUTPATIENT
Start: 2024-01-12 | End: 2024-01-22

## 2024-01-12 RX ORDER — FENTANYL CITRATE 50 UG/ML
INJECTION, SOLUTION INTRAMUSCULAR; INTRAVENOUS AS NEEDED
Status: DISCONTINUED | OUTPATIENT
Start: 2024-01-12 | End: 2024-01-12

## 2024-01-12 RX ADMIN — SODIUM CHLORIDE, SODIUM LACTATE, POTASSIUM CHLORIDE, AND CALCIUM CHLORIDE: .6; .31; .03; .02 INJECTION, SOLUTION INTRAVENOUS at 07:50

## 2024-01-12 RX ADMIN — HYDROMORPHONE HYDROCHLORIDE 0.5 MG: 1 INJECTION, SOLUTION INTRAMUSCULAR; INTRAVENOUS; SUBCUTANEOUS at 09:54

## 2024-01-12 RX ADMIN — FENTANYL CITRATE 50 MCG: 50 INJECTION INTRAMUSCULAR; INTRAVENOUS at 07:30

## 2024-01-12 RX ADMIN — LIDOCAINE HYDROCHLORIDE 50 MG: 10 INJECTION, SOLUTION EPIDURAL; INFILTRATION; INTRACAUDAL; PERINEURAL at 07:35

## 2024-01-12 RX ADMIN — SUGAMMADEX 200 MG: 100 INJECTION, SOLUTION INTRAVENOUS at 09:19

## 2024-01-12 RX ADMIN — FENTANYL CITRATE 50 MCG: 50 INJECTION INTRAMUSCULAR; INTRAVENOUS at 09:41

## 2024-01-12 RX ADMIN — CEFAZOLIN SODIUM 2000 MG: 2 SOLUTION INTRAVENOUS at 07:27

## 2024-01-12 RX ADMIN — FENTANYL CITRATE 50 MCG: 50 INJECTION INTRAMUSCULAR; INTRAVENOUS at 09:48

## 2024-01-12 RX ADMIN — ROCURONIUM BROMIDE 50 MG: 10 INJECTION, SOLUTION INTRAVENOUS at 07:35

## 2024-01-12 RX ADMIN — ROCURONIUM BROMIDE 10 MG: 10 INJECTION, SOLUTION INTRAVENOUS at 08:40

## 2024-01-12 RX ADMIN — HYDROMORPHONE HYDROCHLORIDE 0.5 MG: 1 INJECTION, SOLUTION INTRAMUSCULAR; INTRAVENOUS; SUBCUTANEOUS at 08:57

## 2024-01-12 RX ADMIN — SODIUM CHLORIDE, SODIUM LACTATE, POTASSIUM CHLORIDE, AND CALCIUM CHLORIDE: .6; .31; .03; .02 INJECTION, SOLUTION INTRAVENOUS at 06:53

## 2024-01-12 RX ADMIN — ROCURONIUM BROMIDE 20 MG: 10 INJECTION, SOLUTION INTRAVENOUS at 08:12

## 2024-01-12 RX ADMIN — HYDROMORPHONE HYDROCHLORIDE 0.5 MG: 1 INJECTION, SOLUTION INTRAMUSCULAR; INTRAVENOUS; SUBCUTANEOUS at 10:14

## 2024-01-12 RX ADMIN — PROPOFOL 150 MG: 10 INJECTION, EMULSION INTRAVENOUS at 07:35

## 2024-01-12 RX ADMIN — INDOCYANINE GREEN AND WATER 5 MG: KIT at 07:00

## 2024-01-12 RX ADMIN — ONDANSETRON 4 MG: 2 INJECTION INTRAMUSCULAR; INTRAVENOUS at 09:04

## 2024-01-12 RX ADMIN — HYDROMORPHONE HYDROCHLORIDE 0.5 MG: 1 INJECTION, SOLUTION INTRAMUSCULAR; INTRAVENOUS; SUBCUTANEOUS at 10:04

## 2024-01-12 RX ADMIN — LABETALOL HYDROCHLORIDE 5 MG: 5 INJECTION, SOLUTION INTRAVENOUS at 08:28

## 2024-01-12 RX ADMIN — HYDROMORPHONE HYDROCHLORIDE 0.5 MG: 1 INJECTION, SOLUTION INTRAMUSCULAR; INTRAVENOUS; SUBCUTANEOUS at 09:26

## 2024-01-12 RX ADMIN — FENTANYL CITRATE 50 MCG: 50 INJECTION INTRAMUSCULAR; INTRAVENOUS at 07:51

## 2024-01-12 RX ADMIN — DEXAMETHASONE SODIUM PHOSPHATE 10 MG: 10 INJECTION, SOLUTION INTRAMUSCULAR; INTRAVENOUS at 07:36

## 2024-01-12 NOTE — OP NOTE
OPERATIVE REPORT  PATIENT NAME: Fernando Roberts    :  1950  MRN: [id number]  Pt Location: [facility] OR ROOM 04    SURGERY DATE: 2024    Surgeons and Role:     * Naveed Triplett DO - Primary     * Niru Espinoza PA-C - Assisting    Preop Diagnosis:  CCC (chronic calculous cholecystitis) K80.10    Post-Op Diagnosis Codes:     * CCC (chronic calculous cholecystitis) [K80.10]  Umbilical hernia  Cirrhosis    Procedure(s):  CHOLECYSTECTOMY LAPAROSCOPIC W/ ROBOTICS  REPAIR HERNIA UMBILICAL. NO MESH    Specimen(s):  ID Type Source Tests Collected by Time Destination   1 :  Tissue Gallbladder TISSUE EXAM Naveed Triplett DO 2024 0850        Estimated Blood Loss:   Minimal    Drains:  Closed/Suction Drain Right;Lateral RUQ Bulb 19 Fr. (Active)   Number of days: 0       Anesthesia Type:   General    Operative Indications:  CCC (chronic calculous cholecystitis) K80.10      Operative Findings:  Nodular appearing liver consistent with cirrhosis  Umbilical hernia noted upon entrance  Small shrunken contracted gallbladder around numerous small stones and debris  Height 71 inches weight 96 kg (12 pounds.  BMI 30    Complications:   None    Procedure and Technique:  Patient was brought the operative suite and identified by visualization, conversation, by armband.  Sequential compression pumps were placed.  He was given Ancef perioperatively.  He also received ICG preoperatively.  Once placed under general anesthesia abdomen was then prepped and draped in a sterile fashion.  Timeout was performed and it was assured that the prep was dry.  Local was instilled the infraumbilical fold.  Curvilinear skin incision was made.  In dissecting down to the fascia a small umbilical hernia was noted.  This was freed up with Metzenbaum scissors.  8 mm working robotic port was placed through the defect.  Pneumoperitoneum was established.  2 of the 8 mm working robotic ports were placed as well as a 5 mm laparoscopic port in the  right anterior axillary line.  There is obvious of the gallbladder was quite contracted and a mildly cirrhotic liver.  Robot was docked.  Under direct visualization monopolar hook and grasper were inserted.  I then went to the robotic console.  My assistant attempted to grasp the dome of the gallbladder however it was quite firm clearly congested with stones and debris.  We were able to lifted cephalad.  It seems the gallbladder was quite shrunken.  Firefly was utilized as I dissected throughout the case.  Careful dissection was carried out around the neck.  However it was quite evident of tremendous chronic inflammation.  I then converted to a dome down technique.  Gallbladder was then taken off the liver bed in a dome down fashion using hook with hot cautery.  In doing so I did get into the gallbladder revealing extensive debris and innumerable small stones.  Continued to carefully dissect the gallbladder off the liver in a dome down fashion.  I did get to what appeared to be the cystic artery.  This was able to be ligaclips and divided.  However due to chronic inflammation around the neck of the gallbladder no further dissection can be carried out in a safe manner.  He was at this point that I placed a 2-0 silk suture ligature around the junction of the neck and cystic duct.  Robotic scissors were then used to amputate the gallbladder off at the neck.  Debris was found to be all the way down to this region.  Inspected the liver bed and there was good hemostasis.  Varying points did have to be cauterized throughout.  At this point I returned to the operative field.  Gallbladder is placed into an Endo Catch bag.  Copious irrigation was carried out the gallbladder fossa and there was no signs of bleeding.  I did place a 19 Hebrew Sanju drain into the gallbladder fossa and this exited the right lateralmost trocar site.  It was anchored to the skin with a 3-0 nylon.  Once the gallbladder was out, fascia at the  umbilical trocar/hernia site was closed using 0 Vicryl in a figure-of-eight fashion x 2.  Local was instilled.  4 Monocryl was used to close the remaining incisions in a subcuticular fashion.  Wounds were washed and dried.  Sterile skin glue was applied.  He was awakened in the operating room returned to the recovery area in stable condition having tolerated the procedure well.   I was present for the entire procedure.    Patient Disposition:  PACU         SIGNATURE: Naveed Triplett DO  DATE: January 12, 2024  TIME: 9:06 AM

## 2024-01-12 NOTE — H&P
History and Physical -General Surgical Care   Fernando Roberts 73 y.o. male MRN: [id number]  Unit/Bed#: OR POOL Encounter: [id number]       Principal Problem: chronic  calculous cholecystitis    HPI: Fernando Roberts is a 73 y.o. year old male who presents with aboe.  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) 03/21/2019    Colon polyp     6 polyps 3 years ago    Deep vein thrombosis (HCC)     lower leg- left    Diabetes mellitus (HCC)     Diabetic neuropathic arthropathy (HCC)     causes weakness in LE and uses a cane as assist to walk    Diabetic neuropathy (HCC)     Fatty liver     GERD (gastroesophageal reflux disease)     Hyperlipidemia     Hypertension     Hyponatremia 09/11/2021    Kidney stone     Parotid tumor     bilateral    Seasonal allergies     Spinal stenosis     Ureteral stone with hydronephrosis 05/17/2019    Added automatically from request for surgery [id number]     Past Surgical History:   Procedure Laterality Date    COLONOSCOPY  05/16/2019    completed by Dr. Mckeon, Repeat 3 years    FL RETROGRADE PYELOGRAM  05/17/2019    PAROTIDECTOMY Bilateral     FL CYSTO/URETERO W/LITHOTRIPSY &INDWELL STENT INSRT Right 05/17/2019    Procedure: CYSTOSCOPY URETEROSCOPY WITH LITHOTRIPSY HOLMIUM LASER, RETROGRADE PYELOGRAM AND INSERTION STENT URETERAL--possible stent only;  Surgeon: Casey Snyder MD;  Location: [facility];  Service: Urology    FL EXC PRTD ALEXA/PRTD GLND LAT DSJ&PRSRV FACIAL NR Right 10/18/2017    Procedure: SUPERFICIAL PAROTIDECTOMY WITH NERVE DISSECTION AND PRESERVATION;  Surgeon: Christopher Black MD;  Location: [facility];  Service: ENT    FL TENDON SHEATH INCISION Right 11/03/2020    Procedure: RING TRIGGER FINGER RELEASE;  Surgeon: Marielena Calvert MD;  Location: [facility];  Service: Orthopedics  "   ROTATOR CUFF REPAIR Bilateral     TONSILLECTOMY       Social History   Social History     Substance and Sexual Activity   Alcohol Use Yes    Alcohol/week: 1.0 standard drink of alcohol    Types: 1 Shots of liquor per week    Comment: daily     Social History     Substance and Sexual Activity   Drug Use No     Social History     Tobacco Use   Smoking Status Light Smoker    Types: Pipe, Cigars   Smokeless Tobacco Current   Tobacco Comments    Pipe smoker     Family History   Problem Relation Age of Onset    Lupus Mother     Rheum arthritis Mother     Cirrhosis Father     Alcohol abuse Father     Substance Abuse Brother        Meds/Allergies     Medications Prior to Admission   Medication    acetaminophen (TYLENOL) 325 mg tablet    aspirin (ECOTRIN LOW STRENGTH) 81 mg EC tablet    fenofibrate micronized (LOFIBRA) 134 MG capsule    gabapentin (NEURONTIN) 100 mg capsule    glimepiride (AMARYL) 2 mg tablet    Januvia 100 MG tablet    lisinopril (ZESTRIL) 20 mg tablet    metFORMIN (GLUCOPHAGE-XR) 500 mg 24 hr tablet    omeprazole (PriLOSEC) 20 mg delayed release capsule    ProAir  (90 Base) MCG/ACT inhaler    simvastatin (ZOCOR) 40 mg tablet    tamsulosin (FLOMAX) 0.4 mg    warfarin (COUMADIN) 2.5 mg tablet    warfarin (COUMADIN) 5 mg tablet    Cyanocobalamin (VITAMIN B 12 PO)     Current Facility-Administered Medications   Medication Dose Route Frequency    ceFAZolin (ANCEF) IVPB (premix in dextrose) 2,000 mg 50 mL  2,000 mg Intravenous Once       Allergies   Allergen Reactions    Pletal [Cilostazol] Tachycardia    Dyazide [Hydrochlorothiazide W-Triamterene] Other (See Comments)     Hyponatremia    Jardiance [Empagliflozin] Other (See Comments)     Constipation, increased urinary frequency--not tolerable           Blood pressure 136/66, pulse 74, temperature 98.9 °F (37.2 °C), temperature source Temporal, resp. rate 18, height 5' 11\" (1.803 m), weight 96.2 kg (212 lb), SpO2 96%.    No intake or output data in " the 24 hours ending 01/12/24 0724    PHYSICAL EXAM  General appearance: alert and oriented, in no acute distress  Lungs: clear to auscultation bilaterally  Heart: regular rate and rhythm, S1, S2 normal, no murmur, click, rub or gallop  Abdomen: soft, non-tender; bowel sounds normal; no masses,  no organomegaly  Rectal: deferred  Skin: Skin color, texture, turgor normal. No rashes or lesions    Lab Results:   Admission on 01/12/2024   Component Date Value    POC Glucose 01/12/2024 163 (H)      Imaging Studies: I have personally reviewed pertinent reports.      ASSESSMENT: chronic calculous cholecystitis    PLAN: Robotic lap nikole discussed and he agrees    Counseling / Coordination of Care  Total time spent today  20 minutes. Greater than 50% of total time was spent with the patient and / or family counseling and / or coordination of care.

## 2024-01-12 NOTE — ASSESSMENT & PLAN NOTE
Lab Results   Component Value Date    HGBA1C 7.1 (H) 10/30/2023   -Reviewed most recent HgA1c with pt.  -Stable/decreasing blood sugars. Reviewed the importance of maintaining an optimized blood sugar for progression of vascular disease.  -management per PCP

## 2024-01-12 NOTE — ANESTHESIA POSTPROCEDURE EVALUATION
Post-Op Assessment Note    CV Status:  Stable  Pain Score: 0    Pain management: adequate       Mental Status:  Sleepy and arousable   Hydration Status:  Stable   PONV Controlled:  None   Airway Patency:  Patent  Two or more mitigation strategies used for obstructive sleep apnea   Post Op Vitals Reviewed: Yes      Staff: CRNA               BP  158/72    Temp 98.4   Pulse 74   Resp 14   SpO2 95 6L FM

## 2024-01-12 NOTE — DISCHARGE INSTR - AVS FIRST PAGE
Laparoscopic Cholecystectomy    WHAT YOU SHOULD KNOW:    Cholecystitis is inflammation of your gallbladder. Your gallbladder stores bile, which helps break down the fat that you eat. It also helps remove certain chemicals from your body. You may have a sudden, severe attack (acute cholecystitis) or several mild attacks (chronic cholecystitis).  Laparoscopic cholecystectomy is surgery to remove your gallbladder.  During this surgery, small incisions are made in your abdomen. A small scope and special tools are inserted through these incisions. Your gallbladder is removed from it normal location and taken out of your abdomen.  The incisions are closed with sutures and a small amount of glue is applied over top incisions to help reinforce the incisions to optimize healing.         AFTER YOU LEAVE:  Following discharge from the hospital, you may have some questions about your procedure, your activities or your general condition.  These instructions may answer some of your questions and help you adjust during the first few days following your operation.  You can expect to be sore and tender mostly around the incisions. This pain should last approximally 5 days and gradually improve daily.          Incisions:Your doctor may have chosen to use a type of adhesive glue, to close your incision.The glue is used to cover the incision, assist in closure, and prevent contamination so to optimize healing. This adhesive glue will darken and may appear as a purple film over the incision.  Do not pick at the glue, it will peel away on its own within one to two weeks.    You may apply ice to the incisions to help with pain. Avoid heat as this my make the glue tacky   It is normal to have some bruising, swelling or mild discoloration around the incision.  If increasing redness or pain develops, call our office immediately.   You may wash the incision gently with soap and water then pat dry.    Do not apply any creams or ointments.      Dressings: You do not usually need to keep incisions covered with a dressing.  A dressing is only required if you had a drain following your surgery and the drain was removed prior to discharge.  If a dressing is required the doctor will discuss the dressing with prior to leaving. You may remove the dressing for showering, but reapply when dry.     Bathing: You may shower daily with soap and water the day after the procedure. It is OK to GENTLY wash the incision with soap and water then pat dry.  Do NOT soak incision in a tub, pool, or hot tub for 2 weeks.    Diet: Eat low-fat foods for 4 to 6 weeks while your body learns to digest fat without a gallbladder. Slowly increase the amount of fat that you eat. We recommend you slowly advance your diet. Try to start with softer bland foods and gradually advance as tolerated.   Be sure to consume plenty of water.  Avoid alcohol.        Activity/Restrictions: The evening following the procedure you should rest as much as possible, sitting, lying or reclining.  you should be sure someone remains with you until the next morning.  Gradually increase your activity daily. Walking 3-4 daily is good and  stairs are ok.  Listen to your body.  If you start to get tired or sore then rest.   No strenuous activity or exercise for 3-4 weeks.   No driving for 5 days or while taking narcotics for pain.    Monitor drain output daily.  May shower over drain.  Keep a gauze around the drain each day for any drainage around it.    Return or work: You may return to work or other activities as soon as your pain is controlled and you feel comfortable. For many people, this is 5 to 7 days after surgery. If your job requires heavy lifting you will need to be on light duty for 2-3 weeks.      Follow up appointment: following discharge from the hospital call the office in 1-2 days to set up a post operative appointment to be seen in 2-3 weeks.  The phone number and address should be provided in  your discharge paper work.    Medication: Please take all medications as prescribed. Call your healthcare provider if you think your medicine is not helping or if you have side effects.   If you were given a prescription for Percocet, Norco, or Vicodin for pain be sure to eat prior to taking as these medications as they may cause nausea and vomiting on an empty stomach.    DO NOT take Tylenol with these medication for a fever or for further pain control as these medications already contain Tylenol in them.      Contact your healthcare provider if:   You have a fever over 101°F (38°C) or chills.    You have pain or nausea that is not relieved by medicine.    You have redness and swelling around your incisions, or blood or pus is leaking from your incisions.    You are constipated or have diarrhea.    Your skin or eyes are yellow, or your bowel movements are pale.    You have questions or concerns about your surgery, condition, or care.  Seek care immediately or call 911 if:   You cannot stop vomiting.    Your bowel movements are black or bloody.    You have pain in your abdomen and it is swollen or hard.    Your arm or leg feels warm, tender, and painful. It may look swollen and red.   You feel lightheaded, short of breath, and have chest pain.    You cough up blood.

## 2024-01-13 ENCOUNTER — HOSPITAL ENCOUNTER (EMERGENCY)
Facility: HOSPITAL | Age: 74
Discharge: HOME | End: 2024-01-13
Attending: EMERGENCY MEDICINE
Payer: MEDICARE

## 2024-01-13 VITALS
DIASTOLIC BLOOD PRESSURE: 63 MMHG | OXYGEN SATURATION: 98 % | RESPIRATION RATE: 18 BRPM | HEART RATE: 87 BPM | TEMPERATURE: 97.9 F | SYSTOLIC BLOOD PRESSURE: 137 MMHG

## 2024-01-13 DIAGNOSIS — K80.20 CALCULUS OF GALLBLADDER WITHOUT CHOLECYSTITIS WITHOUT OBSTRUCTION: ICD-10-CM

## 2024-01-13 DIAGNOSIS — T81.9XXA POST-OPERATIVE COMPLICATION: Primary | ICD-10-CM

## 2024-01-13 PROCEDURE — 99282 EMERGENCY DEPT VISIT SF MDM: CPT

## 2024-01-13 PROCEDURE — 99284 EMERGENCY DEPT VISIT MOD MDM: CPT | Performed by: EMERGENCY MEDICINE

## 2024-01-13 PROCEDURE — 99283 EMERGENCY DEPT VISIT LOW MDM: CPT | Performed by: SURGERY

## 2024-01-13 NOTE — ED ATTENDING ATTESTATION
1/13/2024  I, Brando Kelley DO, saw and evaluated the patient. I have discussed the patient with the resident/non-physician practitioner and agree with the resident's/non-physician practitioner's findings, Plan of Care, and MDM as documented in the resident's/non-physician practitioner's note, except where noted. All available labs and Radiology studies were reviewed.  I was present for key portions of any procedure(s) performed by the resident/non-physician practitioner and I was immediately available to provide assistance.       At this point I agree with the current assessment done in the Emergency Department.  I have conducted an independent evaluation of this patient a history and physical is as follows:    Patient is a 73-year-old male who presents with abdominal pain and leaking surgical site.  Patient had a laparoscopic cholecystectomy yesterday for chronic cholecystitis.  He has a LEYDI drain present in the right upper quadrant.  Patient states that his abdominal pain has been persistent since surgery.  He had a difficult time sleeping overnight, but has not taken any pain medication since about 10 PM.  He is here however because of leaking fluid from his right upper quadrant surgical site.  He states that he emptied his LEYDI drain last evening but fluid began leaking from around the tubing.  He emptied it this morning, but it does not seem that it is filling with fluid and he noticed a blood clot.  He states that there is continued drainage from around the tubing and it has required multiple dressing changes.    On exam, patient is in no acute distress.  Heart is regular rate and rhythm.  Breath sounds normal.  Abdomen is mildly distended.  Laparoscopic surgical incisions present with mild surrounding ecchymosis.  LEYDI drain present in the right upper quadrant.  Bulb is filled with about 25 mL of blood-tinged fluid as well as a blood clot.  There is serous fluid leaking from the incision site.    General  "surgery evaluated patient at bedside.  They were able to clear clots from the LEYDI drain and it is now functioning well.  Patient is stable and appropriate for discharge.    Portions of the above record have been created with voice recognition software.  Occasional wrong word or \"sound alike\" substitutions may have occurred due to the inherent limitations of voice recognition software.  Read the chart carefully and recognize, using context, where substitutions may have occurred.      ED Course         Critical Care Time  Procedures      "

## 2024-01-13 NOTE — ED PROVIDER NOTES
History  Chief Complaint   Patient presents with    Surgical Problem Re-Evaluation     Pt got gallbladder removed and umbilical surgery done yesterday. Pt noticed drain was leaking blood. No foul odor, no fevers. Pt reports some abdominal discomfort.      Patient is a 73-year-old male with a past medical history significant for dyslipidemia, hypertension, hyperglycemia presenting to the emergency department for evaluation of a prior surgical site.  Patient had a laparoscopic cholecystectomy umbilical hernia repair yesterday by Dr. High on.  He was discharged in the morning with a LEYDI drain in place.  He reports throughout the day he has had worsening discharge from the right upper quadrant incision site where the LEYDI drain enters his abdomen.  He motions to his bandage which is soaked in serosanguineous fluid.  He does endorse some mild abdominal discomfort however denies fevers, chills, nausea, vomiting, diarrhea.  He is otherwise in his normal state of health.        Prior to Admission Medications   Prescriptions Last Dose Informant Patient Reported? Taking?   Cyanocobalamin (VITAMIN B 12 PO)  Self Yes No   Sig: Take 1,000 mcg by mouth daily   Januvia 100 MG tablet  Self No No   Sig: TAKE 1 TABLET BY MOUTH EVERY DAY   ProAir  (90 Base) MCG/ACT inhaler  Self No No   Sig: INHALE 2 PUFFS INTO THE LUNGS EVERY 4 HOURS AS NEEDED FOR SHORTNESS OF BREATH OR FOR WHEEZE   acetaminophen (TYLENOL) 325 mg tablet  Self No No   Si, by mouth, every 6 hours as needed for mild to moderate pain.   aspirin (ECOTRIN LOW STRENGTH) 81 mg EC tablet  Self Yes No   Sig: Take 81 mg by mouth daily     fenofibrate micronized (LOFIBRA) 134 MG capsule  Self No No   Sig: TAKE 1 CAPSULE (134 MG TOTAL) BY MOUTH DAILY WITH BREAKFAST   gabapentin (NEURONTIN) 100 mg capsule  Self No No   Sig: TAKE 2 CAPSULES (200 MG TOTAL) BY MOUTH DAILY AT BEDTIME   glimepiride (AMARYL) 2 mg tablet  Self No No   Sig: Take 1.5 tablets (3 mg total) by mouth  daily with breakfast   lisinopril (ZESTRIL) 20 mg tablet  Self No No   Sig: TAKE 1 TABLET BY MOUTH EVERY DAY   metFORMIN (GLUCOPHAGE-XR) 500 mg 24 hr tablet  Self No No   Sig: Take 2 tablets (1,000 mg total) by mouth 2 (two) times a day with meals   omeprazole (PriLOSEC) 20 mg delayed release capsule  Self No No   Sig: Take 1 capsule (20 mg total) by mouth daily   oxyCODONE (ROXICODONE) 5 immediate release tablet   No No   Sig: Take 1 tablet (5 mg total) by mouth every 4 (four) hours as needed for moderate pain for up to 10 days Max Daily Amount: 30 mg   simvastatin (ZOCOR) 40 mg tablet  Self No No   Sig: TAKE 1 TABLET BY MOUTH EVERY DAY   tamsulosin (FLOMAX) 0.4 mg  Self No No   Sig: TAKE 1 CAPSULE BY MOUTH EVERY DAY WITH DINNER   warfarin (COUMADIN) 2.5 mg tablet  Self No No   Sig: Take 1 tablet by mouth daily T-Th-S-S takes 2.5mg     M-W-F takes 5.0 mg   Patient taking differently: Take 2.5 mg by mouth daily T-WED-Th-S-S takes 2.5mg     M-F takes 5.0 mg   warfarin (COUMADIN) 5 mg tablet  Self No No   Sig: TAKE 1 TABLET EVERY DAY OR AS DIRECTED BY MD      Facility-Administered Medications: None       Past Medical History:   Diagnosis Date    LISY (acute kidney injury) (HCC) 09/11/2021    Arthritis     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) 03/21/2019    Colon polyp     6 polyps 3 years ago    Deep vein thrombosis (HCC)     lower leg- left    Diabetes mellitus (HCC)     Diabetic neuropathic arthropathy (HCC)     causes weakness in LE and uses a cane as assist to walk    Diabetic neuropathy (HCC)     Fatty liver     GERD (gastroesophageal reflux disease)     Hyperlipidemia     Hypertension     Hyponatremia 09/11/2021    Kidney stone     Parotid tumor     bilateral    Seasonal allergies     Spinal stenosis     Ureteral stone with hydronephrosis 05/17/2019    Added automatically from request for surgery [id number]       Past Surgical History:   Procedure Laterality Date     CHOLECYSTECTOMY      COLONOSCOPY  05/16/2019    completed by Dr. Mckeon, Repeat 3 years    FL RETROGRADE PYELOGRAM  05/17/2019    PAROTIDECTOMY Bilateral     WI CYSTO/URETERO W/LITHOTRIPSY &INDWELL STENT INSRT Right 05/17/2019    Procedure: CYSTOSCOPY URETEROSCOPY WITH LITHOTRIPSY HOLMIUM LASER, RETROGRADE PYELOGRAM AND INSERTION STENT URETERAL--possible stent only;  Surgeon: Casey Snyder MD;  [facility];  Service: Urology    WI EXC PRTD ALEXA/PRTD GLND LAT DSJ&PRSRV FACIAL NR Right 10/18/2017    Procedure: SUPERFICIAL PAROTIDECTOMY WITH NERVE DISSECTION AND PRESERVATION;  Surgeon: Christopher Black MD;  [facility];  Service: ENT    WI LAPAROSCOPY SURG CHOLECYSTECTOMY N/A 01/12/2024    Procedure: CHOLECYSTECTOMY LAPAROSCOPIC W/ ROBOTICS;  Surgeon: Naveed Triplett DO;  [facility];  Service: General    WI TENDON SHEATH INCISION Right 11/03/2020    Procedure: RING TRIGGER FINGER RELEASE;  Surgeon: Marielena Calvert MD;  [facility];  Service: Orthopedics    ROTATOR CUFF REPAIR Bilateral     TONSILLECTOMY      UMBILICAL HERNIA REPAIR N/A 01/12/2024    Procedure: REPAIR HERNIA UMBILICAL, NO MESH;  Surgeon: Naveed Triplett DO;  [facility];  Service: General       Family History   Problem Relation Age of Onset    Lupus Mother     Rheum arthritis Mother     Cirrhosis Father     Alcohol abuse Father     Substance Abuse Brother      I have reviewed and agree with the history as documented.    E-Cigarette/Vaping    E-Cigarette Use Never User      E-Cigarette/Vaping Substances    Nicotine No     THC No     CBD No     Flavoring No     Other No     Unknown No      Social History     Tobacco Use    Smoking status: Light Smoker     Types: Pipe, Cigars    Smokeless tobacco: Current    Tobacco comments:     Pipe smoker   Vaping Use    Vaping status: Never Used   Substance Use Topics    Alcohol use: Yes     Alcohol/week: 1.0 standard drink of alcohol     Types: 1 Shots of liquor  per week     Comment: daily    Drug use: No        Review of Systems   Constitutional:  Negative for chills and fever.   HENT:  Negative for ear pain and sore throat.    Eyes:  Negative for pain and visual disturbance.   Respiratory:  Negative for cough and shortness of breath.    Cardiovascular:  Negative for chest pain and palpitations.   Gastrointestinal:  Negative for abdominal pain and vomiting.   Genitourinary:  Negative for dysuria and hematuria.   Musculoskeletal:  Negative for arthralgias and back pain.   Skin:  Negative for color change and rash.   Neurological:  Negative for seizures and syncope.   All other systems reviewed and are negative.      Physical Exam  ED Triage Vitals [01/13/24 0704]   Temperature Pulse Respirations Blood Pressure SpO2   97.9 °F (36.6 °C) 87 18 137/63 98 %      Temp Source Heart Rate Source Patient Position - Orthostatic VS BP Location FiO2 (%)   Oral Monitor -- Right arm --      Pain Score       9             Orthostatic Vital Signs  Vitals:    01/13/24 0704   BP: 137/63   Pulse: 87       Physical Exam  Vitals and nursing note reviewed.   Constitutional:       General: He is not in acute distress.     Appearance: Normal appearance. He is not ill-appearing, toxic-appearing or diaphoretic.   HENT:      Head: Normocephalic and atraumatic.   Eyes:      General: No scleral icterus.        Right eye: No discharge.         Left eye: No discharge.      Extraocular Movements: Extraocular movements intact.      Conjunctiva/sclera: Conjunctivae normal.   Cardiovascular:      Rate and Rhythm: Normal rate.      Pulses: Normal pulses.      Heart sounds: Normal heart sounds. No murmur heard.     No friction rub. No gallop.   Pulmonary:      Effort: Pulmonary effort is normal. No respiratory distress.      Breath sounds: Normal breath sounds. No stridor. No wheezing, rhonchi or rales.   Abdominal:      General: Abdomen is flat. Bowel sounds are normal. There is no distension.      Palpations:  Abdomen is soft.      Tenderness: There is no abdominal tenderness. There is no guarding or rebound.      Comments: Multiple sites for trocar entry on the abdomen specifically umbilical, right upper quadrant, left lower quadrant.  Sites have glue overlying and are intact with minimal ecchymosis surrounding.  LEYDI drain in site over right upper quadrant incision site. Minimal abdominal tenderness to palpation. Bandages overlying LEYDI site wet with serosanguinous fluid.   Musculoskeletal:         General: No swelling. Normal range of motion.      Cervical back: Normal range of motion. No rigidity.      Right lower leg: No edema.      Left lower leg: No edema.   Skin:     General: Skin is warm and dry.      Capillary Refill: Capillary refill takes less than 2 seconds.      Coloration: Skin is not jaundiced.      Findings: No bruising or lesion.   Neurological:      General: No focal deficit present.      Mental Status: He is alert and oriented to person, place, and time. Mental status is at baseline.   Psychiatric:         Mood and Affect: Mood normal.         Behavior: Behavior normal.         Thought Content: Thought content normal.         Judgment: Judgment normal.         ED Medications  Medications - No data to display    Diagnostic Studies  Results Reviewed       None                   No orders to display         Procedures  Procedures      ED Course                                       Medical Decision Making  73-year-old male presenting to the emergency department for evaluation of drain issue.  Physical exam as noted above, abdomen is soft, tender around site of prior incision.  LEYDI drain in place with suction intact.  Drain was stripped with surgery in room.  Overall patient okay for discharge home.  Return precautions given, patient discharged.  At time of discharge patient was nontoxic with normal vital signs.          Disposition  Final diagnoses:   Post-operative complication     Time reflects when  diagnosis was documented in both MDM as applicable and the Disposition within this note       Time User Action Codes Description Comment    1/13/2024  8:18 AM Iron Rosales Add [T81.9XXA] Post-operative complication     1/13/2024  8:31 AM Vipin Esparza Add [K80.20] Calculus of gallbladder without cholecystitis without obstruction           ED Disposition       ED Disposition   Discharge    Condition   Stable    Date/Time   Sat Jan 13, 2024  8:18 AM    Comment   Fernando Roberts discharge to home/self care.                   Follow-up Information       Follow up With Specialties Details Why Contact Info    Naveed Triplett,  General Surgery   [address] [place] [address]  [address]  [facility] [address]  [phone]              Discharge Medication List as of 1/13/2024  8:31 AM        CONTINUE these medications which have NOT CHANGED    Details   acetaminophen (TYLENOL) 325 mg tablet 2, by mouth, every 6 hours as needed for mild to moderate pain., No Print      aspirin (ECOTRIN LOW STRENGTH) 81 mg EC tablet Take 81 mg by mouth daily  , Historical Med      Cyanocobalamin (VITAMIN B 12 PO) Take 1,000 mcg by mouth daily, Historical Med      fenofibrate micronized (LOFIBRA) 134 MG capsule TAKE 1 CAPSULE (134 MG TOTAL) BY MOUTH DAILY WITH BREAKFAST, Starting Mon 4/10/2023, Normal      gabapentin (NEURONTIN) 100 mg capsule TAKE 2 CAPSULES (200 MG TOTAL) BY MOUTH DAILY AT BEDTIME, Starting Sun 10/15/2023, Normal      glimepiride (AMARYL) 2 mg tablet Take 1.5 tablets (3 mg total) by mouth daily with breakfast, Starting Fri 12/29/2023, Until Mon 12/23/2024, Fill Later      Januvia 100 MG tablet TAKE 1 TABLET BY MOUTH EVERY DAY, Normal      lisinopril (ZESTRIL) 20 mg tablet TAKE 1 TABLET BY MOUTH EVERY DAY, Normal      metFORMIN (GLUCOPHAGE-XR) 500 mg 24 hr tablet Take 2 tablets (1,000 mg total) by mouth 2 (two) times a day with meals, Starting Mon 2/27/2023, Normal      omeprazole (PriLOSEC) 20 mg delayed  release capsule Take 1 capsule (20 mg total) by mouth daily, Starting Wed 11/15/2023, Normal      oxyCODONE (ROXICODONE) 5 immediate release tablet Take 1 tablet (5 mg total) by mouth every 4 (four) hours as needed for moderate pain for up to 10 days Max Daily Amount: 30 mg, Starting Fri 1/12/2024, Until Mon 1/22/2024 at 2359, Normal      ProAir  (90 Base) MCG/ACT inhaler INHALE 2 PUFFS INTO THE LUNGS EVERY 4 HOURS AS NEEDED FOR SHORTNESS OF BREATH OR FOR WHEEZE, Normal      simvastatin (ZOCOR) 40 mg tablet TAKE 1 TABLET BY MOUTH EVERY DAY, Normal      tamsulosin (FLOMAX) 0.4 mg TAKE 1 CAPSULE BY MOUTH EVERY DAY WITH DINNER, Normal      !! warfarin (COUMADIN) 2.5 mg tablet Take 1 tablet by mouth daily T-Th-S-S takes 2.5mg     M-W-F takes 5.0 mg, Starting Fri 10/20/2017, No Print      !! warfarin (COUMADIN) 5 mg tablet TAKE 1 TABLET EVERY DAY OR AS DIRECTED BY MD, Normal       !! - Potential duplicate medications found. Please discuss with provider.        No discharge procedures on file.    PDMP Review         Value Time User    PDMP Reviewed  Yes 11/3/2020  9:40 AM Sara Whitman PA-C             ED Provider  Attending physically available and evaluated Fernando Roberts. I managed the patient along with the ED Attending.    Electronically Signed by           Iron Rosales DO  01/13/24 [id number]

## 2024-01-13 NOTE — CONSULTS
Consultation - General Surgery   Fernando Roberts 73 y.o. male MRN: [id number]  Unit/Bed#: ED-23 Encounter: [id number]    Assessment/Plan     Assessment:  73M s/p lap nikole on 1/12 now with SS drainage from LEYDI site    LEYDI w/ SS output  Plan:  -Clot present in the LEYDI tubing, this was stripped. LEYDI drain now functioning appropriately. Educated patient and wife on how to strip drain.  -Clear for discharge from surgical perspective, can DC from ED  -Outpt follow up with Dr. Triplett  History of Present Illness     HPI:  Fernando Roberts is a 73 y.o. male who is s/p lap nikole and umbilical hernia repair on 1/12 now presents with SS drainage from his right upper quadrant LEYDI site. He states that he had saturated his clothes overnight which prompted his evaluation in the ED. Reports some shayy-incisional pain and burning at LEYDI site. Tolerating diet. No fevers or chills. No nausea or vomiting.  Pain is controlled. Restarted coumadin yday..    Inpatient consult to Acute Care Surgery  Consult performed by: Vipin Esparza DO  Consult ordered by: Vipin Esparza DO          Review of Systems   Constitutional: Negative.    HENT: Negative.     Eyes: Negative.    Respiratory: Negative.     Cardiovascular: Negative.    Gastrointestinal:  Positive for abdominal pain. Negative for nausea and vomiting.   Endocrine: Negative.    Genitourinary: Negative.    Musculoskeletal: Negative.    Skin:  Negative for color change.   Allergic/Immunologic: Negative.    Neurological: Negative.    Hematological: Negative.    Psychiatric/Behavioral: Negative.         Historical Information   Past Medical History:   Diagnosis Date    LISY (acute kidney injury) (HCC) 09/11/2021    Arthritis     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) 03/21/2019    Colon polyp     6 polyps 3 years ago    Deep vein thrombosis (HCC)     lower leg- left    Diabetes mellitus (HCC)     Diabetic neuropathic arthropathy (HCC)     causes weakness  in LE and uses a cane as assist to walk    Diabetic neuropathy (HCC)     Fatty liver     GERD (gastroesophageal reflux disease)     Hyperlipidemia     Hypertension     Hyponatremia 09/11/2021    Kidney stone     Parotid tumor     bilateral    Seasonal allergies     Spinal stenosis     Ureteral stone with hydronephrosis 05/17/2019    Added automatically from request for surgery [id number]     Past Surgical History:   Procedure Laterality Date    CHOLECYSTECTOMY      COLONOSCOPY  05/16/2019    completed by Dr. Mckeon, Repeat 3 years    FL RETROGRADE PYELOGRAM  05/17/2019    PAROTIDECTOMY Bilateral     [place] CYSTO/URETERO W/LITHOTRIPSY &INDWELL STENT INSRT Right 05/17/2019    Procedure: CYSTOSCOPY URETEROSCOPY WITH LITHOTRIPSY HOLMIUM LASER, RETROGRADE PYELOGRAM AND INSERTION STENT URETERAL--possible stent only;  Surgeon: Casey Snyder MD;  [facility];  Service: Urology    [place] EXC PRTD ALEXA/PRTD GLND LAT DSJ&PRSRV FACIAL NR Right 10/18/2017    Procedure: SUPERFICIAL PAROTIDECTOMY WITH NERVE DISSECTION AND PRESERVATION;  Surgeon: Christopher Black MD;  [facility];  Service: ENT    NY LAPAROSCOPY SURG CHOLECYSTECTOMY N/A 01/12/2024    Procedure: CHOLECYSTECTOMY LAPAROSCOPIC W/ ROBOTICS;  Surgeon: Naveed Triplett DO;  [facility];  Service: General    NY TENDON SHEATH INCISION Right 11/03/2020    Procedure: RING TRIGGER FINGER RELEASE;  Surgeon: Marielena Calvert MD;  [facility];  Service: Orthopedics    ROTATOR CUFF REPAIR Bilateral     TONSILLECTOMY      UMBILICAL HERNIA REPAIR N/A 01/12/2024    Procedure: REPAIR HERNIA UMBILICAL, NO MESH;  Surgeon: Naveed Triplett DO;  [facility];  Service: General     Social History   Social History     Substance and Sexual Activity   Alcohol Use Yes    Alcohol/week: 1.0 standard drink of alcohol    Types: 1 Shots of liquor per week    Comment: daily     Social History     Substance and Sexual Activity   Drug Use No  "    E-Cigarette/Vaping    E-Cigarette Use Never User      E-Cigarette/Vaping Substances    Nicotine No     THC No     CBD No     Flavoring No     Other No     Unknown No      Social History     Tobacco Use   Smoking Status Light Smoker    Types: Pipe, Cigars   Smokeless Tobacco Current   Tobacco Comments    Pipe smoker     Family History: non-contributory    Meds/Allergies   all current active meds have been reviewed and current meds:   No current facility-administered medications for this encounter.     Allergies   Allergen Reactions    Pletal [Cilostazol] Tachycardia    Dyazide [Hydrochlorothiazide W-Triamterene] Other (See Comments)     Hyponatremia    Jardiance [Empagliflozin] Other (See Comments)     Constipation, increased urinary frequency--not tolerable       Objective   First Vitals:   Blood Pressure: 137/63 (01/13/24 0704)  Pulse: 87 (01/13/24 0704)  Temperature: 97.9 °F (36.6 °C) (01/13/24 0704)  Temp Source: Oral (01/13/24 0704)  Respirations: 18 (01/13/24 0704)  SpO2: 98 % (01/13/24 0704)    Current Vitals:   Blood Pressure: 137/63 (01/13/24 0704)  Pulse: 87 (01/13/24 0704)  Temperature: 97.9 °F (36.6 °C) (01/13/24 0704)  Temp Source: Oral (01/13/24 0704)  Respirations: 18 (01/13/24 0704)  SpO2: 98 % (01/13/24 0704)    No intake or output data in the 24 hours ending 01/13/24 0832    Invasive Devices       Drain  Duration             Closed/Suction Drain Right;Lateral RUQ Bulb 19 Fr. <1 day                    Physical Exam  General: NAD  HENT: NCAT MMM  Neck: supple, no JVD  CV: nl rate  Lungs: nl wob. No resp distress  ABD: Soft, shayy-incisional tenderness, nondistended, incisions c/d/I. LEYDI w/ SS drainage. Clot within tubing that was stripped.  Extrem: No CCE  Neuro: AAOx3    Lab Results: I have personally reviewed pertinent lab results.  , CBC: No results found for: \"WBC\", \"HGB\", \"HCT\", \"MCV\", \"PLT\", \"ADJUSTEDWBC\", \"RBC\", \"MCH\", \"MCHC\", \"RDW\", \"MPV\", \"NRBC\", CMP: No results found for: \"SODIUM\", \"K\", " "\"CL\", \"CO2\", \"ANIONGAP\", \"BUN\", \"CREATININE\", \"GLUCOSE\", \"CALCIUM\", \"AST\", \"ALT\", \"ALKPHOS\", \"PROT\", \"BILITOT\", \"EGFR\", Coagulation: No results found for: \"PT\", \"INR\", \"APTT\"  Imaging: I have personally reviewed pertinent films in PACS  EKG, Pathology, and Other Studies: I have personally reviewed pertinent films in PACS    "

## 2024-01-14 DIAGNOSIS — E11.40 TYPE 2 DIABETES MELLITUS WITH DIABETIC NEUROPATHY, WITHOUT LONG-TERM CURRENT USE OF INSULIN (HCC): ICD-10-CM

## 2024-01-15 ENCOUNTER — VBI (OUTPATIENT)
Dept: FAMILY MEDICINE CLINIC | Facility: CLINIC | Age: 74
End: 2024-01-15

## 2024-01-15 RX ORDER — METFORMIN HYDROCHLORIDE 500 MG/1
1000 TABLET, EXTENDED RELEASE ORAL 2 TIMES DAILY WITH MEALS
Qty: 360 TABLET | Refills: 3 | Status: SHIPPED | OUTPATIENT
Start: 2024-01-15

## 2024-01-15 NOTE — ANESTHESIA PROCEDURE NOTES
Anesthesia Notable Event  No anethesia notable event occurred.    Date/Time: 1/14/2024 7:08 PM    Performed by: Talon Canales MD  Authorized by: Talon Canales MD

## 2024-01-15 NOTE — TELEPHONE ENCOUNTER
01/15/24 10:42 AM    Patient contacted post ED visit, [facility] department spoke with patient/caregiver and outreach was successful.    Thank you.  Kiesha Martínez  [facility]

## 2024-01-17 PROCEDURE — 88304 TISSUE EXAM BY PATHOLOGIST: CPT | Performed by: PATHOLOGY

## 2024-01-31 ENCOUNTER — OFFICE VISIT (OUTPATIENT)
Dept: SURGERY | Facility: CLINIC | Age: 74
End: 2024-01-31

## 2024-01-31 DIAGNOSIS — K42.9 UMBILICAL HERNIA WITHOUT OBSTRUCTION AND WITHOUT GANGRENE: ICD-10-CM

## 2024-01-31 DIAGNOSIS — K74.60 CIRRHOSIS (HCC): ICD-10-CM

## 2024-01-31 DIAGNOSIS — K80.10 CHRONIC CHOLECYSTITIS WITH CALCULUS: Primary | ICD-10-CM

## 2024-01-31 PROCEDURE — 99024 POSTOP FOLLOW-UP VISIT: CPT | Performed by: SURGERY

## 2024-01-31 NOTE — PROGRESS NOTES
Assessment/Plan:    Diagnoses and all orders for this visit:    Chronic cholecystitis with calculus    Umbilical hernia without obstruction and without gangrene    Cirrhosis (HCC)    Status post laparoscopic cholecystectomy and umbilical hernia repair, no mesh.  Doing quite well.  Drain removed.  May resume diet and activity as tolerated    Pathology: Reviewed with patient, all questions answered.       Postoperative restrictions reviewed. All questions answered.       ______________________________________________________  HPI: Patient presents post operatively.  Laparoscopic cholecystectomy with robotics and umbilical hernia repair no mesh 1/12/2024   Final Diagnosis  A. Gallbladder, cholecystectomy:  -   Gallbladder with cholelithiasis, chronic cholecystitis and mucosal erosion.                       ROS:  General ROS: negative for - chills, fatigue, fever or night sweats, weight loss  Respiratory ROS: no cough, shortness of breath, or wheezing  Cardiovascular ROS: no chest pain or dyspnea on exertion  Genito-Urinary ROS: no dysuria, trouble voiding, or hematuria  Musculoskeletal ROS: negative for - gait disturbance, joint pain or muscle pain  Neurological ROS: no TIA or stroke symptoms  GI ROS: see HPI  Skin ROS: no new rashes or lesions   Lymphatic ROS: no new adenopathy noted by pt.   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)    Atrial fibrillation (HCC)    History of colon polyps    Gastroesophageal reflux disease without esophagitis    Hydronephrosis    Warthin's tumor    Hiatal hernia     Smoking    Chronic obstructive pulmonary disease (HCC)    Prostatic disorder    Trigger ring finger of right hand    Intermittent claudication (HCC)    Popliteal artery ectasia bilateral (HCC)    Muscle cramping    Extensor carpi radialis brevis tenosynovitis    History of parotidectomy    History of nephrolithiasis    Calculus of gallbladder without cholecystitis without obstruction    Chronic cholecystitis with calculus       Allergies:  Pletal [cilostazol], Dyazide [hydrochlorothiazide w-triamterene], and Jardiance [empagliflozin]      Current Outpatient Medications:     acetaminophen (TYLENOL) 325 mg tablet, 2, by mouth, every 6 hours as needed for mild to moderate pain., Disp: 30 tablet, Rfl: 0    aspirin (ECOTRIN LOW STRENGTH) 81 mg EC tablet, Take 81 mg by mouth daily  , Disp: , Rfl:     Cyanocobalamin (VITAMIN B 12 PO), Take 1,000 mcg by mouth daily, Disp: , Rfl:     fenofibrate micronized (LOFIBRA) 134 MG capsule, TAKE 1 CAPSULE (134 MG TOTAL) BY MOUTH DAILY WITH BREAKFAST, Disp: 90 capsule, Rfl: 3    gabapentin (NEURONTIN) 100 mg capsule, TAKE 2 CAPSULES (200 MG TOTAL) BY MOUTH DAILY AT BEDTIME, Disp: 180 capsule, Rfl: 1    glimepiride (AMARYL) 2 mg tablet, Take 1.5 tablets (3 mg total) by mouth daily with breakfast, Disp: 90 tablet, Rfl: 3    Januvia 100 MG tablet, TAKE 1 TABLET BY MOUTH EVERY DAY, Disp: 90 tablet, Rfl: 3    lisinopril (ZESTRIL) 20 mg tablet, TAKE 1 TABLET BY MOUTH EVERY DAY, Disp: 90 tablet, Rfl: 4    metFORMIN (GLUCOPHAGE-XR) 500 mg 24 hr tablet, TAKE 2 TABLETS BY MOUTH TWICE A DAY WITH FOOD, Disp: 360 tablet, Rfl: 3    omeprazole (PriLOSEC) 20 mg delayed release capsule, Take 1 capsule (20 mg total) by mouth daily, Disp: 90 capsule, Rfl: 1    ProAir  (90 Base) MCG/ACT inhaler, INHALE 2 PUFFS INTO THE LUNGS EVERY 4 HOURS AS NEEDED FOR SHORTNESS OF BREATH OR FOR WHEEZE, Disp: 17 g, Rfl: 1    simvastatin (ZOCOR) 40 mg tablet, TAKE 1 TABLET BY MOUTH EVERY DAY, Disp: 90 tablet,  Rfl: 4    tamsulosin (FLOMAX) 0.4 mg, TAKE 1 CAPSULE BY MOUTH EVERY DAY WITH DINNER, Disp: 90 capsule, Rfl: 3    warfarin (COUMADIN) 2.5 mg tablet, Take 1 tablet by mouth daily T-Th-S-S takes 2.5mg    M-W-F takes 5.0 mg (Patient taking differently: Take 2.5 mg by mouth daily T-WED-Th-S-S takes 2.5mg    M-F takes 5.0 mg), Disp: , Rfl:     warfarin (COUMADIN) 5 mg tablet, TAKE 1 TABLET EVERY DAY OR AS DIRECTED BY MD, Disp: 90 tablet, Rfl: 3    Past Medical History:   Diagnosis Date    LISY (acute kidney injury) (HCC) 09/11/2021    Arthritis     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) 03/21/2019    Colon polyp     6 polyps 3 years ago    Deep vein thrombosis (HCC)     lower leg- left    Diabetes mellitus (HCC)     Diabetic neuropathic arthropathy (HCC)     causes weakness in LE and uses a cane as assist to walk    Diabetic neuropathy (HCC)     Fatty liver     GERD (gastroesophageal reflux disease)     Hyperlipidemia     Hypertension     Hyponatremia 09/11/2021    Kidney stone     Parotid tumor     bilateral    Seasonal allergies     Spinal stenosis     Ureteral stone with hydronephrosis 05/17/2019    Added automatically from request for surgery [id number]       Past Surgical History:   Procedure Laterality Date    CHOLECYSTECTOMY      COLONOSCOPY  05/16/2019    completed by Dr. Mckeon, Repeat 3 years    FL RETROGRADE PYELOGRAM  05/17/2019    PAROTIDECTOMY Bilateral     NV CYSTO/URETERO W/LITHOTRIPSY &INDWELL STENT INSRT Right 05/17/2019    Procedure: CYSTOSCOPY URETEROSCOPY WITH LITHOTRIPSY HOLMIUM LASER, RETROGRADE PYELOGRAM AND INSERTION STENT URETERAL--possible stent only;  Surgeon: Casey Snyder MD;  [facility];  Service: Urology    NV EXC PRTD ALEXA/PRTD GLND LAT DSJ&PRSRV FACIAL NR Right 10/18/2017    Procedure: SUPERFICIAL PAROTIDECTOMY WITH NERVE DISSECTION AND PRESERVATION;  Surgeon: Christopher Black MD;  [facility];  Service: ENT    NV LAPAROSCOPY SURG CHOLECYSTECTOMY  N/A 01/12/2024    Procedure: CHOLECYSTECTOMY LAPAROSCOPIC W/ ROBOTICS;  Surgeon: Naveed Triplett DO;  [facility];  Service: General    NC TENDON SHEATH INCISION Right 11/03/2020    Procedure: RING TRIGGER FINGER RELEASE;  Surgeon: Marielena Calvert MD;  [facility];  Service: Orthopedics    ROTATOR CUFF REPAIR Bilateral     TONSILLECTOMY      UMBILICAL HERNIA REPAIR N/A 01/12/2024    Procedure: REPAIR HERNIA UMBILICAL, NO MESH;  Surgeon: Naveed Triplett DO;  [facility];  Service: General       Family History   Problem Relation Age of Onset    Lupus Mother     Rheum arthritis Mother     Cirrhosis Father     Alcohol abuse Father     Substance Abuse Brother         reports that he has been smoking pipe and cigars. He uses smokeless tobacco. He reports current alcohol use of about 1.0 standard drink of alcohol per week. He reports that he does not use drugs.    PHYSICAL EXAM    There were no vitals taken for this visit.    General: normal, cooperative, no distress  Abdominal: soft, nondistended, or nontender  Incision: clean, dry, and intact and healing well.  LEYDI drain was removed without incident.      Naveed Triplett DO    Date: 1/31/2024 Time: 9:19 AM

## 2024-01-31 NOTE — LETTER
January 31, 2024     DAYAMI Pedroza  [address] [facility] [address]    Patient: Fernando Roberts   YOB: 1950   Date of Visit: 1/31/2024       Dear Dr. Monteiro:    Thank you for referring Fernando Roberts to me for evaluation. Below are my notes for this consultation.    If you have questions, please do not hesitate to call me. I look forward to following your patient along with you.         Sincerely,        Naveed Triplett DO        CC: No Recipients    Naveed Triplett DO  1/31/2024  9:19 AM  Sign when Signing Visit  Assessment/Plan:    Diagnoses and all orders for this visit:    Chronic cholecystitis with calculus    Umbilical hernia without obstruction and without gangrene    Cirrhosis (HCC)    Status post laparoscopic cholecystectomy and umbilical hernia repair, no mesh.  Doing quite well.  Drain removed.  May resume diet and activity as tolerated    Pathology: Reviewed with patient, all questions answered.       Postoperative restrictions reviewed. All questions answered.       ______________________________________________________  HPI: Patient presents post operatively.  Laparoscopic cholecystectomy with robotics and umbilical hernia repair no mesh 1/12/2024   Final Diagnosis  A. Gallbladder, cholecystectomy:  -   Gallbladder with cholelithiasis, chronic cholecystitis and mucosal erosion.                       ROS:  General ROS: negative for - chills, fatigue, fever or night sweats, weight loss  Respiratory ROS: no cough, shortness of breath, or wheezing  Cardiovascular ROS: no chest pain or dyspnea on exertion  Genito-Urinary ROS: no dysuria, trouble voiding, or hematuria  Musculoskeletal ROS: negative for - gait disturbance, joint pain or muscle pain  Neurological ROS: no TIA or stroke symptoms  GI ROS: see HPI  Skin ROS: no new rashes or lesions   Lymphatic ROS: no new adenopathy noted by pt.   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)   • Atrial fibrillation (HCC)   • History of colon polyps   • Gastroesophageal reflux disease without esophagitis   • Hydronephrosis   • Warthin's tumor   • Hiatal hernia   • Smoking   • Chronic obstructive pulmonary disease (HCC)   • Prostatic disorder   • Trigger ring finger of right hand   • Intermittent claudication (HCC)   • Popliteal artery ectasia bilateral (HCC)   • Muscle cramping   • Extensor carpi radialis brevis tenosynovitis   • History of parotidectomy   • History of nephrolithiasis   • Calculus of gallbladder without cholecystitis without obstruction   • Chronic cholecystitis with calculus       Allergies:  Pletal [cilostazol], Dyazide [hydrochlorothiazide w-triamterene], and Jardiance [empagliflozin]      Current Outpatient Medications:   •  acetaminophen (TYLENOL) 325 mg tablet, 2, by mouth, every 6 hours as needed for mild to moderate pain., Disp: 30 tablet, Rfl: 0  •  aspirin (ECOTRIN LOW STRENGTH) 81 mg EC tablet, Take 81 mg by mouth daily  , Disp: , Rfl:   •  Cyanocobalamin (VITAMIN B 12 PO), Take 1,000 mcg by mouth daily, Disp: , Rfl:   •  fenofibrate micronized (LOFIBRA) 134 MG capsule, TAKE 1 CAPSULE (134 MG TOTAL) BY MOUTH DAILY WITH BREAKFAST, Disp: 90 capsule, Rfl: 3  •  gabapentin (NEURONTIN) 100 mg capsule, TAKE 2 CAPSULES (200 MG TOTAL) BY MOUTH DAILY AT BEDTIME, Disp: 180 capsule, Rfl: 1  •  glimepiride (AMARYL) 2 mg tablet, Take 1.5 tablets (3 mg total) by mouth daily with breakfast, Disp: 90 tablet, Rfl: 3  •  Januvia 100 MG tablet, TAKE 1 TABLET BY MOUTH EVERY DAY, Disp: 90 tablet, Rfl: 3  •   lisinopril (ZESTRIL) 20 mg tablet, TAKE 1 TABLET BY MOUTH EVERY DAY, Disp: 90 tablet, Rfl: 4  •  metFORMIN (GLUCOPHAGE-XR) 500 mg 24 hr tablet, TAKE 2 TABLETS BY MOUTH TWICE A DAY WITH FOOD, Disp: 360 tablet, Rfl: 3  •  omeprazole (PriLOSEC) 20 mg delayed release capsule, Take 1 capsule (20 mg total) by mouth daily, Disp: 90 capsule, Rfl: 1  •  ProAir  (90 Base) MCG/ACT inhaler, INHALE 2 PUFFS INTO THE LUNGS EVERY 4 HOURS AS NEEDED FOR SHORTNESS OF BREATH OR FOR WHEEZE, Disp: 17 g, Rfl: 1  •  simvastatin (ZOCOR) 40 mg tablet, TAKE 1 TABLET BY MOUTH EVERY DAY, Disp: 90 tablet, Rfl: 4  •  tamsulosin (FLOMAX) 0.4 mg, TAKE 1 CAPSULE BY MOUTH EVERY DAY WITH DINNER, Disp: 90 capsule, Rfl: 3  •  warfarin (COUMADIN) 2.5 mg tablet, Take 1 tablet by mouth daily T-Th-S-S takes 2.5mg    M-W-F takes 5.0 mg (Patient taking differently: Take 2.5 mg by mouth daily T-WED-Th-S-S takes 2.5mg    M-F takes 5.0 mg), Disp: , Rfl:   •  warfarin (COUMADIN) 5 mg tablet, TAKE 1 TABLET EVERY DAY OR AS DIRECTED BY MD, Disp: 90 tablet, Rfl: 3    Past Medical History:   Diagnosis Date   • LISY (acute kidney injury) (HCC) 09/11/2021   • Arthritis    • 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) 03/21/2019   • Colon polyp     6 polyps 3 years ago   • Deep vein thrombosis (HCC)     lower leg- left   • Diabetes mellitus (HCC)    • Diabetic neuropathic arthropathy (HCC)     causes weakness in LE and uses a cane as assist to walk   • Diabetic neuropathy (HCC)    • Fatty liver    • GERD (gastroesophageal reflux disease)    • Hyperlipidemia    • Hypertension    • Hyponatremia 09/11/2021   • Kidney stone    • Parotid tumor     bilateral   • Seasonal allergies    • Spinal stenosis    • Ureteral stone with hydronephrosis 05/17/2019    Added automatically from request for surgery [id number]       Past Surgical History:   Procedure Laterality Date   • CHOLECYSTECTOMY     • COLONOSCOPY  05/16/2019    completed by Dr. Mckeon,  Repeat 3 years   • FL RETROGRADE PYELOGRAM  05/17/2019   • PAROTIDECTOMY Bilateral    • NM CYSTO/URETERO W/LITHOTRIPSY &INDWELL STENT INSRT Right 05/17/2019    Procedure: CYSTOSCOPY URETEROSCOPY WITH LITHOTRIPSY HOLMIUM LASER, RETROGRADE PYELOGRAM AND INSERTION STENT URETERAL--possible stent only;  Surgeon: Casey Snyder MD;  [facility];  Service: Urology   • NM EXC PRTD ALEXA/PRTD GLND LAT DSJ&PRSRV FACIAL NR Right 10/18/2017    Procedure: SUPERFICIAL PAROTIDECTOMY WITH NERVE DISSECTION AND PRESERVATION;  Surgeon: Christopher Black MD;  [facility];  Service: ENT   • NM LAPAROSCOPY SURG CHOLECYSTECTOMY N/A 01/12/2024    Procedure: CHOLECYSTECTOMY LAPAROSCOPIC W/ ROBOTICS;  Surgeon: Naveed Triplett DO;  [facility];  Service: General   • NM TENDON SHEATH INCISION Right 11/03/2020    Procedure: RING TRIGGER FINGER RELEASE;  Surgeon: Marielena Calvert MD;  [facility];  Service: Orthopedics   • ROTATOR CUFF REPAIR Bilateral    • TONSILLECTOMY     • UMBILICAL HERNIA REPAIR N/A 01/12/2024    Procedure: REPAIR HERNIA UMBILICAL, NO MESH;  Surgeon: Naveed Triplett DO;  [facility];  Service: General       Family History   Problem Relation Age of Onset   • Lupus Mother    • Rheum arthritis Mother    • Cirrhosis Father    • Alcohol abuse Father    • Substance Abuse Brother         reports that he has been smoking pipe and cigars. He uses smokeless tobacco. He reports current alcohol use of about 1.0 standard drink of alcohol per week. He reports that he does not use drugs.    PHYSICAL EXAM    There were no vitals taken for this visit.    General: normal, cooperative, no distress  Abdominal: soft, nondistended, or nontender  Incision: clean, dry, and intact and healing well.  LEYDI drain was removed without incident.      Naveed Triplett DO    Date: 1/31/2024 Time: 9:19 AM

## 2024-02-01 ENCOUNTER — ANTICOAG VISIT (OUTPATIENT)
Dept: CARDIOLOGY CLINIC | Facility: CLINIC | Age: 74
End: 2024-02-01

## 2024-02-01 ENCOUNTER — APPOINTMENT (OUTPATIENT)
Dept: LAB | Facility: AMBULARY SURGERY CENTER | Age: 74
End: 2024-02-01
Payer: MEDICARE

## 2024-02-01 DIAGNOSIS — Z86.718 HISTORY OF DVT (DEEP VEIN THROMBOSIS): Primary | ICD-10-CM

## 2024-02-01 NOTE — PROGRESS NOTES
Spoke with patient, advised INR at low end of goal, unsure if he missed a dose. Advised to continue 5 mg Mon Fri, 2.5 mg all other days, will recheck in 3 weeks 2/22/24

## 2024-02-19 ENCOUNTER — APPOINTMENT (OUTPATIENT)
Dept: LAB | Facility: AMBULARY SURGERY CENTER | Age: 74
End: 2024-02-19
Payer: MEDICARE

## 2024-02-19 ENCOUNTER — ANTICOAG VISIT (OUTPATIENT)
Dept: CARDIOLOGY CLINIC | Facility: CLINIC | Age: 74
End: 2024-02-19

## 2024-02-19 DIAGNOSIS — Z86.718 HISTORY OF DVT (DEEP VEIN THROMBOSIS): Primary | ICD-10-CM

## 2024-02-19 NOTE — PROGRESS NOTES
Spoke with patient, advised INR good, current dose verified, continue 5 mg Mon Fri, 2.5 mg all other days, will recheck in 3 weeks 3/11/24

## 2024-02-29 ENCOUNTER — OFFICE VISIT (OUTPATIENT)
Dept: PODIATRY | Facility: CLINIC | Age: 74
End: 2024-02-29
Payer: MEDICARE

## 2024-02-29 VITALS — DIASTOLIC BLOOD PRESSURE: 66 MMHG | HEART RATE: 79 BPM | SYSTOLIC BLOOD PRESSURE: 138 MMHG

## 2024-02-29 DIAGNOSIS — E11.40 TYPE 2 DIABETES MELLITUS WITH DIABETIC NEUROPATHY, WITHOUT LONG-TERM CURRENT USE OF INSULIN (HCC): Primary | ICD-10-CM

## 2024-02-29 DIAGNOSIS — L84 CALLUS OF FOOT: ICD-10-CM

## 2024-02-29 DIAGNOSIS — I70.213 ATHEROSCLEROSIS OF NATIVE ARTERY OF BOTH LOWER EXTREMITIES WITH INTERMITTENT CLAUDICATION (HCC): ICD-10-CM

## 2024-02-29 DIAGNOSIS — B35.1 TINEA UNGUIUM: ICD-10-CM

## 2024-02-29 PROCEDURE — 99214 OFFICE O/P EST MOD 30 MIN: CPT | Performed by: PODIATRIST

## 2024-02-29 NOTE — PROGRESS NOTES
Assessment/Plan:       Diagnoses and all orders for this visit:    Type 2 diabetes mellitus with diabetic neuropathy, without long-term current use of insulin (HCC)    Atherosclerosis of native artery of both lower extremities with intermittent claudication (HCC)    Tinea unguium    Callus of foot        Diagnosis and options discussed with patient  Patient agreeable to the plan as stated below    -DM foot risk is moderate. Recommend at risk foot care.   -Discussed DM risk to lower extremities, proper shoe gear, and daily monitoring of feet.   -Discussed weight loss and suitable exercise regiment.   -Reviewed most recent PCP visit on 11/15/2023  -Educated on A1C and the risks of poorly controlled Diabetes. Reviewed recent A1C:  Lab Results   Component Value Date    HGBA1C 7.1 (H) 10/30/2023    HGBA1C 7.7 (H) 12/14/2015   .   Reviewed arterial dopplers in December, there is some stenosis but no acute issues. Check annually. Reviewed vascular visit 1/11/2024    Subjective:      Patient ID: Fernando Roberts is a 73 y.o. male.    PAtient presents for DM foot risk. He has h/o neuropathy. A1C 7.1.  He has history of claudication but sees vascular surgery regularly.  He did have recent Dopplers.  At the moment he has no acute concerns with his feet other than thick toenails and callus.        The following portions of the patient's history were reviewed and updated as appropriate: allergies, current medications, past family history, past medical history, past social history, past surgical history, and problem list.    Review of Systems    As stated in HPI, otherwise normal    Objective:      /66 (BP Location: Right arm, Patient Position: Sitting, Cuff Size: Large)   Pulse 79          Physical Exam  Vitals reviewed.   Constitutional:       General: He is not in acute distress.     Appearance: He is not toxic-appearing.   Cardiovascular:      Rate and Rhythm: Normal rate.      Pulses: Pulses are weak.           Dorsalis  pedis pulses are 1+ on the right side and 1+ on the left side.        Posterior tibial pulses are 0 on the right side and 0 on the left side.   Pulmonary:      Effort: No respiratory distress.   Musculoskeletal:         General: Deformity present.      Right foot: Decreased range of motion. Deformity and bunion present. No prominent metatarsal heads.      Left foot: Decreased range of motion. Deformity and bunion present. No prominent metatarsal heads.   Feet:      Right foot:      Protective Sensation: 10 sites tested.  4 sites sensed.      Skin integrity: Callus and dry skin present. No ulcer, skin breakdown, erythema or warmth.      Toenail Condition: Right toenails are abnormally thick. Fungal disease present.     Left foot:      Protective Sensation: 10 sites tested.  4 sites sensed.      Skin integrity: Callus and dry skin present. No ulcer, skin breakdown, erythema or warmth.      Toenail Condition: Left toenails are abnormally thick. Fungal disease present.  Skin:     Capillary Refill: Capillary refill takes less than 2 seconds.      Findings: No erythema or rash.   Neurological:      Mental Status: He is alert and oriented to person, place, and time.      Sensory: Sensory deficit present.       Diabetic Foot Exam    Patient's shoes and socks removed.    Right Foot/Ankle   Right Foot Inspection  Skin Exam: skin normal, skin intact, dry skin, callus and callus. No warmth, no erythema, no maceration, no abnormal color, no pre-ulcer and no ulcer.     Toe Exam: ROM and strength within normal limits and right toe deformity.     Sensory   Vibration: absent  Monofilament testing: diminished    Vascular  Capillary refills: < 3 seconds  The right DP pulse is 1+. The right PT pulse is 0.     Right Toe  - Comprehensive Exam  Hallux valgus: yes      Left Foot/Ankle  Left Foot Inspection  Skin Exam: skin normal, skin intact, dry skin and callus. No warmth, no erythema, no maceration, normal color, no pre-ulcer and no  ulcer.     Toe Exam: ROM and strength within normal limits and left toe deformity.     Sensory   Vibration: absent  Monofilament testing: diminished    Vascular  Capillary refills: < 3 seconds  The left DP pulse is 1+. The left PT pulse is 0.     Left Toe  - Comprehensive Exam  Hallux valgus: yes      Assign Risk Category  Deformity present  Loss of protective sensation  Weak pulses  Risk: 2

## 2024-03-11 ENCOUNTER — ANTICOAG VISIT (OUTPATIENT)
Dept: CARDIOLOGY CLINIC | Facility: CLINIC | Age: 74
End: 2024-03-11

## 2024-03-11 ENCOUNTER — APPOINTMENT (OUTPATIENT)
Dept: LAB | Facility: CLINIC | Age: 74
End: 2024-03-11
Payer: MEDICARE

## 2024-03-11 DIAGNOSIS — Z86.718 HISTORY OF DVT (DEEP VEIN THROMBOSIS): Primary | ICD-10-CM

## 2024-03-11 NOTE — PROGRESS NOTES
PC to patient with good INR of 2.37.  Advised same dosing of 5 mgs every Mon and Fri.  2.5 mgs all other days of the week. Recheck in 4 weeks.

## 2024-03-28 DIAGNOSIS — J30.9 ALLERGIC RHINITIS, UNSPECIFIED SEASONALITY, UNSPECIFIED TRIGGER: ICD-10-CM

## 2024-04-08 ENCOUNTER — APPOINTMENT (OUTPATIENT)
Dept: LAB | Facility: AMBULARY SURGERY CENTER | Age: 74
End: 2024-04-08
Payer: MEDICARE

## 2024-04-08 ENCOUNTER — ANTICOAG VISIT (OUTPATIENT)
Dept: CARDIOLOGY CLINIC | Facility: CLINIC | Age: 74
End: 2024-04-08

## 2024-04-08 DIAGNOSIS — Z86.718 HISTORY OF DVT (DEEP VEIN THROMBOSIS): Primary | ICD-10-CM

## 2024-04-08 DIAGNOSIS — I48.0 PAROXYSMAL ATRIAL FIBRILLATION (HCC): ICD-10-CM

## 2024-04-08 NOTE — PROGRESS NOTES
Spoke with patient, advised INR good, current dose verified, continue 5 mg Mon Fri, 2.5 mg all other days, will recheck in 4 weeks 5/6/24

## 2024-04-14 DIAGNOSIS — E78.2 MIXED HYPERLIPIDEMIA: ICD-10-CM

## 2024-04-15 RX ORDER — SIMVASTATIN 40 MG
TABLET ORAL
Qty: 90 TABLET | Refills: 1 | Status: SHIPPED | OUTPATIENT
Start: 2024-04-15

## 2024-04-16 RX ORDER — FENOFIBRATE 134 MG/1
134 CAPSULE ORAL
Qty: 90 CAPSULE | Refills: 3 | Status: SHIPPED | OUTPATIENT
Start: 2024-04-16

## 2024-04-19 DIAGNOSIS — G62.9 PERIPHERAL POLYNEUROPATHY: ICD-10-CM

## 2024-04-19 RX ORDER — GABAPENTIN 100 MG/1
200 CAPSULE ORAL
Qty: 180 CAPSULE | Refills: 1 | Status: SHIPPED | OUTPATIENT
Start: 2024-04-19

## 2024-04-30 DIAGNOSIS — N23 RENAL COLIC ON RIGHT SIDE: ICD-10-CM

## 2024-05-01 RX ORDER — TAMSULOSIN HYDROCHLORIDE 0.4 MG/1
0.4 CAPSULE ORAL
Qty: 90 CAPSULE | Refills: 1 | Status: SHIPPED | OUTPATIENT
Start: 2024-05-01

## 2024-05-06 ENCOUNTER — APPOINTMENT (OUTPATIENT)
Dept: LAB | Facility: AMBULARY SURGERY CENTER | Age: 74
End: 2024-05-06
Payer: MEDICARE

## 2024-05-06 ENCOUNTER — ANTICOAG VISIT (OUTPATIENT)
Dept: CARDIOLOGY CLINIC | Facility: CLINIC | Age: 74
End: 2024-05-06

## 2024-05-06 DIAGNOSIS — E11.40 TYPE 2 DIABETES MELLITUS WITH DIABETIC NEUROPATHY, WITHOUT LONG-TERM CURRENT USE OF INSULIN (HCC): ICD-10-CM

## 2024-05-06 DIAGNOSIS — E78.2 MIXED HYPERLIPIDEMIA: ICD-10-CM

## 2024-05-06 DIAGNOSIS — Z86.718 HISTORY OF DVT (DEEP VEIN THROMBOSIS): Primary | ICD-10-CM

## 2024-05-06 DIAGNOSIS — I10 ESSENTIAL HYPERTENSION: ICD-10-CM

## 2024-05-06 LAB
ALBUMIN SERPL BCP-MCNC: 4.3 G/DL (ref 3.5–5)
ALP SERPL-CCNC: 83 U/L (ref 34–104)
ALT SERPL W P-5'-P-CCNC: 79 U/L (ref 7–52)
ANION GAP SERPL CALCULATED.3IONS-SCNC: 9 MMOL/L (ref 4–13)
AST SERPL W P-5'-P-CCNC: 77 U/L (ref 13–39)
BILIRUB SERPL-MCNC: 0.69 MG/DL (ref 0.2–1)
BUN SERPL-MCNC: 9 MG/DL (ref 5–25)
CALCIUM SERPL-MCNC: 9.1 MG/DL (ref 8.4–10.2)
CHLORIDE SERPL-SCNC: 100 MMOL/L (ref 96–108)
CHOLEST SERPL-MCNC: 170 MG/DL
CO2 SERPL-SCNC: 27 MMOL/L (ref 21–32)
CREAT SERPL-MCNC: 0.89 MG/DL (ref 0.6–1.3)
ERYTHROCYTE [DISTWIDTH] IN BLOOD BY AUTOMATED COUNT: 14.1 % (ref 11.6–15.1)
GFR SERPL CREATININE-BSD FRML MDRD: 85 ML/MIN/1.73SQ M
GLUCOSE P FAST SERPL-MCNC: 132 MG/DL (ref 65–99)
HCT VFR BLD AUTO: 50.9 % (ref 36.5–49.3)
HDLC SERPL-MCNC: 39 MG/DL
HGB BLD-MCNC: 16.3 G/DL (ref 12–17)
LDLC SERPL CALC-MCNC: 68 MG/DL (ref 0–100)
MCH RBC QN AUTO: 31.2 PG (ref 26.8–34.3)
MCHC RBC AUTO-ENTMCNC: 32 G/DL (ref 31.4–37.4)
MCV RBC AUTO: 97 FL (ref 82–98)
NONHDLC SERPL-MCNC: 131 MG/DL
PLATELET # BLD AUTO: 288 THOUSANDS/UL (ref 149–390)
PMV BLD AUTO: 11.3 FL (ref 8.9–12.7)
POTASSIUM SERPL-SCNC: 4.6 MMOL/L (ref 3.5–5.3)
PROT SERPL-MCNC: 7.8 G/DL (ref 6.4–8.4)
RBC # BLD AUTO: 5.23 MILLION/UL (ref 3.88–5.62)
SODIUM SERPL-SCNC: 136 MMOL/L (ref 135–147)
TRIGL SERPL-MCNC: 313 MG/DL
TSH SERPL DL<=0.05 MIU/L-ACNC: 3.83 UIU/ML (ref 0.45–4.5)
WBC # BLD AUTO: 8.16 THOUSAND/UL (ref 4.31–10.16)

## 2024-05-06 PROCEDURE — 83036 HEMOGLOBIN GLYCOSYLATED A1C: CPT

## 2024-05-06 PROCEDURE — 85027 COMPLETE CBC AUTOMATED: CPT

## 2024-05-06 PROCEDURE — 80061 LIPID PANEL: CPT

## 2024-05-06 PROCEDURE — 80053 COMPREHEN METABOLIC PANEL: CPT

## 2024-05-06 PROCEDURE — 84443 ASSAY THYROID STIM HORMONE: CPT

## 2024-05-06 NOTE — PROGRESS NOTES
Spoke with patient, advised INR good, current dose verified, continue 5 mg Mon Fri, 2.5 mg all other days, will recheck in 4 weeks 6/3/24

## 2024-05-07 ENCOUNTER — OFFICE VISIT (OUTPATIENT)
Dept: CARDIOLOGY CLINIC | Facility: CLINIC | Age: 74
End: 2024-05-07
Payer: MEDICARE

## 2024-05-07 VITALS
WEIGHT: 214.6 LBS | HEART RATE: 78 BPM | OXYGEN SATURATION: 98 % | DIASTOLIC BLOOD PRESSURE: 62 MMHG | HEIGHT: 71 IN | SYSTOLIC BLOOD PRESSURE: 140 MMHG | BODY MASS INDEX: 30.04 KG/M2

## 2024-05-07 DIAGNOSIS — I70.213 ATHEROSCLEROSIS OF NATIVE ARTERY OF BOTH LOWER EXTREMITIES WITH INTERMITTENT CLAUDICATION (HCC): ICD-10-CM

## 2024-05-07 DIAGNOSIS — I10 ESSENTIAL HYPERTENSION: Primary | ICD-10-CM

## 2024-05-07 DIAGNOSIS — E78.2 MIXED HYPERLIPIDEMIA: ICD-10-CM

## 2024-05-07 DIAGNOSIS — I48.0 PAROXYSMAL ATRIAL FIBRILLATION (HCC): ICD-10-CM

## 2024-05-07 LAB
EST. AVERAGE GLUCOSE BLD GHB EST-MCNC: 157 MG/DL
HBA1C MFR BLD: 7.1 %

## 2024-05-07 PROCEDURE — 99213 OFFICE O/P EST LOW 20 MIN: CPT | Performed by: INTERNAL MEDICINE

## 2024-05-07 NOTE — ASSESSMENT & PLAN NOTE
Hyperlipidemia, stable.  The patient however is concerned about the use of statin in light of his muscle cramps.  His muscle cramps include facial muscles as well.  We have agreed that the patient will stop simvastatin for 4 to 6 weeks.  He will also discontinue fenofibrate 2 weeks into this regimen.  The patient will observe the symptoms as well.

## 2024-05-07 NOTE — PROGRESS NOTES
Assessment/Plan:    Essential hypertension  Hypertension, stable.    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)  Peripheral vascular disease with lower extremity claudication.    Atrial fibrillation (HCC)  Remote history of atrial fibrillation, stable.  The patient is in regular rhythm.    Mixed hyperlipidemia  Hyperlipidemia, stable.  The patient however is concerned about the use of statin in light of his muscle cramps.  His muscle cramps include facial muscles as well.  We have agreed that the patient will stop simvastatin for 4 to 6 weeks.  He will also discontinue fenofibrate 2 weeks into this regimen.  The patient will observe the symptoms as well.       Diagnoses and all orders for this visit:    Essential hypertension    Atherosclerosis of native artery of both lower extremities with intermittent claudication (HCC)    Paroxysmal atrial fibrillation (HCC)    Mixed hyperlipidemia          Subjective: Muscle spasm.     Patient ID: Fernando Roberts is a 73 y.o. male.    The patient presented to this office for the purpose of cardiac follow-up.  He is known to have a history of hypertension and hyperlipidemia as well as diabetes mellitus.  He is also known to have a history of peripheral vascular disease with lower extremity claudication.  The patient has some dyspnea on exertion but denies any chest pain.  He denies any symptoms of palpitation, dizziness or syncope.  He has no significant lower extremity edema.  The patient is concerned about taking statins because of muscle complaint related to spasm involving different part of his body including his face.        The following portions of the patient's history were reviewed and updated as appropriate: allergies, current medications, past family history, past medical history, past social history, past surgical history, and problem list.    Review of Systems   Respiratory:  Positive for shortness of breath. Negative for apnea, cough,  "chest tightness and wheezing.    Cardiovascular:  Negative for chest pain, palpitations and leg swelling.   Gastrointestinal:  Negative for abdominal pain.   Neurological:  Negative for dizziness and light-headedness.   Psychiatric/Behavioral: Negative.           Objective: Stable cardiac wise.      /62 (BP Location: Left arm, Patient Position: Sitting, Cuff Size: Standard)   Pulse 78   Ht 5' 11\" (1.803 m)   Wt 97.3 kg (214 lb 9.6 oz)   SpO2 98%   BMI 29.93 kg/m²          Physical Exam  Vitals reviewed.   Constitutional:       General: He is not in acute distress.     Appearance: He is well-developed. He is not diaphoretic.   HENT:      Head: Normocephalic.   Eyes:      Pupils: Pupils are equal, round, and reactive to light.   Neck:      Thyroid: No thyromegaly.      Vascular: No JVD.   Cardiovascular:      Rate and Rhythm: Normal rate and regular rhythm.      Heart sounds: S1 normal and S2 normal. No murmur heard.     No friction rub. No gallop.   Pulmonary:      Effort: Pulmonary effort is normal. No respiratory distress.      Breath sounds: Normal breath sounds. No wheezing or rales.   Chest:      Chest wall: No tenderness.   Abdominal:      Palpations: Abdomen is soft.   Musculoskeletal:         General: No tenderness or deformity. Normal range of motion.      Cervical back: Normal range of motion.      Right lower leg: No edema.      Left lower leg: No edema.   Skin:     General: Skin is warm and dry.   Neurological:      Mental Status: He is alert and oriented to person, place, and time.   Psychiatric:         Mood and Affect: Mood normal.           "

## 2024-05-07 NOTE — PATIENT INSTRUCTIONS
The patient was advised to discontinue simvastatin for 6 weeks and fenofibrate as well.  He will continue other medications.

## 2024-05-09 ENCOUNTER — OFFICE VISIT (OUTPATIENT)
Dept: PODIATRY | Facility: CLINIC | Age: 74
End: 2024-05-09
Payer: MEDICARE

## 2024-05-09 VITALS
HEIGHT: 71 IN | HEART RATE: 72 BPM | DIASTOLIC BLOOD PRESSURE: 74 MMHG | SYSTOLIC BLOOD PRESSURE: 154 MMHG | BODY MASS INDEX: 29.26 KG/M2 | WEIGHT: 209 LBS

## 2024-05-09 DIAGNOSIS — L84 CALLUS OF FOOT: ICD-10-CM

## 2024-05-09 DIAGNOSIS — E11.40 TYPE 2 DIABETES MELLITUS WITH DIABETIC NEUROPATHY, WITHOUT LONG-TERM CURRENT USE OF INSULIN (HCC): Primary | ICD-10-CM

## 2024-05-09 DIAGNOSIS — I70.213 ATHEROSCLEROSIS OF NATIVE ARTERY OF BOTH LOWER EXTREMITIES WITH INTERMITTENT CLAUDICATION (HCC): ICD-10-CM

## 2024-05-09 DIAGNOSIS — B35.1 TINEA UNGUIUM: ICD-10-CM

## 2024-05-09 PROCEDURE — 11055 PARING/CUTG B9 HYPRKER LES 1: CPT | Performed by: PODIATRIST

## 2024-05-09 PROCEDURE — 11721 DEBRIDE NAIL 6 OR MORE: CPT | Performed by: PODIATRIST

## 2024-05-09 PROCEDURE — 11056 PARNG/CUTG B9 HYPRKR LES 2-4: CPT | Performed by: PODIATRIST

## 2024-05-09 NOTE — PROGRESS NOTES
"Fernando GUERRERO Armando  1950  AT RISK FOOT CARE    1. Type 2 diabetes mellitus with diabetic neuropathy, without long-term current use of insulin (HCC)  Lesion Destruction      2. Tinea unguium  Lesion Destruction      3. Atherosclerosis of native artery of both lower extremities with intermittent claudication (HCC)  Lesion Destruction      4. Callus of foot  Lesion Destruction          Patient presents for at-risk foot care.  Patient has no acute concerns today.  Patient has significant lower extremity risk due to diminished pulses in the feet and trophic skin changes to the lower extremity including thick toenail, atrophic skin, and decreased hair growth.      On exam patient has thickened, hypertrophic, discolored, brittle toenails with subungual debris and tenderness x10   Callus: left hallux, right hallux  Patient has diminished pedal pulses and decreased perfusion to the lower extremities  Patient has significant trophic changes to the skin including thick toenails, decreased pedal hair and atrophic skin.     Today's treatment includes:  Debridement of toenails. Using nail nipper, nadya, and curette, nails were sharply debrided, reduced in thickness and length. Devitalized nail tissue and fungal debris excised and removed. Patient tolerated well.    Lesion Destruction    Date/Time: 5/9/2024 10:15 AM    Performed by: Sourav Boyle DPM  Authorized by: Sourav oByle DPM  Universal Protocol:  Consent: Verbal consent obtained.  Risks and benefits: risks, benefits and alternatives were discussed  Consent given by: patient  Time out: Immediately prior to procedure a \"time out\" was called to verify the correct patient, procedure, equipment, support staff and site/side marked as required.  Timeout called at: 5/9/2024 10:17 AM.  Patient understanding: patient states understanding of the procedure being performed  Patient identity confirmed: verbally with patient    Procedure Details - Lesion Destruction:     " Number of Lesions:  2  Lesion 1:     Body area:  Lower extremity    Lower extremity location:  L big toe    Malignancy: benign hyperkeratotic lesion      Destruction method: scissors used for extraction    Lesion 2:     Body area:  Lower extremity    Lower extremity location:  R big toe    Malignancy: benign hyperkeratotic lesion      Destruction method: scissors used for extraction        Discussed proper shoe gear, daily inspections of feet, and general foot health with patient. Patient has Q8  findings and is recommended for at risk foot care every 9-10 weeks.    Patients most recent complete clinical foot exam was on: 2/29/2024

## 2024-05-11 DIAGNOSIS — E11.40 TYPE 2 DIABETES MELLITUS WITH DIABETIC NEUROPATHY, WITHOUT LONG-TERM CURRENT USE OF INSULIN (HCC): ICD-10-CM

## 2024-05-11 DIAGNOSIS — J30.9 ALLERGIC RHINITIS, UNSPECIFIED SEASONALITY, UNSPECIFIED TRIGGER: ICD-10-CM

## 2024-05-12 RX ORDER — GLIMEPIRIDE 2 MG/1
3 TABLET ORAL
Qty: 90 TABLET | Refills: 1 | Status: SHIPPED | OUTPATIENT
Start: 2024-05-12 | End: 2025-05-07

## 2024-05-13 ENCOUNTER — ESTABLISHED COMPREHENSIVE EXAM (OUTPATIENT)
Dept: URBAN - METROPOLITAN AREA CLINIC 6 | Facility: CLINIC | Age: 74
End: 2024-05-13

## 2024-05-13 DIAGNOSIS — E11.9: ICD-10-CM

## 2024-05-13 DIAGNOSIS — H25.813: ICD-10-CM

## 2024-05-13 DIAGNOSIS — H04.123: ICD-10-CM

## 2024-05-13 LAB
LEFT EYE DIABETIC RETINOPATHY: NORMAL
RIGHT EYE DIABETIC RETINOPATHY: NORMAL

## 2024-05-13 PROCEDURE — 92014 COMPRE OPH EXAM EST PT 1/>: CPT

## 2024-05-13 ASSESSMENT — VISUAL ACUITY
OU_CC: 20/25-2
OS_CC: 20/30-1
OU_CC: J2
OD_CC: 20/25-2

## 2024-05-13 ASSESSMENT — KERATOMETRY
OD_K2POWER_DIOPTERS: 42.00
OD_K1POWER_DIOPTERS: 41.50
OS_K1POWER_DIOPTERS: 42.75
OS_K2POWER_DIOPTERS: 43.00
OD_AXISANGLE_DEGREES: 169
OS_AXISANGLE2_DEGREES: 15
OS_AXISANGLE_DEGREES: 105
OD_AXISANGLE2_DEGREES: 79

## 2024-05-13 ASSESSMENT — TONOMETRY
OD_IOP_MMHG: 14
OS_IOP_MMHG: 17

## 2024-05-14 DIAGNOSIS — K21.9 GASTROESOPHAGEAL REFLUX DISEASE WITHOUT ESOPHAGITIS: ICD-10-CM

## 2024-05-14 RX ORDER — ALBUTEROL SULFATE 90 UG/1
AEROSOL, METERED RESPIRATORY (INHALATION)
Qty: 18 G | Refills: 1 | Status: SHIPPED | OUTPATIENT
Start: 2024-05-14

## 2024-05-14 RX ORDER — GLIMEPIRIDE 2 MG/1
2 TABLET ORAL
Qty: 90 TABLET | Refills: 3 | OUTPATIENT
Start: 2024-05-14

## 2024-05-15 RX ORDER — OMEPRAZOLE 20 MG/1
20 CAPSULE, DELAYED RELEASE ORAL DAILY
Qty: 90 CAPSULE | Refills: 1 | Status: SHIPPED | OUTPATIENT
Start: 2024-05-15

## 2024-05-21 ENCOUNTER — OFFICE VISIT (OUTPATIENT)
Dept: FAMILY MEDICINE CLINIC | Facility: CLINIC | Age: 74
End: 2024-05-21
Payer: MEDICARE

## 2024-05-21 VITALS
HEART RATE: 73 BPM | BODY MASS INDEX: 30.1 KG/M2 | OXYGEN SATURATION: 95 % | DIASTOLIC BLOOD PRESSURE: 72 MMHG | TEMPERATURE: 98.4 F | SYSTOLIC BLOOD PRESSURE: 142 MMHG | WEIGHT: 215 LBS | HEIGHT: 71 IN | RESPIRATION RATE: 16 BRPM

## 2024-05-21 DIAGNOSIS — R25.2 MUSCLE CRAMPING: ICD-10-CM

## 2024-05-21 DIAGNOSIS — I10 ESSENTIAL HYPERTENSION: ICD-10-CM

## 2024-05-21 DIAGNOSIS — I48.0 PAROXYSMAL ATRIAL FIBRILLATION (HCC): ICD-10-CM

## 2024-05-21 DIAGNOSIS — Z00.00 MEDICARE ANNUAL WELLNESS VISIT, SUBSEQUENT: Primary | ICD-10-CM

## 2024-05-21 DIAGNOSIS — F17.200 SMOKING: ICD-10-CM

## 2024-05-21 DIAGNOSIS — K21.9 GASTROESOPHAGEAL REFLUX DISEASE WITHOUT ESOPHAGITIS: ICD-10-CM

## 2024-05-21 DIAGNOSIS — E78.2 MIXED HYPERLIPIDEMIA: ICD-10-CM

## 2024-05-21 DIAGNOSIS — Z11.59 ENCOUNTER FOR SCREENING FOR OTHER VIRAL DISEASES: ICD-10-CM

## 2024-05-21 DIAGNOSIS — R79.89 ELEVATED LFTS: ICD-10-CM

## 2024-05-21 DIAGNOSIS — E11.40 TYPE 2 DIABETES MELLITUS WITH DIABETIC NEUROPATHY, WITHOUT LONG-TERM CURRENT USE OF INSULIN (HCC): ICD-10-CM

## 2024-05-21 DIAGNOSIS — J44.9 CHRONIC OBSTRUCTIVE PULMONARY DISEASE, UNSPECIFIED COPD TYPE (HCC): ICD-10-CM

## 2024-05-21 LAB
CREAT UR-MCNC: 42.4 MG/DL
MICROALBUMIN UR-MCNC: 7.7 MG/L
MICROALBUMIN/CREAT 24H UR: 18 MG/G CREATININE (ref 0–30)

## 2024-05-21 PROCEDURE — G0439 PPPS, SUBSEQ VISIT: HCPCS | Performed by: NURSE PRACTITIONER

## 2024-05-21 PROCEDURE — 99214 OFFICE O/P EST MOD 30 MIN: CPT | Performed by: NURSE PRACTITIONER

## 2024-05-21 PROCEDURE — 82043 UR ALBUMIN QUANTITATIVE: CPT | Performed by: NURSE PRACTITIONER

## 2024-05-21 PROCEDURE — 82570 ASSAY OF URINE CREATININE: CPT | Performed by: NURSE PRACTITIONER

## 2024-05-21 NOTE — PATIENT INSTRUCTIONS
Medicare Preventive Visit Patient Instructions  Thank you for completing your Welcome to Medicare Visit or Medicare Annual Wellness Visit today. Your next wellness visit will be due in one year (5/22/2025).  The screening/preventive services that you may require over the next 5-10 years are detailed below. Some tests may not apply to you based off risk factors and/or age. Screening tests ordered at today's visit but not completed yet may show as past due. Also, please note that scanned in results may not display below.  Preventive Screenings:  Service Recommendations Previous Testing/Comments   Colorectal Cancer Screening  Colonoscopy    Fecal Occult Blood Test (FOBT)/Fecal Immunochemical Test (FIT)  Fecal DNA/Cologuard Test  Flexible Sigmoidoscopy Age: 45-75 years old   Colonoscopy: every 10 years (May be performed more frequently if at higher risk)  OR  FOBT/FIT: every 1 year  OR  Cologuard: every 3 years  OR  Sigmoidoscopy: every 5 years  Screening may be recommended earlier than age 45 if at higher risk for colorectal cancer. Also, an individualized decision between you and your healthcare provider will decide whether screening between the ages of 76-85 would be appropriate. Colonoscopy: 12/01/2022  FOBT/FIT: Not on file  Cologuard: Not on file  Sigmoidoscopy: Not on file    Screening Current     Prostate Cancer Screening Individualized decision between patient and health care provider in men between ages of 55-69   Medicare will cover every 12 months beginning on the day after your 50th birthday PSA: 1.2 ng/mL           Hepatitis C Screening Once for adults born between 1945 and 1965  More frequently in patients at high risk for Hepatitis C Hep C Antibody: 03/05/2019    Screening Current   Diabetes Screening 1-2 times per year if you're at risk for diabetes or have pre-diabetes Fasting glucose: 132 mg/dL (5/6/2024)  A1C: 7.1 % (5/6/2024)  Screening Not Indicated  History Diabetes   Cholesterol Screening Once  every 5 years if you don't have a lipid disorder. May order more often based on risk factors. Lipid panel: 05/06/2024  Screening Not Indicated  History Lipid Disorder      Other Preventive Screenings Covered by Medicare:  Abdominal Aortic Aneurysm (AAA) Screening: covered once if your at risk. You're considered to be at risk if you have a family history of AAA or a male between the age of 65-75 who smoking at least 100 cigarettes in your lifetime.  Lung Cancer Screening: covers low dose CT scan once per year if you meet all of the following conditions: (1) Age 55-77; (2) No signs or symptoms of lung cancer; (3) Current smoker or have quit smoking within the last 15 years; (4) You have a tobacco smoking history of at least 20 pack years (packs per day x number of years you smoked); (5) You get a written order from a healthcare provider.  Glaucoma Screening: covered annually if you're considered high risk: (1) You have diabetes OR (2) Family history of glaucoma OR (3)  aged 50 and older OR (4)  American aged 65 and older  Osteoporosis Screening: covered every 2 years if you meet one of the following conditions: (1) Have a vertebral abnormality; (2) On glucocorticoid therapy for more than 3 months; (3) Have primary hyperparathyroidism; (4) On osteoporosis medications and need to assess response to drug therapy.  HIV Screening: covered annually if you're between the age of 15-65. Also covered annually if you are younger than 15 and older than 65 with risk factors for HIV infection. For pregnant patients, it is covered up to 3 times per pregnancy.    Immunizations:  Immunization Recommendations   Influenza Vaccine Annual influenza vaccination during flu season is recommended for all persons aged >= 6 months who do not have contraindications   Pneumococcal Vaccine   * Pneumococcal conjugate vaccine = PCV13 (Prevnar 13), PCV15 (Vaxneuvance), PCV20 (Prevnar 20)  * Pneumococcal polysaccharide vaccine  = PPSV23 (Pneumovax) Adults 19-65 yo with certain risk factors or if 65+ yo  If never received any pneumonia vaccine: recommend Prevnar 20 (PCV20)  Give PCV20 if previously received 1 dose of PCV13 or PPSV23   Hepatitis B Vaccine 3 dose series if at intermediate or high risk (ex: diabetes, end stage renal disease, liver disease)   Respiratory syncytial virus (RSV) Vaccine - COVERED BY MEDICARE PART D  * RSVPreF3 (Arexvy) CDC recommends that adults 60 years of age and older may receive a single dose of RSV vaccine using shared clinical decision-making (SCDM)   Tetanus (Td) Vaccine - COST NOT COVERED BY MEDICARE PART B Following completion of primary series, a booster dose should be given every 10 years to maintain immunity against tetanus. Td may also be given as tetanus wound prophylaxis.   Tdap Vaccine - COST NOT COVERED BY MEDICARE PART B Recommended at least once for all adults. For pregnant patients, recommended with each pregnancy.   Shingles Vaccine (Shingrix) - COST NOT COVERED BY MEDICARE PART B  2 shot series recommended in those 19 years and older who have or will have weakened immune systems or those 50 years and older     Health Maintenance Due:      Topic Date Due   • Colorectal Cancer Screening  11/30/2027   • Hepatitis C Screening  Completed     Immunizations Due:      Topic Date Due   • Hepatitis A Vaccine (1 of 2 - Risk 2-dose series) Never done     Advance Directives   What are advance directives?  Advance directives are legal documents that state your wishes and plans for medical care. These plans are made ahead of time in case you lose your ability to make decisions for yourself. Advance directives can apply to any medical decision, such as the treatments you want, and if you want to donate organs.   What are the types of advance directives?  There are many types of advance directives, and each state has rules about how to use them. You may choose a combination of any of the following:  Living  will:  This is a written record of the treatment you want. You can also choose which treatments you do not want, which to limit, and which to stop at a certain time. This includes surgery, medicine, IV fluid, and tube feedings.   Durable power of  for healthcare (DPAHC):  This is a written record that states who you want to make healthcare choices for you when you are unable to make them for yourself. This person, called a proxy, is usually a family member or a friend. You may choose more than 1 proxy.  Do not resuscitate (DNR) order:  A DNR order is used in case your heart stops beating or you stop breathing. It is a request not to have certain forms of treatment, such as CPR. A DNR order may be included in other types of advance directives.  Medical directive:  This covers the care that you want if you are in a coma, near death, or unable to make decisions for yourself. You can list the treatments you want for each condition. Treatment may include pain medicine, surgery, blood transfusions, dialysis, IV or tube feedings, and a ventilator (breathing machine).  Values history:  This document has questions about your views, beliefs, and how you feel and think about life. This information can help others choose the care that you would choose.  Why are advance directives important?  An advance directive helps you control your care. Although spoken wishes may be used, it is better to have your wishes written down. Spoken wishes can be misunderstood, or not followed. Treatments may be given even if you do not want them. An advance directive may make it easier for your family to make difficult choices about your care.   Fall Prevention    Fall prevention  includes ways to make your home and other areas safer. It also includes ways you can move more carefully to prevent a fall. Health conditions that cause changes in your blood pressure, vision, or muscle strength and coordination may increase your risk for falls.  Medicines may also increase your risk for falls if they make you dizzy, weak, or sleepy.   Fall prevention tips:   Stand or sit up slowly.    Use assistive devices as directed.    Wear shoes that fit well and have soles that .    Wear a personal alarm.    Stay active.    Manage your medical conditions.    Home Safety Tips:  Add items to prevent falls in the bathroom.    Keep paths clear.    Install bright lights in your home.    Keep items you use often on shelves within reach.    Paint or place reflective tape on the edges of your stairs.    Cigarette Smoking and Your Health   Risks to your health if you smoke:  Nicotine and other chemicals found in tobacco damage every cell in your body. Even if you are a light smoker, you have an increased risk for cancer, heart disease, and lung disease. If you are pregnant or have diabetes, smoking increases your risk for complications.   Benefits to your health if you stop smoking:   You decrease respiratory symptoms such as coughing, wheezing, and shortness of breath.   You reduce your risk for cancers of the lung, mouth, throat, kidney, bladder, pancreas, stomach, and cervix. If you already have cancer, you increase the benefits of chemotherapy. You also reduce your risk for cancer returning or a second cancer from developing.   You reduce your risk for heart disease, blood clots, heart attack, and stroke.   You reduce your risk for lung infections, and diseases such as pneumonia, asthma, chronic bronchitis, and emphysema.  Your circulation improves. More oxygen can be delivered to your body. If you have diabetes, you lower your risk for complications, such as kidney, artery, and eye diseases. You also lower your risk for nerve damage. Nerve damage can lead to amputations, poor vision, and blindness.  You improve your body's ability to heal and to fight infections.  For more information and support to stop smoking:   Smoke[employer]  Phone: 9- 944 - 064-9845  Web Address:  www.smokefree.gov  How to Quit Using Smokeless Tobacco   Why it is important to stop using smokeless tobacco:  Smokeless tobacco comes in many forms. Examples include chew, snuff, dip, dissolvable tobacco, and snus. All smokeless tobacco products contain nicotine and may contain as much nicotine as 3 cigarettes. You may be physically dependent on nicotine. You may also be emotionally addicted to it. The cravings can be strong, but it is important to quit using smokeless tobacco. You will improve your health and decrease your cancer, stroke, and heart attack risk. Mouth sores and tooth problems will also improve when you quit. You can benefit from quitting no matter how long you have used smokeless tobacco.   Prepare to stop using smokeless tobacco:  Nicotine is a highly addictive drug. Withdrawal symptoms can happen when you stop and make it hard to quit. The following can help keep you on track:  Set a quit date.    Tell friends, family, and coworkers that you plan to quit.    Remove all smokeless tobacco products from your home, car, and workplace.    Manage weight gain after you quit:  Nicotine can affect your metabolism. You may gain a few pounds after you quit. The following can help you control your weight:  Eat healthy foods.    Drink water before, during, and between meals.    Exercise as directed.      Weight Management   Why it is important to manage your weight:  Being overweight increases your risk of health conditions such as heart disease, high blood pressure, type 2 diabetes, and certain types of cancer. It can also increase your risk for osteoarthritis, sleep apnea, and other respiratory problems. Aim for a slow, steady weight loss. Even a small amount of weight loss can lower your risk of health problems.  How to lose weight safely:  A safe and healthy way to lose weight is to eat fewer calories and get regular exercise. You can lose up about 1 pound a week by decreasing the number of calories you  eat by 500 calories each day.   Healthy meal plan for weight management:  A healthy meal plan includes a variety of foods, contains fewer calories, and helps you stay healthy. A healthy meal plan includes the following:  Eat whole-grain foods more often.  A healthy meal plan should contain fiber. Fiber is the part of grains, fruits, and vegetables that is not broken down by your body. Whole-grain foods are healthy and provide extra fiber in your diet. Some examples of whole-grain foods are whole-wheat breads and pastas, oatmeal, brown rice, and bulgur.  Eat a variety of vegetables every day.  Include dark, leafy greens such as spinach, kale, carito greens, and mustard greens. Eat yellow and orange vegetables such as carrots, sweet potatoes, and winter squash.   Eat a variety of fruits every day.  Choose fresh or canned fruit (canned in its own juice or light syrup) instead of juice. Fruit juice has very little or no fiber.  Eat low-fat dairy foods.  Drink fat-free (skim) milk or 1% milk. Eat fat-free yogurt and low-fat cottage cheese. Try low-fat cheeses such as mozzarella and other reduced-fat cheeses.  Choose meat and other protein foods that are low in fat.  Choose beans or other legumes such as split peas or lentils. Choose fish, skinless poultry (chicken or turkey), or lean cuts of red meat (beef or pork). Before you cook meat or poultry, cut off any visible fat.   Use less fat and oil.  Try baking foods instead of frying them. Add less fat, such as margarine, sour cream, regular salad dressing and mayonnaise to foods. Eat fewer high-fat foods. Some examples of high-fat foods include french fries, doughnuts, ice cream, and cakes.  Eat fewer sweets.  Limit foods and drinks that are high in sugar. This includes candy, cookies, regular soda, and sweetened drinks.  Exercise:  Exercise at least 30 minutes per day on most days of the week. Some examples of exercise include walking, biking, dancing, and swimming. You  can also fit in more physical activity by taking the stairs instead of the elevator or parking farther away from stores. Ask your healthcare provider about the best exercise plan for you.      © Copyright [employer] 2018 Information is for End User's use only and may not be sold, redistributed or otherwise used for commercial purposes. All illustrations and images included in CareNotes® are the copyrighted property of [employer] or [employer]

## 2024-05-21 NOTE — PROGRESS NOTES
Ambulatory Visit  Name: Fernando Roberts      : 1950      MRN: [id number]  Encounter Provider: DAYAMI Pedroza  Encounter Date: 2024   Encounter department: [facility]    Assessment & Plan   1. Medicare annual wellness visit, subsequent  2. Chronic obstructive pulmonary disease, unspecified COPD type (HCC)  Assessment & Plan:  Emphysema changes on CT chest 2019.   Continue to recommend smoking cessation.   Overall asymptomatic.   Rare albuterol use.      3. Type 2 diabetes mellitus with diabetic neuropathy, without long-term current use of insulin (HCC)  Assessment & Plan:    Lab Results   Component Value Date    HGBA1C 7.1 (H) 2024     Stable A1c at 7.1%.   Continue current medications.   Try to reduce evening snacking.   Orders:  -     Albumin / creatinine urine ratio  4. Paroxysmal atrial fibrillation (HCC)  Assessment & Plan:  Remote history of a. Fib.   Anticoagulated on Coumadin for history of DVT.  5. Elevated LFTs  -     Hepatic function panel; Future  -     Gamma GT; Future  -     Chronic Hepatitis Panel; Future  -     SEAMUS Screen w/ Reflex to Titer/Pattern; Future  -     Iron Panel (Includes Ferritin, Iron Sat%, Iron, and TIBC); Future  -     Alpha-1-antitrypsin; Future  -     Ambulatory Referral to Gastroenterology; Future  6. Encounter for screening for other viral diseases  -     Chronic Hepatitis Panel; Future  7. Smoking  Assessment & Plan:  1 cigar per day.   Smoking cessation advised.   8. Muscle cramping  Assessment & Plan:  Has recently stopped simvastatin 24 and will be stopping fenofibarate 24 under the direction of Dr. Jay.   9. Mixed hyperlipidemia  Assessment & Plan:  Simvastatin and Fenofibrate stopping to see if these are contributing to muscle cramps as per Dr. Jay.   10. Gastroesophageal reflux disease without esophagitis  Assessment & Plan:  Last EGD 2019 was normal.   Would like to try to wean off omeprazole again, and transition to  Pepcid.   He will start omeprazole every other day, alternating with Pepcid.     11. Essential hypertension  Assessment & Plan:  Stable blood pressure on lisinopril.         Blood work with next INR due in Early June.   Follow up in 6 months for recheck.       Depression Screening and Follow-up Plan: Patient was screened for depression during today's encounter. They screened negative with a PHQ-2 score of 0.      Preventive health issues were discussed with patient, and age appropriate screening tests were ordered as noted in patient's After Visit Summary. Personalized health advice and appropriate referrals for health education or preventive services given if needed, as noted in patient's After Visit Summary.    History of Present Illness     Fernando Roberts is a 73 year old male presenting today for annual medicare wellness visit and follow up on chronic conditions.     Has stopped Simvastatin on May 7th under the direction of Dr. Jay for leg cramps.   Will be stopping fenofibrate on 5/28 as well per Dr. Jay.     Feels he is taking too many medications.   Would like to try to wean off gabapentin for neuropathy.   Does not feel it is effective and does not want to increase dose.     Elevated liver functions.   Drinks 1 drink, one ounce of scotch daily for 5-6 days per week with cigar.  Using Tylenol as needed, but not often.     Eats well during the day, but has a lot of snacks in the evening pretzel nuggets, kellogs breakfast bar, reeses cup, small peppermint tami.                   Patient Care Team:  DAYAMI Pedroza as PCP - General (Family Medicine)  Rowdy Oneal MD (Ophthalmology)  Christopher Black MD (Otolaryngology)  Levon Westbrook DDS  Naveed Greer DDS (Oral Surgery)  Sourav Boyle DPM (Podiatry)  Filipe Thomas DO (Ophthalmology)    Review of Systems   Constitutional: Negative.    HENT: Negative.     Respiratory: Negative.     Cardiovascular: Negative.    Gastrointestinal: Negative.     Genitourinary: Negative.    Musculoskeletal:  Positive for myalgias (cramps).   Skin: Negative.    Neurological: Negative.    Hematological: Negative.    Psychiatric/Behavioral: Negative.       Medical History Reviewed by provider this encounter:       Annual Wellness Visit Questionnaire   Fernando is here for his Subsequent Wellness visit.     Health Risk Assessment:   Patient rates overall health as good. Patient feels that their physical health rating is same. Patient is very satisfied with their life. Eyesight was rated as slightly worse. Hearing was rated as slightly worse. Patient feels that their emotional and mental health rating is same. Patients states they are sometimes angry. Patient states they are sometimes unusually tired/fatigued. Pain experienced in the last 7 days has been some. Patient's pain rating has been 3/10. Patient states that he has experienced no weight loss or gain in last 6 months. Too much private information to be posted over the internet.    Depression Screening:   PHQ-2 Score: 0      Fall Risk Screening:   In the past year, patient has experienced: history of falling in past year    Number of falls: 1    Home Safety:  Patient does not have trouble with stairs inside or outside of their home. Patient has working smoke alarms and has no working carbon monoxide detector. Home safety hazards include: none.     Nutrition:   Current diet is Regular.     Medications:   Patient is currently taking over-the-counter supplements. OTC medications include: see medication list. Patient is able to manage medications.     Activities of Daily Living (ADLs)/Instrumental Activities of Daily Living (IADLs):   Walk and transfer into and out of bed and chair?: Yes  Dress and groom yourself?: Yes    Bathe or shower yourself?: Yes    Feed yourself? Yes  Do your laundry/housekeeping?: Yes  Manage your money, pay your bills and track your expenses?: Yes  Make your own meals?: Yes    Do your own shopping?:  Yes    Previous Hospitalizations:   Any hospitalizations or ED visits within the last 12 months?: Yes    How many hospitalizations have you had in the last year?: 1-2    Advance Care Planning:   Living will: No    Durable POA for healthcare: No    Advanced directive: No    Advanced directive counseling given: Yes      Cognitive Screening:   Provider or family/friend/caregiver concerned regarding cognition?: No    PREVENTIVE SCREENINGS      Cardiovascular Screening:    General: Screening Not Indicated and History Lipid Disorder      Diabetes Screening:     General: Screening Not Indicated and History Diabetes      Colorectal Cancer Screening:     General: Screening Current      Osteoporosis Screening:    General: Screening Not Indicated      Abdominal Aortic Aneurysm (AAA) Screening:    Risk factors include: age between 65-74 yo and tobacco use        General: Screening Current      Lung Cancer Screening:     General: Screening Not Indicated      Hepatitis C Screening:    General: Screening Current    Screening, Brief Intervention, and Referral to Treatment (SBIRT)    Screening  Typical number of drinks in a day: 1  Typical number of drinks in a week: 4  Interpretation: Low risk drinking behavior.    AUDIT-C Screenin) How often did you have a drink containing alcohol in the past year? 2 to 3 times a week  2) How many drinks did you have on a typical day when you were drinking in the past year? 1 to 2  3) How often did you have 6 or more drinks on one occasion in the past year? never    AUDIT-C Score: 3  Interpretation: Score 0-3 (male): Negative screen for alcohol misuse    Single Item Drug Screening:  How often have you used an illegal drug (including marijuana) or a prescription medication for non-medical reasons in the past year? never    Single Item Drug Screen Score: 0  Interpretation: Negative screen for possible drug use disorder    Brief Intervention  Alcohol & drug use screenings were reviewed. No  "concerns regarding substance use disorder identified.     Social Determinants of Health     Financial Resource Strain: Low Risk  (5/18/2023)    Overall Financial Resource Strain (CARDIA)     Difficulty of Paying Living Expenses: Not hard at all   Food Insecurity: No Food Insecurity (5/21/2024)    Hunger Vital Sign     Worried About Running Out of Food in the Last Year: Never true     Ran Out of Food in the Last Year: Never true   Transportation Needs: No Transportation Needs (5/21/2024)    PRAPARE - Transportation     Lack of Transportation (Medical): No     Lack of Transportation (Non-Medical): No   Housing Stability: Low Risk  (5/21/2024)    Housing Stability Vital Sign     Unable to Pay for Housing in the Last Year: No     Number of Times Moved in the Last Year: 1     Homeless in the Last Year: No   Utilities: Not At Risk (5/21/2024)    [facility] Utilities     Threatened with loss of utilities: No     No results found.    Objective     /72   Pulse 73   Temp 98.4 °F (36.9 °C) (Temporal)   Resp 16   Ht 5' 11\" (1.803 m)   Wt 97.5 kg (215 lb)   SpO2 95%   BMI 29.99 kg/m²     Physical Exam  Vitals and nursing note reviewed.   Constitutional:       General: He is not in acute distress.     Appearance: Normal appearance. He is not ill-appearing.   HENT:      Head: Atraumatic.      Right Ear: Tympanic membrane normal.      Left Ear: Tympanic membrane normal.      Mouth/Throat:      Mouth: Mucous membranes are moist.      Pharynx: Oropharynx is clear.   Eyes:      Conjunctiva/sclera: Conjunctivae normal.   Cardiovascular:      Rate and Rhythm: Normal rate and regular rhythm.      Heart sounds: No murmur heard.  Pulmonary:      Effort: Pulmonary effort is normal.      Breath sounds: Normal breath sounds.   Musculoskeletal:      Cervical back: Normal range of motion and neck supple.      Right lower leg: No edema.      Left lower leg: No edema.   Lymphadenopathy:      Cervical: No cervical adenopathy.   Skin:     " Findings: No rash.   Neurological:      Mental Status: He is alert.   Psychiatric:         Mood and Affect: Mood normal.       Administrative Statements

## 2024-05-27 PROBLEM — K80.10 CHRONIC CHOLECYSTITIS WITH CALCULUS: Status: RESOLVED | Noted: 2024-01-31 | Resolved: 2024-05-27

## 2024-05-27 NOTE — ASSESSMENT & PLAN NOTE
Last EGD 2019 was normal.   Would like to try to wean off omeprazole again, and transition to Pepcid.   He will start omeprazole every other day, alternating with Pepcid.

## 2024-05-27 NOTE — ASSESSMENT & PLAN NOTE
Simvastatin and Fenofibrate stopping to see if these are contributing to muscle cramps as per Dr. Jay.

## 2024-05-27 NOTE — ASSESSMENT & PLAN NOTE
Has recently stopped simvastatin 5/7/24 and will be stopping fenofibarate 5/28/24 under the direction of Dr. Jay.

## 2024-05-27 NOTE — ASSESSMENT & PLAN NOTE
Emphysema changes on CT chest 2019.   Continue to recommend smoking cessation.   Overall asymptomatic.   Rare albuterol use.

## 2024-05-27 NOTE — ASSESSMENT & PLAN NOTE
Lab Results   Component Value Date    HGBA1C 7.1 (H) 05/06/2024     Stable A1c at 7.1%.   Continue current medications.   Try to reduce evening snacking.

## 2024-06-03 ENCOUNTER — APPOINTMENT (OUTPATIENT)
Dept: LAB | Facility: AMBULARY SURGERY CENTER | Age: 74
End: 2024-06-03
Payer: MEDICARE

## 2024-06-03 ENCOUNTER — ANTICOAG VISIT (OUTPATIENT)
Dept: CARDIOLOGY CLINIC | Facility: CLINIC | Age: 74
End: 2024-06-03

## 2024-06-03 DIAGNOSIS — R79.89 ELEVATED LFTS: ICD-10-CM

## 2024-06-03 DIAGNOSIS — Z11.59 ENCOUNTER FOR SCREENING FOR OTHER VIRAL DISEASES: ICD-10-CM

## 2024-06-03 DIAGNOSIS — I48.0 PAROXYSMAL ATRIAL FIBRILLATION (HCC): ICD-10-CM

## 2024-06-03 DIAGNOSIS — Z86.718 HISTORY OF DVT (DEEP VEIN THROMBOSIS): Primary | ICD-10-CM

## 2024-06-03 LAB
ALBUMIN SERPL BCP-MCNC: 3.8 G/DL (ref 3.5–5)
ALP SERPL-CCNC: 89 U/L (ref 34–104)
ALT SERPL W P-5'-P-CCNC: 62 U/L (ref 7–52)
ANA SER QL IA: POSITIVE
AST SERPL W P-5'-P-CCNC: 54 U/L (ref 13–39)
BILIRUB DIRECT SERPL-MCNC: 0.1 MG/DL (ref 0–0.2)
BILIRUB SERPL-MCNC: 0.56 MG/DL (ref 0.2–1)
FERRITIN SERPL-MCNC: 214 NG/ML (ref 24–336)
GGT SERPL-CCNC: 254 U/L (ref 9–64)
HBV CORE AB SER QL: NORMAL
HBV CORE IGM SER QL: NORMAL
HBV SURFACE AG SER QL: NORMAL
HCV AB SER QL: NORMAL
IRON SATN MFR SERPL: 24 % (ref 15–50)
IRON SERPL-MCNC: 79 UG/DL (ref 50–212)
PROT SERPL-MCNC: 6.7 G/DL (ref 6.4–8.4)
TIBC SERPL-MCNC: 330 UG/DL (ref 250–450)
UIBC SERPL-MCNC: 251 UG/DL (ref 155–355)

## 2024-06-03 PROCEDURE — 86704 HEP B CORE ANTIBODY TOTAL: CPT

## 2024-06-03 PROCEDURE — 83540 ASSAY OF IRON: CPT

## 2024-06-03 PROCEDURE — 83550 IRON BINDING TEST: CPT

## 2024-06-03 PROCEDURE — 82103 ALPHA-1-ANTITRYPSIN TOTAL: CPT

## 2024-06-03 PROCEDURE — 86235 NUCLEAR ANTIGEN ANTIBODY: CPT

## 2024-06-03 PROCEDURE — 86225 DNA ANTIBODY NATIVE: CPT

## 2024-06-03 PROCEDURE — 86705 HEP B CORE ANTIBODY IGM: CPT

## 2024-06-03 PROCEDURE — 80076 HEPATIC FUNCTION PANEL: CPT

## 2024-06-03 PROCEDURE — 87340 HEPATITIS B SURFACE AG IA: CPT

## 2024-06-03 PROCEDURE — 82977 ASSAY OF GGT: CPT

## 2024-06-03 PROCEDURE — 86038 ANTINUCLEAR ANTIBODIES: CPT

## 2024-06-03 PROCEDURE — 82728 ASSAY OF FERRITIN: CPT

## 2024-06-03 PROCEDURE — 86803 HEPATITIS C AB TEST: CPT

## 2024-06-03 PROCEDURE — 86039 ANTINUCLEAR ANTIBODIES (ANA): CPT

## 2024-06-03 NOTE — PROGRESS NOTES
Spoke with patient, advised INR low, doesn't think he missed any doses, states he did stop simvastatin about 3 weeks ago.  Advised that might affect INR. Advised to take 7.5 mg tonight only, then take 5 mg Mon Wed Fri, 2.5 mg all other days, will recheck in 2 weeks 6/17/24

## 2024-06-04 LAB — A1AT SERPL-MCNC: 166 MG/DL (ref 101–187)

## 2024-06-05 LAB
ANA HOMOGEN SER QL IF: NORMAL
ANA HOMOGEN TITR SER: NORMAL {TITER}
DSDNA AB SER-ACNC: <4 IU/ML
ENA RNP AB SER IA-ACNC: NEGATIVE
ENA SM AB SER IA-ACNC: NEGATIVE
ENA SM+RNP IGG SER-ACNC: NEGATIVE
ENA SS-A AB SER IA-ACNC: POSITIVE
ENA SS-B AB SER IA-ACNC: NEGATIVE

## 2024-06-06 ENCOUNTER — TELEPHONE (OUTPATIENT)
Dept: FAMILY MEDICINE CLINIC | Facility: CLINIC | Age: 74
End: 2024-06-06

## 2024-06-06 DIAGNOSIS — I10 ESSENTIAL HYPERTENSION: ICD-10-CM

## 2024-06-06 DIAGNOSIS — E78.2 MIXED HYPERLIPIDEMIA: ICD-10-CM

## 2024-06-06 DIAGNOSIS — E11.40 TYPE 2 DIABETES MELLITUS WITH DIABETIC NEUROPATHY, WITHOUT LONG-TERM CURRENT USE OF INSULIN (HCC): Primary | ICD-10-CM

## 2024-06-06 NOTE — TELEPHONE ENCOUNTER
----- Message from DAYAMI Delgado sent at 6/6/2024  2:43 PM EDT -----  Please contact Fernando with results of blood work.  Liver functions remain elevated. One new liver function checked this time was GGT, this is high and tends to be high with too much alcohol consumption. Please try to decrease alcohol intake.   SEAMUS test and Sjogren's antibody testing are positive, these are markers for autoimmune disorders, which can affect the liver.   I would like you to see a rheumatologist as well as the gastroenterologist.   Please see Dr. Galaviz [phone].  Please let me know if you have any questions.

## 2024-06-17 ENCOUNTER — ANTICOAG VISIT (OUTPATIENT)
Dept: CARDIOLOGY CLINIC | Facility: CLINIC | Age: 74
End: 2024-06-17

## 2024-06-17 ENCOUNTER — APPOINTMENT (OUTPATIENT)
Dept: LAB | Facility: AMBULARY SURGERY CENTER | Age: 74
End: 2024-06-17
Payer: MEDICARE

## 2024-06-17 DIAGNOSIS — Z86.718 HISTORY OF DVT (DEEP VEIN THROMBOSIS): Primary | ICD-10-CM

## 2024-06-17 NOTE — PROGRESS NOTES
Spoke with patient, advised INR good, continue 5 mg Mon Wed Fri, 5 mg all other days, will recheck in 3 weeks 7/8/24

## 2024-06-18 DIAGNOSIS — E78.2 MIXED HYPERLIPIDEMIA: Primary | ICD-10-CM

## 2024-07-06 DIAGNOSIS — I10 ESSENTIAL HYPERTENSION: ICD-10-CM

## 2024-07-07 RX ORDER — LISINOPRIL 20 MG/1
TABLET ORAL
Qty: 90 TABLET | Refills: 1 | Status: SHIPPED | OUTPATIENT
Start: 2024-07-07

## 2024-07-09 ENCOUNTER — APPOINTMENT (OUTPATIENT)
Dept: LAB | Facility: AMBULARY SURGERY CENTER | Age: 74
End: 2024-07-09
Payer: MEDICARE

## 2024-07-09 ENCOUNTER — ANTICOAG VISIT (OUTPATIENT)
Dept: CARDIOLOGY CLINIC | Facility: CLINIC | Age: 74
End: 2024-07-09

## 2024-07-09 DIAGNOSIS — E78.2 MIXED HYPERLIPIDEMIA: ICD-10-CM

## 2024-07-09 DIAGNOSIS — Z86.718 HISTORY OF DVT (DEEP VEIN THROMBOSIS): Primary | ICD-10-CM

## 2024-07-09 LAB
CHOLEST SERPL-MCNC: 251 MG/DL
HDLC SERPL-MCNC: 30 MG/DL
LDLC SERPL DIRECT ASSAY-MCNC: 108 MG/DL (ref 0–100)
TRIGL SERPL-MCNC: 514 MG/DL

## 2024-07-09 PROCEDURE — 80061 LIPID PANEL: CPT

## 2024-07-09 PROCEDURE — 83721 ASSAY OF BLOOD LIPOPROTEIN: CPT

## 2024-07-09 NOTE — PROGRESS NOTES
Spoke with patient, advised INR low, states he most likely has been eating more green vegetables, advised to take 7.5 mg tonight only, then 2.5 mg Mon Wed Fri, 5 mg all other days, will recheck 7/23/24

## 2024-07-19 ENCOUNTER — OFFICE VISIT (OUTPATIENT)
Dept: PODIATRY | Facility: CLINIC | Age: 74
End: 2024-07-19
Payer: MEDICARE

## 2024-07-19 VITALS — HEART RATE: 85 BPM | DIASTOLIC BLOOD PRESSURE: 80 MMHG | SYSTOLIC BLOOD PRESSURE: 154 MMHG

## 2024-07-19 DIAGNOSIS — I70.213 ATHEROSCLEROSIS OF NATIVE ARTERY OF BOTH LOWER EXTREMITIES WITH INTERMITTENT CLAUDICATION (HCC): ICD-10-CM

## 2024-07-19 DIAGNOSIS — B35.1 TINEA UNGUIUM: ICD-10-CM

## 2024-07-19 DIAGNOSIS — E11.40 TYPE 2 DIABETES MELLITUS WITH DIABETIC NEUROPATHY, WITHOUT LONG-TERM CURRENT USE OF INSULIN (HCC): Primary | ICD-10-CM

## 2024-07-19 DIAGNOSIS — L84 CALLUS OF FOOT: ICD-10-CM

## 2024-07-19 PROCEDURE — 11721 DEBRIDE NAIL 6 OR MORE: CPT | Performed by: PODIATRIST

## 2024-07-19 PROCEDURE — 11056 PARNG/CUTG B9 HYPRKR LES 2-4: CPT | Performed by: PODIATRIST

## 2024-07-19 PROCEDURE — RECHECK: Performed by: PODIATRIST

## 2024-07-19 NOTE — PROGRESS NOTES
"Fernando GUERRERO Armando  1950  AT RISK FOOT CARE    1. Type 2 diabetes mellitus with diabetic neuropathy, without long-term current use of insulin (HCC)  Lesion Destruction      2. Atherosclerosis of native artery of both lower extremities with intermittent claudication (HCC)  Lesion Destruction      3. Tinea unguium        4. Callus of foot  Lesion Destruction          Patient presents for at-risk foot care.  Patient has no acute concerns today.  Patient has significant lower extremity risk due to diminished pulses in the feet and trophic skin changes to the lower extremity including thick toenail, atrophic skin, and decreased hair growth.      On exam patient has thickened, hypertrophic, discolored, brittle toenails with subungual debris and tenderness x10   Callus: B/L great toe  Patient has diminished pedal pulses and decreased perfusion to the lower extremities  Patient has significant trophic changes to the skin including thick toenails, decreased pedal hair and atrophic skin.     Today's treatment includes:  Debridement of toenails. Using nail nipper, nadya, and curette, nails were sharply debrided, reduced in thickness and length. Devitalized nail tissue and fungal debris excised and removed. Patient tolerated well.      Lesion Destruction    Date/Time: 7/19/2024 9:15 AM    Performed by: Sourav Boyle DPM  Authorized by: Sourav Boyle DPM  Universal Protocol:  Consent: Verbal consent obtained.  Risks and benefits: risks, benefits and alternatives were discussed  Consent given by: patient  Time out: Immediately prior to procedure a \"time out\" was called to verify the correct patient, procedure, equipment, support staff and site/side marked as required.  Timeout called at: 7/19/2024 9:28 AM.  Patient understanding: patient states understanding of the procedure being performed  Patient identity confirmed: verbally with patient    Procedure Details - Lesion Destruction:     Number of Lesions:  2  Lesion " 1:     Body area:  Lower extremity    Lower extremity location:  R big toe    Malignancy: benign hyperkeratotic lesion      Destruction method: scissors used for extraction    Lesion 2:     Body area:  Lower extremity    Lower extremity location:  L big toe    Malignancy: benign hyperkeratotic lesion      Destruction method: scissors used for extraction        Discussed proper shoe gear, daily inspections of feet, and general foot health with patient. Patient has Q8  findings and is recommended for at risk foot care every 9-10 weeks.    Patients most recent complete clinical foot exam was on: 2/29/2024

## 2024-07-22 ENCOUNTER — ANTICOAG VISIT (OUTPATIENT)
Dept: CARDIOLOGY CLINIC | Facility: CLINIC | Age: 74
End: 2024-07-22

## 2024-07-22 ENCOUNTER — APPOINTMENT (OUTPATIENT)
Dept: LAB | Facility: AMBULARY SURGERY CENTER | Age: 74
End: 2024-07-22
Payer: MEDICARE

## 2024-07-22 DIAGNOSIS — Z86.718 HISTORY OF DVT (DEEP VEIN THROMBOSIS): Primary | ICD-10-CM

## 2024-07-22 NOTE — PROGRESS NOTES
Spoke with patient, advised INR now at top of goal. States he was restarted simvastatin and fenofibrate about 1 week ago.  May affect INR.  Advised to take 2.5 mg tonight, then 5 mg Mon Wed Fri, 2.5 mg all other days, will recheck in 2 weeks 8/5/24

## 2024-08-01 ENCOUNTER — APPOINTMENT (OUTPATIENT)
Dept: LAB | Facility: AMBULARY SURGERY CENTER | Age: 74
End: 2024-08-01
Payer: MEDICARE

## 2024-08-01 ENCOUNTER — ANTICOAG VISIT (OUTPATIENT)
Dept: CARDIOLOGY CLINIC | Facility: CLINIC | Age: 74
End: 2024-08-01

## 2024-08-01 DIAGNOSIS — Z86.718 HISTORY OF DVT (DEEP VEIN THROMBOSIS): Primary | ICD-10-CM

## 2024-08-01 NOTE — PROGRESS NOTES
Spoke with patient, advised INR good, will continue 5 mg Mon Wed Fri, 2.5 mg all other days, will recheck in 2 weeks 8/15/24

## 2024-08-08 DIAGNOSIS — E11.40 TYPE 2 DIABETES MELLITUS WITH DIABETIC NEUROPATHY, WITHOUT LONG-TERM CURRENT USE OF INSULIN (HCC): ICD-10-CM

## 2024-08-08 RX ORDER — GLIMEPIRIDE 2 MG/1
TABLET ORAL
Qty: 135 TABLET | Refills: 1 | Status: SHIPPED | OUTPATIENT
Start: 2024-08-08

## 2024-08-19 ENCOUNTER — ANTICOAG VISIT (OUTPATIENT)
Dept: CARDIOLOGY CLINIC | Facility: CLINIC | Age: 74
End: 2024-08-19

## 2024-08-19 ENCOUNTER — APPOINTMENT (OUTPATIENT)
Dept: LAB | Facility: AMBULARY SURGERY CENTER | Age: 74
End: 2024-08-19
Payer: MEDICARE

## 2024-08-19 DIAGNOSIS — R74.01 NONSPECIFIC ELEVATION OF LEVELS OF TRANSAMINASE OR LACTIC ACID DEHYDROGENASE (LDH): ICD-10-CM

## 2024-08-19 DIAGNOSIS — R74.02 NONSPECIFIC ELEVATION OF LEVELS OF TRANSAMINASE OR LACTIC ACID DEHYDROGENASE (LDH): ICD-10-CM

## 2024-08-19 DIAGNOSIS — Z86.718 HISTORY OF DVT (DEEP VEIN THROMBOSIS): Primary | ICD-10-CM

## 2024-08-19 LAB — CK SERPL-CCNC: 90 U/L (ref 39–308)

## 2024-08-19 PROCEDURE — 82550 ASSAY OF CK (CPK): CPT

## 2024-08-19 NOTE — PROGRESS NOTES
Spoke with patient, advised INR high, no changes in medications or diet. Advised will go back to 5 mg Mon Fri, 2.5 mg all other days, will recheck in 3 weeks 9/9/24

## 2024-08-28 ENCOUNTER — PRE-OP CATARACT MEASUREMENTS (OUTPATIENT)
Dept: URBAN - METROPOLITAN AREA CLINIC 6 | Facility: CLINIC | Age: 74
End: 2024-08-28

## 2024-08-28 DIAGNOSIS — H25.813: ICD-10-CM

## 2024-08-28 PROCEDURE — 92014 COMPRE OPH EXAM EST PT 1/>: CPT

## 2024-08-28 PROCEDURE — 92136 OPHTHALMIC BIOMETRY: CPT | Mod: 26,LT

## 2024-08-28 ASSESSMENT — KERATOMETRY
OD_K1POWER_DIOPTERS: 41.75
OS_AXISANGLE2_DEGREES: 65
OS_AXISANGLE_DEGREES: 155
OD_AXISANGLE2_DEGREES: 77
OD_K2POWER_DIOPTERS: 42.00
OS_K2POWER_DIOPTERS: 42.50
OD_AXISANGLE_DEGREES: 167
OS_K1POWER_DIOPTERS: 42.25

## 2024-08-28 ASSESSMENT — VISUAL ACUITY
OS_GLARE: 20/60-1
OU_CC: J1
OD_GLARE: 20/50+1
OD_CC: 20/25-2
OS_CC: 20/30-2

## 2024-08-28 ASSESSMENT — TONOMETRY
OD_IOP_MMHG: 16
OS_IOP_MMHG: 21

## 2024-09-05 ENCOUNTER — OFFICE VISIT (OUTPATIENT)
Dept: GASTROENTEROLOGY | Facility: AMBULARY SURGERY CENTER | Age: 74
End: 2024-09-05
Payer: MEDICARE

## 2024-09-05 VITALS
BODY MASS INDEX: 29.54 KG/M2 | HEIGHT: 71 IN | SYSTOLIC BLOOD PRESSURE: 136 MMHG | DIASTOLIC BLOOD PRESSURE: 70 MMHG | OXYGEN SATURATION: 99 % | HEART RATE: 74 BPM | WEIGHT: 211 LBS

## 2024-09-05 DIAGNOSIS — L29.9 PRURITUS: ICD-10-CM

## 2024-09-05 DIAGNOSIS — Z11.59 ENCOUNTER FOR SCREENING FOR OTHER VIRAL DISEASES: ICD-10-CM

## 2024-09-05 DIAGNOSIS — K76.0 HEPATIC STEATOSIS: ICD-10-CM

## 2024-09-05 DIAGNOSIS — R94.5 ABNORMAL RESULTS OF LIVER FUNCTION STUDIES: Primary | ICD-10-CM

## 2024-09-05 PROCEDURE — 99204 OFFICE O/P NEW MOD 45 MIN: CPT | Performed by: STUDENT IN AN ORGANIZED HEALTH CARE EDUCATION/TRAINING PROGRAM

## 2024-09-05 NOTE — PROGRESS NOTES
[facility] - Outpatient Consultation  Fernando Roberts 74 y.o. male MRN: [id number]  Encounter: [id number]    PCP:  DAYAMI Pedroza, [phone]  Referring Provider:  Hedy Monteiro CRNP, [phone]    Patient: Fernando Roberts, 1950  Reason for Referral: abnormal liver tests and hepatic steatosis    ASSESSMENT/PLAN:  74 y.o. male with history of DM, HLD, pAfib, remote bilateral parotid tumors, and DVT on coumadin who presents for initial evaluation for abnormal liver tests and hepatic steatosis.    He has had mild, chronic hepatitis dating back to 2020.  Serologic workup which showed an elevated GGT as well as positive SEAMUS and SSA.  His viral serologies were negative.  He had a RUQ US which showed hepatomegaly with steatosis.  His liver synthetic function and platelets are normal.    Concern for underlying autoimmune disease given his complaints of pruritus and +SSA in the context of bilateral parotid tumors. In addition, he has a family history of liver disease (although attributed to EtOH) as well as SLE. Would recommend completing a serologic work-up to evaluate for AIH and PBC and discussed the need for possible biopsy. He should also have an US abdomen and elastography to further evaluate his abdominal distension and assess for advanced fibrosis.    If his serologic work-up is unremarkable, then he likely has MASH given his metabolic risk factors. We discussed the natural history, prognosis, and complications of MASLD, including progression to cirrhosis as well as risk for cardiovascular events. We discussed that weight loss as well as aggressive modification of his metabolic risk factors are borrero in preventing progression of disease. Would consider addition of GLP1a if his DM worsens as GLP1a have been associated with improvement of steatohepatitis and regression of fibrosis.     - CBC, CMP and INR  - ASMA, AMA and quantitative immunoglobulins  - A1AT levels and phenotype  - Celiac panel  -  HAV IgG and HBsAb; vaccinate for HAV and HBV if non-immune  - US abdomen complete with elastography   - Obtain consult notes from his rheumatology visit      Desire Guidry MD  Division of Gastroenterology and Hepatology  [facility]    ============================================================================  CC/HPI: 74 y.o. male with history of DM, HLD, pAfib, remote bilateral parotid tumors, and DVT on coumadin who presents for initial evaluation for abnormal liver tests and hepatic steatosis.    He has had abnormal liver tests dating back to 2021 with ALT 60-70s. He had an US abdomen which showed hepatomegaly with steatosis. Serologic work-up did show elevated GGT as well as +SEAMUS and SSA. His viral serologies were negative.      He reports chronic pruritus as well as dry mouth, skin and eyes.  States that he needs a back scratcher on every floor for his pruritus.  He was seen a rheumatologist recently for positive autoantibodies but was not diagnosed with Sjogren's disease.    In his weight, he does report peak weight earlier this year in January but he has lost 12 pounds over the summer by increasing his physical activity.    He reports drinking 5 beverages weekly. He reports a family history of alcoholism and EtOH cirrhosis. His mother also had lupus.     He has been on statins for years and discontinued in May 2024 due to cramping.  However this was recently restarted in July 2024 without any changes in his liver chemistry trend.  Denies any herbal supplement use.    ROS: Complete review of systems otherwise negative.     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) 03/21/2019    Chronic cholecystitis with calculus 01/31/2024    Colon polyp     6 polyps 3 years ago    Deep vein thrombosis (HCC)     lower leg- left     Diabetes mellitus (HCC)     Diabetic neuropathic arthropathy (HCC)     causes weakness in LE and uses a cane as assist to walk    Diabetic neuropathy (HCC)     Fatty liver     GERD (gastroesophageal reflux disease)     Hyperlipidemia     Hypertension     Hyponatremia 09/11/2021    Kidney stone     Parotid tumor     bilateral    Seasonal allergies     Spinal stenosis     Ureteral stone with hydronephrosis 05/17/2019    Added automatically from request for surgery [id number]        Past Surgical History:   Procedure Laterality Date    CHOLECYSTECTOMY      COLONOSCOPY  05/16/2019    completed by Dr. Mckeon, Repeat 3 years    FL RETROGRADE PYELOGRAM  05/17/2019    PAROTIDECTOMY Bilateral     FL CYSTO/URETERO W/LITHOTRIPSY &INDWELL STENT INSRT Right 05/17/2019    Procedure: CYSTOSCOPY URETEROSCOPY WITH LITHOTRIPSY HOLMIUM LASER, RETROGRADE PYELOGRAM AND INSERTION STENT URETERAL--possible stent only;  Surgeon: Casey Snyder MD;  [facility];  Service: Urology    FL EXC PRTD ALEXA/PRTD GLND LAT [id number]&PRSRV FACIAL NR Right 10/18/2017    Procedure: SUPERFICIAL PAROTIDECTOMY WITH NERVE DISSECTION AND PRESERVATION;  Surgeon: Christopher Black MD;  [facility];  Service: ENT    FL LAPAROSCOPY SURG CHOLECYSTECTOMY N/A 01/12/2024    Procedure: CHOLECYSTECTOMY LAPAROSCOPIC W/ ROBOTICS;  Surgeon: Naveed Triplett DO;  [facility];  Service: General    FL TENDON SHEATH INCISION Right 11/03/2020    Procedure: RING TRIGGER FINGER RELEASE;  Surgeon: Marielena Calvert MD;  [facility];  Service: Orthopedics    ROTATOR CUFF REPAIR Bilateral     TONSILLECTOMY      UMBILICAL HERNIA REPAIR N/A 01/12/2024    Procedure: REPAIR HERNIA UMBILICAL, NO MESH;  Surgeon: Naveed Triplett DO;  [facility];  Service: General       FAMILY/SOCIAL HISTORY:  Family History   Problem Relation Age of Onset    Lupus Mother     Rheum arthritis Mother     Cirrhosis Father     Alcohol abuse Father     Substance Abuse  Brother        Social History     Tobacco Use    Smoking status: Light Smoker     Types: Pipe, Cigars    Smokeless tobacco: Current    Tobacco comments:     Pipe smoker   Vaping Use    Vaping status: Never Used   Substance Use Topics    Alcohol use: Yes     Alcohol/week: 1.0 standard drink of alcohol     Types: 1 Shots of liquor per week     Comment: daily    Drug use: No       MEDICATIONS:  Current Outpatient Medications on File Prior to Visit   Medication Sig Dispense Refill    acetaminophen (TYLENOL) 325 mg tablet 2, by mouth, every 6 hours as needed for mild to moderate pain. 30 tablet 0    albuterol (PROVENTIL HFA,VENTOLIN HFA) 90 mcg/act inhaler TAKE 2 PUFFS BY MOUTH EVERY 4 HOURS AS NEEDED FOR WHEEZE 18 g 1    aspirin (ECOTRIN LOW STRENGTH) 81 mg EC tablet Take 81 mg by mouth daily        Cyanocobalamin (VITAMIN B 12 PO) Take 1,000 mcg by mouth daily      fenofibrate micronized (LOFIBRA) 134 MG capsule TAKE 1 CAPSULE (134 MG TOTAL) BY MOUTH DAILY WITH BREAKFAST 90 capsule 3    gabapentin (NEURONTIN) 100 mg capsule TAKE 2 CAPSULES (200 MG TOTAL) BY MOUTH DAILY AT BEDTIME 180 capsule 1    glimepiride (AMARYL) 2 mg tablet TAKE 1 AND HALF TABLET (3 MG TOTAL) BY MOUTH DAILY WITH BREAKFAST 135 tablet 1    Januvia 100 MG tablet TAKE 1 TABLET BY MOUTH EVERY DAY 90 tablet 3    lisinopril (ZESTRIL) 20 mg tablet TAKE 1 TABLET BY MOUTH EVERY DAY 90 tablet 1    metFORMIN (GLUCOPHAGE-XR) 500 mg 24 hr tablet TAKE 2 TABLETS BY MOUTH TWICE A DAY WITH FOOD 360 tablet 3    omeprazole (PriLOSEC) 20 mg delayed release capsule TAKE 1 CAPSULE BY MOUTH EVERY DAY 90 capsule 1    tamsulosin (FLOMAX) 0.4 mg Take 1 capsule (0.4 mg total) by mouth daily with dinner 90 capsule 1    warfarin (COUMADIN) 2.5 mg tablet Take 1 tablet by mouth daily T-Th-S-S takes 2.5mg     M-W-F takes 5.0 mg      warfarin (COUMADIN) 5 mg tablet TAKE 1 TABLET EVERY DAY OR AS DIRECTED BY MD 90 tablet 3    simvastatin (ZOCOR) 40 mg tablet TAKE 1 TABLET BY MOUTH  "EVERY DAY (Patient not taking: Reported on 5/21/2024) 90 tablet 1     No current facility-administered medications on file prior to visit.       Allergies   Allergen Reactions    Pletal [Cilostazol] Tachycardia    Dyazide [Hydrochlorothiazide W-Triamterene] Other (See Comments)     Hyponatremia    Jardiance [Empagliflozin] Other (See Comments)     Constipation, increased urinary frequency--not tolerable       PHYSICAL EXAM:  /70 (BP Location: Left arm, Patient Position: Sitting, Cuff Size: Adult)   Pulse 74   Ht 5' 11\" (1.803 m)   Wt 95.7 kg (211 lb)   SpO2 99%   BMI 29.43 kg/m²   GENERAL: NAD, AAO  HEENT: anicteric, OP clear, MMM  ABDOMEN: distended abdomen, ?fluid wave  EXTREMITIES: no edema  SKIN: no rashes, no palmar erythema, no spider angiomata   NEURO: normal gait, no tremor, no asterixis     LABS/RADIOLOGY/ENDOSCOPY:  Lab Results   Component Value Date    WBC 8.16 05/06/2024    HGB 16.3 05/06/2024    HCT 50.9 (H) 05/06/2024     05/06/2024    GLUF 132 (H) 05/06/2024    BUN 9 05/06/2024    CREATININE 0.89 05/06/2024     (L) 12/14/2015    K 4.6 05/06/2024     05/06/2024    CO2 27 05/06/2024    PROT 7.3 12/14/2015    BILIDIR 0.10 06/03/2024    ALKPHOS 89 06/03/2024    ALT 62 (H) 06/03/2024    AST 54 (H) 06/03/2024    CALCIUM 9.1 05/06/2024    EGFR 85 05/06/2024    CHOL 150 12/14/2015    TRIG 514 (H) 07/09/2024    HDL 30 (L) 07/09/2024    INR 3.25 (H) 08/19/2024    PTT 34 05/17/2019     (H) 06/03/2024     RUQ US (11/2023)  1. Mild right hydronephrosis.  2. Enlarged fatty liver.  3. Cholelithiasis. Negative sonographic Haider sign.    MELD 3.0: 14 at 5/6/2024  8:34 AM  MELD-Na: 16 at 5/6/2024  8:34 AM  Calculated from:  Serum Creatinine: 0.89 mg/dL (Using min of 1 mg/dL) at 5/6/2024  8:34 AM  Serum Sodium: 136 mmol/L at 5/6/2024  8:34 AM  Total Bilirubin: 0.69 mg/dL (Using min of 1 mg/dL) at 5/6/2024  8:34 AM  Serum Albumin: 4.3 g/dL (Using max of 3.5 g/dL) at 5/6/2024  8:34 " AM  INR(ratio): 2.15 at 5/6/2024  8:34 AM  Age at listing (hypothetical): 73 years  Sex: Male at 5/6/2024  8:34 AM

## 2024-09-09 ENCOUNTER — CONSULT (OUTPATIENT)
Dept: FAMILY MEDICINE CLINIC | Facility: CLINIC | Age: 74
End: 2024-09-09
Payer: MEDICARE

## 2024-09-09 ENCOUNTER — ANTICOAG VISIT (OUTPATIENT)
Dept: CARDIOLOGY CLINIC | Facility: CLINIC | Age: 74
End: 2024-09-09

## 2024-09-09 ENCOUNTER — APPOINTMENT (OUTPATIENT)
Dept: LAB | Facility: CLINIC | Age: 74
End: 2024-09-09
Payer: MEDICARE

## 2024-09-09 VITALS
OXYGEN SATURATION: 97 % | WEIGHT: 210 LBS | SYSTOLIC BLOOD PRESSURE: 130 MMHG | HEART RATE: 68 BPM | BODY MASS INDEX: 29.4 KG/M2 | RESPIRATION RATE: 16 BRPM | HEIGHT: 71 IN | TEMPERATURE: 98.2 F | DIASTOLIC BLOOD PRESSURE: 70 MMHG

## 2024-09-09 DIAGNOSIS — H26.9 CATARACT OF BOTH EYES, UNSPECIFIED CATARACT TYPE: ICD-10-CM

## 2024-09-09 DIAGNOSIS — K76.0 FATTY LIVER: ICD-10-CM

## 2024-09-09 DIAGNOSIS — G62.9 PERIPHERAL POLYNEUROPATHY: ICD-10-CM

## 2024-09-09 DIAGNOSIS — E11.42 TYPE 2 DIABETES MELLITUS WITH DIABETIC POLYNEUROPATHY, WITHOUT LONG-TERM CURRENT USE OF INSULIN (HCC): ICD-10-CM

## 2024-09-09 DIAGNOSIS — Z86.718 HISTORY OF DVT (DEEP VEIN THROMBOSIS): Primary | ICD-10-CM

## 2024-09-09 DIAGNOSIS — Z01.818 PRE-OPERATIVE EXAMINATION: Primary | ICD-10-CM

## 2024-09-09 DIAGNOSIS — J30.9 ALLERGIC RHINITIS, UNSPECIFIED SEASONALITY, UNSPECIFIED TRIGGER: ICD-10-CM

## 2024-09-09 DIAGNOSIS — E11.40 TYPE 2 DIABETES MELLITUS WITH DIABETIC NEUROPATHY, WITHOUT LONG-TERM CURRENT USE OF INSULIN (HCC): ICD-10-CM

## 2024-09-09 DIAGNOSIS — R74.01 NONSPECIFIC ELEVATION OF LEVELS OF TRANSAMINASE OR LACTIC ACID DEHYDROGENASE (LDH): ICD-10-CM

## 2024-09-09 DIAGNOSIS — I10 ESSENTIAL HYPERTENSION: ICD-10-CM

## 2024-09-09 DIAGNOSIS — I73.9 INTERMITTENT CLAUDICATION (HCC): ICD-10-CM

## 2024-09-09 DIAGNOSIS — K21.9 GASTROESOPHAGEAL REFLUX DISEASE WITHOUT ESOPHAGITIS: ICD-10-CM

## 2024-09-09 DIAGNOSIS — J44.9 CHRONIC OBSTRUCTIVE PULMONARY DISEASE, UNSPECIFIED COPD TYPE (HCC): ICD-10-CM

## 2024-09-09 DIAGNOSIS — E78.2 MIXED HYPERLIPIDEMIA: ICD-10-CM

## 2024-09-09 DIAGNOSIS — R94.5 ABNORMAL RESULTS OF LIVER FUNCTION STUDIES: ICD-10-CM

## 2024-09-09 DIAGNOSIS — N23 RENAL COLIC ON RIGHT SIDE: ICD-10-CM

## 2024-09-09 DIAGNOSIS — Z11.59 ENCOUNTER FOR SCREENING FOR OTHER VIRAL DISEASES: ICD-10-CM

## 2024-09-09 DIAGNOSIS — I48.0 PAROXYSMAL ATRIAL FIBRILLATION (HCC): ICD-10-CM

## 2024-09-09 DIAGNOSIS — R74.02 NONSPECIFIC ELEVATION OF LEVELS OF TRANSAMINASE OR LACTIC ACID DEHYDROGENASE (LDH): ICD-10-CM

## 2024-09-09 DIAGNOSIS — F17.200 SMOKING: ICD-10-CM

## 2024-09-09 PROCEDURE — G2211 COMPLEX E/M VISIT ADD ON: HCPCS | Performed by: NURSE PRACTITIONER

## 2024-09-09 PROCEDURE — 99214 OFFICE O/P EST MOD 30 MIN: CPT | Performed by: NURSE PRACTITIONER

## 2024-09-09 RX ORDER — GABAPENTIN 100 MG/1
200 CAPSULE ORAL
Qty: 180 CAPSULE | Refills: 1 | Status: SHIPPED | OUTPATIENT
Start: 2024-09-09

## 2024-09-09 RX ORDER — ALBUTEROL SULFATE 90 UG/1
AEROSOL, METERED RESPIRATORY (INHALATION)
Qty: 8.5 G | Refills: 5 | Status: SHIPPED | OUTPATIENT
Start: 2024-09-09

## 2024-09-09 RX ORDER — SITAGLIPTIN 100 MG/1
TABLET, FILM COATED ORAL
Qty: 90 TABLET | Refills: 1 | Status: SHIPPED | OUTPATIENT
Start: 2024-09-09

## 2024-09-09 RX ORDER — TAMSULOSIN HYDROCHLORIDE 0.4 MG/1
0.4 CAPSULE ORAL
Qty: 90 CAPSULE | Refills: 1 | Status: SHIPPED | OUTPATIENT
Start: 2024-09-09

## 2024-09-09 RX ORDER — ROSUVASTATIN CALCIUM 20 MG/1
20 TABLET, COATED ORAL DAILY
Qty: 100 TABLET | Refills: 3 | Status: SHIPPED | OUTPATIENT
Start: 2024-09-09

## 2024-09-09 NOTE — PROGRESS NOTES
Left message for patient, advised INR good, continue 5 mg Mon Fri, 2.5 mg all other days, will recheck in 3 weeks 9/30/24  Advised to call with questions or concerns.

## 2024-09-09 NOTE — PROGRESS NOTES
FAMILY PRACTICE OFFICE VISIT       NAME: Fernando Roberts  AGE: 74 y.o. SEX: male       : 1950        MRN: [id number]    Assessment and Plan   1. Pre-operative examination    Cataract surgery scheduled:  Left eye , Right eye 10/8. Dr. Thomas.  Chronic conditions are stable. Fernando is acceptable risk to proceed with surgery as scheduled.    2. Cataract of both eyes, unspecified cataract type  3. Type 2 diabetes mellitus with diabetic neuropathy, without long-term current use of insulin ([facility])  Assessment & Plan:    Lab Results   Component Value Date    HGBA1C 7.1 (H) 2024       Stable A1c at 7.1%.   Continue current medications.   4. Chronic obstructive pulmonary disease, unspecified COPD type ([facility])  Assessment & Plan:  Emphysema changes on CT chest .   Continue to recommend smoking cessation.   Overall asymptomatic.   Rare albuterol use.         5. Paroxysmal atrial fibrillation (HCC)  Assessment & Plan:  Remote history of a. Fib.   Anticoagulated on Coumadin for history of DVT.  6. Essential hypertension  Assessment & Plan:  Continue lisinopril.  7. Mixed hyperlipidemia  Assessment & Plan:  He completed trial of holding simvastatin and fenofibrate for muscle cramps under the direction of Dr. Jay, cardiology.   Lipid panel 24 off medications: total cholesterol 251, triglycerides 514, HDL 30, .  Lipid panel 24 on medications: total cholesterol 170, triglycerides 313, HDL 39, LDL 68.   Restarted simvastatin and fenofibrate.  Muscle cramps were better off of medication, but not resolved.   Recommend stop simvastatin and trial rosuvastatin.   Continue fenofibrate.   Repeat blood work in November with follow up office visit. Orders previously provided.   Orders:  -     rosuvastatin (CRESTOR) 20 MG tablet; Take 1 tablet (20 mg total) by mouth daily  8. Smoking  Assessment & Plan:  5 cigars per week.   Also smokes a pipe--few puffs several times per day.   9. Intermittent claudication  (HCC)  Assessment & Plan:  Continue aspirin, statin.   Continue follow up with vascular surgery.      Lower extremity arterial doppler study December 2023:  RIGHT LOWER LIMB:  Evidence of 50-75% stenosis of the proximal superficial femoral artery and a  >75% stenosis of the mid superficial femoral artery, with diffuse disease  throughout the remainder of the femoral-popliteal arteries.  Evidence of tibioperoneal disease without a focal stenosis noted.  The popliteal artery is ectatic in caliber, 1.0 cm, (Prior: 1.0 cm).  Ankle/Brachial index: 0.81 which is in the moderate disease category, (Prior:  0.95).  PVR/ PPG tracings are dampened. Triphasic/ biphasic waveforms noted at the  ankle.  Metatarsal pressure of 114 mm Hg, (Prior: 115 mm Hg).  Great toe pressure of 68 mm Hg, within the healing range, (Prior: 89 mm Hg).     LEFT LOWER LIMB:  Evidence of 50-75% stenosis of the proximal superficial femoral artery with  diffuse disease throughout the remainder of the femoral-popliteal arteries.  Evidence of tibioperoneal disease without a focal stenosis noted.  The popliteal artery is ectatic in caliber, 1.0 cm, (Prior: 0.9 cm).  Ankle/Brachial index: 0.95 which is in the normal category, (Prior: 1.05).  PVR/ PPG tracings are dampened. Triphasic/ biphasic waveforms noted at the  ankle.  Metatarsal pressure of 126 mm Hg, (Prior: 113 mm Hg).  Great toe pressure of 82 mm Hg, within the healing range, (Prior: 76 mm Hg).     Compared to previous study on 12/5/2022, there is a new finding in the right  mid superficial femoral artery.  10. Gastroesophageal reflux disease without esophagitis  Assessment & Plan:  Continue Omeprazole.   11. Fatty liver  Assessment & Plan:  Persistent elevation of AST/ALT with fatty liver on RUQ US 11/30/23.   He is currently undergoing work up with hepatologist, Dr. Guidry.        Routine follow up with blood work as scheduled in November.     Chief Complaint     Chief Complaint   Patient presents  with    Pre-op Exam       History of Present Illness     Fernando Roberts is a 74 year old male presenting today for pre-operative examination.     Cataract surgery scheduled:  Left eye 9/26, Right eye 10/8. Dr. Thomas.  Night time driving has become unsafe due to poor vision.     Checking am fasting sugars 100-115    He is feeling well, no new complaints today.   Denies shortness of breath, chest pain, edema, palpitations, dizziness, headaches.     No prior problems with anesthesia--personal or family.             Review of Systems   Review of Systems   Constitutional: Negative.    HENT: Negative.     Respiratory: Negative.     Cardiovascular: Negative.    Gastrointestinal: Negative.    Genitourinary: Negative.    Musculoskeletal: Negative.         Leg cramps    Skin: Negative.    Neurological: Negative.    Hematological: Negative.    Psychiatric/Behavioral: Negative.       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)    Atrial fibrillation (HCC)    History of colon polyps    Gastroesophageal reflux disease without esophagitis    Warthin's tumor    Hiatal hernia    Smoking    Chronic obstructive pulmonary disease (HCC)    Prostatic disorder    Trigger ring finger of right hand    Intermittent claudication (HCC)    Popliteal artery ectasia bilateral (HCC)    Muscle cramping    Extensor carpi radialis brevis tenosynovitis    History of parotidectomy    History of nephrolithiasis      Past Medical History:   Diagnosis Date    LISY (acute kidney injury) (HCC) 09/11/2021    Arthritis     Atherosclerosis of native arteries of the extremities with ulceration ([facility]) 03/21/2019    Chronic cholecystitis with calculus  01/31/2024    Colon polyp     6 polyps 3 years ago    Deep vein thrombosis (HCC)     lower leg- left    Diabetes mellitus (HCC)     Diabetic neuropathic arthropathy (HCC)     causes weakness in LE and uses a cane as assist to walk    Diabetic neuropathy (HCC)     Fatty liver     GERD (gastroesophageal reflux disease)     Hydronephrosis 05/17/2019    Added automatically from request for surgery [id number]      Hyperlipidemia     Hypertension     Hyponatremia 09/11/2021    Kidney stone     Parotid tumor     bilateral    Seasonal allergies     Spinal stenosis     Ureteral stone with hydronephrosis 05/17/2019    Added automatically from request for surgery [id number]      Past Surgical History:   Procedure Laterality Date    CHOLECYSTECTOMY      COLONOSCOPY  05/16/2019    completed by Dr. Mckeon, Repeat 3 years    FL RETROGRADE PYELOGRAM  05/17/2019    PAROTIDECTOMY Bilateral     ID CYSTO/URETERO W/LITHOTRIPSY &INDWELL STENT INSRT Right 05/17/2019    Procedure: CYSTOSCOPY URETEROSCOPY WITH LITHOTRIPSY HOLMIUM LASER, RETROGRADE PYELOGRAM AND INSERTION STENT URETERAL--possible stent only;  Surgeon: Casey Snyder MD;  [facility];  Service: Urology    ID EXC PRTD ALEXA/PRTD GLND LAT [id number]&[id number] FACIAL NR Right 10/18/2017    Procedure: SUPERFICIAL PAROTIDECTOMY WITH NERVE DISSECTION AND PRESERVATION;  Surgeon: Christopher Black MD;  [facility];  Service: ENT    ID LAPAROSCOPY SURG CHOLECYSTECTOMY N/A 01/12/2024    Procedure: CHOLECYSTECTOMY LAPAROSCOPIC W/ ROBOTICS;  Surgeon: Naveed Triplett DO;  [facility];  Service: General    ID TENDON SHEATH INCISION Right 11/03/2020    Procedure: RING TRIGGER FINGER RELEASE;  Surgeon: Marielena Calvert MD;  [facility];  Service: Orthopedics    ROTATOR CUFF REPAIR Bilateral     TONSILLECTOMY      UMBILICAL HERNIA REPAIR N/A 01/12/2024    Procedure: REPAIR HERNIA UMBILICAL, NO MESH;  Surgeon: Naveed Triplett DO;  [facility];  Service: General       Family History   Problem Relation Age of Onset    Lupus Mother     Rheum arthritis Mother     Cirrhosis Father     Alcohol abuse Father     Substance Abuse Brother       Social History     Socioeconomic History    Marital status: /Civil Union     Spouse name: Not on file    Number of children: Not on file    Years of education: Not on file    Highest education level: Not on file   Occupational History    Not on file   Tobacco Use    Smoking status: Light Smoker     Types: Pipe, Cigars    Smokeless tobacco: Current    Tobacco comments:     Pipe smoker   Vaping Use    Vaping status: Never Used   Substance and Sexual Activity    Alcohol use: Yes     Alcohol/week: 1.0 standard drink of alcohol     Types: 1 Shots of liquor per week     Comment: daily    Drug use: No    Sexual activity: Not Currently     Partners: Female   Other Topics Concern    Not on file   Social History Narrative             Social Determinants of Health     Financial Resource Strain: Low Risk  (5/18/2023)    Overall Financial Resource Strain (CARDIA)     Difficulty of Paying Living Expenses: Not hard at all   Food Insecurity: No Food Insecurity (5/21/2024)    Hunger Vital Sign     Worried About Running Out of Food in the Last Year: Never true     Ran Out of Food in the Last Year: Never true   Transportation Needs: No Transportation Needs (5/21/2024)    PRAPARE - Transportation     Lack of Transportation (Medical): No     Lack of Transportation (Non-Medical): No   Physical Activity: Not on file   Stress: Not on file   Social Connections: Not on file   Intimate Partner Violence: Not on file   Housing Stability: Low Risk  (5/21/2024)    Housing Stability Vital Sign     Unable to Pay for Housing in the Last Year: No     Number of Times Moved in the Last Year: 1     Homeless in the Last Year: No        I have reviewed the patient's medical history in detail; there are no changes to the history as noted in the electronic medical  "record.    Objective     Vitals:    09/09/24 1006   BP: 130/70   Pulse: 68   Resp: 16   Temp: 98.2 °F (36.8 °C)   TempSrc: Temporal   SpO2: 97%   Weight: 95.3 kg (210 lb)   Height: 5' 11\" (1.803 m)     Wt Readings from Last 3 Encounters:   09/09/24 95.3 kg (210 lb)   09/05/24 95.7 kg (211 lb)   05/21/24 97.5 kg (215 lb)     Physical Exam  Vitals and nursing note reviewed.   Constitutional:       General: He is not in acute distress.     Appearance: Normal appearance. He is not ill-appearing.   HENT:      Head: Atraumatic.      Right Ear: Tympanic membrane normal.      Left Ear: Tympanic membrane normal.      Nose: Nose normal.      Mouth/Throat:      Mouth: Mucous membranes are moist.      Pharynx: Oropharynx is clear.   Eyes:      Conjunctiva/sclera: Conjunctivae normal.      Pupils: Pupils are equal, round, and reactive to light.      Comments: Bilateral cataracts.   Neck:      Thyroid: No thyromegaly.      Vascular: No carotid bruit.   Cardiovascular:      Rate and Rhythm: Normal rate and regular rhythm.      Heart sounds: No murmur heard.  Pulmonary:      Effort: Pulmonary effort is normal.      Breath sounds: Normal breath sounds.   Abdominal:      General: Bowel sounds are normal.      Palpations: Abdomen is soft.      Tenderness: There is no abdominal tenderness. There is no guarding.   Musculoskeletal:      Cervical back: Normal range of motion and neck supple.      Right lower leg: No edema.      Left lower leg: No edema.   Lymphadenopathy:      Cervical: No cervical adenopathy.   Skin:     General: Skin is warm and dry.      Findings: No rash.   Neurological:      Mental Status: He is alert and oriented to person, place, and time.   Psychiatric:         Mood and Affect: Mood normal.            ALLERGIES:  Allergies   Allergen Reactions    Pletal [Cilostazol] Tachycardia    Dyazide [Hydrochlorothiazide W-Triamterene] Other (See Comments)     Hyponatremia    Jardiance [Empagliflozin] Other (See Comments)     " Constipation, increased urinary frequency--not tolerable       Current Medications     Current Outpatient Medications   Medication Sig Dispense Refill    acetaminophen (TYLENOL) 325 mg tablet 2, by mouth, every 6 hours as needed for mild to moderate pain. 30 tablet 0    aspirin (ECOTRIN LOW STRENGTH) 81 mg EC tablet Take 81 mg by mouth daily        Cyanocobalamin (VITAMIN B 12 PO) Take 1,000 mcg by mouth daily      fenofibrate micronized (LOFIBRA) 134 MG capsule TAKE 1 CAPSULE (134 MG TOTAL) BY MOUTH DAILY WITH BREAKFAST 90 capsule 3    glimepiride (AMARYL) 2 mg tablet TAKE 1 AND HALF TABLET (3 MG TOTAL) BY MOUTH DAILY WITH BREAKFAST 135 tablet 1    lisinopril (ZESTRIL) 20 mg tablet TAKE 1 TABLET BY MOUTH EVERY DAY 90 tablet 1    metFORMIN (GLUCOPHAGE-XR) 500 mg 24 hr tablet TAKE 2 TABLETS BY MOUTH TWICE A DAY WITH FOOD 360 tablet 3    rosuvastatin (CRESTOR) 20 MG tablet Take 1 tablet (20 mg total) by mouth daily 100 tablet 3    warfarin (COUMADIN) 2.5 mg tablet Take 1 tablet by mouth daily T-Th-S-S takes 2.5mg     M-W-F takes 5.0 mg      warfarin (COUMADIN) 5 mg tablet TAKE 1 TABLET EVERY DAY OR AS DIRECTED BY MD 90 tablet 3    albuterol (PROVENTIL HFA,VENTOLIN HFA) 90 mcg/act inhaler INHALE 2 PUFFS BY MOUTH EVERY 4 HOURS AS NEEDED FOR WHEEZE 8.5 g 5    gabapentin (NEURONTIN) 100 mg capsule TAKE 2 CAPSULES (200 MG TOTAL) BY MOUTH DAILY AT BEDTIME 180 capsule 1    omeprazole (PriLOSEC) 20 mg delayed release capsule TAKE 1 CAPSULE BY MOUTH EVERY DAY 90 capsule 1    sitaGLIPtin (Januvia) 100 mg tablet TAKE 1 TABLET BY MOUTH EVERY DAY 90 tablet 1    tamsulosin (FLOMAX) 0.4 mg TAKE 1 CAPSULE BY MOUTH EVERY DAY WITH DINNER 90 capsule 1     No current facility-administered medications for this visit.         Health Maintenance     Health Maintenance   Topic Date Due    Hepatitis A Vaccine (1 of 2 - Risk 2-dose series) Never done    Influenza Vaccine (1) 09/01/2024    HEMOGLOBIN A1C  11/06/2024    Diabetic Foot Exam   02/28/2025    Kidney Health Evaluation: GFR  05/06/2025    Fall Risk  05/21/2025    Depression Screening  05/21/2025    Medicare Annual Wellness Visit (AWV)  05/21/2025    Kidney Health Evaluation: Albumin/Creatinine Ratio  05/21/2025    DM Eye Exam  05/13/2026    Colorectal Cancer Screening  11/30/2027    Hepatitis C Screening  Completed    RSV Vaccine Age 60+ Years  Completed    Zoster Vaccine  Completed    Pneumococcal Vaccine: 65+ Years  Completed    Hepatitis B Vaccine  Completed    COVID-19 Vaccine  Completed    RSV Vaccine age 0-20 Months  Aged Out    HIB Vaccine  Aged Out    IPV Vaccine  Aged Out    Meningococcal ACWY Vaccine  Aged Out    HPV Vaccine  Aged Out     Immunization History   Administered Date(s) Administered    COVID-19 Moderna Vac BIVALENT 12 Yr+ IM 0.5 ML 05/04/2023    COVID-19 PFIZER VACCINE 0.3 ML IM 03/05/2021, 03/26/2021, 09/25/2021    COVID-19 Pfizer Vac BIVALENT Christopher-sucrose 12 Yr+ IM 09/09/2022, 05/04/2023    COVID-19 Pfizer mRNA vacc PF christopher-sucrose 12 yr and older (Comirnaty) 09/28/2023    COVID-19 Pfizer vac (Christopher-sucrose, gray cap) 12 yr+ IM 04/01/2022    COVID-19, unspecified 04/01/2022    Hep B, adult 03/19/2019, 04/23/2019, 09/19/2019    INFLUENZA 09/13/2018, 09/17/2018, 09/20/2021, 09/09/2022, 09/28/2023    Influenza, high dose seasonal 0.7 mL 09/19/2019, 08/24/2020    Pneumococcal Conjugate 13-Valent 03/19/2019    Pneumococcal Polysaccharide PPV23 08/17/2020    Respiratory Syncytial Virus Vaccine (Recombinant, Adjuvanted) 11/14/2023    Zoster Vaccine Recombinant 07/30/2018, 09/29/2018       DAYAMI Pedroza

## 2024-09-10 PROBLEM — N13.30 HYDRONEPHROSIS: Status: RESOLVED | Noted: 2019-05-17 | Resolved: 2024-09-10

## 2024-09-10 NOTE — ASSESSMENT & PLAN NOTE
He completed trial of holding simvastatin and fenofibrate for muscle cramps under the direction of Dr. Jay, cardiology.   Lipid panel 7/9/24 off medications: total cholesterol 251, triglycerides 514, HDL 30, .  Lipid panel 5/6/24 on medications: total cholesterol 170, triglycerides 313, HDL 39, LDL 68.   Restarted simvastatin and fenofibrate.  Muscle cramps were better off of medication, but not resolved.   Recommend stop simvastatin and trial rosuvastatin.   Continue fenofibrate.   Repeat blood work in November with follow up office visit. Orders previously provided.

## 2024-09-10 NOTE — ASSESSMENT & PLAN NOTE
Continue aspirin, statin.   Continue follow up with vascular surgery.      Lower extremity arterial doppler study December 2023:  RIGHT LOWER LIMB:  Evidence of 50-75% stenosis of the proximal superficial femoral artery and a  >75% stenosis of the mid superficial femoral artery, with diffuse disease  throughout the remainder of the femoral-popliteal arteries.  Evidence of tibioperoneal disease without a focal stenosis noted.  The popliteal artery is ectatic in caliber, 1.0 cm, (Prior: 1.0 cm).  Ankle/Brachial index: 0.81 which is in the moderate disease category, (Prior:  0.95).  PVR/ PPG tracings are dampened. Triphasic/ biphasic waveforms noted at the  ankle.  Metatarsal pressure of 114 mm Hg, (Prior: 115 mm Hg).  Great toe pressure of 68 mm Hg, within the healing range, (Prior: 89 mm Hg).     LEFT LOWER LIMB:  Evidence of 50-75% stenosis of the proximal superficial femoral artery with  diffuse disease throughout the remainder of the femoral-popliteal arteries.  Evidence of tibioperoneal disease without a focal stenosis noted.  The popliteal artery is ectatic in caliber, 1.0 cm, (Prior: 0.9 cm).  Ankle/Brachial index: 0.95 which is in the normal category, (Prior: 1.05).  PVR/ PPG tracings are dampened. Triphasic/ biphasic waveforms noted at the  ankle.  Metatarsal pressure of 126 mm Hg, (Prior: 113 mm Hg).  Great toe pressure of 82 mm Hg, within the healing range, (Prior: 76 mm Hg).     Compared to previous study on 12/5/2022, there is a new finding in the right  mid superficial femoral artery.

## 2024-09-10 NOTE — ASSESSMENT & PLAN NOTE
Lab Results   Component Value Date    HGBA1C 7.1 (H) 05/06/2024       Stable A1c at 7.1%.   Continue current medications.

## 2024-09-10 NOTE — ASSESSMENT & PLAN NOTE
Persistent elevation of AST/ALT with fatty liver on RUQ US 11/30/23.   He is currently undergoing work up with hepatologist, Dr. Guidry.

## 2024-09-13 ENCOUNTER — HOSPITAL ENCOUNTER (OUTPATIENT)
Dept: ULTRASOUND IMAGING | Facility: HOSPITAL | Age: 74
Discharge: HOME | End: 2024-09-13
Attending: STUDENT IN AN ORGANIZED HEALTH CARE EDUCATION/TRAINING PROGRAM
Payer: MEDICARE

## 2024-09-13 DIAGNOSIS — R94.5 ABNORMAL RESULTS OF LIVER FUNCTION STUDIES: ICD-10-CM

## 2024-09-13 PROCEDURE — 76981 USE PARENCHYMA: CPT

## 2024-09-13 PROCEDURE — 76700 US EXAM ABDOM COMPLETE: CPT

## 2024-09-24 ENCOUNTER — IMMUNIZATIONS (OUTPATIENT)
Dept: FAMILY MEDICINE CLINIC | Facility: CLINIC | Age: 74
End: 2024-09-24
Payer: MEDICARE

## 2024-09-24 DIAGNOSIS — Z23 ENCOUNTER FOR IMMUNIZATION: Primary | ICD-10-CM

## 2024-09-24 PROCEDURE — 90662 IIV NO PRSV INCREASED AG IM: CPT

## 2024-09-24 PROCEDURE — G0008 ADMIN INFLUENZA VIRUS VAC: HCPCS

## 2024-09-25 ENCOUNTER — TELEPHONE (OUTPATIENT)
Age: 74
End: 2024-09-25

## 2024-09-25 NOTE — TELEPHONE ENCOUNTER
----- Message from Desire Guidry MD sent at 9/24/2024 10:29 PM EDT -----  Hi can we please inform him that his US elastography showed F3 disease and that he needs to complete the blood work that was ordered at our office visit earlier this month? Since his elastography did show advanced fibrosis, I am leaning towards a liver biopsy but would like to see the results of his serologic work-up first. Thank you!

## 2024-09-25 NOTE — TELEPHONE ENCOUNTER
Spoke with pt, relayed results as per Dr Guidry. Pt agreeable and verbalized understanding, pt is having cataract surgery tomorrow and will get bloodwork by Friday.

## 2024-09-26 ENCOUNTER — SURGERY/PROCEDURE (OUTPATIENT)
Dept: URBAN - METROPOLITAN AREA SURGICAL CENTER 6 | Facility: SURGICAL CENTER | Age: 74
End: 2024-09-26

## 2024-09-26 ENCOUNTER — TELEPHONE (OUTPATIENT)
Age: 74
End: 2024-09-26

## 2024-09-26 DIAGNOSIS — H25.812: ICD-10-CM

## 2024-09-26 PROCEDURE — 66984 XCAPSL CTRC RMVL W/O ECP: CPT

## 2024-09-26 PROCEDURE — 68841 INSJ RX ELUT IMPLT LAC CANAL: CPT

## 2024-09-26 PROCEDURE — MISCFEMTO FEMTO

## 2024-09-26 PROCEDURE — MISCMFIOL MISCMFIOL

## 2024-09-27 ENCOUNTER — 1 DAY POST-OP (OUTPATIENT)
Dept: URBAN - METROPOLITAN AREA CLINIC 6 | Facility: CLINIC | Age: 74
End: 2024-09-27

## 2024-09-27 ENCOUNTER — ANTICOAG VISIT (OUTPATIENT)
Dept: CARDIOLOGY CLINIC | Facility: CLINIC | Age: 74
End: 2024-09-27

## 2024-09-27 ENCOUNTER — APPOINTMENT (OUTPATIENT)
Dept: LAB | Facility: AMBULARY SURGERY CENTER | Age: 74
End: 2024-09-27
Payer: MEDICARE

## 2024-09-27 DIAGNOSIS — I10 ESSENTIAL HYPERTENSION: ICD-10-CM

## 2024-09-27 DIAGNOSIS — Z86.718 HISTORY OF DVT (DEEP VEIN THROMBOSIS): Primary | ICD-10-CM

## 2024-09-27 DIAGNOSIS — Z96.1: ICD-10-CM

## 2024-09-27 DIAGNOSIS — H25.811: ICD-10-CM

## 2024-09-27 LAB
ALBUMIN SERPL BCG-MCNC: 4.3 G/DL (ref 3.5–5)
ALP SERPL-CCNC: 78 U/L (ref 34–104)
ALT SERPL W P-5'-P-CCNC: 59 U/L (ref 7–52)
ANION GAP SERPL CALCULATED.3IONS-SCNC: 9 MMOL/L (ref 4–13)
AST SERPL W P-5'-P-CCNC: 74 U/L (ref 13–39)
BILIRUB SERPL-MCNC: 0.62 MG/DL (ref 0.2–1)
BUN SERPL-MCNC: 10 MG/DL (ref 5–25)
CALCIUM SERPL-MCNC: 9.2 MG/DL (ref 8.4–10.2)
CHLORIDE SERPL-SCNC: 102 MMOL/L (ref 96–108)
CO2 SERPL-SCNC: 25 MMOL/L (ref 21–32)
CREAT SERPL-MCNC: 0.8 MG/DL (ref 0.6–1.3)
ERYTHROCYTE [DISTWIDTH] IN BLOOD BY AUTOMATED COUNT: 13.4 % (ref 11.6–15.1)
GFR SERPL CREATININE-BSD FRML MDRD: 88 ML/MIN/1.73SQ M
GLIADIN PEPTIDE IGA SER-ACNC: 1.2 U/ML
GLIADIN PEPTIDE IGA SER-ACNC: NEGATIVE
GLIADIN PEPTIDE IGG SER-ACNC: <0.4 U/ML
GLIADIN PEPTIDE IGG SER-ACNC: NEGATIVE
GLUCOSE P FAST SERPL-MCNC: 107 MG/DL (ref 65–99)
HAV AB SER QL IA: NORMAL
HBV SURFACE AB SER-ACNC: <3 MIU/ML
HCT VFR BLD AUTO: 50.2 % (ref 36.5–49.3)
HGB BLD-MCNC: 16.5 G/DL (ref 12–17)
IGA SERPL-MCNC: 139 MG/DL (ref 66–433)
IGA SERPL-MCNC: 139 MG/DL (ref 66–433)
IGG SERPL-MCNC: 1308 MG/DL (ref 635–1741)
IGM SERPL-MCNC: 85 MG/DL (ref 45–281)
MCH RBC QN AUTO: 31.3 PG (ref 26.8–34.3)
MCHC RBC AUTO-ENTMCNC: 32.9 G/DL (ref 31.4–37.4)
MCV RBC AUTO: 95 FL (ref 82–98)
PLATELET # BLD AUTO: 264 THOUSANDS/UL (ref 149–390)
PMV BLD AUTO: 11 FL (ref 8.9–12.7)
POTASSIUM SERPL-SCNC: 4.5 MMOL/L (ref 3.5–5.3)
PROT SERPL-MCNC: 7.6 G/DL (ref 6.4–8.4)
RBC # BLD AUTO: 5.27 MILLION/UL (ref 3.88–5.62)
SODIUM SERPL-SCNC: 136 MMOL/L (ref 135–147)
TSH SERPL DL<=0.05 MIU/L-ACNC: 2.41 UIU/ML (ref 0.45–4.5)
TTG IGA SER-ACNC: <0.5 U/ML
TTG IGA SER-ACNC: NEGATIVE
TTG IGG SER-ACNC: <0.8 U/ML
TTG IGG SER-ACNC: NEGATIVE
WBC # BLD AUTO: 8.76 THOUSAND/UL (ref 4.31–10.16)

## 2024-09-27 PROCEDURE — 82103 ALPHA-1-ANTITRYPSIN TOTAL: CPT

## 2024-09-27 PROCEDURE — 86015 ACTIN ANTIBODY EACH: CPT

## 2024-09-27 PROCEDURE — 85027 COMPLETE CBC AUTOMATED: CPT

## 2024-09-27 PROCEDURE — 84443 ASSAY THYROID STIM HORMONE: CPT

## 2024-09-27 PROCEDURE — 82784 ASSAY IGA/IGD/IGG/IGM EACH: CPT

## 2024-09-27 PROCEDURE — 82104 ALPHA-1-ANTITRYPSIN PHENO: CPT

## 2024-09-27 PROCEDURE — 86708 HEPATITIS A ANTIBODY: CPT

## 2024-09-27 PROCEDURE — 86364 TISS TRNSGLTMNASE EA IG CLAS: CPT

## 2024-09-27 PROCEDURE — 86381 MITOCHONDRIAL ANTIBODY EACH: CPT

## 2024-09-27 PROCEDURE — 86258 DGP ANTIBODY EACH IG CLASS: CPT

## 2024-09-27 PROCEDURE — 86706 HEP B SURFACE ANTIBODY: CPT

## 2024-09-27 PROCEDURE — 99024 POSTOP FOLLOW-UP VISIT: CPT

## 2024-09-27 PROCEDURE — 80053 COMPREHEN METABOLIC PANEL: CPT

## 2024-09-27 ASSESSMENT — KERATOMETRY
OS_K2POWER_DIOPTERS: 43.00
OD_K1POWER_DIOPTERS: 41.50
OD_AXISANGLE_DEGREES: 169
OD_AXISANGLE2_DEGREES: 79
OS_AXISANGLE_DEGREES: 105
OD_AXISANGLE2_DEGREES: 79
OS_AXISANGLE_DEGREES: 105
OS_K1POWER_DIOPTERS: 42.75
OD_K2POWER_DIOPTERS: 42.00
OD_AXISANGLE_DEGREES: 169
OS_AXISANGLE2_DEGREES: 15
OS_K1POWER_DIOPTERS: 42.75
OD_K1POWER_DIOPTERS: 41.50
OD_K2POWER_DIOPTERS: 42.00
OS_K2POWER_DIOPTERS: 43.00
OS_AXISANGLE2_DEGREES: 15

## 2024-09-27 ASSESSMENT — VISUAL ACUITY
OD_CC: 20/25-1
OS_SC: 20/25-2
OS_SC: J1
OD_CC: J1

## 2024-09-27 ASSESSMENT — TONOMETRY
OD_IOP_MMHG: 19
OS_IOP_MMHG: 22

## 2024-09-27 NOTE — PROGRESS NOTES
PC to patient with good INR of 2.19.  Advised same dosing of warfarin and get another INR in 4 weeks.

## 2024-09-28 LAB
ACTIN IGG SERPL-ACNC: 9 UNITS (ref 0–19)
MITOCHONDRIA M2 IGG SER-ACNC: <20 UNITS (ref 0–20)

## 2024-10-04 ENCOUNTER — 1 WEEK POST-OP (OUTPATIENT)
Dept: URBAN - METROPOLITAN AREA CLINIC 6 | Facility: CLINIC | Age: 74
End: 2024-10-04

## 2024-10-04 DIAGNOSIS — H25.811: ICD-10-CM

## 2024-10-04 DIAGNOSIS — Z96.1: ICD-10-CM

## 2024-10-04 LAB
A1AT PHENOTYP SERPL IFE: NORMAL
A1AT SERPL-MCNC: 186 MG/DL (ref 101–187)

## 2024-10-04 PROCEDURE — 99024 POSTOP FOLLOW-UP VISIT: CPT

## 2024-10-04 ASSESSMENT — KERATOMETRY
OD_K1POWER_DIOPTERS: 41.50
OD_K2POWER_DIOPTERS: 42.00
OS_AXISANGLE_DEGREES: 105
OS_K2POWER_DIOPTERS: 43.00
OD_AXISANGLE2_DEGREES: 79
OS_AXISANGLE2_DEGREES: 15
OS_K1POWER_DIOPTERS: 42.75
OD_AXISANGLE_DEGREES: 169

## 2024-10-04 ASSESSMENT — VISUAL ACUITY
OS_SC: 20/25
OD_SC: 20/400
OD_PH: 20/100
OD_CC: 20/25
OS_SC: J1

## 2024-10-04 ASSESSMENT — TONOMETRY
OS_IOP_MMHG: 18
OD_IOP_MMHG: 19

## 2024-10-09 ENCOUNTER — 1 DAY POST-OP (OUTPATIENT)
Dept: URBAN - METROPOLITAN AREA CLINIC 6 | Facility: CLINIC | Age: 74
End: 2024-10-09

## 2024-10-09 DIAGNOSIS — Z96.1: ICD-10-CM

## 2024-10-09 PROCEDURE — 99024 POSTOP FOLLOW-UP VISIT: CPT

## 2024-10-09 ASSESSMENT — KERATOMETRY
OS_K1POWER_DIOPTERS: 42.75
OS_K2POWER_DIOPTERS: 43.00
OD_AXISANGLE2_DEGREES: 79
OD_AXISANGLE_DEGREES: 169
OS_AXISANGLE_DEGREES: 105
OD_K1POWER_DIOPTERS: 41.50
OS_AXISANGLE2_DEGREES: 15
OD_K2POWER_DIOPTERS: 42.00

## 2024-10-09 ASSESSMENT — VISUAL ACUITY
OD_SC: 20/30
OS_SC: 20/25
OU_SC: J1+

## 2024-10-10 ENCOUNTER — OFFICE VISIT (OUTPATIENT)
Dept: PODIATRY | Facility: CLINIC | Age: 74
End: 2024-10-10
Payer: MEDICARE

## 2024-10-10 DIAGNOSIS — B35.1 TINEA UNGUIUM: ICD-10-CM

## 2024-10-10 DIAGNOSIS — L84 CALLUS OF FOOT: ICD-10-CM

## 2024-10-10 DIAGNOSIS — E11.40 TYPE 2 DIABETES MELLITUS WITH DIABETIC NEUROPATHY, WITHOUT LONG-TERM CURRENT USE OF INSULIN (HCC): Primary | ICD-10-CM

## 2024-10-10 DIAGNOSIS — L90.9 FAT PAD ATROPHY OF FOOT: ICD-10-CM

## 2024-10-10 DIAGNOSIS — I70.213 ATHEROSCLEROSIS OF NATIVE ARTERY OF BOTH LOWER EXTREMITIES WITH INTERMITTENT CLAUDICATION (HCC): ICD-10-CM

## 2024-10-10 PROCEDURE — RECHECK: Performed by: PODIATRIST

## 2024-10-10 PROCEDURE — 11056 PARNG/CUTG B9 HYPRKR LES 2-4: CPT | Performed by: PODIATRIST

## 2024-10-10 PROCEDURE — 11721 DEBRIDE NAIL 6 OR MORE: CPT | Performed by: PODIATRIST

## 2024-10-10 NOTE — PROGRESS NOTES
"Fernando Roberts  1950  AT RISK FOOT CARE    1. Type 2 diabetes mellitus with diabetic neuropathy, without long-term current use of insulin (HCC)  Lesion Destruction    Diabetic Shoe    Diabetic Shoe Inserts      2. Atherosclerosis of native artery of both lower extremities with intermittent claudication (HCC)  Lesion Destruction    Diabetic Shoe    Diabetic Shoe Inserts      3. Tinea unguium  Lesion Destruction    Diabetic Shoe    Diabetic Shoe Inserts      4. Callus of foot  Lesion Destruction    Diabetic Shoe    Diabetic Shoe Inserts      5. Fat pad atrophy of foot  Diabetic Shoe    Diabetic Shoe Inserts          Patient presents for at-risk foot care.  Patient has no acute concerns today.  Patient has significant lower extremity risk due to diminished pulses in the feet and trophic skin changes to the lower extremity including thick toenail, atrophic skin, and decreased hair growth.      On exam patient has thickened, hypertrophic, discolored, brittle toenails with subungual debris and tenderness x10   Callus: B/L great toe callus, no hemorrhage or breakdown  Patient has diminished pedal pulses and decreased perfusion to the lower extremities  Patient has significant trophic changes to the skin including thick toenails, decreased pedal hair and atrophic skin.     Today's treatment includes:  Debridement of toenails. Using nail nipper, nadya, and curette, nails were sharply debrided, reduced in thickness and length. Devitalized nail tissue and fungal debris excised and removed. Patient tolerated well.      Lesion Destruction    Date/Time: 10/10/2024 8:45 AM    Performed by: Sourav Boyle DPM  Authorized by: Sourav Boyle DPM  Universal Protocol:  Consent: Verbal consent obtained.  Risks and benefits: risks, benefits and alternatives were discussed  Consent given by: patient  Time out: Immediately prior to procedure a \"time out\" was called to verify the correct patient, procedure, equipment, support " staff and site/side marked as required.  Timeout called at: 10/10/2024 8:34 AM.  Patient understanding: patient states understanding of the procedure being performed  Patient identity confirmed: verbally with patient    Procedure Details - Lesion Destruction:     Number of Lesions:  2  Lesion 1:     Body area:  Lower extremity    Lower extremity location:  L big toe    Malignancy: benign hyperkeratotic lesion      Destruction method: scissors used for extraction    Lesion 2:     Body area:  Lower extremity    Lower extremity location:  R big toe    Malignancy: benign hyperkeratotic lesion      Destruction method: scissors used for extraction        Discussed proper shoe gear, daily inspections of feet, and general foot health with patient. Patient has Q8  findings and is recommended for at risk foot care every 9-10 weeks.    Patients most recent complete clinical foot exam was on: 2/29/2024

## 2024-10-14 ENCOUNTER — TELEPHONE (OUTPATIENT)
Age: 74
End: 2024-10-14

## 2024-10-14 NOTE — TELEPHONE ENCOUNTER
Reviewed blood work and US elastography results which showed stage 3 fibrosis. Discussed that this may be overestimation of LSM given active hepatitis given his normal platelets. Suspect that LFT elevations are due to NAFLD for which I recommend aggressive modification of his metabolic risk factors including DM. Would recommend repeating a US elastography in 6 months to trend. Liver biopsy was also discussed as well but deferred given preference for non-invasive management. He has an appointment with me in December for follow up.

## 2024-10-17 ENCOUNTER — 1 WEEK POST-OP (OUTPATIENT)
Dept: URBAN - METROPOLITAN AREA CLINIC 6 | Facility: CLINIC | Age: 74
End: 2024-10-17

## 2024-10-17 DIAGNOSIS — Z96.1: ICD-10-CM

## 2024-10-17 PROCEDURE — 99024 POSTOP FOLLOW-UP VISIT: CPT

## 2024-10-17 ASSESSMENT — VISUAL ACUITY
OD_SC: 20/30+1
OS_PH: 20/20-2
OD_PH: 20/25-2
OS_SC: 20/25-2

## 2024-10-17 ASSESSMENT — TONOMETRY
OS_IOP_MMHG: 16
OD_IOP_MMHG: 17

## 2024-10-17 ASSESSMENT — KERATOMETRY
OS_AXISANGLE2_DEGREES: 15
OD_K2POWER_DIOPTERS: 42.00
OD_AXISANGLE_DEGREES: 169
OD_K1POWER_DIOPTERS: 41.50
OS_K1POWER_DIOPTERS: 42.75
OS_K2POWER_DIOPTERS: 43.00
OS_AXISANGLE_DEGREES: 105
OD_AXISANGLE2_DEGREES: 79

## 2024-10-28 ENCOUNTER — ANTICOAG VISIT (OUTPATIENT)
Dept: CARDIOLOGY CLINIC | Facility: CLINIC | Age: 74
End: 2024-10-28

## 2024-10-28 ENCOUNTER — APPOINTMENT (OUTPATIENT)
Dept: LAB | Facility: AMBULARY SURGERY CENTER | Age: 74
End: 2024-10-28
Payer: MEDICARE

## 2024-10-28 DIAGNOSIS — I10 ESSENTIAL HYPERTENSION: ICD-10-CM

## 2024-10-28 DIAGNOSIS — E11.40 TYPE 2 DIABETES MELLITUS WITH DIABETIC NEUROPATHY, WITHOUT LONG-TERM CURRENT USE OF INSULIN (HCC): ICD-10-CM

## 2024-10-28 DIAGNOSIS — E78.2 MIXED HYPERLIPIDEMIA: ICD-10-CM

## 2024-10-28 DIAGNOSIS — Z86.718 HISTORY OF DVT (DEEP VEIN THROMBOSIS): Primary | ICD-10-CM

## 2024-10-28 LAB
ALBUMIN SERPL BCG-MCNC: 3.9 G/DL (ref 3.5–5)
ALP SERPL-CCNC: 73 U/L (ref 34–104)
ALT SERPL W P-5'-P-CCNC: 58 U/L (ref 7–52)
ANION GAP SERPL CALCULATED.3IONS-SCNC: 8 MMOL/L (ref 4–13)
AST SERPL W P-5'-P-CCNC: 55 U/L (ref 13–39)
BILIRUB SERPL-MCNC: 0.65 MG/DL (ref 0.2–1)
BUN SERPL-MCNC: 7 MG/DL (ref 5–25)
CALCIUM SERPL-MCNC: 8.8 MG/DL (ref 8.4–10.2)
CHLORIDE SERPL-SCNC: 103 MMOL/L (ref 96–108)
CHOLEST SERPL-MCNC: 132 MG/DL
CK SERPL-CCNC: 67 U/L (ref 39–308)
CO2 SERPL-SCNC: 24 MMOL/L (ref 21–32)
CREAT SERPL-MCNC: 0.79 MG/DL (ref 0.6–1.3)
ERYTHROCYTE [DISTWIDTH] IN BLOOD BY AUTOMATED COUNT: 13.5 % (ref 11.6–15.1)
EST. AVERAGE GLUCOSE BLD GHB EST-MCNC: 169 MG/DL
GFR SERPL CREATININE-BSD FRML MDRD: 89 ML/MIN/1.73SQ M
GLUCOSE P FAST SERPL-MCNC: 123 MG/DL (ref 65–99)
HBA1C MFR BLD: 7.5 %
HCT VFR BLD AUTO: 48.4 % (ref 36.5–49.3)
HDLC SERPL-MCNC: 31 MG/DL
HGB BLD-MCNC: 15.7 G/DL (ref 12–17)
INR PPP: 2.73 (ref 0.85–1.19)
LDLC SERPL CALC-MCNC: 51 MG/DL (ref 0–100)
MCH RBC QN AUTO: 31.6 PG (ref 26.8–34.3)
MCHC RBC AUTO-ENTMCNC: 32.4 G/DL (ref 31.4–37.4)
MCV RBC AUTO: 97 FL (ref 82–98)
NONHDLC SERPL-MCNC: 101 MG/DL
PLATELET # BLD AUTO: 301 THOUSANDS/UL (ref 149–390)
PMV BLD AUTO: 10.6 FL (ref 8.9–12.7)
POTASSIUM SERPL-SCNC: 4.7 MMOL/L (ref 3.5–5.3)
PROT SERPL-MCNC: 7.1 G/DL (ref 6.4–8.4)
PROTHROMBIN TIME: 28.7 SECONDS (ref 12.3–15)
RBC # BLD AUTO: 4.97 MILLION/UL (ref 3.88–5.62)
SODIUM SERPL-SCNC: 135 MMOL/L (ref 135–147)
TRIGL SERPL-MCNC: 249 MG/DL
WBC # BLD AUTO: 9.17 THOUSAND/UL (ref 4.31–10.16)

## 2024-10-28 PROCEDURE — 82550 ASSAY OF CK (CPK): CPT

## 2024-10-28 PROCEDURE — 80061 LIPID PANEL: CPT

## 2024-10-28 PROCEDURE — 85610 PROTHROMBIN TIME: CPT

## 2024-10-28 PROCEDURE — 80053 COMPREHEN METABOLIC PANEL: CPT

## 2024-10-28 PROCEDURE — 85027 COMPLETE CBC AUTOMATED: CPT

## 2024-10-28 PROCEDURE — 83036 HEMOGLOBIN GLYCOSYLATED A1C: CPT

## 2024-10-28 NOTE — PROGRESS NOTES
Spoke with patient, advised INR good, states he had cataract surgery on both his eyes, he was on the antibiotic drops. States today is his last dose.  Current dose verified. Advised to continue 5 mg Mon Fri, 2.5 mg all other days, will recheck in 4 weeks 11/25/24

## 2024-11-18 ENCOUNTER — 1 MONTH POST-OP (OUTPATIENT)
Dept: URBAN - METROPOLITAN AREA CLINIC 6 | Facility: CLINIC | Age: 74
End: 2024-11-18

## 2024-11-18 DIAGNOSIS — Z96.1: ICD-10-CM

## 2024-11-18 PROCEDURE — 99024 POSTOP FOLLOW-UP VISIT: CPT

## 2024-11-18 ASSESSMENT — KERATOMETRY
OS_AXISANGLE_DEGREES: 105
OS_K2POWER_DIOPTERS: 43.00
OS_K1POWER_DIOPTERS: 42.75
OD_AXISANGLE_DEGREES: 169
OD_K1POWER_DIOPTERS: 41.50
OD_AXISANGLE2_DEGREES: 79
OS_AXISANGLE2_DEGREES: 15
OD_K2POWER_DIOPTERS: 42.00

## 2024-11-18 ASSESSMENT — VISUAL ACUITY
OS_SC: 20/30
OD_SC: 20/30+1

## 2024-11-18 ASSESSMENT — TONOMETRY
OD_IOP_MMHG: 14
OS_IOP_MMHG: 17

## 2024-11-20 ENCOUNTER — HOSPITAL ENCOUNTER (EMERGENCY)
Facility: HOSPITAL | Age: 74
Discharge: HOME | End: 2024-11-20
Attending: EMERGENCY MEDICINE
Payer: MEDICARE

## 2024-11-20 VITALS
SYSTOLIC BLOOD PRESSURE: 172 MMHG | RESPIRATION RATE: 18 BRPM | TEMPERATURE: 98.1 F | DIASTOLIC BLOOD PRESSURE: 71 MMHG | OXYGEN SATURATION: 98 % | HEART RATE: 79 BPM

## 2024-11-20 DIAGNOSIS — K13.79 BLEEDING FROM MOUTH: ICD-10-CM

## 2024-11-20 DIAGNOSIS — Z51.89 VISIT FOR WOUND CHECK: Primary | ICD-10-CM

## 2024-11-20 PROCEDURE — 99283 EMERGENCY DEPT VISIT LOW MDM: CPT

## 2024-11-20 PROCEDURE — 99284 EMERGENCY DEPT VISIT MOD MDM: CPT | Performed by: EMERGENCY MEDICINE

## 2024-11-20 RX ORDER — TRANEXAMIC ACID 100 MG/ML
1000 INJECTION, SOLUTION INTRAVENOUS ONCE
Status: COMPLETED | OUTPATIENT
Start: 2024-11-20 | End: 2024-11-20

## 2024-11-20 RX ADMIN — TRANEXAMIC ACID 1000 MG: 100 INJECTION, SOLUTION INTRAVENOUS at 06:01

## 2024-11-20 NOTE — ED PROVIDER NOTES
Time reflects when diagnosis was documented in both MDM as applicable and the Disposition within this note       Time User Action Codes Description Comment    11/20/2024  6:53 AM Floyd Steen Add [Z51.89] Visit for wound check     11/20/2024  6:53 AM Floyd Steen Add [K13.79] Bleeding from mouth           ED Disposition       ED Disposition   Discharge    Condition   Stable    Date/Time   Wed Nov 20, 2024  6:53 AM    Comment   Fernando Roberts discharge to home/self care.                   Assessment & Plan       Medical Decision Making  74-year-old male presenting with bleeding from the mouth status post recent dental procedure 1 week ago. Currently oozing.     TXA soaked Dental roll applied to bleeding area with resolution of oozing after approximately 20 minutes.     On reevaluation the patient appears well, is in no acute distress, and is comfortable discharge at this time.  Patient tolerating his secretions and has an intact airway.  Discussed with patient the emergent signs and symptoms for which she should return to the ED and encouraged continued follow-up with Dr. Greer, his oral surgeon for reevaluation of symptoms.  Advised patient that if the bleeding recurs while at home he can try to bite down with some teabags for similar effect as the TXA.    Patient verbalized understanding the plan of care and was given the opportunity to ask questions.      Risk  Prescription drug management.             Medications   tranexamic acid 100mg/mL (TOPICAL/EPISTAXIS) 1,000 mg (1,000 mg Topical Given by Other 11/20/24 0601)       ED Risk Strat Scores                           SBIRT 20yo+      Flowsheet Row Most Recent Value   Initial Alcohol Screen: US AUDIT-C     1. How often do you have a drink containing alcohol? 3 Filed at: 11/20/2024 0516   2. How many drinks containing alcohol do you have on a typical day you are drinking?  3 Filed at: 11/20/2024 0516   3a. Male UNDER 65: How often do you have five or more  drinks on one occasion? 0 Filed at: 11/20/2024 0516   3b. FEMALE Any Age, or MALE 65+: How often do you have 4 or more drinks on one occassion? 0 Filed at: 11/20/2024 0516   Audit-C Score 6 Filed at: 11/20/2024 0516   RICHARD: How many times in the past year have you...    Used an illegal drug or used a prescription medication for non-medical reasons? Never Filed at: 11/20/2024 0516                            History of Present Illness       Chief Complaint   Patient presents with    Wound Check     Patient comes in reporting recent surgery on mouth, states had teeth pulled on R side of mouth. Reports he was healing appropriately and ate bag of cheetos last night noticed bleeding from mouth this AM. Reports spitting up clots. On coumadin. 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) 03/21/2019    Chronic cholecystitis with calculus 01/31/2024    Colon polyp     6 polyps 3 years ago    Deep vein thrombosis (HCC)     lower leg- left    Diabetes mellitus (HCC)     Diabetic neuropathic arthropathy (HCC)     causes weakness in LE and uses a cane as assist to walk    Diabetic neuropathy (HCC)     Fatty liver     GERD (gastroesophageal reflux disease)     Hydronephrosis 05/17/2019    Added automatically from request for surgery [id number]      Hyperlipidemia     Hypertension     Hyponatremia 09/11/2021    Kidney stone     Parotid tumor     bilateral    Seasonal allergies     Spinal stenosis     Ureteral stone with hydronephrosis 05/17/2019    Added automatically from request for surgery [id number]      Past Surgical History:   Procedure Laterality Date    CHOLECYSTECTOMY      COLONOSCOPY  05/16/2019    completed by Dr. Mckeon, Repeat 3 years    FL RETROGRADE PYELOGRAM  05/17/2019    PAROTIDECTOMY Bilateral     HI CYSTO/URETERO W/LITHOTRIPSY &INDWELL STENT INSRT Right 05/17/2019     Procedure: CYSTOSCOPY URETEROSCOPY WITH LITHOTRIPSY HOLMIUM LASER, RETROGRADE PYELOGRAM AND INSERTION STENT URETERAL--possible stent only;  Surgeon: Casey Snyder MD;  [facility];  Service: Urology    KY EXC PRTD ALEXA/PRTD GLND LAT DSJ&PRSRV FACIAL NR Right 10/18/2017    Procedure: SUPERFICIAL PAROTIDECTOMY WITH NERVE DISSECTION AND PRESERVATION;  Surgeon: Christopher Black MD;  [facility];  Service: ENT    KY LAPAROSCOPY SURG CHOLECYSTECTOMY N/A 01/12/2024    Procedure: CHOLECYSTECTOMY LAPAROSCOPIC W/ ROBOTICS;  Surgeon: Naveed Triplett DO;  [facility];  Service: General    KY TENDON SHEATH INCISION Right 11/03/2020    Procedure: RING TRIGGER FINGER RELEASE;  Surgeon: Marielena Calvert MD;  [facility];  Service: Orthopedics    ROTATOR CUFF REPAIR Bilateral     TONSILLECTOMY      UMBILICAL HERNIA REPAIR N/A 01/12/2024    Procedure: REPAIR HERNIA UMBILICAL, NO MESH;  Surgeon: Naveed Triplett DO;  [facility];  Service: General      Family History   Problem Relation Age of Onset    Lupus Mother     Rheum arthritis Mother     Cirrhosis Father     Alcohol abuse Father     Substance Abuse Brother       Social History     Tobacco Use    Smoking status: Light Smoker     Types: Pipe, Cigars    Smokeless tobacco: Never    Tobacco comments:     Pipe smoker   Vaping Use    Vaping status: Never Used   Substance Use Topics    Alcohol use: Yes     Alcohol/week: 4.0 standard drinks of alcohol     Types: 4 Shots of liquor per week     Comment: daily    Drug use: No      E-Cigarette/Vaping    E-Cigarette Use Never User       E-Cigarette/Vaping Substances    Nicotine No     THC No     CBD No     Flavoring No     Other No     Unknown No       I have reviewed and agree with the history as documented.     HPI  Patient is a 74-year-old male with a history of atrial fibrillation on Coumadin, who presents to the emergency department for evaluation of bleeding from the mouth,  beginning tonight.  Patient reports he had a recent dental procedure about 1 week ago with Dr. Greer stating he had 1 upper left tooth as well as 3 upper right teeth extracted.  Patient states he tolerated the procedure well and has had no issues since the procedure however after having some Cheezits he started to develop bleeding on the left side of his mouth.  Patient became concerned due to the volume of blood that he noted concerning him and prompting this ED visit.  Patient is able to tolerate his secretions and has otherwise been in his normal state of health.     Review of Systems   All other systems reviewed and are negative.          Objective       ED Triage Vitals [11/20/24 0517]   Temperature Pulse Blood Pressure Respirations SpO2 Patient Position - Orthostatic VS   98.1 °F (36.7 °C) 79 (!) 172/71 18 98 % Sitting      Temp Source Heart Rate Source BP Location FiO2 (%) Pain Score    Oral Monitor Right arm -- --      Vitals      Date and Time Temp Pulse SpO2 Resp BP Pain Score FACES Pain Rating User   11/20/24 0517 98.1 °F (36.7 °C) 79 98 % 18 172/71 -- --             Physical Exam  Vitals reviewed.   Constitutional:       Appearance: Normal appearance.   HENT:      Head: Normocephalic and atraumatic.      Mouth/Throat:      Mouth: Mucous membranes are moist.      Pharynx: Oropharynx is clear. No posterior oropharyngeal erythema.     Cardiovascular:      Rate and Rhythm: Normal rate and regular rhythm.   Pulmonary:      Effort: Pulmonary effort is normal.      Breath sounds: Normal breath sounds.   Abdominal:      Palpations: Abdomen is soft.   Skin:     General: Skin is warm.   Neurological:      Mental Status: He is alert.         Results Reviewed       None            No orders to display       Procedures    ED Medication and Procedure Management   Prior to Admission Medications   Prescriptions Last Dose Informant Patient Reported? Taking?   Cyanocobalamin (VITAMIN B 12 PO)  Self Yes No   Sig: Take  1,000 mcg by mouth daily   acetaminophen (TYLENOL) 325 mg tablet  Self No No   Si, by mouth, every 6 hours as needed for mild to moderate pain.   albuterol (PROVENTIL HFA,VENTOLIN HFA) 90 mcg/act inhaler   No No   Sig: INHALE 2 PUFFS BY MOUTH EVERY 4 HOURS AS NEEDED FOR WHEEZE   aspirin (ECOTRIN LOW STRENGTH) 81 mg EC tablet  Self Yes No   Sig: Take 81 mg by mouth daily     fenofibrate micronized (LOFIBRA) 134 MG capsule  Self No No   Sig: TAKE 1 CAPSULE (134 MG TOTAL) BY MOUTH DAILY WITH BREAKFAST   gabapentin (NEURONTIN) 100 mg capsule   No No   Sig: TAKE 2 CAPSULES (200 MG TOTAL) BY MOUTH DAILY AT BEDTIME   glimepiride (AMARYL) 2 mg tablet  Self No No   Sig: TAKE 1 AND HALF TABLET (3 MG TOTAL) BY MOUTH DAILY WITH BREAKFAST   lisinopril (ZESTRIL) 20 mg tablet  Self No No   Sig: TAKE 1 TABLET BY MOUTH EVERY DAY   metFORMIN (GLUCOPHAGE-XR) 500 mg 24 hr tablet  Self No No   Sig: TAKE 2 TABLETS BY MOUTH TWICE A DAY WITH FOOD   omeprazole (PriLOSEC) 20 mg delayed release capsule   No No   Sig: TAKE 1 CAPSULE BY MOUTH EVERY DAY   rosuvastatin (CRESTOR) 20 MG tablet   No No   Sig: Take 1 tablet (20 mg total) by mouth daily   sitaGLIPtin (Januvia) 100 mg tablet   No No   Sig: TAKE 1 TABLET BY MOUTH EVERY DAY   tamsulosin (FLOMAX) 0.4 mg   No No   Sig: TAKE 1 CAPSULE BY MOUTH EVERY DAY WITH DINNER   warfarin (COUMADIN) 2.5 mg tablet  Self No No   Sig: Take 1 tablet by mouth daily --S-S takes 2.5mg     -- takes 5.0 mg   warfarin (COUMADIN) 5 mg tablet  Self No No   Sig: TAKE 1 TABLET EVERY DAY OR AS DIRECTED BY MD      Facility-Administered Medications: None     Discharge Medication List as of 2024  6:54 AM        CONTINUE these medications which have NOT CHANGED    Details   acetaminophen (TYLENOL) 325 mg tablet 2, by mouth, every 6 hours as needed for mild to moderate pain., No Print      albuterol (PROVENTIL HFA,VENTOLIN HFA) 90 mcg/act inhaler INHALE 2 PUFFS BY MOUTH EVERY 4 HOURS AS NEEDED FOR WHEEZE,  Normal      aspirin (ECOTRIN LOW STRENGTH) 81 mg EC tablet Take 81 mg by mouth daily  , Historical Med      Cyanocobalamin (VITAMIN B 12 PO) Take 1,000 mcg by mouth daily, Historical Med      fenofibrate micronized (LOFIBRA) 134 MG capsule TAKE 1 CAPSULE (134 MG TOTAL) BY MOUTH DAILY WITH BREAKFAST, Starting Tue 4/16/2024, Normal      gabapentin (NEURONTIN) 100 mg capsule TAKE 2 CAPSULES (200 MG TOTAL) BY MOUTH DAILY AT BEDTIME, Starting Mon 9/9/2024, Normal      glimepiride (AMARYL) 2 mg tablet TAKE 1 AND HALF TABLET (3 MG TOTAL) BY MOUTH DAILY WITH BREAKFAST, Normal      lisinopril (ZESTRIL) 20 mg tablet TAKE 1 TABLET BY MOUTH EVERY DAY, Normal      metFORMIN (GLUCOPHAGE-XR) 500 mg 24 hr tablet TAKE 2 TABLETS BY MOUTH TWICE A DAY WITH FOOD, Starting Mon 1/15/2024, Normal      omeprazole (PriLOSEC) 20 mg delayed release capsule TAKE 1 CAPSULE BY MOUTH EVERY DAY, Starting Mon 9/9/2024, Normal      rosuvastatin (CRESTOR) 20 MG tablet Take 1 tablet (20 mg total) by mouth daily, Starting Mon 9/9/2024, Normal      sitaGLIPtin (Januvia) 100 mg tablet TAKE 1 TABLET BY MOUTH EVERY DAY, Normal      tamsulosin (FLOMAX) 0.4 mg TAKE 1 CAPSULE BY MOUTH EVERY DAY WITH DINNER, Starting Mon 9/9/2024, Normal      !! warfarin (COUMADIN) 2.5 mg tablet Take 1 tablet by mouth daily T-Th-S-S takes 2.5mg     M-W-F takes 5.0 mg, Starting Fri 10/20/2017, No Print      !! warfarin (COUMADIN) 5 mg tablet TAKE 1 TABLET EVERY DAY OR AS DIRECTED BY MD, Normal       !! - Potential duplicate medications found. Please discuss with provider.        No discharge procedures on file.  ED SEPSIS DOCUMENTATION   Time reflects when diagnosis was documented in both MDM as applicable and the Disposition within this note       Time User Action Codes Description Comment    11/20/2024  6:53 AM Floyd Steen [Z51.89] Visit for wound check     11/20/2024  6:53 AM Floyd Steen [K13.79] Bleeding from mouth                  Floyd Steen V  DO  11/20/24 0704       Floyd Steen V, DO  11/20/24 0709

## 2024-11-20 NOTE — ED ATTENDING ATTESTATION
2024  ISera MD, saw and evaluated the patient. I have discussed the patient with the resident/non-physician practitioner and agree with the resident's/non-physician practitioner's findings, Plan of Care, and MDM as documented in the resident's/non-physician practitioner's note, except where noted. All available labs and Radiology studies were reviewed.  I was present for key portions of any procedure(s) performed by the resident/non-physician practitioner and I was immediately available to provide assistance.       At this point I agree with the current assessment done in the Emergency Department.  I have conducted an independent evaluation of this patient a history and physical is as follows:    74-year-old male with a history of atrial fibrillation on Eliquis presenting for evaluation of gum bleeding.  Patient states last week he had several teeth extracted on the right and left maxillary teeth.  Patient states he has been well since that time.  Last night he was eating cheese its and then started to have bleeding.  Denies pain.  Has been tolerating secretions.  Denies dizziness, lightheadedness, chest pain, shortness of breath, nausea, vomiting, abdominal pain.    Please see resident documentation for histories and review of systems.    Exam: Vital signs and nursing notes reviewed  General: Awake, alert, no acute distress  HEENT: Normocephalic, atraumatic, mucous membranes moist, left maxillary extraction site with oozing but no clots, other right sided dental extraction site is without bleeding  Neck: Supple  Heart: Regular rate and rhythm  Lungs: No stridor  Abdomen: Soft, nondistended    ED course/Medical Decision Makin-year-old male presenting for evaluation of gum bleeding from dental extraction site.  Patient is hemodynamically stable and denies signs and symptoms of anemia.  He has oozing from the left-sided extraction site.  TXA soaked gauze was applied with external  pressure with achievement of hemostasis.  At this time, patient is appropriate for discharge home with outpatient follow-up.  Counseled on applying pressure and use of teabags at home should bleeding recur. Should avoid sharp and hard foods.  Advised close follow-up with dentist.  Return precautions discussed.  Patient is in agreement and understanding of these instructions.    Diagnosis: Bleeding from dental extraction site  Disposition: Discharge

## 2024-11-21 ENCOUNTER — VBI (OUTPATIENT)
Dept: FAMILY MEDICINE CLINIC | Facility: CLINIC | Age: 74
End: 2024-11-21

## 2024-11-21 NOTE — TELEPHONE ENCOUNTER
11/21/24 12:17 PM    Patient contacted post ED visit, [facility] department spoke with patient/caregiver and outreach was successful.    Thank you.  Luther John MA  [facility]

## 2024-11-23 DIAGNOSIS — I82.409 DEEP VEIN THROMBOSIS (DVT) OF LOWER EXTREMITY, UNSPECIFIED CHRONICITY, UNSPECIFIED LATERALITY, UNSPECIFIED VEIN (HCC): ICD-10-CM

## 2024-11-25 ENCOUNTER — OFFICE VISIT (OUTPATIENT)
Dept: FAMILY MEDICINE CLINIC | Facility: CLINIC | Age: 74
End: 2024-11-25
Payer: MEDICARE

## 2024-11-25 ENCOUNTER — APPOINTMENT (OUTPATIENT)
Dept: LAB | Facility: CLINIC | Age: 74
End: 2024-11-25
Payer: MEDICARE

## 2024-11-25 VITALS
HEIGHT: 71 IN | DIASTOLIC BLOOD PRESSURE: 70 MMHG | SYSTOLIC BLOOD PRESSURE: 120 MMHG | TEMPERATURE: 98.4 F | OXYGEN SATURATION: 97 % | WEIGHT: 210 LBS | RESPIRATION RATE: 16 BRPM | HEART RATE: 78 BPM | BODY MASS INDEX: 29.4 KG/M2

## 2024-11-25 DIAGNOSIS — I48.0 PAROXYSMAL ATRIAL FIBRILLATION (HCC): ICD-10-CM

## 2024-11-25 DIAGNOSIS — I70.213 ATHEROSCLEROSIS OF NATIVE ARTERY OF BOTH LOWER EXTREMITIES WITH INTERMITTENT CLAUDICATION (HCC): ICD-10-CM

## 2024-11-25 DIAGNOSIS — E11.40 TYPE 2 DIABETES MELLITUS WITH DIABETIC NEUROPATHY, WITHOUT LONG-TERM CURRENT USE OF INSULIN (HCC): ICD-10-CM

## 2024-11-25 DIAGNOSIS — E78.2 MIXED HYPERLIPIDEMIA: ICD-10-CM

## 2024-11-25 DIAGNOSIS — I10 ESSENTIAL HYPERTENSION: ICD-10-CM

## 2024-11-25 DIAGNOSIS — I82.402 ACUTE DEEP VEIN THROMBOSIS (DVT) OF LEFT LOWER EXTREMITY, UNSPECIFIED VEIN (HCC): Primary | ICD-10-CM

## 2024-11-25 DIAGNOSIS — J44.9 CHRONIC OBSTRUCTIVE PULMONARY DISEASE, UNSPECIFIED COPD TYPE (HCC): ICD-10-CM

## 2024-11-25 DIAGNOSIS — I10 ESSENTIAL HYPERTENSION: Primary | ICD-10-CM

## 2024-11-25 DIAGNOSIS — I82.402 ACUTE DEEP VEIN THROMBOSIS (DVT) OF LEFT LOWER EXTREMITY, UNSPECIFIED VEIN (HCC): ICD-10-CM

## 2024-11-25 LAB
INR PPP: 2.68 (ref 0.85–1.19)
PROTHROMBIN TIME: 28.3 SECONDS (ref 12.3–15)

## 2024-11-25 PROCEDURE — G2211 COMPLEX E/M VISIT ADD ON: HCPCS | Performed by: NURSE PRACTITIONER

## 2024-11-25 PROCEDURE — 85610 PROTHROMBIN TIME: CPT

## 2024-11-25 PROCEDURE — 99214 OFFICE O/P EST MOD 30 MIN: CPT | Performed by: NURSE PRACTITIONER

## 2024-11-25 PROCEDURE — 36415 COLL VENOUS BLD VENIPUNCTURE: CPT

## 2024-11-25 RX ORDER — WARFARIN SODIUM 5 MG/1
TABLET ORAL
Qty: 90 TABLET | Refills: 3 | Status: SHIPPED | OUTPATIENT
Start: 2024-11-25

## 2024-11-25 RX ORDER — HYDROCODONE BITARTRATE AND ACETAMINOPHEN 7.5; 325 MG/1; MG/1
1 TABLET ORAL
COMMUNITY
Start: 2024-11-12 | End: 2024-11-25 | Stop reason: ALTCHOICE

## 2024-11-25 NOTE — ASSESSMENT & PLAN NOTE
Total cholesterol 132, triglycerides 249, HDL 31, LDL 51.   Improved from lipid panel on no medications in July total cholesterol 251, triglycerides 514, HDL 30.   Continue rosuvastatin and fenofibrate.   Orders:    Lipid panel; Future

## 2024-11-25 NOTE — ASSESSMENT & PLAN NOTE
Lab Results   Component Value Date    HGBA1C 7.5 (H) 10/28/2024     A1c stable 7.5%. increased a little bit from 7.!%.   Notes his sugars were higher prior to extraction of 4 teeth. After this, sugars are better.   Fasting sugar 81 today. Will monitor.   Peripheral neuropathy, unsure if gabapentin is helping. He will try to wean off. Currently taking gabapentin 200 mg at bedtime. Decrease to 100 mg at bedtime for two weeks, then stop.     Orders:    Hemoglobin A1C; Future

## 2024-11-25 NOTE — PROGRESS NOTES
Name: Fernando Roberts      : 1950      MRN: [id number]  Encounter Provider: DAYAMI Pedroza  Encounter Date: 2024   Encounter department: [facility]    Assessment & Plan       Preventive health issues were discussed with patient, and age appropriate screening tests were ordered as noted in patient's After Visit Summary. Personalized health advice and appropriate referrals for health education or preventive services given if needed, as noted in patient's After Visit Summary.    History of Present Illness   {?Quick Links Encounters * My Last Note * Last Note in Specialty * Snapshot * Since Last Visit * History :11507}  HPI   Patient Care Team:  DAYAMI Pedroza as PCP - General (Family Medicine)  Rowdy Oneal MD (Ophthalmology)  Christopher Black MD (Otolaryngology)  Levon Westbrook DDS  Naveed Greer DDS (Oral Surgery)  Sourav Boyle DPM (Podiatry)  Filipe Thomas DO (Ophthalmology)    Review of Systems  Medical History Reviewed by provider this encounter:       Annual Wellness Visit Questionnaire   Annual Wellness Visit  Social Drivers of Health     Financial Resource Strain: Low Risk  (2023)    Overall Financial Resource Strain (CARDIA)     Difficulty of Paying Living Expenses: Not hard at all   Food Insecurity: No Food Insecurity (2024)    Nursing - Inadequate Food Risk Classification     Worried About Running Out of Food in the Last Year: Never true     Ran Out of Food in the Last Year: Never true   Transportation Needs: No Transportation Needs (2024)    PRAPARE - Transportation     Lack of Transportation (Medical): No     Lack of Transportation (Non-Medical): No   Housing Stability: Low Risk  (2024)    Housing Stability Vital Sign     Unable to Pay for Housing in the Last Year: No     Number of Times Moved in the Last Year: 1     Homeless in the Last Year: No   Utilities: Not At Risk (2024)    [facility] Utilities     Threatened with loss of  utilities: No     No results found.    Objective {?Quick Links Trend Vitals * Enter New Vitals * Results Review * Timeline (Adult) * Labs * Imaging * Cardiology * Procedures * Lung Cancer Screening * Surgical eConsent :48765}  There were no vitals taken for this visit.    Physical Exam  {Administrative / Billing Section (Optional):74700}

## 2024-11-25 NOTE — ASSESSMENT & PLAN NOTE
Blood pressure is well controlled on lisinopril 20 mg daily.   Orders:    CBC; Future    Comprehensive metabolic panel; Future    TSH, 3rd generation with Free T4 reflex; Future

## 2024-11-25 NOTE — PROGRESS NOTES
Name: Fernando Roberts      : 1950      MRN: [id number]  Encounter Provider: DAYAMI Pedroza  Encounter Date: 2024   Encounter department: [facility]  :  Assessment & Plan  Essential hypertension  Blood pressure is well controlled on lisinopril 20 mg daily.   Orders:    CBC; Future    Comprehensive metabolic panel; Future    TSH, 3rd generation with Free T4 reflex; Future    Mixed hyperlipidemia  Total cholesterol 132, triglycerides 249, HDL 31, LDL 51.   Improved from lipid panel on no medications in July total cholesterol 251, triglycerides 514, HDL 30.   Continue rosuvastatin and fenofibrate.   Orders:    Lipid panel; Future    Type 2 diabetes mellitus with diabetic neuropathy, without long-term current use of insulin (HCC)    Lab Results   Component Value Date    HGBA1C 7.5 (H) 10/28/2024     A1c stable 7.5%. increased a little bit from 7.!%.   Notes his sugars were higher prior to extraction of 4 teeth. After this, sugars are better.   Fasting sugar 81 today. Will monitor.   Peripheral neuropathy, unsure if gabapentin is helping. He will try to wean off. Currently taking gabapentin 200 mg at bedtime. Decrease to 100 mg at bedtime for two weeks, then stop.     Orders:    Hemoglobin A1C; Future    Chronic obstructive pulmonary disease, unspecified COPD type (HCC)  Emphysema changes on CT chest .   Continue to recommend smoking cessation.   Overall asymptomatic.   Rare albuterol use.                   Paroxysmal atrial fibrillation (HCC)  Remote history of a. Fib.   Anticoagulated on Coumadin for history of DVT.       Atherosclerosis of native artery of both lower extremities with intermittent claudication (HCC)  Peripheral arterial disease of bilateral lower extremities.   Continue aspirin, coumadin, statin.   Scheduled for repeat LEADS, following with vascular.         Repeat blood work and follow up in 6 months.       Tobacco Cessation Counseling: Tobacco cessation  counseling was provided. The patient is sincerely urged to quit consumption of tobacco. He is not ready to quit tobacco.     History of Present Illness     Fernando Roberts is a 74 year old male presenting today for 6 month check up.     Cataracts done, vision is much better.    Taking fenofibrate and rosuvastain.  Tolerating well    A1c 7.5%.    Fasting sugar 81 this morning  Cut back on sweets.    Sugars better after tooth pulled.  3 teeth one side and 1 on other.   Eating less.  Will be getting a partial dental plate.    Gabapentin neuropathy.   Dropped safe on foot--didn't feel it.  Would like to try off this med, as he is not sure it is helping.     Accompanied by his wife today.        Review of Systems   Constitutional: Negative.    HENT: Negative.     Respiratory: Negative.     Cardiovascular: Negative.    Gastrointestinal: Negative.    Genitourinary: Negative.    Musculoskeletal: Negative.    Skin: Negative.    Neurological:  Positive for numbness (feet, chronic).   Hematological: Negative.    Psychiatric/Behavioral: Negative.       Current Outpatient Medications on File Prior to Visit   Medication Sig Dispense Refill    acetaminophen (TYLENOL) 325 mg tablet 2, by mouth, every 6 hours as needed for mild to moderate pain. 30 tablet 0    albuterol (PROVENTIL HFA,VENTOLIN HFA) 90 mcg/act inhaler INHALE 2 PUFFS BY MOUTH EVERY 4 HOURS AS NEEDED FOR WHEEZE 8.5 g 5    aspirin (ECOTRIN LOW STRENGTH) 81 mg EC tablet Take 81 mg by mouth daily        Cyanocobalamin (VITAMIN B 12 PO) Take 1,000 mcg by mouth daily      fenofibrate micronized (LOFIBRA) 134 MG capsule TAKE 1 CAPSULE (134 MG TOTAL) BY MOUTH DAILY WITH BREAKFAST 90 capsule 3    gabapentin (NEURONTIN) 100 mg capsule TAKE 2 CAPSULES (200 MG TOTAL) BY MOUTH DAILY AT BEDTIME 180 capsule 1    glimepiride (AMARYL) 2 mg tablet TAKE 1 AND HALF TABLET (3 MG TOTAL) BY MOUTH DAILY WITH BREAKFAST 135 tablet 1    lisinopril (ZESTRIL) 20 mg tablet TAKE 1 TABLET BY MOUTH  "EVERY DAY 90 tablet 1    metFORMIN (GLUCOPHAGE-XR) 500 mg 24 hr tablet TAKE 2 TABLETS BY MOUTH TWICE A DAY WITH FOOD 360 tablet 3    omeprazole (PriLOSEC) 20 mg delayed release capsule TAKE 1 CAPSULE BY MOUTH EVERY DAY 90 capsule 1    rosuvastatin (CRESTOR) 20 MG tablet Take 1 tablet (20 mg total) by mouth daily 100 tablet 3    sitaGLIPtin (Januvia) 100 mg tablet TAKE 1 TABLET BY MOUTH EVERY DAY 90 tablet 1    tamsulosin (FLOMAX) 0.4 mg TAKE 1 CAPSULE BY MOUTH EVERY DAY WITH DINNER 90 capsule 1    warfarin (COUMADIN) 2.5 mg tablet Take 1 tablet by mouth daily T-Th-S-S takes 2.5mg     M-W-F takes 5.0 mg      warfarin (COUMADIN) 5 mg tablet TAKE 1 TABLET EVERY DAY OR AS DIRECTED BY MD 90 tablet 3     No current facility-administered medications on file prior to visit.         Objective   /70   Pulse 78   Temp 98.4 °F (36.9 °C) (Temporal)   Resp 16   Ht 5' 11\" (1.803 m)   Wt 95.3 kg (210 lb)   SpO2 97%   BMI 29.29 kg/m²      Physical Exam  Vitals and nursing note reviewed.   Constitutional:       General: He is not in acute distress.     Appearance: Normal appearance. He is not ill-appearing.   HENT:      Head: Atraumatic.      Right Ear: Tympanic membrane normal.      Left Ear: Tympanic membrane normal.      Mouth/Throat:      Mouth: Mucous membranes are moist.      Pharynx: Oropharynx is clear.   Neck:      Thyroid: No thyromegaly.      Vascular: No carotid bruit.   Cardiovascular:      Rate and Rhythm: Normal rate and regular rhythm.      Heart sounds: No murmur heard.  Pulmonary:      Effort: Pulmonary effort is normal.      Breath sounds: Normal breath sounds.   Musculoskeletal:      Cervical back: Normal range of motion and neck supple.      Right lower leg: No edema.      Left lower leg: No edema.   Lymphadenopathy:      Cervical: No cervical adenopathy.   Neurological:      Mental Status: He is alert.   Psychiatric:         Mood and Affect: Mood normal.       "

## 2024-11-26 ENCOUNTER — ANTICOAG VISIT (OUTPATIENT)
Dept: CARDIOLOGY CLINIC | Facility: CLINIC | Age: 74
End: 2024-11-26

## 2024-11-26 DIAGNOSIS — Z86.718 HISTORY OF DVT (DEEP VEIN THROMBOSIS): Primary | ICD-10-CM

## 2024-11-26 NOTE — PROGRESS NOTES
Spoke with patient, advised INR good, current dose verified, continue 5 mg Mon Fri, 2.5 mg all other days, will recheck in 4 weeks 12/23/24

## 2024-12-01 NOTE — ASSESSMENT & PLAN NOTE
Peripheral arterial disease of bilateral lower extremities.   Continue aspirin, coumadin, statin.   Scheduled for repeat LEADS, following with vascular.

## 2024-12-06 ENCOUNTER — HOSPITAL ENCOUNTER (OUTPATIENT)
Dept: NON INVASIVE DIAGNOSTICS | Facility: CLINIC | Age: 74
Discharge: HOME | End: 2024-12-06
Payer: MEDICARE

## 2024-12-06 DIAGNOSIS — I70.213 ATHEROSCLEROSIS OF NATIVE ARTERY OF BOTH LOWER EXTREMITIES WITH INTERMITTENT CLAUDICATION (HCC): ICD-10-CM

## 2024-12-06 DIAGNOSIS — I72.4 POPLITEAL ARTERY ANEURYSM, BILATERAL (HCC): ICD-10-CM

## 2024-12-06 PROCEDURE — 93923 UPR/LXTR ART STDY 3+ LVLS: CPT

## 2024-12-06 PROCEDURE — 93925 LOWER EXTREMITY STUDY: CPT | Performed by: SURGERY

## 2024-12-06 PROCEDURE — 93925 LOWER EXTREMITY STUDY: CPT

## 2024-12-06 PROCEDURE — 93923 UPR/LXTR ART STDY 3+ LVLS: CPT | Performed by: SURGERY

## 2024-12-17 ENCOUNTER — OFFICE VISIT (OUTPATIENT)
Dept: GASTROENTEROLOGY | Facility: AMBULARY SURGERY CENTER | Age: 74
End: 2024-12-17
Payer: MEDICARE

## 2024-12-17 ENCOUNTER — TELEPHONE (OUTPATIENT)
Age: 74
End: 2024-12-17

## 2024-12-17 VITALS
DIASTOLIC BLOOD PRESSURE: 68 MMHG | BODY MASS INDEX: 29.71 KG/M2 | HEIGHT: 71 IN | HEART RATE: 76 BPM | OXYGEN SATURATION: 99 % | SYSTOLIC BLOOD PRESSURE: 120 MMHG | WEIGHT: 212.2 LBS

## 2024-12-17 DIAGNOSIS — K74.02 ADVANCED HEPATIC FIBROSIS: ICD-10-CM

## 2024-12-17 DIAGNOSIS — R94.5 ABNORMAL RESULTS OF LIVER FUNCTION STUDIES: ICD-10-CM

## 2024-12-17 DIAGNOSIS — E11.9 TYPE 2 DIABETES MELLITUS WITHOUT COMPLICATION, WITHOUT LONG-TERM CURRENT USE OF INSULIN (HCC): ICD-10-CM

## 2024-12-17 DIAGNOSIS — K76.0 METABOLIC DYSFUNCTION-ASSOCIATED STEATOTIC LIVER DISEASE (MASLD): Primary | ICD-10-CM

## 2024-12-17 PROCEDURE — G2211 COMPLEX E/M VISIT ADD ON: HCPCS | Performed by: STUDENT IN AN ORGANIZED HEALTH CARE EDUCATION/TRAINING PROGRAM

## 2024-12-17 PROCEDURE — 99214 OFFICE O/P EST MOD 30 MIN: CPT | Performed by: STUDENT IN AN ORGANIZED HEALTH CARE EDUCATION/TRAINING PROGRAM

## 2024-12-17 NOTE — PROGRESS NOTES
[facility] - Outpatient Consultation  Fernando Roberts 74 y.o. male MRN: [id number]  Encounter: [id number]    PCP:  DAYAMI Pedroza, [phone]  Referring Provider:  No ref. 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 male with history of DM, HLD, pAfib, remote bilateral parotid tumors, and DVT on coumadin who presents for follow up for MASLD.      He has had mild, chronic hepatitis dating back to 2020.  His liver synthetic function and platelets are normal. Serologic workup which showed an elevated GGT as well as positive SEAMSU and SSA.  His viral and autoimmune serologies were negative.  He had a RUQ US which showed hepatomegaly with steatosis with recent elastography showing stage 3 fibrosis.    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 Given advanced fibrosis, would recommend weight loss and aggressive modification of his metabolic risk factors, including uncontrolled DM. Would consider GLP1a which has been shown to improve steatohepatitis and regression of fibrosis. If he is not a candidate for GLP1a, would then consider Rezdiffra given advanced fibrosis.     Otherwise, he should avoid interval weight gain, excessive EtOH consumption and hepatotoxic medications. He will return to clinic in 6 months or sooner if needed. Thank you for the opportunity to consult in his care.    - CBC, CMP and INR   - Needs HAV and HBV vaccination  - Start Ozempic  0.25 mg weekly x 8 weeks and then increase to 0.5 mg weekly   - Repeat US elastography in 3 months     Desire Guidry MD  Division of Gastroenterology and Hepatology  [facility]    ============================================================================  CC/HPI: 74 y.o. male with history of DM, HLD, pAfib, remote bilateral parotid tumors, and DVT on coumadin who presents for follow up for MASLD.    Interval  events  - US elastography showed stage 3 fibrosis for which repeat US elastography in 6 months was recommended   - Serologic work-up unrevealing. Repeat LFTs in October with ALT in 50s   - Started Crestor in September     Extended liver history  He has had abnormal liver tests dating back to 2021 with ALT 60-70s. He had an US abdomen which showed hepatomegaly with steatosis. Serologic work-up did show elevated GGT as well as +SEAMUS and SSA. His viral serologies were negative.       He reports chronic pruritus as well as dry mouth, skin and eyes.  States that he needs a back scratcher on every floor for his pruritus.  He was seen a rheumatologist recently for positive autoantibodies but was not diagnosed with Sjogren's disease.     In his weight, he does report peak weight earlier this year in January but he has lost 12 pounds over the summer by increasing his physical activity.     He reports drinking 5 beverages weekly. He reports a family history of alcoholism and EtOH cirrhosis. His mother also had lupus.      He has been on statins for years and discontinued in May 2024 due to cramping.  However this was recently restarted in July 2024 without any changes in his liver chemistry trend.  Denies any herbal supplement use.       ROS: Complete review of systems otherwise negative.     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) 03/21/2019    Chronic cholecystitis with calculus 01/31/2024    Colon polyp     6 polyps 3 years ago    Deep vein thrombosis (HCC)     lower leg- left    Diabetes mellitus (HCC)     Diabetic neuropathic arthropathy (HCC)     causes weakness in LE and uses a cane as assist to walk    Diabetic neuropathy (HCC)     Fatty liver     GERD (gastroesophageal reflux disease)     Hydronephrosis 05/17/2019    Added automatically from request  for surgery [id number]      Hyperlipidemia     Hypertension     Hyponatremia 09/11/2021    Kidney stone     Parotid tumor     bilateral    Seasonal allergies     Spinal stenosis     Ureteral stone with hydronephrosis 05/17/2019    Added automatically from request for surgery [id number]        Past Surgical History:   Procedure Laterality Date    CHOLECYSTECTOMY      COLONOSCOPY  05/16/2019    completed by Dr. Mckeon, Repeat 3 years    FL RETROGRADE PYELOGRAM  05/17/2019    PAROTIDECTOMY Bilateral     VA CYSTO/URETERO W/LITHOTRIPSY &INDWELL STENT INSRT Right 05/17/2019    Procedure: CYSTOSCOPY URETEROSCOPY WITH LITHOTRIPSY HOLMIUM LASER, RETROGRADE PYELOGRAM AND INSERTION STENT URETERAL--possible stent only;  Surgeon: Casey Snyder MD;  [facility];  Service: Urology    VA EXC PRTD ALEXA/PRTD GLND LAT DSJ&PRSRV FACIAL NR Right 10/18/2017    Procedure: SUPERFICIAL PAROTIDECTOMY WITH NERVE DISSECTION AND PRESERVATION;  Surgeon: Christopher Black MD;  [facility];  Service: ENT    VA LAPAROSCOPY SURG CHOLECYSTECTOMY N/A 01/12/2024    Procedure: CHOLECYSTECTOMY LAPAROSCOPIC W/ ROBOTICS;  Surgeon: Naveed Triplett DO;  [facility];  Service: General    VA TENDON SHEATH INCISION Right 11/03/2020    Procedure: RING TRIGGER FINGER RELEASE;  Surgeon: Marielena Calvert MD;  [facility];  Service: Orthopedics    ROTATOR CUFF REPAIR Bilateral     TONSILLECTOMY      UMBILICAL HERNIA REPAIR N/A 01/12/2024    Procedure: REPAIR HERNIA UMBILICAL, NO MESH;  Surgeon: Naveed Triplett DO;  [facility];  Service: General       FAMILY/SOCIAL HISTORY:  Family History   Problem Relation Age of Onset    Lupus Mother     Rheum arthritis Mother     Cirrhosis Father     Alcohol abuse Father     Substance Abuse Brother        Social History     Tobacco Use    Smoking status: Light Smoker     Types: Pipe, Cigars    Smokeless tobacco: Never    Tobacco comments:     Pipe smoker   Vaping Use    Vaping  status: Never Used   Substance Use Topics    Alcohol use: Yes     Alcohol/week: 4.0 standard drinks of alcohol     Types: 4 Shots of liquor per week     Comment: daily    Drug use: No       MEDICATIONS:  Current Outpatient Medications on File Prior to Visit   Medication Sig Dispense Refill    acetaminophen (TYLENOL) 325 mg tablet 2, by mouth, every 6 hours as needed for mild to moderate pain. 30 tablet 0    albuterol (PROVENTIL HFA,VENTOLIN HFA) 90 mcg/act inhaler INHALE 2 PUFFS BY MOUTH EVERY 4 HOURS AS NEEDED FOR WHEEZE 8.5 g 5    aspirin (ECOTRIN LOW STRENGTH) 81 mg EC tablet Take 81 mg by mouth daily        Cyanocobalamin (VITAMIN B 12 PO) Take 1,000 mcg by mouth daily      fenofibrate micronized (LOFIBRA) 134 MG capsule TAKE 1 CAPSULE (134 MG TOTAL) BY MOUTH DAILY WITH BREAKFAST 90 capsule 3    gabapentin (NEURONTIN) 100 mg capsule TAKE 2 CAPSULES (200 MG TOTAL) BY MOUTH DAILY AT BEDTIME 180 capsule 1    glimepiride (AMARYL) 2 mg tablet TAKE 1 AND HALF TABLET (3 MG TOTAL) BY MOUTH DAILY WITH BREAKFAST 135 tablet 1    lisinopril (ZESTRIL) 20 mg tablet TAKE 1 TABLET BY MOUTH EVERY DAY 90 tablet 1    metFORMIN (GLUCOPHAGE-XR) 500 mg 24 hr tablet TAKE 2 TABLETS BY MOUTH TWICE A DAY WITH FOOD 360 tablet 3    omeprazole (PriLOSEC) 20 mg delayed release capsule TAKE 1 CAPSULE BY MOUTH EVERY DAY 90 capsule 1    rosuvastatin (CRESTOR) 20 MG tablet Take 1 tablet (20 mg total) by mouth daily 100 tablet 3    sitaGLIPtin (Januvia) 100 mg tablet TAKE 1 TABLET BY MOUTH EVERY DAY 90 tablet 1    tamsulosin (FLOMAX) 0.4 mg TAKE 1 CAPSULE BY MOUTH EVERY DAY WITH DINNER 90 capsule 1    warfarin (COUMADIN) 2.5 mg tablet Take 1 tablet by mouth daily T-Th-S-S takes 2.5mg     M-W-F takes 5.0 mg      warfarin (COUMADIN) 5 mg tablet TAKE 1 TABLET EVERY DAY OR AS DIRECTED BY MD 90 tablet 3     No current facility-administered medications on file prior to visit.       Allergies   Allergen Reactions    Pletal [Cilostazol] Tachycardia     "Dyazide [Hydrochlorothiazide W-Triamterene] Other (See Comments)     Hyponatremia    Jardiance [Empagliflozin] Other (See Comments)     Constipation, increased urinary frequency--not tolerable       PHYSICAL EXAM:  /68 (BP Location: Left arm, Patient Position: Sitting, Cuff Size: Standard)   Pulse 76   Ht 5' 11\" (1.803 m)   Wt 96.3 kg (212 lb 3.2 oz)   SpO2 99%   BMI 29.60 kg/m²   GENERAL: NAD, AAO  HEENT: anicteric, OP clear, MMM  ABDOMEN: non-distended   EXTREMITIES: no edema  SKIN: no rashes, no palmar erythema, no spider angiomata   NEURO: normal gait, no tremor, no asterixis     LABS/RADIOLOGY/ENDOSCOPY:  Lab Results   Component Value Date    WBC 9.17 10/28/2024    HGB 15.7 10/28/2024    HCT 48.4 10/28/2024     10/28/2024    GLUF 123 (H) 10/28/2024    BUN 7 10/28/2024    CREATININE 0.79 10/28/2024     (L) 12/14/2015    K 4.7 10/28/2024     10/28/2024    CO2 24 10/28/2024    PROT 7.3 12/14/2015    BILIDIR 0.10 06/03/2024    ALKPHOS 73 10/28/2024    ALT 58 (H) 10/28/2024    AST 55 (H) 10/28/2024    CALCIUM 8.8 10/28/2024    EGFR 89 10/28/2024    CHOL 150 12/14/2015    TRIG 249 (H) 10/28/2024    HDL 31 (L) 10/28/2024    INR 2.68 (H) 11/25/2024    PTT 34 05/17/2019     (H) 06/03/2024     US abdomen (9/2024)   1.  There is moderately increased echogenicity throughout the visible hepatic parenchyma, with posterior attenuation of the sound waves. The liver is moderately enlarged. No definite focal hepatic lesions. However, this appearance decreases the   sensitivity for detecting subtle lesions.  2.  Chronic multifocal scarring throughout the spleen.     US elastography (9/2024)  Shear Wave Elastography sampling was performed to evaluate stiffness changes within the liver associated with fibrosis.  E1 10.5 kPa  E2 12.16 kPa  E3 8.18 kPa  E4 10.39 kPa  E5 12.1 kPa  E6 11.08 kPa  E7 9.22 kPa  E8 11.87 kPa  E9 10.79 kPa  E10 10.88 kPa     E median: 10.84 kPa.  The corresponding " Metavir score is F3 (severe fibrosis), 9.40-11.87 kPa. 1.77-1.99 m/s.  IQR/median: 11.6% (IQR of less than 30% is considered a satisfactory data set).       MELD 3.0: 17 at 10/28/2024  9:20 AM  MELD-Na: 19 at 10/28/2024  9:20 AM  Calculated from:  Serum Creatinine: 0.79 mg/dL (Using min of 1 mg/dL) at 10/28/2024  9:20 AM  Serum Sodium: 135 mmol/L at 10/28/2024  9:20 AM  Total Bilirubin: 0.65 mg/dL (Using min of 1 mg/dL) at 10/28/2024  9:20 AM  Serum Albumin: 3.9 g/dL (Using max of 3.5 g/dL) at 10/28/2024  9:20 AM  INR(ratio): 2.73 at 10/28/2024  9:20 AM  Age at listing (hypothetical): 74 years  Sex: Male at 10/28/2024  9:20 AM

## 2024-12-17 NOTE — TELEPHONE ENCOUNTER
Closed    Close reason: Prior Authorization not required for patient/medication  Payer: [facility] [employer]    [phone]    [phone]  Note from payer: The patient currently has access to the requested medication and a Prior Authorization is not needed for the patient/medication.  View History  Medication Being Authorized    semaglutide, 0.25 or 0.5 mg/dose, (Ozempic, 0.25 or 0.5 MG/DOSE,) 2 mg/3 mL injection pen  Inject 0.75 mL (0.5 mg total) under the skin every 7 days for 8 doses  Dispense: 6 mL Refills: 0   Start: 12/17/2024 End: 2/5/2025   Class: Normal Diagnoses: Type 2 diabetes mellitus without complication, without long-term current use of insulin (HCC)   This order has been released to its destination.  To be filled at: [id number] - [place], [place] - [address]

## 2024-12-18 ENCOUNTER — RESULTS FOLLOW-UP (OUTPATIENT)
Dept: OTHER | Facility: HOSPITAL | Age: 74
End: 2024-12-18

## 2024-12-19 ENCOUNTER — ANTICOAG VISIT (OUTPATIENT)
Dept: CARDIOLOGY CLINIC | Facility: CLINIC | Age: 74
End: 2024-12-19

## 2024-12-19 ENCOUNTER — APPOINTMENT (OUTPATIENT)
Dept: LAB | Facility: AMBULARY SURGERY CENTER | Age: 74
End: 2024-12-19
Payer: MEDICARE

## 2024-12-19 DIAGNOSIS — E11.9 TYPE 2 DIABETES MELLITUS WITHOUT COMPLICATION, WITHOUT LONG-TERM CURRENT USE OF INSULIN (HCC): ICD-10-CM

## 2024-12-19 DIAGNOSIS — Z86.718 HISTORY OF DVT (DEEP VEIN THROMBOSIS): Primary | ICD-10-CM

## 2024-12-19 DIAGNOSIS — K76.0 METABOLIC DYSFUNCTION-ASSOCIATED STEATOTIC LIVER DISEASE (MASLD): ICD-10-CM

## 2024-12-19 DIAGNOSIS — I82.402 ACUTE DEEP VEIN THROMBOSIS (DVT) OF LEFT LOWER EXTREMITY, UNSPECIFIED VEIN (HCC): ICD-10-CM

## 2024-12-19 DIAGNOSIS — R94.5 ABNORMAL RESULTS OF LIVER FUNCTION STUDIES: ICD-10-CM

## 2024-12-19 LAB
ALBUMIN SERPL BCG-MCNC: 4.2 G/DL (ref 3.5–5)
ALP SERPL-CCNC: 81 U/L (ref 34–104)
ALT SERPL W P-5'-P-CCNC: 58 U/L (ref 7–52)
ANION GAP SERPL CALCULATED.3IONS-SCNC: 9 MMOL/L (ref 4–13)
AST SERPL W P-5'-P-CCNC: 50 U/L (ref 13–39)
BILIRUB SERPL-MCNC: 0.7 MG/DL (ref 0.2–1)
BUN SERPL-MCNC: 9 MG/DL (ref 5–25)
CALCIUM SERPL-MCNC: 9.2 MG/DL (ref 8.4–10.2)
CHLORIDE SERPL-SCNC: 101 MMOL/L (ref 96–108)
CO2 SERPL-SCNC: 25 MMOL/L (ref 21–32)
CREAT SERPL-MCNC: 0.88 MG/DL (ref 0.6–1.3)
ERYTHROCYTE [DISTWIDTH] IN BLOOD BY AUTOMATED COUNT: 13.5 % (ref 11.6–15.1)
GFR SERPL CREATININE-BSD FRML MDRD: 85 ML/MIN/1.73SQ M
GLUCOSE P FAST SERPL-MCNC: 150 MG/DL (ref 65–99)
HCT VFR BLD AUTO: 49.3 % (ref 36.5–49.3)
HGB BLD-MCNC: 15.7 G/DL (ref 12–17)
INR PPP: 2.89 (ref 0.85–1.19)
MCH RBC QN AUTO: 30.4 PG (ref 26.8–34.3)
MCHC RBC AUTO-ENTMCNC: 31.8 G/DL (ref 31.4–37.4)
MCV RBC AUTO: 95 FL (ref 82–98)
PLATELET # BLD AUTO: 288 THOUSANDS/UL (ref 149–390)
PMV BLD AUTO: 10.7 FL (ref 8.9–12.7)
POTASSIUM SERPL-SCNC: 4.4 MMOL/L (ref 3.5–5.3)
PROT SERPL-MCNC: 7.3 G/DL (ref 6.4–8.4)
PROTHROMBIN TIME: 30 SECONDS (ref 12.3–15)
RBC # BLD AUTO: 5.17 MILLION/UL (ref 3.88–5.62)
SODIUM SERPL-SCNC: 135 MMOL/L (ref 135–147)
WBC # BLD AUTO: 8.87 THOUSAND/UL (ref 4.31–10.16)

## 2024-12-19 PROCEDURE — 36415 COLL VENOUS BLD VENIPUNCTURE: CPT

## 2024-12-19 PROCEDURE — 80053 COMPREHEN METABOLIC PANEL: CPT

## 2024-12-19 PROCEDURE — 85610 PROTHROMBIN TIME: CPT

## 2024-12-19 PROCEDURE — 85027 COMPLETE CBC AUTOMATED: CPT

## 2024-12-19 NOTE — PROGRESS NOTES
Left message for patient, advised INR good, continue 5 mg Mon Fri, 2.5 mg all other days, will recheck in 4 weeks 1/16/25  Advised to call with questions or concerns.

## 2024-12-20 ENCOUNTER — OFFICE VISIT (OUTPATIENT)
Dept: PODIATRY | Facility: CLINIC | Age: 74
End: 2024-12-20

## 2024-12-20 ENCOUNTER — RESULTS FOLLOW-UP (OUTPATIENT)
Age: 74
End: 2024-12-20

## 2024-12-20 DIAGNOSIS — L84 CALLUS OF FOOT: ICD-10-CM

## 2024-12-20 DIAGNOSIS — E11.40 TYPE 2 DIABETES MELLITUS WITH DIABETIC NEUROPATHY, WITHOUT LONG-TERM CURRENT USE OF INSULIN (HCC): Primary | ICD-10-CM

## 2024-12-20 DIAGNOSIS — B35.1 TINEA UNGUIUM: ICD-10-CM

## 2024-12-20 DIAGNOSIS — I70.213 ATHEROSCLEROSIS OF NATIVE ARTERY OF BOTH LOWER EXTREMITIES WITH INTERMITTENT CLAUDICATION (HCC): ICD-10-CM

## 2024-12-20 NOTE — PROGRESS NOTES
"Fernando CESAR Armando  1950  AT RISK FOOT CARE    1. Type 2 diabetes mellitus with diabetic neuropathy, without long-term current use of insulin (HCC)  Lesion Destruction      2. Atherosclerosis of native artery of both lower extremities with intermittent claudication (HCC)  Lesion Destruction      3. Tinea unguium  Lesion Destruction      4. Callus of foot  Lesion Destruction          Patient presents for at-risk foot care.  Patient has no acute concerns today.  Patient has significant lower extremity risk due to diminished pulses in the feet and trophic skin changes to the lower extremity including thick toenail, atrophic skin, and decreased hair growth.      On exam patient has thickened, hypertrophic, discolored, brittle toenails with subungual debris and tenderness x10   Callus: Left and right great toe, stable  Patient has diminished pedal pulses and decreased perfusion to the lower extremities  Patient has significant trophic changes to the skin including thick toenails, decreased pedal hair and atrophic skin.     Today's treatment includes:  Debridement of toenails. Using nail nipper, nadya, and curette, nails were sharply debrided, reduced in thickness and length. Devitalized nail tissue and fungal debris excised and removed. Patient tolerated well.    Lesion Destruction    Date/Time: 12/20/2024 9:15 AM    Performed by: Sourav Boyle DPM  Authorized by: Sourav Boyle DPM  Universal Protocol:  Consent: Verbal consent obtained.  Risks and benefits: risks, benefits and alternatives were discussed  Consent given by: patient  Time out: Immediately prior to procedure a \"time out\" was called to verify the correct patient, procedure, equipment, support staff and site/side marked as required.  Timeout called at: 12/20/2024 9:09 AM.  Patient understanding: patient states understanding of the procedure being performed  Patient identity confirmed: verbally with patient    Procedure Details - Lesion " Destruction:     Number of Lesions:  2  Lesion 1:     Body area:  Lower extremity    Lower extremity location:  R foot    Malignancy: benign hyperkeratotic lesion      Destruction method: scissors used for extraction    Lesion 2:     Body area:  Lower extremity    Lower extremity location:  L foot    Malignancy: benign hyperkeratotic lesion      Destruction method: scissors used for extraction        Discussed proper shoe gear, daily inspections of feet, and general foot health with patient. Patient has Q8  findings and is recommended for at risk foot care every 9-10 weeks.    Patients most recent complete clinical foot exam was on: 2/29/2024

## 2024-12-23 ENCOUNTER — TELEPHONE (OUTPATIENT)
Dept: FAMILY MEDICINE CLINIC | Facility: CLINIC | Age: 74
End: 2024-12-23

## 2024-12-23 NOTE — TELEPHONE ENCOUNTER
Spoke with Fernando via telephone.     Discussed Ozempic, dosing, side effects administration at length.   He has not had pancreatitis, he has no family history of thyroid cancer.     He will start Ozempic after the holiday.   He will start at 0.25 mg weekly for 4 weeks, then increase to 0.5 mg weekly.     He will send in his sugars to me in about 6 weeks, and we will adjust medications as needed. If sugars are persistently <120 he will let me know.     Currently glucose yesterday was 200, today was 160.    I will be managing Ozempic dosing, as per my communication with Dr. Guidry and patient.     He will call with any questions or concerns.

## 2025-01-10 ENCOUNTER — PATIENT MESSAGE (OUTPATIENT)
Dept: FAMILY MEDICINE CLINIC | Facility: CLINIC | Age: 75
End: 2025-01-10

## 2025-01-13 ENCOUNTER — TELEPHONE (OUTPATIENT)
Dept: FAMILY MEDICINE CLINIC | Facility: CLINIC | Age: 75
End: 2025-01-13

## 2025-01-13 NOTE — TELEPHONE ENCOUNTER
Patient needs  a letter excusing him from Jury duty.   Could you please write the letter?     Chronic conditions: Diabetes, COPD, peripheral arterial disease with claudication, a. Fib, and hypertension.       Thank you.

## 2025-01-23 ENCOUNTER — ANTICOAG VISIT (OUTPATIENT)
Dept: CARDIOLOGY CLINIC | Facility: CLINIC | Age: 75
End: 2025-01-23

## 2025-01-23 ENCOUNTER — APPOINTMENT (OUTPATIENT)
Dept: LAB | Facility: AMBULARY SURGERY CENTER | Age: 75
End: 2025-01-23
Payer: MEDICARE

## 2025-01-23 DIAGNOSIS — I82.402 ACUTE DEEP VEIN THROMBOSIS (DVT) OF LEFT LOWER EXTREMITY, UNSPECIFIED VEIN (HCC): ICD-10-CM

## 2025-01-23 DIAGNOSIS — Z86.718 HISTORY OF DVT (DEEP VEIN THROMBOSIS): Primary | ICD-10-CM

## 2025-01-23 LAB
INR PPP: 1.65 (ref 0.85–1.19)
PROTHROMBIN TIME: 19.7 SECONDS (ref 12.3–15)

## 2025-01-23 PROCEDURE — 36415 COLL VENOUS BLD VENIPUNCTURE: CPT

## 2025-01-23 PROCEDURE — 85610 PROTHROMBIN TIME: CPT

## 2025-01-23 NOTE — PROGRESS NOTES
Patient called, states he did miss 5 mg dose on Mon, so he took 5 mg Tues. Also states that he was started on Ozempic, they are gradually increasing dose.   Advised will continue with above instructions, but can retest in 3 weeks 2/13/25  Patient agreeable.

## 2025-01-23 NOTE — PROGRESS NOTES
Attempted to contact patient, left message, advised INR low, advised I have him taking 5 mg Mon Fri, 2.5 mg all other days, will have him take 5 mg tonight only instead of 2.5 mg, then go back to 5 mg Mon Fri, 2.5 mg all other days. Will recheck in 2 weeks 2/6/25  Advised to call with any missed doses, or changes in medications or diet.

## 2025-01-31 DIAGNOSIS — E11.40 TYPE 2 DIABETES MELLITUS WITH DIABETIC NEUROPATHY, WITHOUT LONG-TERM CURRENT USE OF INSULIN (HCC): ICD-10-CM

## 2025-01-31 DIAGNOSIS — I10 ESSENTIAL HYPERTENSION: ICD-10-CM

## 2025-01-31 RX ORDER — LISINOPRIL 20 MG/1
20 TABLET ORAL DAILY
Qty: 30 TABLET | Refills: 0 | Status: SHIPPED | OUTPATIENT
Start: 2025-01-31

## 2025-01-31 RX ORDER — GLIMEPIRIDE 2 MG/1
TABLET ORAL
Qty: 135 TABLET | Refills: 1 | Status: SHIPPED | OUTPATIENT
Start: 2025-01-31

## 2025-02-02 DIAGNOSIS — N23 RENAL COLIC ON RIGHT SIDE: ICD-10-CM

## 2025-02-02 DIAGNOSIS — E11.40 TYPE 2 DIABETES MELLITUS WITH DIABETIC NEUROPATHY, WITHOUT LONG-TERM CURRENT USE OF INSULIN (HCC): ICD-10-CM

## 2025-02-03 ENCOUNTER — OFFICE VISIT (OUTPATIENT)
Dept: VASCULAR SURGERY | Facility: CLINIC | Age: 75
End: 2025-02-03
Payer: MEDICARE

## 2025-02-03 VITALS
HEIGHT: 71 IN | DIASTOLIC BLOOD PRESSURE: 62 MMHG | RESPIRATION RATE: 18 BRPM | BODY MASS INDEX: 29.73 KG/M2 | WEIGHT: 212.4 LBS | OXYGEN SATURATION: 99 % | SYSTOLIC BLOOD PRESSURE: 136 MMHG | HEART RATE: 80 BPM

## 2025-02-03 DIAGNOSIS — E78.2 MIXED HYPERLIPIDEMIA: ICD-10-CM

## 2025-02-03 DIAGNOSIS — I10 ESSENTIAL HYPERTENSION: ICD-10-CM

## 2025-02-03 DIAGNOSIS — I70.213 ATHEROSCLEROSIS OF NATIVE ARTERY OF BOTH LOWER EXTREMITIES WITH INTERMITTENT CLAUDICATION (HCC): Primary | ICD-10-CM

## 2025-02-03 PROCEDURE — 99214 OFFICE O/P EST MOD 30 MIN: CPT | Performed by: NURSE PRACTITIONER

## 2025-02-03 RX ORDER — METFORMIN HYDROCHLORIDE 500 MG/1
1000 TABLET, EXTENDED RELEASE ORAL 2 TIMES DAILY WITH MEALS
Qty: 360 TABLET | Refills: 2 | Status: SHIPPED | OUTPATIENT
Start: 2025-02-03

## 2025-02-03 RX ORDER — TRIAMTERENE AND HYDROCHLOROTHIAZIDE 37.5; 25 MG/1; MG/1
TABLET ORAL
COMMUNITY

## 2025-02-03 NOTE — PROGRESS NOTES
Name: Fernando Roberts      : 1950      MRN: [id number]  Encounter Provider: DAYAMI Cotto  Encounter Date: 2/3/2025   Encounter department: [facility] [place]    74 y/oM former smoker with hx of Warthin's tumor s/p resection, GERD, COPD, DM, HTN, HLD, afib (warfarin), PAD w/ BLE claudicaiton, B popliteal ectasia, Hx of LLE DVT who presents for RFM and to review recent vascular testing   Assessment & Plan  Atherosclerosis of native artery of both lower extremities with intermittent claudication (HCC)  Reviewed 24 LEAD  Rt: Occlusion and distal reconstitution vs. high-grade stenosis m SFA.  50-75% proximal thigh SFA. TP dx with retrograde flow noted in the distal peroneal artery.  The popliteal artery is borderline ectatic in caliber, 0.9 cm, (Prior: 1.0 cm).  AARTI 0.79 (Prior: 0.81)/  mm Hg/ GTP 68 mm Hg     Lt: 50-75% stenosis of the proximal SFA  diffuse disease throughout the remainder of the femoral-popliteal arteries.  Evidence of tibioperoneal disease without a focal stenosis noted.  The popliteal artery is borderline ectatic in caliber, 0.9 cm, (Prior: 1.0 cm).  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 Sleeps there due to shoulder, back and hip pain. No wounds/ tissue loss. Does have L great toe callus, no open areas.     -Reviewed lower extremity ultrasound in detail with pt and answered all questions. Educated pt on peripheral arterial disease and indication for vascular intervention. No indications for vascular intervention at this time as he is not experiencing any life limiting claudication factors at this time. Discussed that he likely his multifaceted etiology of pain symptoms in legs d/t experiencing cramping associated with leg repositioned rather than walking/ activity. This cramping is likely not vascular in etiology. Recommending continued surveillance with  non-invasive imagining yearly with medical management at this time. Pt verbalized understanding.     Recommendations:  -Complete LEAD in one year and return to the office for review.  -Call the office with any new or worsening leg pain, discoloration, swelling, new wounds/ tissue loss.  -Continue to f/u with Podiatry  -Continue to take aspirin 81 mg daily.  -Continue to take simvastatin daily as per PCP.  -Do daily foot checks, food protection, wear well fitted shoes.  -Increase daily walking for 20-30 min everyday.    Orders:    VAS ARTERIAL DUPLEX- LOWER LIMB BILATERAL; Future    Essential hypertension  -BP well controlled  -continue current medical regimen  -management per PCP       Mixed hyperlipidemia  -stable  -continue statin medication  -continue routine follow-up with family doctor           History of Present Illness   HPI  Fernando Roberts is a 74 y.o. male Warthin's tumor s/p resection, GERD, COPD, DM, HTN, HLD, afib (warfarin), PAD w/ BLE claudicaiton, B popliteal ectasia, Hx of LLE DVT who presents for RFM and to review recent vascular testing    Who presents today for RR of NABEEL of 12/06/24. Some discoloration on both lower legs, pain and burning sensation on both calf when walking.     Pt presents to the office accompanied by his wife.   B/L groin pain with bending and lifting.   B/L calf burning with 1 block, denies pain at night (sleeps in [place]). Sleeps there due to shoulder, back and hip pain. No wounds/ tissue loss. Does have L great toe callus, no open areas.  -Follows with podiatry every 10-12 weeks with .   -Reports compliance with ASA and statin.   -Coumadin for A-fib    History obtained from: patient    Review of Systems   Constitutional: Negative.    HENT: Negative.     Eyes: Negative.    Respiratory: Negative.  Negative for shortness of breath.    Cardiovascular: Negative.  Negative for chest pain and leg swelling.   Gastrointestinal: Negative.    Endocrine: Negative.     Genitourinary: Negative.    Musculoskeletal: Negative.    Skin: Negative.    Allergic/Immunologic: Negative.    Neurological: Negative.    Hematological: Negative.    Psychiatric/Behavioral: Negative.       Current Outpatient Medications on File Prior to Visit   Medication Sig Dispense Refill    acetaminophen (TYLENOL) 325 mg tablet 2, by mouth, every 6 hours as needed for mild to moderate pain. 30 tablet 0    albuterol (PROVENTIL HFA,VENTOLIN HFA) 90 mcg/act inhaler INHALE 2 PUFFS BY MOUTH EVERY 4 HOURS AS NEEDED FOR WHEEZE 8.5 g 5    aspirin (ECOTRIN LOW STRENGTH) 81 mg EC tablet Take 81 mg by mouth daily        Cyanocobalamin (VITAMIN B 12 PO) Take 1,000 mcg by mouth daily      fenofibrate micronized (LOFIBRA) 134 MG capsule TAKE 1 CAPSULE (134 MG TOTAL) BY MOUTH DAILY WITH BREAKFAST 90 capsule 3    gabapentin (NEURONTIN) 100 mg capsule TAKE 2 CAPSULES (200 MG TOTAL) BY MOUTH DAILY AT BEDTIME (Patient taking differently: Take 100 mg by mouth daily) 180 capsule 1    glimepiride (AMARYL) 2 mg tablet TAKE 1 AND HALF TABLET (3 MG TOTAL) BY MOUTH DAILY WITH BREAKFAST 135 tablet 1    lisinopril (ZESTRIL) 20 mg tablet TAKE 1 TABLET BY MOUTH EVERY DAY 30 tablet 0    metFORMIN (GLUCOPHAGE-XR) 500 mg 24 hr tablet TAKE 2 TABLETS BY MOUTH TWICE A DAY WITH FOOD 360 tablet 3    omeprazole (PriLOSEC) 20 mg delayed release capsule TAKE 1 CAPSULE BY MOUTH EVERY DAY 90 capsule 1    rosuvastatin (CRESTOR) 20 MG tablet Take 1 tablet (20 mg total) by mouth daily 100 tablet 3    semaglutide, 0.25 or 0.5 mg/dose, (Ozempic, 0.25 or 0.5 MG/DOSE,) 2 mg/3 mL injection pen Inject 0.375 mL (0.25 mg total) under the skin every 7 days for 8 doses 3 mL 0    semaglutide, 0.25 or 0.5 mg/dose, (Ozempic, 0.25 or 0.5 MG/DOSE,) 2 mg/3 mL injection pen Inject 0.75 mL (0.5 mg total) under the skin every 7 days for 8 doses 6 mL 0    sitaGLIPtin (Januvia) 100 mg tablet TAKE 1 TABLET BY MOUTH EVERY DAY 90 tablet 1    tamsulosin (FLOMAX) 0.4 mg TAKE 1  "CAPSULE BY MOUTH EVERY DAY WITH DINNER 90 capsule 1    warfarin (COUMADIN) 2.5 mg tablet Take 1 tablet by mouth daily T-Th-S-S takes 2.5mg     M-W-F takes 5.0 mg      warfarin (COUMADIN) 5 mg tablet TAKE 1 TABLET EVERY DAY OR AS DIRECTED BY MD 90 tablet 3    triamterene-hydrochlorothiazide (MAXZIDE-25) 37.5-25 mg per tablet Take by mouth       No current facility-administered medications on file prior to visit.         Objective   /62 (BP Location: Left arm, Patient Position: Sitting, Cuff Size: Large)   Pulse 80   Resp 18   Ht 5' 11\" (1.803 m)   Wt 96.3 kg (212 lb 6.4 oz)   SpO2 99%   BMI 29.62 kg/m²      Physical Exam  Vitals reviewed.   Constitutional:       General: He is not in acute distress.     Appearance: Normal appearance. He is obese. He is not ill-appearing.   HENT:      Head: Normocephalic and atraumatic.   Neck:      Vascular: No carotid bruit.   Cardiovascular:      Rate and Rhythm: Normal rate and regular rhythm.      Pulses:           Radial pulses are 2+ on the right side and 2+ on the left side.        Femoral pulses are 2+ on the right side and 2+ on the left side.       Dorsalis pedis pulses are detected w/ Doppler on the right side and detected w/ Doppler on the left side.        Posterior tibial pulses are detected w/ Doppler on the right side and detected w/ Doppler on the left side.      Heart sounds: No murmur heard.     Comments: R DP and PT are biphasic doppler signals  L DP and PT are monophasic doppler signals.   Pulmonary:      Effort: Pulmonary effort is normal. No respiratory distress.   Musculoskeletal:         General: Tenderness (B/L knees) present.      Cervical back: Normal range of motion. No rigidity.      Right lower leg: No edema.      Left lower leg: No edema.   Skin:     General: Skin is warm and dry.      Capillary Refill: Capillary refill takes less than 2 seconds.      Findings: No erythema or rash.   Neurological:      General: No focal deficit present.     "  Mental Status: He is alert and oriented to person, place, and time.      Sensory: Sensory deficit (b/l toes and ankles) present.      Gait: Gait abnormal (Cane).   Psychiatric:         Mood and Affect: Mood normal.         Behavior: Behavior normal.         Administrative Statements   I have spent a total time of 30 minutes in caring for this patient on the day of the visit/encounter including Diagnostic results, Risks and benefits of tx options, Instructions for management, Patient and family education, Importance of tx compliance, Counseling / Coordination of care, Documenting in the medical record, Reviewing / ordering tests, medicine, procedures  , and Obtaining or reviewing history  .

## 2025-02-03 NOTE — ASSESSMENT & PLAN NOTE
Reviewed 12/6/24 LEAD  Rt: Occlusion and distal reconstitution vs. high-grade stenosis m SFA.  50-75% proximal thigh SFA. TP dx with retrograde flow noted in the distal peroneal artery.  The popliteal artery is borderline ectatic in caliber, 0.9 cm, (Prior: 1.0 cm).  AARTI 0.79 (Prior: 0.81)/  mm Hg/ GTP 68 mm Hg     Lt: 50-75% stenosis of the proximal SFA  diffuse disease throughout the remainder of the femoral-popliteal arteries.  Evidence of tibioperoneal disease without a focal stenosis noted.  The popliteal artery is borderline ectatic in caliber, 0.9 cm, (Prior: 1.0 cm).  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 Sleeps there due to shoulder, back and hip pain. No wounds/ tissue loss. Does have L great toe callus, no open areas.     -Reviewed lower extremity ultrasound in detail with pt and answered all questions. Educated pt on peripheral arterial disease and indication for vascular intervention. No indications for vascular intervention at this time as he is not experiencing any life limiting claudication factors at this time. Discussed that he likely his multifaceted etiology of pain symptoms in legs d/t experiencing cramping associated with leg repositioned rather than walking/ activity. This cramping is likely not vascular in etiology. Recommending continued surveillance with non-invasive imagining yearly with medical management at this time. Pt verbalized understanding.     Recommendations:  -Complete LEAD in one year and return to the office for review.  -Call the office with any new or worsening leg pain, discoloration, swelling, new wounds/ tissue loss.  -Continue to f/u with Podiatry  -Continue to take aspirin 81 mg daily.  -Continue to take simvastatin daily as per PCP.  -Do daily foot checks, food protection, wear well fitted shoes.  -Increase daily walking for 20-30 min everyday.    Orders:     VAS ARTERIAL DUPLEX- LOWER LIMB BILATERAL; Future

## 2025-02-04 RX ORDER — TAMSULOSIN HYDROCHLORIDE 0.4 MG/1
0.4 CAPSULE ORAL
Qty: 90 CAPSULE | Refills: 1 | Status: SHIPPED | OUTPATIENT
Start: 2025-02-04

## 2025-02-10 ENCOUNTER — ANTICOAG VISIT (OUTPATIENT)
Dept: CARDIOLOGY CLINIC | Facility: CLINIC | Age: 75
End: 2025-02-10

## 2025-02-10 ENCOUNTER — APPOINTMENT (OUTPATIENT)
Dept: LAB | Facility: AMBULARY SURGERY CENTER | Age: 75
End: 2025-02-10
Payer: MEDICARE

## 2025-02-10 DIAGNOSIS — Z86.718 HISTORY OF DVT (DEEP VEIN THROMBOSIS): Primary | ICD-10-CM

## 2025-02-10 DIAGNOSIS — I82.402 ACUTE DEEP VEIN THROMBOSIS (DVT) OF LEFT LOWER EXTREMITY, UNSPECIFIED VEIN (HCC): ICD-10-CM

## 2025-02-10 LAB
INR PPP: 2.58 (ref 0.85–1.19)
PROTHROMBIN TIME: 27.5 SECONDS (ref 12.3–15)

## 2025-02-10 PROCEDURE — 85610 PROTHROMBIN TIME: CPT

## 2025-02-10 PROCEDURE — 36415 COLL VENOUS BLD VENIPUNCTURE: CPT

## 2025-02-10 NOTE — PROGRESS NOTES
PC to patient with good INR of 2.58.  Patient still on Ozempic.  We will keep eye on INR.  Advised usual dosing of 5 mgs M/F and then 2.5 mgs all other days of the week. Retest in 3 weeks.

## 2025-02-17 DIAGNOSIS — E11.42 TYPE 2 DIABETES MELLITUS WITH DIABETIC POLYNEUROPATHY, WITHOUT LONG-TERM CURRENT USE OF INSULIN (HCC): Primary | ICD-10-CM

## 2025-02-21 ENCOUNTER — OFFICE VISIT (OUTPATIENT)
Dept: PODIATRY | Facility: CLINIC | Age: 75
End: 2025-02-21
Payer: MEDICARE

## 2025-02-21 VITALS — BODY MASS INDEX: 28.98 KG/M2 | WEIGHT: 207 LBS | HEIGHT: 71 IN

## 2025-02-21 DIAGNOSIS — E11.40 TYPE 2 DIABETES MELLITUS WITH DIABETIC NEUROPATHY, WITHOUT LONG-TERM CURRENT USE OF INSULIN (HCC): Primary | ICD-10-CM

## 2025-02-21 DIAGNOSIS — B35.1 TINEA UNGUIUM: ICD-10-CM

## 2025-02-21 DIAGNOSIS — I70.213 ATHEROSCLEROSIS OF NATIVE ARTERY OF BOTH LOWER EXTREMITIES WITH INTERMITTENT CLAUDICATION (HCC): ICD-10-CM

## 2025-02-21 DIAGNOSIS — L84 CALLUS OF FOOT: ICD-10-CM

## 2025-02-21 PROCEDURE — 99214 OFFICE O/P EST MOD 30 MIN: CPT | Performed by: PODIATRIST

## 2025-02-21 PROCEDURE — 11721 DEBRIDE NAIL 6 OR MORE: CPT | Performed by: PODIATRIST

## 2025-02-21 PROCEDURE — 11056 PARNG/CUTG B9 HYPRKR LES 2-4: CPT | Performed by: PODIATRIST

## 2025-02-21 NOTE — PROGRESS NOTES
Name: Fernando Roberts      : 1950      MRN: [id number]  Encounter Provider: Sourav Boyle DPM  Encounter Date: 2025   Encounter department: [facility]  :  Assessment & Plan  Type 2 diabetes mellitus with diabetic neuropathy, without long-term current use of insulin (HCC)  Diagnosis and options discussed with patient  Patient agreeable to the plan as stated below    -DM foot risk is moderate. Recommend at risk foot care (Q8)  -Discussed DM risk to lower extremities, proper shoe gear, and daily monitoring of feet.   -Discussed weight loss and suitable exercise regiment.   -Reviewed most recent PCP visit on 2024  -Vascualr visit from 2/3/2025 reviewed  -Educated on A1C and the risks of poorly controlled Diabetes. Reviewed recent A1C:  Lab Results   Component Value Date    HGBA1C 7.5 (H) 10/28/2024    HGBA1C 7.7 (H) 2015   .     Lab Results   Component Value Date    HGBA1C 7.5 (H) 10/28/2024     Orders:  •  Lesion Destruction    Atherosclerosis of native artery of both lower extremities with intermittent claudication (HCC)  2024 dopplers reviewed, he sees vascular regularly. No acute issues today regarding his feet  Orders:  •  Lesion Destruction    Tinea unguium  Using nail nipper, nadya, and curette, nails were sharply debrided, reduced in thickness and length. Devitalized tissue removed. Patient tolerated well. Patient has Q8  findings and is recommended for at risk foot care every 9-10 weeks.     Orders:  •  Lesion Destruction    Callus of foot  Left hallux, sub met 1, left 5th  Right hallux   Orders:  •  Lesion Destruction    Lesion Destruction    Date/Time: 2025 9:15 AM    Performed by: Sourav Boyle DPM  Authorized by: Sourav Boyle DPM  Universal Protocol:  Consent: Verbal consent obtained.  Risks and benefits: risks, benefits and alternatives were discussed  Consent given by: patient  Time out: Immediately prior to procedure a  "\"time out\" was called to verify the correct patient, procedure, equipment, support staff and site/side marked as required.  Timeout called at: 2/21/2025 9:48 AM.  Patient understanding: patient states understanding of the procedure being performed  Patient identity confirmed: verbally with patient    Procedure Details - Lesion Destruction:     Number of Lesions:  4  Lesion 1:     Body area:  Lower extremity    Lower extremity location:  L big toe    Malignancy: benign hyperkeratotic lesion      Destruction method: scissors used for extraction    Lesion 2:     Body area:  Lower extremity    Lower extremity location:  L foot    Malignancy: benign hyperkeratotic lesion      Destruction method: scissors used for extraction    Lesion 3:     Body area:  Lower extremity    Lower extremity location:  L little toe    Malignancy: benign hyperkeratotic lesion      Destruction method: scissors used for extraction    Lesion 4:     Body area:  Lower extremity    Lower extremity location:  R big toe    Malignancy: benign hyperkeratotic lesion      Destruction method: scissors used for extraction          History of Present Illness   HPI  Fernando Roberts is a 74 y.o. male who presents for at risk foot care. A1C well controlled. He had arterial dopplers recently and just saw his vascular surgeon. Blood flow is stable.       Review of Systems  As stated in HPI, otherwise normal    Medical History Reviewed by provider this encounter:  Tobacco  Allergies  Meds  Problems  Med Hx  Surg Hx  Fam Hx           Objective   Ht 5' 11\" (1.803 m)   Wt 93.9 kg (207 lb)   BMI 28.87 kg/m²      Physical Exam  Vitals reviewed.   Constitutional:       General: He is not in acute distress.     Appearance: He is normal weight.   Cardiovascular:      Rate and Rhythm: Normal rate.      Pulses: Pulses are weak.           Dorsalis pedis pulses are 1+ on the right side and 1+ on the left side.        Posterior tibial pulses are 0 on the right side and 0 " on the left side.   Pulmonary:      Effort: No respiratory distress.   Musculoskeletal:         General: Deformity present.      Right foot: Decreased range of motion. Deformity present.      Left foot: Deformity present.   Feet:      Right foot:      Protective Sensation: 10 sites tested.  5 sites sensed.      Skin integrity: Callus and dry skin present.      Toenail Condition: Right toenails are abnormally thick. Fungal disease present.     Left foot:      Protective Sensation: 10 sites tested.  4 sites sensed.      Skin integrity: Callus and dry skin present.      Toenail Condition: Left toenails are abnormally thick. Fungal disease present.  Skin:     Capillary Refill: Capillary refill takes 2 to 3 seconds.      Findings: No erythema.   Neurological:      Mental Status: He is alert and oriented to person, place, and time.      Sensory: Sensory deficit present.     Diabetic Foot Exam    Patient's shoes and socks removed.    Right Foot/Ankle   Right Foot Inspection  Skin Exam: dry skin, callus and callus.     Toe Exam: swelling and right toe deformity.     Sensory   Vibration: absent  Monofilament testing: diminished    Vascular  Capillary refills: < 3 seconds  The right DP pulse is 1+. The right PT pulse is 0.     Left Foot/Ankle  Left Foot Inspection  Skin Exam: dry skin and callus.     Toe Exam: swelling and left toe deformity.     Sensory   Vibration: absent  Monofilament testing: diminished    Vascular  Capillary refills: < 3 seconds  The left DP pulse is 1+. The left PT pulse is 0.     Assign Risk Category  Deformity present  Loss of protective sensation  Weak pulses  Risk: 2

## 2025-02-21 NOTE — ASSESSMENT & PLAN NOTE
12/6/2024 dopplers reviewed, he sees vascular regularly. No acute issues today regarding his feet  Orders:  •  Lesion Destruction

## 2025-02-21 NOTE — ASSESSMENT & PLAN NOTE
Diagnosis and options discussed with patient  Patient agreeable to the plan as stated below    -DM foot risk is moderate. Recommend at risk foot care (Q8)  -Discussed DM risk to lower extremities, proper shoe gear, and daily monitoring of feet.   -Discussed weight loss and suitable exercise regiment.   -Reviewed most recent PCP visit on 11/25/2024  -Vascualr visit from 2/3/2025 reviewed  -Educated on A1C and the risks of poorly controlled Diabetes. Reviewed recent A1C:  Lab Results   Component Value Date    HGBA1C 7.5 (H) 10/28/2024    HGBA1C 7.7 (H) 12/14/2015   .     Lab Results   Component Value Date    HGBA1C 7.5 (H) 10/28/2024     Orders:  •  Lesion Destruction

## 2025-02-21 NOTE — ASSESSMENT & PLAN NOTE
Using nail nipper, nadya, and curette, nails were sharply debrided, reduced in thickness and length. Devitalized tissue removed. Patient tolerated well. Patient has Q8  findings and is recommended for at risk foot care every 9-10 weeks.     Orders:  •  Lesion Destruction

## 2025-02-24 DIAGNOSIS — I10 ESSENTIAL HYPERTENSION: ICD-10-CM

## 2025-02-26 RX ORDER — LISINOPRIL 20 MG/1
20 TABLET ORAL DAILY
Qty: 90 TABLET | Refills: 1 | Status: SHIPPED | OUTPATIENT
Start: 2025-02-26

## 2025-03-03 ENCOUNTER — ANTICOAG VISIT (OUTPATIENT)
Dept: CARDIOLOGY CLINIC | Facility: CLINIC | Age: 75
End: 2025-03-03

## 2025-03-03 ENCOUNTER — APPOINTMENT (OUTPATIENT)
Dept: LAB | Facility: AMBULARY SURGERY CENTER | Age: 75
End: 2025-03-03
Payer: MEDICARE

## 2025-03-03 DIAGNOSIS — I82.402 ACUTE DEEP VEIN THROMBOSIS (DVT) OF LEFT LOWER EXTREMITY, UNSPECIFIED VEIN (HCC): ICD-10-CM

## 2025-03-03 DIAGNOSIS — Z86.718 HISTORY OF DVT (DEEP VEIN THROMBOSIS): Primary | ICD-10-CM

## 2025-03-03 LAB
INR PPP: 2.82 (ref 0.85–1.19)
PROTHROMBIN TIME: 29.5 SECONDS (ref 12.3–15)

## 2025-03-03 PROCEDURE — 85610 PROTHROMBIN TIME: CPT

## 2025-03-03 PROCEDURE — 36415 COLL VENOUS BLD VENIPUNCTURE: CPT

## 2025-03-03 NOTE — PROGRESS NOTES
Spoke with patient, advised INR good, continue 5 mg Mon Fri, 2.5 mg all other days, will recheck in 4 weeks 3/31/25

## 2025-03-18 ENCOUNTER — OFFICE VISIT (OUTPATIENT)
Dept: CARDIOLOGY CLINIC | Facility: CLINIC | Age: 75
End: 2025-03-18
Payer: MEDICARE

## 2025-03-18 VITALS
OXYGEN SATURATION: 97 % | DIASTOLIC BLOOD PRESSURE: 60 MMHG | SYSTOLIC BLOOD PRESSURE: 124 MMHG | BODY MASS INDEX: 29.57 KG/M2 | WEIGHT: 211.2 LBS | HEART RATE: 76 BPM | HEIGHT: 71 IN

## 2025-03-18 DIAGNOSIS — I70.213 ATHEROSCLEROSIS OF NATIVE ARTERY OF BOTH LOWER EXTREMITIES WITH INTERMITTENT CLAUDICATION (HCC): ICD-10-CM

## 2025-03-18 DIAGNOSIS — I10 ESSENTIAL HYPERTENSION: Primary | ICD-10-CM

## 2025-03-18 DIAGNOSIS — I48.0 PAROXYSMAL ATRIAL FIBRILLATION (HCC): ICD-10-CM

## 2025-03-18 DIAGNOSIS — R06.09 DYSPNEA ON EXERTION: ICD-10-CM

## 2025-03-18 DIAGNOSIS — E78.2 MIXED HYPERLIPIDEMIA: ICD-10-CM

## 2025-03-18 PROCEDURE — 99214 OFFICE O/P EST MOD 30 MIN: CPT | Performed by: INTERNAL MEDICINE

## 2025-03-18 NOTE — ASSESSMENT & PLAN NOTE
Possible remote history of paroxysmal atrial fibrillation with no recurrences.  The patient is on anticoagulation because of history of DVT.

## 2025-03-18 NOTE — PATIENT INSTRUCTIONS
The patient will be scheduled for echocardiogram and pharmacologic nuclear stress test.  He will continue present medications.

## 2025-03-18 NOTE — ASSESSMENT & PLAN NOTE
Peripheral arterial disease with symptoms of leg claudication.  This has been stable and the patient follows with vascular surgery on an annual basis.

## 2025-03-18 NOTE — PROGRESS NOTES
Name: Fernando Roberts      : 1950      MRN: [id number]  Encounter Provider: Mercedes Jay MD  Encounter Date: 3/18/2025   Encounter department: [facility]  :  Assessment & Plan  Essential hypertension  Hypertension, stable and adequately controlled.       Atherosclerosis of native artery of both lower extremities with intermittent claudication (HCC)  Peripheral arterial disease with symptoms of leg claudication.  This has been stable and the patient follows with vascular surgery on an annual basis.       Paroxysmal atrial fibrillation (HCC)  Possible remote history of paroxysmal atrial fibrillation with no recurrences.  The patient is on anticoagulation because of history of DVT.       Mixed hyperlipidemia  Hyperlipidemia, stable.  The patient will continue rosuvastatin at 20 mg daily and fenofibrate 134 mg daily.       Dyspnea on exertion  The patient describes symptom of shortness of breath on exertion especially upon lifting heavy object or walking up the hill.  There is no associated symptom of chest pain.  At like to arrange for the patient to have an echocardiogram.  Orders:    NM myocardial perfusion spect (rx stress and/or rest); Future    Echo complete w/ contrast if indicated; Future        History of Present Illness   The patient presented to this office for the purpose of cardiac follow-up.  He is known to have a history of hypertension and hyperlipidemia as well as DVT and peripheral vascular disease.  The patient experiences lower extremity claudication upon walking.  He has some dyspnea on exertion but denies any chest pain.  He denies any symptoms of palpitation, dizziness or syncope.  He has no leg edema.      Fernando Roberts is a 74 y.o. male   History obtained from: patient    Review of Systems   Constitutional: Negative.    Respiratory: Negative.     Cardiovascular: Negative.    Psychiatric/Behavioral: Negative.            Objective   /60 (BP  "Location: Left arm, Patient Position: Sitting, Cuff Size: Large)   Pulse 76   Ht 5' 11\" (1.803 m)   Wt 95.8 kg (211 lb 3.2 oz)   SpO2 97%   BMI 29.46 kg/m²      Physical Exam  Vitals reviewed.   Constitutional:       Appearance: He is well-developed.   HENT:      Head: Normocephalic and atraumatic.   Cardiovascular:      Rate and Rhythm: Normal rate and regular rhythm.   Pulmonary:      Effort: Pulmonary effort is normal.      Breath sounds: Normal breath sounds.   Musculoskeletal:      Right lower leg: No edema.      Left lower leg: No edema.   Skin:     General: Skin is warm and dry.   Neurological:      Mental Status: He is alert and oriented to person, place, and time.   Psychiatric:         Mood and Affect: Mood normal.         Behavior: Behavior normal.           "

## 2025-03-18 NOTE — ASSESSMENT & PLAN NOTE
Hyperlipidemia, stable.  The patient will continue rosuvastatin at 20 mg daily and fenofibrate 134 mg daily.

## 2025-03-31 ENCOUNTER — OFFICE VISIT (OUTPATIENT)
Dept: GASTROENTEROLOGY | Facility: AMBULARY SURGERY CENTER | Age: 75
End: 2025-03-31
Payer: MEDICARE

## 2025-03-31 ENCOUNTER — APPOINTMENT (OUTPATIENT)
Dept: LAB | Facility: AMBULARY SURGERY CENTER | Age: 75
End: 2025-03-31
Payer: MEDICARE

## 2025-03-31 ENCOUNTER — ANTICOAG VISIT (OUTPATIENT)
Dept: CARDIOLOGY CLINIC | Facility: CLINIC | Age: 75
End: 2025-03-31

## 2025-03-31 VITALS
DIASTOLIC BLOOD PRESSURE: 68 MMHG | SYSTOLIC BLOOD PRESSURE: 138 MMHG | HEIGHT: 71 IN | HEART RATE: 75 BPM | OXYGEN SATURATION: 98 % | WEIGHT: 211.6 LBS | BODY MASS INDEX: 29.62 KG/M2

## 2025-03-31 DIAGNOSIS — I82.402 ACUTE DEEP VEIN THROMBOSIS (DVT) OF LEFT LOWER EXTREMITY, UNSPECIFIED VEIN (HCC): ICD-10-CM

## 2025-03-31 DIAGNOSIS — K76.0 METABOLIC DYSFUNCTION-ASSOCIATED STEATOTIC LIVER DISEASE (MASLD): Primary | ICD-10-CM

## 2025-03-31 DIAGNOSIS — Z86.0100 HISTORY OF COLON POLYPS: ICD-10-CM

## 2025-03-31 DIAGNOSIS — Z86.718 HISTORY OF DVT (DEEP VEIN THROMBOSIS): Primary | ICD-10-CM

## 2025-03-31 DIAGNOSIS — E11.9 TYPE 2 DIABETES MELLITUS WITHOUT COMPLICATION, WITHOUT LONG-TERM CURRENT USE OF INSULIN (HCC): ICD-10-CM

## 2025-03-31 DIAGNOSIS — K74.02 ADVANCED HEPATIC FIBROSIS: ICD-10-CM

## 2025-03-31 DIAGNOSIS — R94.5 ABNORMAL RESULTS OF LIVER FUNCTION STUDIES: ICD-10-CM

## 2025-03-31 LAB
INR PPP: 3.04 (ref 0.85–1.19)
PROTHROMBIN TIME: 31.2 SECONDS (ref 12.3–15)

## 2025-03-31 PROCEDURE — 85610 PROTHROMBIN TIME: CPT

## 2025-03-31 PROCEDURE — 99213 OFFICE O/P EST LOW 20 MIN: CPT | Performed by: FAMILY MEDICINE

## 2025-03-31 PROCEDURE — 36415 COLL VENOUS BLD VENIPUNCTURE: CPT

## 2025-03-31 NOTE — PROGRESS NOTES
Name: Fernando Roberts      : 1950      MRN: [id number]  Encounter Provider: Pooja Garcia PA-C  Encounter Date: 3/31/2025   Encounter department: [facility]  :  Assessment & Plan  Metabolic dysfunction-associated steatotic liver disease (MASLD)  Patient with mild and intermittently elevated serum transaminases dating back to  also found to have hepatomegaly with steatosis on prior ultrasound. He had a serologic evaluation which was unremarkable for viral, autoimmune and other genetic causes of liver disease also including celiac disease. He also had a US elastography (2024) showing F3 disease. Fortunately, with a preserved platelet count. INR within range on Coumadin. No clinical evidence of chronic liver disease.    It was previously recommended that he start Ozempic, repeat his LFTs q3 months and repeat US elastography 6 months from his last. He did start Ozempic in January with improved glucose readings but overall stable weight. He has not completed his repeat LFTs or US elastography.    Recommend up titration of Ozempic, as his blood sugars allow, to help promote weight loss. He will be having an updated US elastography now. If he is still found to have advanced hepatic fibrosis (F2/F3 disease) on elastography then would recommend the addition of Rezdiffra while he continues to work towards steady and sustainable weight loss. He will also have updated labs now to reassess his liver chemistries and function.      He is otherwise aware of the basic pathophysiology of fatty liver disease and potential to progress to cirrhosis if left untreated. Aside from weight loss, he will also continue with optimization of his metabolic risk factors and limiting his alcohol use.    Orders:  •  CBC and differential; Future  •  Comprehensive metabolic panel; Future  •  Protime-INR; Future  •  US elastography/UGAP; Future    Type 2 diabetes mellitus without complication,  without long-term current use of insulin (HCC)  Last recent hemoglobin A1c 7.5%.  Started Ozempic in January 2025 and ordered for repeat A1c in May 2025.  Refer to A/P above.    Lab Results   Component Value Date    HGBA1C 7.5 (H) 10/28/2024          Abnormal results of liver function studies  Stable.  Refer to A/P above.  Orders:  •  CBC and differential; Future  •  Comprehensive metabolic panel; Future  •  Protime-INR; Future  •  US elastography/UGAP; Future    Advanced hepatic fibrosis  Refer to A/P above.  Orders:  •  CBC and differential; Future  •  Comprehensive metabolic panel; Future  •  Protime-INR; Future  •  US elastography/UGAP; Future    History of colon polyps  Patient is up-to-date with CRC screening.  Colonoscopy (12/2022) notable for multiple subcentimeter colonic polyps (TA x1) and internal hemorrhoids.  Repeat x 5 years; Next due 12/2027.       Follow-up in 6 months or sooner if necessary.       History of Present Illness   HPI    Fernando Roberts is a 74 y.o. male with PMH significant for DM2, HLD, paroxysmal A-fib and DVTs on Coumadin and remote bilateral parotid tumors who presents today for follow-up regarding MASLD.     Interval events  - Last seen 12/17/2024.  - Labs (12/19/2024) with stable LFTs.   - Started Ozempic in January 2025.   - His weight is overall stable since his last appointment.      Extended liver history  He has had abnormal liver tests dating back to 2021 with ALT 60-70s. He had an US abdomen which showed hepatomegaly with steatosis. Serologic work-up did show elevated GGT as well as +SEAMUS and SSA. His viral serologies were negative.       He reports chronic pruritus as well as dry mouth, skin and eyes. States that he needs a back scratcher on every floor for his pruritus. He was seen a rheumatologist recently for positive autoantibodies but was not diagnosed with Sjogren's disease.     In his weight, he does report peak weight earlier this year in January but he has lost 12 lbs  over the summer by increasing his physical activity.     He reports drinking 5 beverages weekly. He reports a family history of alcoholism and EtOH cirrhosis. His mother also had lupus.      He has been on statins for years and discontinued in May 2024 due to cramping. However this was recently restarted in July 2024 without any changes in his liver chemistry trend. Denies any herbal supplement use.    12/17/2024   He had a US elastography showed stage 3 fibrosis for which repeat US elastography in 6 months was recommended. His serologic work-up unrevealing. Repeat LFTs in October with ALT in 50s. Also started Crestor in September        History obtained from: patient    Review of Systems   All other systems reviewed and are negative.    Pertinent Medical History     Medical History Reviewed by provider this encounter:  Tobacco  Allergies  Meds  Problems  Med Hx  Surg Hx  Fam Hx     .  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) 03/21/2019   • Chronic cholecystitis with calculus 01/31/2024   • Colon polyp     6 polyps 3 years ago   • Deep vein thrombosis (HCC)     lower leg- left   • Diabetes mellitus (HCC)    • Diabetic neuropathic arthropathy (HCC)     causes weakness in LE and uses a cane as assist to walk   • Diabetic neuropathy (HCC)    • Fatty liver    • GERD (gastroesophageal reflux disease)    • Hydronephrosis 05/17/2019    Added automatically from request for surgery [id number]     • Hyperlipidemia    • Hypertension    • Hyponatremia 09/11/2021   • Kidney stone    • Parotid tumor     bilateral   • Seasonal allergies    • Spinal stenosis    • Ureteral stone with hydronephrosis 05/17/2019    Added automatically from request for surgery [id number]     Past Surgical History:   Procedure Laterality Date   • CHOLECYSTECTOMY     • COLONOSCOPY  05/16/2019     completed by Dr. Mckeon, Repeat 3 years   • FL RETROGRADE PYELOGRAM  05/17/2019   • PAROTIDECTOMY Bilateral    • IA CYSTO/URETERO W/LITHOTRIPSY &INDWELL STENT INSRT Right 05/17/2019    Procedure: CYSTOSCOPY URETEROSCOPY WITH LITHOTRIPSY HOLMIUM LASER, RETROGRADE PYELOGRAM AND INSERTION STENT URETERAL--possible stent only;  Surgeon: Casey Snyder MD;  [facility];  Service: Urology   • IA EXC PRTD ALEXA/PRTD GLND LAT DSJ&PRSRV FACIAL NR Right 10/18/2017    Procedure: SUPERFICIAL PAROTIDECTOMY WITH NERVE DISSECTION AND PRESERVATION;  Surgeon: Christopher Black MD;  [facility];  Service: ENT   • IA LAPAROSCOPY SURG CHOLECYSTECTOMY N/A 01/12/2024    Procedure: CHOLECYSTECTOMY LAPAROSCOPIC W/ ROBOTICS;  Surgeon: Naveed Triplett DO;  [facility];  Service: General   • IA TENDON SHEATH INCISION Right 11/03/2020    Procedure: RING TRIGGER FINGER RELEASE;  Surgeon: Marielena Calvert MD;  [facility];  Service: Orthopedics   • ROTATOR CUFF REPAIR Bilateral    • TONSILLECTOMY     • UMBILICAL HERNIA REPAIR N/A 01/12/2024    Procedure: REPAIR HERNIA UMBILICAL, NO MESH;  Surgeon: Naveed Triplett DO;  [facility];  Service: General     Family History   Problem Relation Age of Onset   • Lupus Mother    • Rheum arthritis Mother    • Cirrhosis Father    • Alcohol abuse Father    • Substance Abuse Brother       reports that he has been smoking pipe and cigars. He has never used smokeless tobacco. He reports current alcohol use of about 4.0 standard drinks of alcohol per week. He reports that he does not use drugs.  Current Outpatient Medications   Medication Instructions   • acetaminophen (TYLENOL) 325 mg tablet 2, by mouth, every 6 hours as needed for mild to moderate pain.   • albuterol (PROVENTIL HFA,VENTOLIN HFA) 90 mcg/act inhaler INHALE 2 PUFFS BY MOUTH EVERY 4 HOURS AS NEEDED FOR WHEEZE   • aspirin (ECOTRIN LOW STRENGTH) 81 mg, Daily   • Cyanocobalamin (VITAMIN B 12 PO) 1,000  mcg, Daily   • fenofibrate micronized (LOFIBRA) 134 mg, Oral, Daily with breakfast   • gabapentin (NEURONTIN) 200 mg, Oral, Daily at bedtime   • glimepiride (AMARYL) 2 mg tablet TAKE 1 AND HALF TABLET (3 MG TOTAL) BY MOUTH DAILY WITH BREAKFAST   • lisinopril (ZESTRIL) 20 mg, Oral, Daily   • metFORMIN (GLUCOPHAGE-XR) 1,000 mg, Oral, 2 times daily with meals   • omeprazole (PRILOSEC) 20 mg, Oral, Daily   • rosuvastatin (CRESTOR) 20 mg, Oral, Daily   • semaglutide (1 mg/dose) (OZEMPIC) 1 mg, Subcutaneous, Weekly   • tamsulosin (FLOMAX) 0.4 mg, Oral, Daily with dinner   • triamterene-hydrochlorothiazide (MAXZIDE-25) 37.5-25 mg per tablet Take by mouth   • warfarin (COUMADIN) 5 mg tablet TAKE 1 TABLET EVERY DAY OR AS DIRECTED BY MD   • warfarin (COUMADIN) 2.5 mg, Oral, Daily (warfarin), T-Th-S-S takes 2.5mg     M-W-F takes 5.0 mg     Allergies   Allergen Reactions   • Pletal [Cilostazol] Tachycardia   • Dyazide [Hydrochlorothiazide W-Triamterene] Other (See Comments)     Hyponatremia   • Jardiance [Empagliflozin] Other (See Comments)     Constipation, increased urinary frequency--not tolerable      Current Outpatient Medications on File Prior to Visit   Medication Sig Dispense Refill   • acetaminophen (TYLENOL) 325 mg tablet 2, by mouth, every 6 hours as needed for mild to moderate pain. 30 tablet 0   • albuterol (PROVENTIL HFA,VENTOLIN HFA) 90 mcg/act inhaler INHALE 2 PUFFS BY MOUTH EVERY 4 HOURS AS NEEDED FOR WHEEZE 8.5 g 5   • aspirin (ECOTRIN LOW STRENGTH) 81 mg EC tablet Take 81 mg by mouth daily       • Cyanocobalamin (VITAMIN B 12 PO) Take 1,000 mcg by mouth daily     • fenofibrate micronized (LOFIBRA) 134 MG capsule TAKE 1 CAPSULE (134 MG TOTAL) BY MOUTH DAILY WITH BREAKFAST 90 capsule 3   • gabapentin (NEURONTIN) 100 mg capsule TAKE 2 CAPSULES (200 MG TOTAL) BY MOUTH DAILY AT BEDTIME 180 capsule 1   • glimepiride (AMARYL) 2 mg tablet TAKE 1 AND HALF TABLET (3 MG TOTAL) BY MOUTH DAILY WITH BREAKFAST 135 tablet 1  "  • lisinopril (ZESTRIL) 20 mg tablet TAKE 1 TABLET BY MOUTH EVERY DAY 90 tablet 1   • metFORMIN (GLUCOPHAGE-XR) 500 mg 24 hr tablet TAKE 2 TABLETS BY MOUTH TWICE A DAY WITH FOOD 360 tablet 2   • omeprazole (PriLOSEC) 20 mg delayed release capsule TAKE 1 CAPSULE BY MOUTH EVERY DAY 90 capsule 1   • rosuvastatin (CRESTOR) 20 MG tablet Take 1 tablet (20 mg total) by mouth daily 100 tablet 3   • semaglutide, 1 mg/dose, (Ozempic) 4 mg/3 mL injection pen Inject 0.75 mL (1 mg total) under the skin once a week 9 mL 0   • tamsulosin (FLOMAX) 0.4 mg TAKE 1 CAPSULE BY MOUTH EVERY DAY WITH DINNER 90 capsule 1   • triamterene-hydrochlorothiazide (MAXZIDE-25) 37.5-25 mg per tablet Take by mouth     • warfarin (COUMADIN) 2.5 mg tablet Take 1 tablet by mouth daily T-Th-S-S takes 2.5mg     M-W-F takes 5.0 mg     • warfarin (COUMADIN) 5 mg tablet TAKE 1 TABLET EVERY DAY OR AS DIRECTED BY MD 90 tablet 3     No current facility-administered medications on file prior to visit.      Social History     Tobacco Use   • Smoking status: Light Smoker     Types: Pipe, Cigars   • Smokeless tobacco: Never   • Tobacco comments:     Pipe smoker   Vaping Use   • Vaping status: Never Used   Substance and Sexual Activity   • Alcohol use: Yes     Alcohol/week: 4.0 standard drinks of alcohol     Types: 4 Shots of liquor per week     Comment: daily   • Drug use: No   • Sexual activity: Not Currently     Partners: Female     Birth control/protection: None        Objective   /68 (BP Location: Left arm, Patient Position: Sitting, Cuff Size: Standard)   Pulse 75   Ht 5' 11\" (1.803 m)   Wt 96 kg (211 lb 9.6 oz)   SpO2 98%   BMI 29.51 kg/m²      Physical Exam  Vitals and nursing note reviewed.   Constitutional:       General: He is not in acute distress.     Appearance: Normal appearance. He is well-developed. He is not ill-appearing or toxic-appearing.   HENT:      Head: Normocephalic and atraumatic.   Eyes:      General: No scleral icterus.     " Conjunctiva/sclera: Conjunctivae normal.   Pulmonary:      Effort: Pulmonary effort is normal. No respiratory distress.   Abdominal:      General: Abdomen is flat. Bowel sounds are normal. There is no distension.      Palpations: Abdomen is soft. There is no mass.      Tenderness: There is no abdominal tenderness.   Musculoskeletal:      Right lower leg: No edema.      Left lower leg: No edema.   Skin:     General: Skin is warm and dry.      Coloration: Skin is not jaundiced.      Findings: No bruising or rash.   Neurological:      Mental Status: He is alert and oriented to person, place, and time. Mental status is at baseline.   Psychiatric:         Mood and Affect: Mood normal.         Behavior: Behavior normal.

## 2025-03-31 NOTE — ASSESSMENT & PLAN NOTE
Patient is up-to-date with CRC screening.  Colonoscopy (12/2022) notable for multiple subcentimeter colonic polyps (TA x1) and internal hemorrhoids.  Repeat x 5 years; Next due 12/2027.       Follow-up in 6 months or sooner if necessary.

## 2025-03-31 NOTE — PROGRESS NOTES
Spoke with patient, advised INR a little high, no changes.  Current dose verified, advised  to take 2.5 mg tonight only instead of 5 mg, then go back to 5 mg Mon Fri, 2.5 mg all other days, will recheck in 3 weeks 4/21/25

## 2025-04-08 ENCOUNTER — HOSPITAL ENCOUNTER (OUTPATIENT)
Dept: NON INVASIVE DIAGNOSTICS | Facility: CLINIC | Age: 75
Discharge: HOME | End: 2025-04-08
Payer: MEDICARE

## 2025-04-08 VITALS
OXYGEN SATURATION: 98 % | HEART RATE: 83 BPM | DIASTOLIC BLOOD PRESSURE: 78 MMHG | BODY MASS INDEX: 29.54 KG/M2 | SYSTOLIC BLOOD PRESSURE: 144 MMHG | HEIGHT: 71 IN | WEIGHT: 211 LBS

## 2025-04-08 VITALS
HEART RATE: 75 BPM | WEIGHT: 211 LBS | HEIGHT: 71 IN | SYSTOLIC BLOOD PRESSURE: 138 MMHG | BODY MASS INDEX: 29.54 KG/M2 | DIASTOLIC BLOOD PRESSURE: 68 MMHG

## 2025-04-08 DIAGNOSIS — R06.09 DYSPNEA ON EXERTION: ICD-10-CM

## 2025-04-08 LAB
AORTIC ROOT: 3.2 CM
ASCENDING AORTA: 3.7 CM
AV LVOT MEAN GRADIENT: 2 MMHG
AV LVOT PEAK GRADIENT: 3 MMHG
BSA FOR ECHO PROCEDURE: 2.16 M2
CHEST PAIN STATEMENT: NORMAL
DOP CALC LVOT PEAK VEL VTI: 22.17 CM
DOP CALC LVOT PEAK VEL: 0.93 M/S
E WAVE DECELERATION TIME: 261 MS
E/A RATIO: 0.96
FRACTIONAL SHORTENING: 31 (ref 28–44)
INTERVENTRICULAR SEPTUM IN DIASTOLE (PARASTERNAL SHORT AXIS VIEW): 1.8 CM
INTERVENTRICULAR SEPTUM: 1.8 CM (ref 0.6–1.1)
LAAS-AP2: 19 CM2
LAAS-AP4: 17.6 CM2
LEFT ATRIUM SIZE: 3.3 CM
LEFT ATRIUM VOLUME (MOD BIPLANE): 51 ML
LEFT ATRIUM VOLUME INDEX (MOD BIPLANE): 23.6 ML/M2
LEFT INTERNAL DIMENSION IN SYSTOLE: 2.9 CM (ref 2.1–4)
LEFT VENTRICULAR INTERNAL DIMENSION IN DIASTOLE: 4.2 CM (ref 3.5–6)
LEFT VENTRICULAR POSTERIOR WALL IN END DIASTOLE: 1.3 CM
LEFT VENTRICULAR STROKE VOLUME: 46 ML
LV EF US.2D.A4C+ESTIMATED: 58 %
LVSV (TEICH): 46 ML
MAX DIASTOLIC BP: 72 MMHG
MAX HR PERCENT: 74 %
MAX HR: 110 BPM
MAX PREDICTED HEART RATE: 147 BPM
MV E'TISSUE VEL-SEP: 7 CM/S
MV PEAK A VEL: 0.77 M/S
MV PEAK E VEL: 74 CM/S
MV STENOSIS PRESSURE HALF TIME: 76 MS
MV VALVE AREA P 1/2 METHOD: 2.9
PROTOCOL NAME: NORMAL
RATE PRESSURE PRODUCT: NORMAL
REASON FOR TERMINATION: NORMAL
RIGHT ATRIUM AREA SYSTOLE A4C: 18.5 CM2
RIGHT VENTRICLE ID DIMENSION: 4.4 CM
SL CV LEFT ATRIUM LENGTH A2C: 5.6 CM
SL CV LV EF: 60
SL CV PED ECHO LEFT VENTRICLE DIASTOLIC VOLUME (MOD BIPLANE) 2D: 77 ML
SL CV PED ECHO LEFT VENTRICLE SYSTOLIC VOLUME (MOD BIPLANE) 2D: 31 ML
SL CV REST NUCLEAR ISOTOPE DOSE: 10.88 MCI
SL CV STRESS NUCLEAR ISOTOPE DOSE: 33 MCI
SL CV STRESS RECOVERY BP: NORMAL MMHG
SL CV STRESS RECOVERY HR: 88 BPM
SL CV STRESS RECOVERY O2 SAT: 97 %
SPECT HRT GATED+EF W RNC IV: 65 %
STRESS ANGINA INDEX: 0
STRESS BASELINE BP: NORMAL MMHG
STRESS BASELINE HR: 83 BPM
STRESS O2 SAT REST: 98 %
STRESS PEAK HR: 110 BPM
STRESS POST ESTIMATED WORKLOAD: 1.6 METS
STRESS POST EXERCISE DUR MIN: 3 MIN
STRESS POST EXERCISE DUR SEC: 0 SEC
STRESS POST O2 SAT PEAK: 97 %
STRESS POST PEAK BP: 160 MMHG
STRESS POST PEAK HR: 110 BPM
STRESS POST PEAK SYSTOLIC BP: 160 MMHG
TARGET HR FORMULA: NORMAL
TRICUSPID ANNULAR PLANE SYSTOLIC EXCURSION: 1.9 CM

## 2025-04-08 PROCEDURE — 93306 TTE W/DOPPLER COMPLETE: CPT

## 2025-04-08 PROCEDURE — 93016 CV STRESS TEST SUPVJ ONLY: CPT | Performed by: INTERNAL MEDICINE

## 2025-04-08 PROCEDURE — 93306 TTE W/DOPPLER COMPLETE: CPT | Performed by: INTERNAL MEDICINE

## 2025-04-08 PROCEDURE — 93018 CV STRESS TEST I&R ONLY: CPT | Performed by: INTERNAL MEDICINE

## 2025-04-08 PROCEDURE — 78452 HT MUSCLE IMAGE SPECT MULT: CPT

## 2025-04-08 PROCEDURE — 78452 HT MUSCLE IMAGE SPECT MULT: CPT | Performed by: INTERNAL MEDICINE

## 2025-04-08 PROCEDURE — 93017 CV STRESS TEST TRACING ONLY: CPT

## 2025-04-08 PROCEDURE — A9502 TC99M TETROFOSMIN: HCPCS

## 2025-04-08 RX ORDER — REGADENOSON 0.08 MG/ML
0.4 INJECTION, SOLUTION INTRAVENOUS ONCE
Status: COMPLETED | OUTPATIENT
Start: 2025-04-08 | End: 2025-04-08

## 2025-04-08 RX ADMIN — REGADENOSON 0.4 MG: 0.08 INJECTION, SOLUTION INTRAVENOUS at 09:47

## 2025-04-10 ENCOUNTER — HOSPITAL ENCOUNTER (OUTPATIENT)
Dept: ULTRASOUND IMAGING | Facility: HOSPITAL | Age: 75
Discharge: HOME | End: 2025-04-10
Payer: MEDICARE

## 2025-04-10 DIAGNOSIS — R94.5 ABNORMAL RESULTS OF LIVER FUNCTION STUDIES: ICD-10-CM

## 2025-04-10 DIAGNOSIS — K74.02 ADVANCED HEPATIC FIBROSIS: ICD-10-CM

## 2025-04-10 DIAGNOSIS — K76.0 METABOLIC DYSFUNCTION-ASSOCIATED STEATOTIC LIVER DISEASE (MASLD): ICD-10-CM

## 2025-04-10 PROCEDURE — 76981 USE PARENCHYMA: CPT

## 2025-04-21 ENCOUNTER — APPOINTMENT (OUTPATIENT)
Dept: LAB | Facility: CLINIC | Age: 75
End: 2025-04-21
Payer: MEDICARE

## 2025-04-21 DIAGNOSIS — I82.402 ACUTE DEEP VEIN THROMBOSIS (DVT) OF LEFT LOWER EXTREMITY, UNSPECIFIED VEIN (HCC): ICD-10-CM

## 2025-04-21 DIAGNOSIS — E11.40 TYPE 2 DIABETES MELLITUS WITH DIABETIC NEUROPATHY, WITHOUT LONG-TERM CURRENT USE OF INSULIN (HCC): ICD-10-CM

## 2025-04-21 DIAGNOSIS — K74.02 ADVANCED HEPATIC FIBROSIS: ICD-10-CM

## 2025-04-21 DIAGNOSIS — I10 ESSENTIAL HYPERTENSION: ICD-10-CM

## 2025-04-21 DIAGNOSIS — R94.5 ABNORMAL RESULTS OF LIVER FUNCTION STUDIES: ICD-10-CM

## 2025-04-21 DIAGNOSIS — E78.2 MIXED HYPERLIPIDEMIA: ICD-10-CM

## 2025-04-21 DIAGNOSIS — K76.0 METABOLIC DYSFUNCTION-ASSOCIATED STEATOTIC LIVER DISEASE (MASLD): ICD-10-CM

## 2025-04-21 LAB
INR PPP: 2.73 (ref 0.85–1.19)
PROTHROMBIN TIME: 28.7 SECONDS (ref 12.3–15)

## 2025-04-21 PROCEDURE — 36415 COLL VENOUS BLD VENIPUNCTURE: CPT

## 2025-04-21 PROCEDURE — 85610 PROTHROMBIN TIME: CPT

## 2025-04-22 ENCOUNTER — ANTICOAG VISIT (OUTPATIENT)
Dept: CARDIOLOGY CLINIC | Facility: CLINIC | Age: 75
End: 2025-04-22

## 2025-04-22 DIAGNOSIS — Z86.718 HISTORY OF DVT (DEEP VEIN THROMBOSIS): Primary | ICD-10-CM

## 2025-04-22 NOTE — PROGRESS NOTES
Left message for patient, advised INR good, will continue 5 mg Mon Fri, 2.5 mg all other days, will recheck in 4 weeks 5/19/25  Advised to call with questions or concerns.

## 2025-04-30 DIAGNOSIS — E11.42 TYPE 2 DIABETES MELLITUS WITH DIABETIC POLYNEUROPATHY, WITHOUT LONG-TERM CURRENT USE OF INSULIN (HCC): ICD-10-CM

## 2025-05-02 ENCOUNTER — PROCEDURE VISIT (OUTPATIENT)
Dept: PODIATRY | Facility: CLINIC | Age: 75
End: 2025-05-02
Payer: MEDICARE

## 2025-05-02 VITALS — WEIGHT: 203 LBS | HEIGHT: 71 IN | BODY MASS INDEX: 28.42 KG/M2

## 2025-05-02 DIAGNOSIS — I70.213 ATHEROSCLEROSIS OF NATIVE ARTERY OF BOTH LOWER EXTREMITIES WITH INTERMITTENT CLAUDICATION (HCC): ICD-10-CM

## 2025-05-02 DIAGNOSIS — B35.1 TINEA UNGUIUM: ICD-10-CM

## 2025-05-02 DIAGNOSIS — E11.40 TYPE 2 DIABETES MELLITUS WITH DIABETIC NEUROPATHY, WITHOUT LONG-TERM CURRENT USE OF INSULIN (HCC): Primary | ICD-10-CM

## 2025-05-02 DIAGNOSIS — L84 CALLUS OF FOOT: ICD-10-CM

## 2025-05-02 PROCEDURE — 11056 PARNG/CUTG B9 HYPRKR LES 2-4: CPT | Performed by: PODIATRIST

## 2025-05-02 PROCEDURE — RECHECK: Performed by: PODIATRIST

## 2025-05-02 PROCEDURE — 11721 DEBRIDE NAIL 6 OR MORE: CPT | Performed by: PODIATRIST

## 2025-05-02 RX ORDER — SITAGLIPTIN 100 MG/1
100 TABLET, FILM COATED ORAL DAILY
Qty: 90 TABLET | Refills: 1 | OUTPATIENT
Start: 2025-05-02

## 2025-05-02 NOTE — PROGRESS NOTES
"Fernando Roberts  1950  AT RISK FOOT CARE    1. Type 2 diabetes mellitus with diabetic neuropathy, without long-term current use of insulin (HCC)  Lesion Destruction      2. Atherosclerosis of native artery of both lower extremities with intermittent claudication (HCC)  Lesion Destruction      3. Tinea unguium  Lesion Destruction      4. Callus of foot  Lesion Destruction          Patient presents for at-risk foot care.  Patient has no acute concerns today.  Patient has significant lower extremity risk due to diminished pulses in the feet and trophic skin changes to the lower extremity including thick toenail, atrophic skin, and decreased hair growth.      On exam patient has thickened, hypertrophic, discolored, brittle toenails with subungual debris and tenderness x10   Callus: left and right hallux pinch callus. No hemorrhage  Patient has diminished pedal pulses and decreased perfusion to the lower extremities  Patient has significant trophic changes to the skin including thick toenails, decreased pedal hair and atrophic skin.     Today's treatment includes:  Debridement of toenails. Using nail nipper, nadya, and curette, nails were sharply debrided, reduced in thickness and length. Devitalized nail tissue and fungal debris excised and removed. Patient tolerated well.    Lesion Destruction    Date/Time: 5/2/2025 9:15 AM    Performed by: Sourav Boyle DPM  Authorized by: Sourav Boyle DPM  Universal Protocol:  Consent: Verbal consent obtained.  Risks and benefits: risks, benefits and alternatives were discussed  Consent given by: patient  Time out: Immediately prior to procedure a \"time out\" was called to verify the correct patient, procedure, equipment, support staff and site/side marked as required.  Timeout called at: 5/2/2025 8:56 AM.  Patient understanding: patient states understanding of the procedure being performed  Patient identity confirmed: verbally with patient    Procedure Details - " Lesion Destruction:     Number of Lesions:  2  Lesion 1:     Body area:  Lower extremity    Lower extremity location:  R big toe    Malignancy: benign hyperkeratotic lesion      Destruction method: scissors used for extraction    Lesion 2:     Body area:  Lower extremity    Lower extremity location:  L big toe    Malignancy: benign hyperkeratotic lesion      Destruction method: scissors used for extraction        Discussed proper shoe gear, daily inspections of feet, and general foot health with patient. Patient has Q8  findings and is recommended for at risk foot care every 9-10 weeks.    Patients most recent complete clinical foot exam was on: 2/21/2025

## 2025-05-05 LAB
LEFT EYE DIABETIC RETINOPATHY: NORMAL
RIGHT EYE DIABETIC RETINOPATHY: NORMAL

## 2025-05-12 ENCOUNTER — RESULTS FOLLOW-UP (OUTPATIENT)
Age: 75
End: 2025-05-12

## 2025-05-12 DIAGNOSIS — R94.5 ABNORMAL RESULTS OF LIVER FUNCTION STUDIES: ICD-10-CM

## 2025-05-12 DIAGNOSIS — K76.0 METABOLIC DYSFUNCTION-ASSOCIATED STEATOTIC LIVER DISEASE (MASLD): Primary | ICD-10-CM

## 2025-05-12 DIAGNOSIS — E11.42 TYPE 2 DIABETES MELLITUS WITH DIABETIC POLYNEUROPATHY, WITHOUT LONG-TERM CURRENT USE OF INSULIN (HCC): ICD-10-CM

## 2025-05-12 DIAGNOSIS — K74.02 ADVANCED HEPATIC FIBROSIS: ICD-10-CM

## 2025-05-12 RX ORDER — SEMAGLUTIDE 1.34 MG/ML
INJECTION, SOLUTION SUBCUTANEOUS
Qty: 9 ML | Refills: 1 | Status: SHIPPED | OUTPATIENT
Start: 2025-05-12

## 2025-05-16 ENCOUNTER — RA CDI HCC (OUTPATIENT)
Dept: OTHER | Facility: HOSPITAL | Age: 75
End: 2025-05-16

## 2025-05-16 NOTE — PROGRESS NOTES
HCC coding opportunities          Chart Reviewed number of suggestions sent to Provider: 1   E11.36    Patients Insurance     Medicare Insurance: Medicare

## 2025-05-19 ENCOUNTER — ANTICOAG VISIT (OUTPATIENT)
Dept: CARDIOLOGY CLINIC | Facility: CLINIC | Age: 75
End: 2025-05-19

## 2025-05-19 ENCOUNTER — APPOINTMENT (OUTPATIENT)
Dept: LAB | Facility: AMBULARY SURGERY CENTER | Age: 75
End: 2025-05-19
Attending: NURSE PRACTITIONER
Payer: MEDICARE

## 2025-05-19 DIAGNOSIS — Z86.718 HISTORY OF DVT (DEEP VEIN THROMBOSIS): Primary | ICD-10-CM

## 2025-05-19 DIAGNOSIS — I82.402 ACUTE DEEP VEIN THROMBOSIS (DVT) OF LEFT LOWER EXTREMITY, UNSPECIFIED VEIN (HCC): ICD-10-CM

## 2025-05-19 LAB
ALBUMIN SERPL BCG-MCNC: 4.2 G/DL (ref 3.5–5)
ALP SERPL-CCNC: 63 U/L (ref 34–104)
ALT SERPL W P-5'-P-CCNC: 41 U/L (ref 7–52)
ANION GAP SERPL CALCULATED.3IONS-SCNC: 6 MMOL/L (ref 4–13)
AST SERPL W P-5'-P-CCNC: 42 U/L (ref 13–39)
BASOPHILS # BLD AUTO: 0.11 THOUSANDS/ÂΜL (ref 0–0.1)
BASOPHILS NFR BLD AUTO: 1 % (ref 0–1)
BILIRUB SERPL-MCNC: 0.65 MG/DL (ref 0.2–1)
BUN SERPL-MCNC: 9 MG/DL (ref 5–25)
CALCIUM SERPL-MCNC: 9.5 MG/DL (ref 8.4–10.2)
CHLORIDE SERPL-SCNC: 104 MMOL/L (ref 96–108)
CHOLEST SERPL-MCNC: 112 MG/DL (ref ?–200)
CO2 SERPL-SCNC: 25 MMOL/L (ref 21–32)
CREAT SERPL-MCNC: 0.83 MG/DL (ref 0.6–1.3)
EOSINOPHIL # BLD AUTO: 0.76 THOUSAND/ÂΜL (ref 0–0.61)
EOSINOPHIL NFR BLD AUTO: 9 % (ref 0–6)
ERYTHROCYTE [DISTWIDTH] IN BLOOD BY AUTOMATED COUNT: 13.8 % (ref 11.6–15.1)
EST. AVERAGE GLUCOSE BLD GHB EST-MCNC: 146 MG/DL
GFR SERPL CREATININE-BSD FRML MDRD: 87 ML/MIN/1.73SQ M
GLUCOSE P FAST SERPL-MCNC: 86 MG/DL (ref 65–99)
HBA1C MFR BLD: 6.7 %
HCT VFR BLD AUTO: 47.2 % (ref 36.5–49.3)
HDLC SERPL-MCNC: 37 MG/DL
HGB BLD-MCNC: 15.5 G/DL (ref 12–17)
IMM GRANULOCYTES # BLD AUTO: 0.03 THOUSAND/UL (ref 0–0.2)
IMM GRANULOCYTES NFR BLD AUTO: 0 % (ref 0–2)
INR PPP: 2.81 (ref 0.85–1.19)
LDLC SERPL CALC-MCNC: 40 MG/DL (ref 0–100)
LYMPHOCYTES # BLD AUTO: 1.75 THOUSANDS/ÂΜL (ref 0.6–4.47)
LYMPHOCYTES NFR BLD AUTO: 21 % (ref 14–44)
MCH RBC QN AUTO: 31.4 PG (ref 26.8–34.3)
MCHC RBC AUTO-ENTMCNC: 32.8 G/DL (ref 31.4–37.4)
MCV RBC AUTO: 96 FL (ref 82–98)
MONOCYTES # BLD AUTO: 1.41 THOUSAND/ÂΜL (ref 0.17–1.22)
MONOCYTES NFR BLD AUTO: 17 % (ref 4–12)
NEUTROPHILS # BLD AUTO: 4.19 THOUSANDS/ÂΜL (ref 1.85–7.62)
NEUTS SEG NFR BLD AUTO: 52 % (ref 43–75)
NONHDLC SERPL-MCNC: 75 MG/DL
NRBC BLD AUTO-RTO: 0 /100 WBCS
PLATELET # BLD AUTO: 336 THOUSANDS/UL (ref 149–390)
PMV BLD AUTO: 9.9 FL (ref 8.9–12.7)
POTASSIUM SERPL-SCNC: 4.4 MMOL/L (ref 3.5–5.3)
PROT SERPL-MCNC: 7.3 G/DL (ref 6.4–8.4)
PROTHROMBIN TIME: 29.4 SECONDS (ref 12.3–15)
RBC # BLD AUTO: 4.94 MILLION/UL (ref 3.88–5.62)
SODIUM SERPL-SCNC: 135 MMOL/L (ref 135–147)
TRIGL SERPL-MCNC: 176 MG/DL (ref ?–150)
TSH SERPL DL<=0.05 MIU/L-ACNC: 1.82 UIU/ML (ref 0.45–4.5)
WBC # BLD AUTO: 8.25 THOUSAND/UL (ref 4.31–10.16)

## 2025-05-19 PROCEDURE — 85025 COMPLETE CBC W/AUTO DIFF WBC: CPT

## 2025-05-19 PROCEDURE — 80061 LIPID PANEL: CPT

## 2025-05-19 PROCEDURE — 84443 ASSAY THYROID STIM HORMONE: CPT

## 2025-05-19 PROCEDURE — 83036 HEMOGLOBIN GLYCOSYLATED A1C: CPT

## 2025-05-19 PROCEDURE — 85610 PROTHROMBIN TIME: CPT

## 2025-05-19 PROCEDURE — 80053 COMPREHEN METABOLIC PANEL: CPT

## 2025-05-19 NOTE — PROGRESS NOTES
Spoke with patient, advised INR good, continue 5 mg Mon Fri, 2.5 mg all other days, will recheck in 4 weeks 6/16/25.

## 2025-05-22 ENCOUNTER — RESULTS FOLLOW-UP (OUTPATIENT)
Dept: FAMILY MEDICINE CLINIC | Facility: CLINIC | Age: 75
End: 2025-05-22

## 2025-05-27 ENCOUNTER — OFFICE VISIT (OUTPATIENT)
Dept: FAMILY MEDICINE CLINIC | Facility: CLINIC | Age: 75
End: 2025-05-27
Payer: MEDICARE

## 2025-05-27 VITALS
RESPIRATION RATE: 16 BRPM | WEIGHT: 207 LBS | SYSTOLIC BLOOD PRESSURE: 148 MMHG | TEMPERATURE: 98.2 F | BODY MASS INDEX: 28.98 KG/M2 | OXYGEN SATURATION: 97 % | DIASTOLIC BLOOD PRESSURE: 66 MMHG | HEART RATE: 74 BPM | HEIGHT: 71 IN

## 2025-05-27 DIAGNOSIS — J44.9 CHRONIC OBSTRUCTIVE PULMONARY DISEASE, UNSPECIFIED COPD TYPE (HCC): ICD-10-CM

## 2025-05-27 DIAGNOSIS — I10 ESSENTIAL HYPERTENSION: ICD-10-CM

## 2025-05-27 DIAGNOSIS — E78.2 MIXED HYPERLIPIDEMIA: ICD-10-CM

## 2025-05-27 DIAGNOSIS — Z00.00 MEDICARE ANNUAL WELLNESS VISIT, SUBSEQUENT: Primary | ICD-10-CM

## 2025-05-27 DIAGNOSIS — K21.9 GASTROESOPHAGEAL REFLUX DISEASE WITHOUT ESOPHAGITIS: ICD-10-CM

## 2025-05-27 DIAGNOSIS — I70.213 ATHEROSCLEROSIS OF NATIVE ARTERY OF BOTH LOWER EXTREMITIES WITH INTERMITTENT CLAUDICATION (HCC): ICD-10-CM

## 2025-05-27 DIAGNOSIS — E11.40 TYPE 2 DIABETES MELLITUS WITH DIABETIC NEUROPATHY, WITHOUT LONG-TERM CURRENT USE OF INSULIN (HCC): ICD-10-CM

## 2025-05-27 DIAGNOSIS — Z79.01 ANTICOAGULATED ON COUMADIN: ICD-10-CM

## 2025-05-27 DIAGNOSIS — I48.0 PAROXYSMAL ATRIAL FIBRILLATION (HCC): ICD-10-CM

## 2025-05-27 DIAGNOSIS — K76.0 FATTY LIVER: ICD-10-CM

## 2025-05-27 PROCEDURE — 99214 OFFICE O/P EST MOD 30 MIN: CPT | Performed by: NURSE PRACTITIONER

## 2025-05-27 PROCEDURE — G2211 COMPLEX E/M VISIT ADD ON: HCPCS | Performed by: NURSE PRACTITIONER

## 2025-05-27 PROCEDURE — G0439 PPPS, SUBSEQ VISIT: HCPCS | Performed by: NURSE PRACTITIONER

## 2025-05-27 NOTE — ASSESSMENT & PLAN NOTE
LDL 40, at goal, continue rosuvastatin, fenofibrate.     Orders:  •  CBC; Future  •  Comprehensive metabolic panel; Future  •  Lipid panel; Future  •  TSH, 3rd generation with Free T4 reflex; Future

## 2025-05-27 NOTE — PROGRESS NOTES
Name: Fernando Roberts      : 1950      MRN: [id number]  Encounter Provider: DAYAMI Pedroza  Encounter Date: 2025   Encounter department: [facility]  :  Assessment & Plan  Medicare annual wellness visit, subsequent         Essential hypertension  Stable blood pressure on current medication.    Orders:  •  CBC; Future  •  Comprehensive metabolic panel; Future  •  TSH, 3rd generation with Free T4 reflex; Future    Mixed hyperlipidemia  LDL 40, at goal, continue rosuvastatin, fenofibrate.     Orders:  •  CBC; Future  •  Comprehensive metabolic panel; Future  •  Lipid panel; Future  •  TSH, 3rd generation with Free T4 reflex; Future    Type 2 diabetes mellitus with diabetic neuropathy, without long-term current use of insulin (HCC)    Lab Results   Component Value Date    HGBA1C 6.7 (H) 2025     A1c down to 6.7%. At goal, continue ozempic, metformin, glimepiride.   Eye exam, foot exam up to date.     Orders:  •  CBC; Future  •  Comprehensive metabolic panel; Future  •  Hemoglobin A1C; Future  •  Albumin / creatinine urine ratio; Future    Chronic obstructive pulmonary disease, unspecified COPD type (HCC)  Emphysema changes on CT chest .   Continue to recommend smoking cessation.   Overall asymptomatic.   Rare albuterol use.                    Gastroesophageal reflux disease without esophagitis  Continue omeprazole.        Fatty liver  Metavir score F3, severe fibrosis.   Working on weight loss.   Following with hepatologist.       Paroxysmal atrial fibrillation (HCC)  History of paroxysmal a. Fib. Regular rate and rhythm today.   Anticoagulated on aspirin and coumadin.   Follows with cardiology, Dr. Jay.        Atherosclerosis of native artery of both lower extremities with intermittent claudication (HCC)  Continue Coumadin, aspirin, statin.   Follows with vascula.   LEADS up to date as of 24.         Anticoagulated on Coumadin  Anticoagulated on for history of DVT and  Denita a. Fib.              Depression Screening and Follow-up Plan: Patient was screened for depression during today's encounter. They screened negative with a PHQ-2 score of 0.      Falls Plan of Care: balance, strength, and gait training instructions were provided.         6 month check up with blood work.     Preventive health issues were discussed with patient, and age appropriate screening tests were ordered as noted in patient's After Visit Summary. Personalized health advice and appropriate referrals for health education or preventive services given if needed, as noted in patient's After Visit Summary.    History of Present Illness     Fernando Roberts is a 74 year old male presenting today for annual medicare wellness visit.     Sugars are better.   More active.     Taking Ozempic--some foods doesn't like anymore.   Smaller portions.    Feels well.          Patient Care Team:  DAYAMI Pedroza as PCP - General (Family Medicine)  Rowdy Oneal MD (Ophthalmology)  Christopher Black MD (Otolaryngology)  Levon Westbrook DDS  Naveed Greer DDS (Oral Surgery)  Sourav Boyle DPM (Podiatry)  Filipe Thomas DO (Ophthalmology)    Review of Systems   Constitutional: Negative.    HENT: Negative.     Respiratory: Negative.     Cardiovascular: Negative.    Gastrointestinal: Negative.    Genitourinary: Negative.    Musculoskeletal: Negative.    Skin: Negative.    Neurological: Negative.    Hematological: Negative.    Psychiatric/Behavioral: Negative.       Medical History Reviewed by provider this encounter:  Tobacco  Allergies  Meds  Problems  Med Hx  Surg Hx  Fam Hx       Annual Wellness Visit Questionnaire   Fernando is here for his Subsequent Wellness visit. Last Medicare Wellness visit information reviewed, patient interviewed and updates made to the record today.      Health Risk Assessment:   Patient rates overall health as good. Patient feels that their physical health rating is same. Patient is  very satisfied with their life. Eyesight was rated as slightly better. Hearing was rated as slightly worse. Patient feels that their emotional and mental health rating is same. Patients states they are sometimes angry. Patient states they are sometimes unusually tired/fatigued. Pain experienced in the last 7 days has been some. Patient's pain rating has been 4/10. Patient states that he has experienced no weight loss or gain in last 6 months.     Depression Screening:   PHQ-2 Score: 0      Fall Risk Screening:   In the past year, patient has experienced: history of falling in past year    Number of falls: 2 or more  Injured during fall?: No    Feels unsteady when standing or walking?: No    Worried about falling?: No      Home Safety:  Patient does not have trouble with stairs inside or outside of their home. Patient has working smoke alarms and has no working carbon monoxide detector. Home safety hazards include: none.     Nutrition:   Current diet is Regular.     Medications:   Patient is currently taking over-the-counter supplements. OTC medications include: see medication list. Patient is able to manage medications.     Activities of Daily Living (ADLs)/Instrumental Activities of Daily Living (IADLs):   Walk and transfer into and out of bed and chair?: Yes  Dress and groom yourself?: Yes    Bathe or shower yourself?: Yes    Feed yourself? Yes  Do your laundry/housekeeping?: Yes  Manage your money, pay your bills and track your expenses?: Yes  Make your own meals?: Yes    Do your own shopping?: Yes    Previous Hospitalizations:   Any hospitalizations or ED visits within the last 12 months?: Yes    How many hospitalizations have you had in the last year?: 1-2    Advance Care Planning:   Living will: No    Durable POA for healthcare: Yes    Advanced directive: No    Advanced directive counseling given: Yes    End of Life Decisions reviewed with patient: Yes      Comments: Son is POA, Fernando Roberts    Xavier  Screening:   Provider or family/friend/caregiver concerned regarding cognition?: No    Preventive Screenings      Cardiovascular Screening:    General: Screening Not Indicated and History Lipid Disorder      Diabetes Screening:     General: Screening Not Indicated and History Diabetes      Colorectal Cancer Screening:     General: Screening Current      Prostate Cancer Screening:    General: Screening Not Indicated      Osteoporosis Screening:    General: Screening Not Indicated      Abdominal Aortic Aneurysm (AAA) Screening:    Risk factors include: age between 65-74 yo and tobacco use        General: Screening Current      Lung Cancer Screening:     General: Screening Not Indicated      Hepatitis C Screening:    General: Screening Current    Screening, Brief Intervention, and Referral to Treatment (SBIRT)     Screening  Typical number of drinks in a day: 1  Typical number of drinks in a week: 4  Interpretation: Low risk drinking behavior.    Single Item Drug Screening:  How often have you used an illegal drug (including marijuana) or a prescription medication for non-medical reasons in the past year? never    Single Item Drug Screen Score: 0  Interpretation: Negative screen for possible drug use disorder    Brief Intervention  Alcohol & drug use screenings were reviewed. No concerns regarding substance use disorder identified.     Social Drivers of Health     Financial Resource Strain: Low Risk  (5/18/2023)    Overall Financial Resource Strain (CARDIA)    • Difficulty of Paying Living Expenses: Not hard at all   Food Insecurity: No Food Insecurity (5/27/2025)    Nursing - Inadequate Food Risk Classification    • Worried About Running Out of Food in the Last Year: Never true    • Ran Out of Food in the Last Year: Never true   Transportation Needs: No Transportation Needs (5/27/2025)    PRAPARE - Transportation    • Lack of Transportation (Medical): No    • Lack of Transportation (Non-Medical): No   Housing  "Stability: Low Risk  (5/27/2025)    Housing Stability Vital Sign    • Unable to Pay for Housing in the Last Year: No    • Number of Times Moved in the Last Year: 0    • Homeless in the Last Year: No   Utilities: Not At Risk (5/27/2025)    [facility] Utilities    • Threatened with loss of utilities: No     No results found.    Objective   /66   Pulse 74   Temp 98.2 °F (36.8 °C) (Temporal)   Resp 16   Ht 5' 11\" (1.803 m)   Wt 93.9 kg (207 lb)   SpO2 97%   BMI 28.87 kg/m²     Physical Exam  Vitals and nursing note reviewed.   Constitutional:       General: He is not in acute distress.     Appearance: Normal appearance. He is not ill-appearing.   HENT:      Head: Atraumatic.      Right Ear: Tympanic membrane normal.      Left Ear: Tympanic membrane normal.      Mouth/Throat:      Mouth: Mucous membranes are moist.      Pharynx: Oropharynx is clear.     Eyes:      Conjunctiva/sclera: Conjunctivae normal.      Pupils: Pupils are equal, round, and reactive to light.     Neck:      Thyroid: No thyromegaly.     Cardiovascular:      Rate and Rhythm: Normal rate and regular rhythm.      Heart sounds: No murmur heard.  Pulmonary:      Effort: Pulmonary effort is normal.      Breath sounds: Normal breath sounds.     Musculoskeletal:      Cervical back: Normal range of motion and neck supple.      Right lower leg: No edema.      Left lower leg: No edema.   Lymphadenopathy:      Cervical: No cervical adenopathy.     Skin:     General: Skin is warm and dry.      Findings: No rash.     Neurological:      Mental Status: He is alert.      Gait: Gait abnormal (ambulates with a cane).     Psychiatric:         Mood and Affect: Mood normal.       Current Medications[1]          [1]    Current Outpatient Medications:   •  acetaminophen (TYLENOL) 325 mg tablet, 2, by mouth, every 6 hours as needed for mild to moderate pain., Disp: 30 tablet, Rfl: 0  •  albuterol (PROVENTIL HFA,VENTOLIN HFA) 90 mcg/act inhaler, INHALE 2 PUFFS BY MOUTH " EVERY 4 HOURS AS NEEDED FOR WHEEZE, Disp: 8.5 g, Rfl: 5  •  aspirin (ECOTRIN LOW STRENGTH) 81 mg EC tablet, Take 81 mg by mouth in the morning., Disp: , Rfl:   •  Cyanocobalamin (VITAMIN B 12 PO), Take 1,000 mcg by mouth in the morning., Disp: , Rfl:   •  fenofibrate micronized (LOFIBRA) 134 MG capsule, TAKE 1 CAPSULE (134 MG TOTAL) BY MOUTH DAILY WITH BREAKFAST, Disp: 90 capsule, Rfl: 3  •  gabapentin (NEURONTIN) 100 mg capsule, TAKE 2 CAPSULES (200 MG TOTAL) BY MOUTH DAILY AT BEDTIME, Disp: 180 capsule, Rfl: 1  •  glimepiride (AMARYL) 2 mg tablet, TAKE 1 AND HALF TABLET (3 MG TOTAL) BY MOUTH DAILY WITH BREAKFAST, Disp: 135 tablet, Rfl: 1  •  lisinopril (ZESTRIL) 20 mg tablet, TAKE 1 TABLET BY MOUTH EVERY DAY, Disp: 90 tablet, Rfl: 1  •  metFORMIN (GLUCOPHAGE-XR) 500 mg 24 hr tablet, TAKE 2 TABLETS BY MOUTH TWICE A DAY WITH FOOD, Disp: 360 tablet, Rfl: 2  •  omeprazole (PriLOSEC) 20 mg delayed release capsule, TAKE 1 CAPSULE BY MOUTH EVERY DAY, Disp: 90 capsule, Rfl: 1  •  rosuvastatin (CRESTOR) 20 MG tablet, Take 1 tablet (20 mg total) by mouth daily, Disp: 100 tablet, Rfl: 3  •  semaglutide, 1 mg/dose, (Ozempic, 1 MG/DOSE,) 4 mg/3 mL injection pen, INJECT 0.75 ML (1 MG TOTAL) UNDER THE SKIN ONCE A WEEK, Disp: 9 mL, Rfl: 1  •  tamsulosin (FLOMAX) 0.4 mg, TAKE 1 CAPSULE BY MOUTH EVERY DAY WITH DINNER, Disp: 90 capsule, Rfl: 1  •  triamterene-hydrochlorothiazide (MAXZIDE-25) 37.5-25 mg per tablet, Take by mouth, Disp: , Rfl:   •  warfarin (COUMADIN) 2.5 mg tablet, Take 1 tablet by mouth daily T-Th-S-S takes 2.5mg    M-W-F takes 5.0 mg, Disp: , Rfl:   •  warfarin (COUMADIN) 5 mg tablet, TAKE 1 TABLET EVERY DAY OR AS DIRECTED BY MD, Disp: 90 tablet, Rfl: 3

## 2025-05-27 NOTE — ASSESSMENT & PLAN NOTE
Stable blood pressure on current medication.    Orders:  •  CBC; Future  •  Comprehensive metabolic panel; Future  •  TSH, 3rd generation with Free T4 reflex; Future

## 2025-05-27 NOTE — ASSESSMENT & PLAN NOTE
Lab Results   Component Value Date    HGBA1C 6.7 (H) 05/19/2025     A1c down to 6.7%. At goal, continue ozempic, metformin, glimepiride.   Eye exam, foot exam up to date.     Orders:  •  CBC; Future  •  Comprehensive metabolic panel; Future  •  Hemoglobin A1C; Future  •  Albumin / creatinine urine ratio; Future

## 2025-05-27 NOTE — PATIENT INSTRUCTIONS
Medicare Preventive Visit Patient Instructions  Thank you for completing your Welcome to Medicare Visit or Medicare Annual Wellness Visit today. Your next wellness visit will be due in one year (5/28/2026).  The screening/preventive services that you may require over the next 5-10 years are detailed below. Some tests may not apply to you based off risk factors and/or age. Screening tests ordered at today's visit but not completed yet may show as past due. Also, please note that scanned in results may not display below.  Preventive Screenings:  Service Recommendations Previous Testing/Comments   Colorectal Cancer Screening  Colonoscopy    Fecal Occult Blood Test (FOBT)/Fecal Immunochemical Test (FIT)  Fecal DNA/Cologuard Test  Flexible Sigmoidoscopy Age: 45-75 years old   Colonoscopy: every 10 years (May be performed more frequently if at higher risk)  OR  FOBT/FIT: every 1 year  OR  Cologuard: every 3 years  OR  Sigmoidoscopy: every 5 years  Screening may be recommended earlier than age 45 if at higher risk for colorectal cancer. Also, an individualized decision between you and your healthcare provider will decide whether screening between the ages of 76-85 would be appropriate. Colonoscopy: 12/01/2022  FOBT/FIT: Not on file  Cologuard: Not on file  Sigmoidoscopy: Not on file    Screening Current     Prostate Cancer Screening Individualized decision between patient and health care provider in men between ages of 55-69   Medicare will cover every 12 months beginning on the day after your 50th birthday PSA: 1.2 ng/mL           Hepatitis C Screening Once for adults born between 1945 and 1965  More frequently in patients at high risk for Hepatitis C Hep C Antibody: 03/05/2019    Screening Current   Diabetes Screening 1-2 times per year if you're at risk for diabetes or have pre-diabetes Fasting glucose: 86 mg/dL (5/19/2025)  A1C: 6.7 % (5/19/2025)  Screening Not Indicated  History Diabetes   Cholesterol Screening Once  every 5 years if you don't have a lipid disorder. May order more often based on risk factors. Lipid panel: 05/19/2025  Screening Not Indicated  History Lipid Disorder      Other Preventive Screenings Covered by Medicare:  Abdominal Aortic Aneurysm (AAA) Screening: covered once if your at risk. You're considered to be at risk if you have a family history of AAA or a male between the age of 65-75 who smoking at least 100 cigarettes in your lifetime.  Lung Cancer Screening: covers low dose CT scan once per year if you meet all of the following conditions: (1) Age 55-77; (2) No signs or symptoms of lung cancer; (3) Current smoker or have quit smoking within the last 15 years; (4) You have a tobacco smoking history of at least 20 pack years (packs per day x number of years you smoked); (5) You get a written order from a healthcare provider.  Glaucoma Screening: covered annually if you're considered high risk: (1) You have diabetes OR (2) Family history of glaucoma OR (3)  aged 50 and older OR (4)  American aged 65 and older  Osteoporosis Screening: covered every 2 years if you meet one of the following conditions: (1) Have a vertebral abnormality; (2) On glucocorticoid therapy for more than 3 months; (3) Have primary hyperparathyroidism; (4) On osteoporosis medications and need to assess response to drug therapy.  HIV Screening: covered annually if you're between the age of 15-65. Also covered annually if you are younger than 15 and older than 65 with risk factors for HIV infection. For pregnant patients, it is covered up to 3 times per pregnancy.    Immunizations:  Immunization Recommendations   Influenza Vaccine Annual influenza vaccination during flu season is recommended for all persons aged >= 6 months who do not have contraindications   Pneumococcal Vaccine   * Pneumococcal conjugate vaccine = PCV13 (Prevnar 13), PCV15 (Vaxneuvance), PCV20 (Prevnar 20)  * Pneumococcal polysaccharide vaccine  = PPSV23 (Pneumovax) Adults 19-63 yo with certain risk factors or if 65+ yo  If never received any pneumonia vaccine: recommend Prevnar 20 (PCV20)  Give PCV20 if previously received 1 dose of PCV13 or PPSV23   Hepatitis B Vaccine 3 dose series if at intermediate or high risk (ex: diabetes, end stage renal disease, liver disease)   Respiratory syncytial virus (RSV) Vaccine - COVERED BY MEDICARE PART D  * RSVPreF3 (Arexvy) CDC recommends that adults 60 years of age and older may receive a single dose of RSV vaccine using shared clinical decision-making (SCDM)   Tetanus (Td) Vaccine - COST NOT COVERED BY MEDICARE PART B Following completion of primary series, a booster dose should be given every 10 years to maintain immunity against tetanus. Td may also be given as tetanus wound prophylaxis.   Tdap Vaccine - COST NOT COVERED BY MEDICARE PART B Recommended at least once for all adults. For pregnant patients, recommended with each pregnancy.   Shingles Vaccine (Shingrix) - COST NOT COVERED BY MEDICARE PART B  2 shot series recommended in those 19 years and older who have or will have weakened immune systems or those 50 years and older     Health Maintenance Due:      Topic Date Due   • Colorectal Cancer Screening  11/30/2027   • Hepatitis C Screening  Completed     Immunizations Due:      Topic Date Due   • COVID-19 Vaccine (10 - 2024-25 season) 09/01/2024     Advance Directives   What are advance directives?  Advance directives are legal documents that state your wishes and plans for medical care. These plans are made ahead of time in case you lose your ability to make decisions for yourself. Advance directives can apply to any medical decision, such as the treatments you want, and if you want to donate organs.   What are the types of advance directives?  There are many types of advance directives, and each state has rules about how to use them. You may choose a combination of any of the following:  Living will:  This  is a written record of the treatment you want. You can also choose which treatments you do not want, which to limit, and which to stop at a certain time. This includes surgery, medicine, IV fluid, and tube feedings.   Durable power of  for healthcare (DPAHC):  This is a written record that states who you want to make healthcare choices for you when you are unable to make them for yourself. This person, called a proxy, is usually a family member or a friend. You may choose more than 1 proxy.  Do not resuscitate (DNR) order:  A DNR order is used in case your heart stops beating or you stop breathing. It is a request not to have certain forms of treatment, such as CPR. A DNR order may be included in other types of advance directives.  Medical directive:  This covers the care that you want if you are in a coma, near death, or unable to make decisions for yourself. You can list the treatments you want for each condition. Treatment may include pain medicine, surgery, blood transfusions, dialysis, IV or tube feedings, and a ventilator (breathing machine).  Values history:  This document has questions about your views, beliefs, and how you feel and think about life. This information can help others choose the care that you would choose.  Why are advance directives important?  An advance directive helps you control your care. Although spoken wishes may be used, it is better to have your wishes written down. Spoken wishes can be misunderstood, or not followed. Treatments may be given even if you do not want them. An advance directive may make it easier for your family to make difficult choices about your care.   Fall Prevention    Fall prevention  includes ways to make your home and other areas safer. It also includes ways you can move more carefully to prevent a fall. Health conditions that cause changes in your blood pressure, vision, or muscle strength and coordination may increase your risk for falls. Medicines  may also increase your risk for falls if they make you dizzy, weak, or sleepy.   Fall prevention tips:   Stand or sit up slowly.    Use assistive devices as directed.    Wear shoes that fit well and have soles that .    Wear a personal alarm.    Stay active.    Manage your medical conditions.    Home Safety Tips:  Add items to prevent falls in the bathroom.    Keep paths clear.    Install bright lights in your home.    Keep items you use often on shelves within reach.    Paint or place reflective tape on the edges of your stairs.    Cigarette Smoking and Your Health   Risks to your health if you smoke:  Nicotine and other chemicals found in tobacco damage every cell in your body. Even if you are a light smoker, you have an increased risk for cancer, heart disease, and lung disease. If you are pregnant or have diabetes, smoking increases your risk for complications.   Benefits to your health if you stop smoking:   You decrease respiratory symptoms such as coughing, wheezing, and shortness of breath.   You reduce your risk for cancers of the lung, mouth, throat, kidney, bladder, pancreas, stomach, and cervix. If you already have cancer, you increase the benefits of chemotherapy. You also reduce your risk for cancer returning or a second cancer from developing.   You reduce your risk for heart disease, blood clots, heart attack, and stroke.   You reduce your risk for lung infections, and diseases such as pneumonia, asthma, chronic bronchitis, and emphysema.  Your circulation improves. More oxygen can be delivered to your body. If you have diabetes, you lower your risk for complications, such as kidney, artery, and eye diseases. You also lower your risk for nerve damage. Nerve damage can lead to amputations, poor vision, and blindness.  You improve your body's ability to heal and to fight infections.  For more information and support to stop smoking:   [employer]  Phone: 0- 724 - 609-3500  Web Address:  www.smokefree.gov  Weight Management   Why it is important to manage your weight:  Being overweight increases your risk of health conditions such as heart disease, high blood pressure, type 2 diabetes, and certain types of cancer. It can also increase your risk for osteoarthritis, sleep apnea, and other respiratory problems. Aim for a slow, steady weight loss. Even a small amount of weight loss can lower your risk of health problems.  How to lose weight safely:  A safe and healthy way to lose weight is to eat fewer calories and get regular exercise. You can lose up about 1 pound a week by decreasing the number of calories you eat by 500 calories each day.   Healthy meal plan for weight management:  A healthy meal plan includes a variety of foods, contains fewer calories, and helps you stay healthy. A healthy meal plan includes the following:  Eat whole-grain foods more often.  A healthy meal plan should contain fiber. Fiber is the part of grains, fruits, and vegetables that is not broken down by your body. Whole-grain foods are healthy and provide extra fiber in your diet. Some examples of whole-grain foods are whole-wheat breads and pastas, oatmeal, brown rice, and bulgur.  Eat a variety of vegetables every day.  Include dark, leafy greens such as spinach, kale, carito greens, and mustard greens. Eat yellow and orange vegetables such as carrots, sweet potatoes, and winter squash.   Eat a variety of fruits every day.  Choose fresh or canned fruit (canned in its own juice or light syrup) instead of juice. Fruit juice has very little or no fiber.  Eat low-fat dairy foods.  Drink fat-free (skim) milk or 1% milk. Eat fat-free yogurt and low-fat cottage cheese. Try low-fat cheeses such as mozzarella and other reduced-fat cheeses.  Choose meat and other protein foods that are low in fat.  Choose beans or other legumes such as split peas or lentils. Choose fish, skinless poultry (chicken or turkey), or lean cuts of red  meat (beef or pork). Before you cook meat or poultry, cut off any visible fat.   Use less fat and oil.  Try baking foods instead of frying them. Add less fat, such as margarine, sour cream, regular salad dressing and mayonnaise to foods. Eat fewer high-fat foods. Some examples of high-fat foods include french fries, doughnuts, ice cream, and cakes.  Eat fewer sweets.  Limit foods and drinks that are high in sugar. This includes candy, cookies, regular soda, and sweetened drinks.  Exercise:  Exercise at least 30 minutes per day on most days of the week. Some examples of exercise include walking, biking, dancing, and swimming. You can also fit in more physical activity by taking the stairs instead of the elevator or parking farther away from stores. Ask your healthcare provider about the best exercise plan for you.    © Copyright [employer] 2018 Information is for End User's use only and may not be sold, redistributed or otherwise used for commercial purposes. All illustrations and images included in CareNotes® are the copyrighted property of [employer] or [employer]

## 2025-05-31 NOTE — ASSESSMENT & PLAN NOTE
History of paroxysmal a. Fib. Regular rate and rhythm today.   Anticoagulated on aspirin and coumadin.   Follows with cardiology, Dr. Jay.

## 2025-05-31 NOTE — ASSESSMENT & PLAN NOTE
Continue Coumadin, aspirin, statin.   Follows with vascula.   LEADS up to date as of 12/6/24.

## 2025-06-16 ENCOUNTER — ANTICOAG VISIT (OUTPATIENT)
Dept: CARDIOLOGY CLINIC | Facility: CLINIC | Age: 75
End: 2025-06-16

## 2025-06-16 ENCOUNTER — APPOINTMENT (OUTPATIENT)
Dept: LAB | Facility: AMBULARY SURGERY CENTER | Age: 75
End: 2025-06-16
Payer: MEDICARE

## 2025-06-16 DIAGNOSIS — Z86.718 HISTORY OF DVT (DEEP VEIN THROMBOSIS): Primary | ICD-10-CM

## 2025-06-16 DIAGNOSIS — I10 ESSENTIAL HYPERTENSION: ICD-10-CM

## 2025-06-16 DIAGNOSIS — E11.40 TYPE 2 DIABETES MELLITUS WITH DIABETIC NEUROPATHY, WITHOUT LONG-TERM CURRENT USE OF INSULIN (HCC): ICD-10-CM

## 2025-06-16 DIAGNOSIS — E78.2 MIXED HYPERLIPIDEMIA: ICD-10-CM

## 2025-06-16 DIAGNOSIS — I82.402 ACUTE DEEP VEIN THROMBOSIS (DVT) OF LEFT LOWER EXTREMITY, UNSPECIFIED VEIN (HCC): ICD-10-CM

## 2025-06-16 LAB
INR PPP: 3.87 (ref 0.85–1.19)
PROTHROMBIN TIME: 37.3 SECONDS (ref 12.3–15)

## 2025-06-16 PROCEDURE — 85610 PROTHROMBIN TIME: CPT

## 2025-06-16 PROCEDURE — 36415 COLL VENOUS BLD VENIPUNCTURE: CPT

## 2025-06-16 NOTE — PROGRESS NOTES
Left message for patient, advised INR high, advised to hold tonight, then go back to 5 mg Mon Fri, 2.5 mg all other days, will recheck next week 6/25/25 or 6/26/25  Advised to call office with any changes in medications or diet.

## 2025-06-26 ENCOUNTER — APPOINTMENT (OUTPATIENT)
Dept: LAB | Facility: AMBULARY SURGERY CENTER | Age: 75
End: 2025-06-26
Payer: MEDICARE

## 2025-06-26 ENCOUNTER — ANTICOAG VISIT (OUTPATIENT)
Dept: CARDIOLOGY CLINIC | Facility: CLINIC | Age: 75
End: 2025-06-26

## 2025-06-26 DIAGNOSIS — I82.402 ACUTE DEEP VEIN THROMBOSIS (DVT) OF LEFT LOWER EXTREMITY, UNSPECIFIED VEIN (HCC): ICD-10-CM

## 2025-06-26 DIAGNOSIS — Z86.718 HISTORY OF DVT (DEEP VEIN THROMBOSIS): Primary | ICD-10-CM

## 2025-06-26 LAB
INR PPP: 2.87 (ref 0.85–1.19)
PROTHROMBIN TIME: 29.8 SECONDS (ref 12.3–15)

## 2025-06-26 PROCEDURE — 36415 COLL VENOUS BLD VENIPUNCTURE: CPT

## 2025-06-26 PROCEDURE — 85610 PROTHROMBIN TIME: CPT

## 2025-06-26 NOTE — PROGRESS NOTES
Spoke with patient, advised INR good, states he was on prednisone cream, now off.  Will continue 5 mg Mon Fri, 2.5 mg all other days, will recheck in 4 weeks 7/24/25

## 2025-07-03 DIAGNOSIS — G62.9 PERIPHERAL POLYNEUROPATHY: ICD-10-CM

## 2025-07-03 DIAGNOSIS — E78.2 MIXED HYPERLIPIDEMIA: ICD-10-CM

## 2025-07-03 RX ORDER — GABAPENTIN 100 MG/1
200 CAPSULE ORAL
Qty: 180 CAPSULE | Refills: 1 | Status: SHIPPED | OUTPATIENT
Start: 2025-07-03

## 2025-07-03 RX ORDER — FENOFIBRATE 134 MG/1
134 CAPSULE ORAL
Qty: 90 CAPSULE | Refills: 1 | Status: SHIPPED | OUTPATIENT
Start: 2025-07-03

## 2025-07-18 ENCOUNTER — PROCEDURE VISIT (OUTPATIENT)
Dept: PODIATRY | Facility: CLINIC | Age: 75
End: 2025-07-18
Payer: MEDICARE

## 2025-07-18 VITALS — HEIGHT: 71 IN | BODY MASS INDEX: 27.44 KG/M2 | WEIGHT: 196 LBS

## 2025-07-18 DIAGNOSIS — I70.213 ATHEROSCLEROSIS OF NATIVE ARTERY OF BOTH LOWER EXTREMITIES WITH INTERMITTENT CLAUDICATION (HCC): ICD-10-CM

## 2025-07-18 DIAGNOSIS — E11.40 TYPE 2 DIABETES MELLITUS WITH DIABETIC NEUROPATHY, WITHOUT LONG-TERM CURRENT USE OF INSULIN (HCC): Primary | ICD-10-CM

## 2025-07-18 DIAGNOSIS — B35.1 TINEA UNGUIUM: ICD-10-CM

## 2025-07-18 DIAGNOSIS — L84 CALLUS OF FOOT: ICD-10-CM

## 2025-07-18 PROCEDURE — 11056 PARNG/CUTG B9 HYPRKR LES 2-4: CPT | Performed by: PODIATRIST

## 2025-07-18 PROCEDURE — 11721 DEBRIDE NAIL 6 OR MORE: CPT | Performed by: PODIATRIST

## 2025-07-18 NOTE — PROGRESS NOTES
"Fernando GUERRERO Armando  1950  AT RISK FOOT CARE    1. Type 2 diabetes mellitus with diabetic neuropathy, without long-term current use of insulin (HCC)  Lesion Destruction      2. Atherosclerosis of native artery of both lower extremities with intermittent claudication (HCC)  Lesion Destruction      3. Tinea unguium  Lesion Destruction      4. Callus of foot  Lesion Destruction          Patient presents for at-risk foot care.  Patient has no acute concerns today.  Patient has significant lower extremity risk due to diminished pulses in the feet and trophic skin changes to the lower extremity including thick toenail, atrophic skin, and decreased hair growth.      On exam patient has thickened, hypertrophic, discolored, brittle toenails with subungual debris and tenderness x10   Callus: right and left hallux, left MTPJ  Patient has diminished pedal pulses and decreased perfusion to the lower extremities  Patient has significant trophic changes to the skin including thick toenails, decreased pedal hair and atrophic skin.     Today's treatment includes:  Debridement of toenails. Using nail nipper, nadya, and curette, nails were sharply debrided, reduced in thickness and length. Devitalized nail tissue and fungal debris excised and removed. Patient tolerated well.    Lesion Destruction    Date/Time: 7/18/2025 9:00 AM    Performed by: Sourav Boyle DPM  Authorized by: Sourav Boyle DPM    Universal Protocol:  Consent: Verbal consent obtained  Risks and benefits: risks, benefits and alternatives were discussed  Consent given by: patient  Time out: Immediately prior to procedure a \"time out\" was called to verify the correct patient, procedure, equipment, support staff and site/side marked as required.  Timeout called at: 7/18/2025 9:16 AM.  Patient understanding: patient states understanding of the procedure being performed  Patient identity confirmed: verbally with patient    Procedure Details - Lesion Destruction: "     Number of Lesions:  3  Lesion 1:     Body area:  Lower extremity    Lower extremity location:  L foot    Malignancy: benign hyperkeratotic lesion      Destruction method: scissors used for extraction    Lesion 2:     Body area:  Lower extremity    Lower extremity location:  L big toe    Malignancy: benign hyperkeratotic lesion      Destruction method: scissors used for extraction    Lesion 3:     Body area:  Lower extremity    Lower extremity location:  R big toe    Malignancy: benign hyperkeratotic lesion      Destruction method: scissors used for extraction        Discussed proper shoe gear, daily inspections of feet, and general foot health with patient. Patient has Q8  findings and is recommended for at risk foot care every 9-10 weeks.    Patients most recent complete clinical foot exam was on: 2/21/2025

## 2025-07-22 ENCOUNTER — ANTICOAG VISIT (OUTPATIENT)
Dept: CARDIOLOGY CLINIC | Facility: CLINIC | Age: 75
End: 2025-07-22

## 2025-07-22 ENCOUNTER — APPOINTMENT (OUTPATIENT)
Dept: LAB | Facility: AMBULARY SURGERY CENTER | Age: 75
End: 2025-07-22
Payer: MEDICARE

## 2025-07-22 DIAGNOSIS — I82.402 ACUTE DEEP VEIN THROMBOSIS (DVT) OF LEFT LOWER EXTREMITY, UNSPECIFIED VEIN (HCC): ICD-10-CM

## 2025-07-22 DIAGNOSIS — Z86.718 HISTORY OF DVT (DEEP VEIN THROMBOSIS): Primary | ICD-10-CM

## 2025-07-22 DIAGNOSIS — I10 ESSENTIAL HYPERTENSION: ICD-10-CM

## 2025-07-22 DIAGNOSIS — E78.2 MIXED HYPERLIPIDEMIA: ICD-10-CM

## 2025-07-22 LAB
INR PPP: 2.94 (ref 0.85–1.19)
PROTHROMBIN TIME: 30.4 SECONDS (ref 12.3–15)

## 2025-07-22 PROCEDURE — 85610 PROTHROMBIN TIME: CPT

## 2025-07-22 PROCEDURE — 36415 COLL VENOUS BLD VENIPUNCTURE: CPT

## 2025-07-22 NOTE — PROGRESS NOTES
Spoke with patient, advised INR slowly creeping up, no changes. Will continue 5 mg Mon Fri, 2.5 mg all other days, will recheck in 3 weeks 8/12/25

## 2025-08-06 DIAGNOSIS — E11.40 TYPE 2 DIABETES MELLITUS WITH DIABETIC NEUROPATHY, WITHOUT LONG-TERM CURRENT USE OF INSULIN (HCC): ICD-10-CM

## 2025-08-08 ENCOUNTER — APPOINTMENT (OUTPATIENT)
Dept: LAB | Facility: AMBULARY SURGERY CENTER | Age: 75
End: 2025-08-08
Attending: INTERNAL MEDICINE
Payer: MEDICARE

## 2025-08-08 ENCOUNTER — ANTICOAG VISIT (OUTPATIENT)
Dept: CARDIOLOGY CLINIC | Facility: CLINIC | Age: 75
End: 2025-08-08

## 2025-08-08 DIAGNOSIS — Z86.718 HISTORY OF DVT (DEEP VEIN THROMBOSIS): Primary | ICD-10-CM

## 2025-08-08 RX ORDER — GLIMEPIRIDE 2 MG/1
TABLET ORAL
Qty: 135 TABLET | Refills: 1 | Status: SHIPPED | OUTPATIENT
Start: 2025-08-08

## (undated) DEVICE — SUT SILK 3-0 TIES 144 IN LA54G

## (undated) DEVICE — OCCLUSIVE GAUZE STRIP,3% BISMUTH TRIBROMOPHENATE IN PETROLATUM BLEND: Brand: XEROFORM

## (undated) DEVICE — PLUMEPEN PRO 10FT

## (undated) DEVICE — DECANTER: Brand: UNBRANDED

## (undated) DEVICE — CHLORAPREP HI-LITE 26ML ORANGE

## (undated) DEVICE — GLOVE SRG BIOGEL ORTHOPEDIC 7

## (undated) DEVICE — ACE WRAP 3 IN STERILE

## (undated) DEVICE — LIGHT HANDLE COVER SLEEVE DISP BLUE STELLAR

## (undated) DEVICE — NEEDLE 25G X 1 1/2

## (undated) DEVICE — GLOVE SRG BIOGEL 6.5

## (undated) DEVICE — PROGRASP FORCEPS: Brand: ENDOWRIST

## (undated) DEVICE — NEEDLE 23G X 1 1/2 SAFETY-GLIDE THIN WALL

## (undated) DEVICE — INTENDED FOR TISSUE SEPARATION, AND OTHER PROCEDURES THAT REQUIRE A SHARP SURGICAL BLADE TO PUNCTURE OR CUT.: Brand: BARD-PARKER ® CARBON RIB-BACK BLADES

## (undated) DEVICE — HEAVY DUTY TABLE COVER: Brand: CONVERTORS

## (undated) DEVICE — GLOVE SRG BIOGEL 7

## (undated) DEVICE — DRAPE SHEET THREE QUARTER

## (undated) DEVICE — SKIN MARKER DUAL TIP WITH RULER CAP, FLEXIBLE RULER AND LABELS: Brand: DEVON

## (undated) DEVICE — DISPOSABLE EQUIPMENT COVER: Brand: SMALL TOWEL DRAPE

## (undated) DEVICE — BULB SYRINGE,IRRIGATION WITH PROTECTIVE CAP: Brand: DOVER

## (undated) DEVICE — 2, DISPOSABLE SUCTION/IRRIGATOR WITHOUT DISPOSABLE TIP: Brand: STRYKEFLOW

## (undated) DEVICE — INTENDED FOR TISSUE SEPARATION, AND OTHER PROCEDURES THAT REQUIRE A SHARP SURGICAL BLADE TO PUNCTURE OR CUT.: Brand: BARD-PARKER SAFETY BLADES SIZE 15, STERILE

## (undated) DEVICE — MAYO STAND COVER: Brand: CONVERTORS

## (undated) DEVICE — COLUMN DRAPE

## (undated) DEVICE — INTENDED FOR TISSUE SEPARATION, AND OTHER PROCEDURES THAT REQUIRE A SHARP SURGICAL BLADE TO PUNCTURE OR CUT.: Brand: BARD-PARKER SAFETY BLADES SIZE 11, STERILE

## (undated) DEVICE — SUT SILK 3-0 SH 30 IN K832H

## (undated) DEVICE — BETHLEHEM UNIVERSAL MINOR GEN: Brand: CARDINAL HEALTH

## (undated) DEVICE — ADHESIVE SKIN HIGH VISCOSITY EXOFIN 1ML

## (undated) DEVICE — GLOVE SRG BIOGEL ECLIPSE 7.5

## (undated) DEVICE — TOWEL SURG XR DETECT GREEN STRL RFD

## (undated) DEVICE — TIBURON SPLIT SHEET: Brand: CONVERTORS

## (undated) DEVICE — PACK PBDS LAP CHOLE RF

## (undated) DEVICE — CURITY STRETCH BANDAGE: Brand: CURITY

## (undated) DEVICE — ASTOUND STANDARD SURGICAL GOWN, XL: Brand: CONVERTORS

## (undated) DEVICE — PACK TUR

## (undated) DEVICE — ARM DRAPE

## (undated) DEVICE — MEDIUM-LARGE CLIP APPLIER: Brand: ENDOWRIST

## (undated) DEVICE — SUT MONOCRYL 4-0 PS-2 27 IN Y426H

## (undated) DEVICE — HEM-O-LOK CLIP CARTRIDGE MED/LARGE DA VINCI SI/XI

## (undated) DEVICE — TROCAR: Brand: KII FIOS FIRST ENTRY

## (undated) DEVICE — IMPERVIOUS STOCKINETTE: Brand: DEROYAL

## (undated) DEVICE — TUBING SMOKE EVAC W/FILTRATION DEVICE PLUMEPORT ACTIV

## (undated) DEVICE — GLOVE INDICATOR PI UNDERGLOVE SZ 6.5 BLUE

## (undated) DEVICE — PERMANENT CAUTERY HOOK: Brand: ENDOWRIST

## (undated) DEVICE — BIPOLAR CORD DISP

## (undated) DEVICE — PACK UNIVERSAL NECK

## (undated) DEVICE — STERILE SURGICAL LUBRICANT,  TUBE: Brand: SURGILUBE

## (undated) DEVICE — ELECTRODE 8227410 PAIRED 2 CH SET ROHS

## (undated) DEVICE — SPONGE PVP SCRUB WING STERILE

## (undated) DEVICE — GLOVE SRG BIOGEL ORTHOPEDIC 8

## (undated) DEVICE — SCD SEQUENTIAL COMPRESSION COMFORT SLEEVE MEDIUM KNEE LENGTH: Brand: KENDALL SCD

## (undated) DEVICE — SUT VICRYL 3-0 SH 27 IN J416H

## (undated) DEVICE — GAUZE SPONGES,16 PLY: Brand: CURITY

## (undated) DEVICE — GUIDEWIRE STRGHT TIP 0.035 IN  SOLO PLUS

## (undated) DEVICE — VISUALIZATION SYSTEM: Brand: CLEARIFY

## (undated) DEVICE — EMG TUBE 8229708 NIM TRIVANTAGE 8.0MM ID: Brand: NIM TRIVANTAGE™

## (undated) DEVICE — SUT SILK 2-0 SH 30 IN K833H

## (undated) DEVICE — LASER FIBER HOLMIUM 272MICRON

## (undated) DEVICE — CANNULA SEAL

## (undated) DEVICE — MINI BLADE ROUND TIP SHARP ON ONE SIDE

## (undated) DEVICE — SUT VICRYL 0 UR-6 27 IN J603H

## (undated) DEVICE — CUFF TOURNIQUET 18 X 4 IN QUICK CONNECT DISP 1 BLADDER

## (undated) DEVICE — STERILE BETHLEHEM PLASTIC HAND: Brand: CARDINAL HEALTH

## (undated) DEVICE — BLADELESS OBTURATOR: Brand: WECK VISTA

## (undated) DEVICE — VIAL DECANTER

## (undated) DEVICE — CONMED ACCESSORY ELECTRODE, FLAT BLADE WITH EXTENDED INSULATION: Brand: CONMED

## (undated) DEVICE — TISSUE RETRIEVAL SYSTEM: Brand: INZII RETRIEVAL SYSTEM

## (undated) DEVICE — BASKET STONE RTRVL ZERO TIP 2.4FR

## (undated) DEVICE — UROCATCH BAG

## (undated) DEVICE — PADDING CAST 4 IN  COTTON STRL

## (undated) DEVICE — GLOVE INDICATOR PI UNDERGLOVE SZ 7 BLUE